# Patient Record
Sex: MALE | Race: WHITE | Employment: OTHER | URBAN - METROPOLITAN AREA
[De-identification: names, ages, dates, MRNs, and addresses within clinical notes are randomized per-mention and may not be internally consistent; named-entity substitution may affect disease eponyms.]

---

## 2019-11-04 ENCOUNTER — OFFICE VISIT (OUTPATIENT)
Dept: PHYSICAL MEDICINE AND REHAB | Age: 74
End: 2019-11-04
Payer: COMMERCIAL

## 2019-11-04 VITALS
SYSTOLIC BLOOD PRESSURE: 111 MMHG | BODY MASS INDEX: 30.1 KG/M2 | WEIGHT: 215 LBS | HEART RATE: 58 BPM | DIASTOLIC BLOOD PRESSURE: 65 MMHG | HEIGHT: 71 IN

## 2019-11-04 DIAGNOSIS — R25.2 SPASTICITY: ICD-10-CM

## 2019-11-04 DIAGNOSIS — I63.232 CEREBROVASCULAR ACCIDENT (CVA) DUE TO OCCLUSION OF LEFT CAROTID ARTERY (HCC): Primary | ICD-10-CM

## 2019-11-04 DIAGNOSIS — Z74.09 IMPAIRED MOBILITY AND ADLS: ICD-10-CM

## 2019-11-04 DIAGNOSIS — Z78.9 IMPAIRED MOBILITY AND ADLS: ICD-10-CM

## 2019-11-04 DIAGNOSIS — I69.30 CHRONIC ISCHEMIC LEFT MCA STROKE: ICD-10-CM

## 2019-11-04 PROCEDURE — 99204 OFFICE O/P NEW MOD 45 MIN: CPT | Performed by: PHYSICAL MEDICINE & REHABILITATION

## 2019-11-04 RX ORDER — LISINOPRIL 10 MG/1
10 TABLET ORAL DAILY
COMMUNITY
Start: 2019-11-04 | End: 2020-03-05 | Stop reason: ALTCHOICE

## 2019-11-04 RX ORDER — BICALUTAMIDE 50 MG/1
50 TABLET, FILM COATED ORAL DAILY
COMMUNITY
Start: 2018-08-27 | End: 2020-03-04 | Stop reason: ALTCHOICE

## 2019-11-04 RX ORDER — OXYCODONE HYDROCHLORIDE AND ACETAMINOPHEN 5; 325 MG/1; MG/1
1 TABLET ORAL EVERY 6 HOURS PRN
Qty: 2 TABLET | Refills: 0 | Status: SHIPPED | OUTPATIENT
Start: 2019-11-04 | End: 2019-11-05

## 2019-12-09 ENCOUNTER — OFFICE VISIT (OUTPATIENT)
Dept: PHYSICAL MEDICINE AND REHAB | Age: 74
End: 2019-12-09
Payer: COMMERCIAL

## 2019-12-09 VITALS — TEMPERATURE: 97.5 F

## 2019-12-09 DIAGNOSIS — R25.2 SPASTICITY: Primary | ICD-10-CM

## 2019-12-09 PROCEDURE — 64644 CHEMODENERV 1 EXTREM 5/> MUS: CPT | Performed by: PHYSICAL MEDICINE & REHABILITATION

## 2019-12-09 PROCEDURE — 64646 CHEMODENERV TRUNK MUSC 1-5: CPT | Performed by: PHYSICAL MEDICINE & REHABILITATION

## 2019-12-09 PROCEDURE — 64642 CHEMODENERV 1 EXTREMITY 1-4: CPT | Performed by: PHYSICAL MEDICINE & REHABILITATION

## 2020-01-20 ENCOUNTER — OFFICE VISIT (OUTPATIENT)
Dept: PHYSICAL MEDICINE AND REHAB | Age: 75
End: 2020-01-20
Payer: COMMERCIAL

## 2020-01-20 VITALS
WEIGHT: 210 LBS | SYSTOLIC BLOOD PRESSURE: 122 MMHG | HEIGHT: 71 IN | DIASTOLIC BLOOD PRESSURE: 72 MMHG | HEART RATE: 73 BPM | BODY MASS INDEX: 29.4 KG/M2

## 2020-01-20 PROCEDURE — 99213 OFFICE O/P EST LOW 20 MIN: CPT | Performed by: PHYSICAL MEDICINE & REHABILITATION

## 2020-01-20 NOTE — PROGRESS NOTES
Gabriel Frey D.O. [facility]   [address] [facility] [place]  Phone: [phone]  Fax: [phone]        20    Chief Complaint   Patient presents with    Arm Pain     6 week follow up after Botox    Leg Pain       HPI:  Kamar Valentin is a 69y.o. year old man seen today in follow up regarding Botox injection for spasticity. Interval history: Since the last visit the patient had Botox injection in the right upper and lower extremities on 19. There were no complications from the injection. Functional improvements have included ability to perform hand hygiene. His caregiver/wife notes that he has been able to walk with less extensor tone but now notes that the right foot is inverting on stairs. Today, the pain is rated Pain Score:   0 - No pain where 0 is no pain and 10 is pain as bad as it can be.     Past Medical History:   Diagnosis Date    A-fib [facility])     CVA (cerebral vascular accident) ([facility] [address] 11/15/2015    left MCA    Hyperlipemia     Prostate cancer ([facility] [address]     mets to spine, currently on chemo       Past Surgical History:   Procedure Laterality Date    COLONOSCOPY      GASTROSTOMY TUBE PLACEMENT  11/20/15    sonal       Social History     Tobacco Use    Smoking status: Former Smoker     Types: Cigarettes     Last attempt to quit: 2015     Years since quittin.4    Smokeless tobacco: Never Used   Substance Use Topics    Alcohol use: No     Alcohol/week: 0.0 standard drinks    Drug use: No       Family History   Problem Relation Age of Onset    Cancer Mother     Heart Disease Father        Current Outpatient Medications   Medication Sig Dispense Refill    lisinopril (PRINIVIL;ZESTRIL) 10 MG tablet Take 10 mg by mouth daily      rivaroxaban (XARELTO) 20 MG TABS tablet Take 20 mg by mouth daily      bicalutamide (CASODEX) 50 MG chemo tablet Take 50 mg by mouth daily      warfarin (COUMADIN) 3 MG tablet Take by mouth daily      baclofen (LIORESAL) 5 mg TABS Take 5 mg by mouth 3 times daily      tamsulosin (FLOMAX) 0.4 MG capsule Take 0.4 mg by mouth daily      metoprolol (TOPROL-XL) 25 MG XL tablet Take 25 mg by mouth daily      magnesium hydroxide (MILK OF MAGNESIA) 400 MG/5ML suspension Take 30 mLs by mouth daily as needed for Constipation      acetaminophen (TYLENOL) 325 MG tablet 650 mg by PEG Tube route every 6 hours as needed for Fever      sertraline (ZOLOFT) 50 MG tablet Take 50 mg by mouth daily       atorvastatin (LIPITOR) 40 MG tablet 1 tablet by Per G Tube route nightly 30 tablet 3    medicated lip balm (BLISTEX/CARMEX) 2-2.5-6.6 % STCK Apply topically as needed for Dry Lips 1 Stick 0    calcium carbonate 600 MG TABS tablet 1 tablet by Per G Tube route daily 30 tablet 3    Multiple Vitamins-Minerals (THERAPEUTIC MULTIVITAMIN-MINERALS) tablet Take 1 tablet by mouth daily      Cholecalciferol (VITAMIN D3) 2000 UNITS CAPS Take 1 capsule by mouth daily       No current facility-administered medications for this visit. No Known Allergies    Review of Systems:  No new weakness, paresthesia, incontinence of bowel or bladder, saddle anesthesia, falls or gait dysfunction. Otherwise, per HPI. Physical Exam:   Blood pressure 122/72, pulse 73, height 5' 11\" (1.803 m), weight 210 lb (95.3 kg). GENERAL: The patient is in no apparent distress. Body habitus is obese. MSK: PROM in shoulder abduction is 80*,MCP extension is -90* . There is no joint effusion, deformity, instability, swelling, erythema or warmth.   Spinal curvatures are normal.      NEURO:  Right hemiparesis     Upper Limb Resting Posture:   [  ]  Adducted/Internally rotated shoulder  Jazz.Crafts  ] Pronated Flexed Elbow  [  ]  Supinated Flexed Elbow  [  ]  Flexed wrist  [ X ]  Flexed Fingers  [  ]  Intrinsic Plus  [  ]  Thumb in Palm    Lower Limb Posture:   [  ]  Flexed hip   [  ]  Adducted thigh  [  ] Flexed knee  Jazz.Crafts  ]  Extended knee  [ X ]  Flexed

## 2020-02-20 ENCOUNTER — TELEPHONE (OUTPATIENT)
Dept: PHYSICAL MEDICINE AND REHAB | Age: 75
End: 2020-02-20

## 2020-02-20 NOTE — TELEPHONE ENCOUNTER
I called [employer] back and waited 1 hr 4 min prior to speaking with a representative. I spoke with Katherine PAYAN, who informed me that codes [address], [address], [address], [address], and [address] do not require a PA and are covered services as long as they are done in an outpatient setting and with an in-network provider. Ref# [id number]. Will call the patient's wife to schedule.

## 2020-02-20 NOTE — TELEPHONE ENCOUNTER
The patient's next Botox injections are due on 3/2/2020. Dr. Cara Reed is increasing the dose to 600 Units for the next round. I have attempted to call [facility] five time this morning, with no success (busy tone). I submitted a BVR request online via [email or web address]. I will try to contact insurance company later today.

## 2020-03-04 ENCOUNTER — OFFICE VISIT (OUTPATIENT)
Dept: PHYSICAL MEDICINE AND REHAB | Age: 75
End: 2020-03-04
Payer: COMMERCIAL

## 2020-03-04 VITALS — HEIGHT: 71 IN | BODY MASS INDEX: 29.29 KG/M2

## 2020-03-04 PROCEDURE — 96372 THER/PROPH/DIAG INJ SC/IM: CPT | Performed by: PHYSICAL MEDICINE & REHABILITATION

## 2020-03-04 PROCEDURE — 64644 CHEMODENERV 1 EXTREM 5/> MUS: CPT | Performed by: PHYSICAL MEDICINE & REHABILITATION

## 2020-03-04 PROCEDURE — 64645 CHEMODENERV 1 EXTREM 5/> EA: CPT | Performed by: PHYSICAL MEDICINE & REHABILITATION

## 2020-03-04 PROCEDURE — 95874 GUIDE NERV DESTR NEEDLE EMG: CPT | Performed by: PHYSICAL MEDICINE & REHABILITATION

## 2020-03-04 RX ORDER — PREDNISONE 1 MG/1
5 TABLET ORAL EVERY MORNING
COMMUNITY
Start: 2020-02-14

## 2020-03-04 RX ORDER — KETOROLAC TROMETHAMINE 15 MG/ML
30 INJECTION, SOLUTION INTRAMUSCULAR; INTRAVENOUS ONCE
Status: COMPLETED | OUTPATIENT
Start: 2020-03-04 | End: 2020-03-04

## 2020-03-04 RX ORDER — ABIRATERONE ACETATE 250 MG/1
1000 TABLET ORAL NIGHTLY
COMMUNITY
Start: 2020-02-15 | End: 2022-07-06

## 2020-03-04 RX ADMIN — KETOROLAC TROMETHAMINE 30 MG: 15 INJECTION, SOLUTION INTRAMUSCULAR; INTRAVENOUS at 16:23

## 2020-03-04 NOTE — PROGRESS NOTES
performed at each site prior to injection. Right Upper extremity  Pectoralis major 50 units  Pronator teres 30 units  FCR 80 units  FDS 70 uniys  FDP 60 units  FCU 30 units    Right lower extremity  RF 50 units  Vastus medialis 50 units  Total units 420. Total waste 80. · Adequate hemostasis was obtained and bandages were applied to the injection sites. The patient was monitored clinically in the office after the injection and left the office without incident. The patient was educated in post-procedure care including ice 20 minutes on/20 minutes off prn pain/bruising, call if any fevers chills, night sweats, drainage, increased pain, weakness, difficulty breathing or swallowing. The patient was advised to anticipate the Botox to start working in about 2 weeks.  follow up 6 weeks      Chipper Sicard, D.O., P.T.   Board Certified Physical Medicine and Rehabilitation  Board Certified Electrodiagnostic Medicine    Administrations This Visit     Onabotulinumtoxin A (BOTOX (COSMETIC)) injection 600 Units     Admin Date  03/04/2020  16:14 Action  Given Dose  600 Units Route  Intramuscular Site  Other Administered By  Monika Hoyt MA    Ordering Provider:  Lisa Roland DO    NDC:  1937-2498-18    Lot#:  [id number]    :  Nadira Culver    Patient Supplied?:  No    Comments:  EXP:  08/2022

## 2020-03-05 ENCOUNTER — APPOINTMENT (OUTPATIENT)
Dept: INTERVENTIONAL RADIOLOGY/VASCULAR | Age: 75
DRG: 208 | End: 2020-03-05
Payer: COMMERCIAL

## 2020-03-05 ENCOUNTER — APPOINTMENT (OUTPATIENT)
Dept: CT IMAGING | Age: 75
DRG: 208 | End: 2020-03-05
Payer: COMMERCIAL

## 2020-03-05 ENCOUNTER — HOSPITAL ENCOUNTER (INPATIENT)
Age: 75
LOS: 18 days | Discharge: NURSING, REHAB OR LONG-TERM CARE | DRG: 208 | End: 2020-03-23
Attending: EMERGENCY MEDICINE | Admitting: INTERNAL MEDICINE
Payer: COMMERCIAL

## 2020-03-05 ENCOUNTER — APPOINTMENT (OUTPATIENT)
Dept: GENERAL RADIOLOGY | Age: 75
DRG: 208 | End: 2020-03-05
Payer: COMMERCIAL

## 2020-03-05 PROBLEM — J18.9 HCAP (HEALTHCARE-ASSOCIATED PNEUMONIA): Status: ACTIVE | Noted: 2020-03-05

## 2020-03-05 LAB
ACETAMINOPHEN LEVEL: <5 MCG/ML (ref 10–30)
ALBUMIN SERPL-MCNC: 3.2 G/DL (ref 3.5–5.2)
ALP BLD-CCNC: 119 U/L (ref 40–129)
ALT SERPL-CCNC: 52 U/L (ref 0–40)
AMMONIA: 25 UMOL/L (ref 16–60)
ANION GAP SERPL CALCULATED.3IONS-SCNC: 17 MMOL/L (ref 7–16)
APTT: 37.6 SEC (ref 24.5–35.1)
AST SERPL-CCNC: 44 U/L (ref 0–39)
B.E.: -0.2 MMOL/L (ref -3–3)
BASOPHILS ABSOLUTE: 0.05 E9/L (ref 0–0.2)
BASOPHILS RELATIVE PERCENT: 0.4 % (ref 0–2)
BILIRUB SERPL-MCNC: 0.6 MG/DL (ref 0–1.2)
BUN BLDV-MCNC: 20 MG/DL (ref 8–23)
CALCIUM SERPL-MCNC: 8.6 MG/DL (ref 8.6–10.2)
CHLORIDE BLD-SCNC: 102 MMOL/L (ref 98–107)
CO2: 19 MMOL/L (ref 22–29)
CREAT SERPL-MCNC: 1 MG/DL (ref 0.7–1.2)
DELIVERY SYSTEMS: ABNORMAL
DEVICE: ABNORMAL
EOSINOPHILS ABSOLUTE: 0 E9/L (ref 0.05–0.5)
EOSINOPHILS RELATIVE PERCENT: 0 % (ref 0–6)
ETHANOL: <10 MG/DL (ref 0–0.08)
GFR AFRICAN AMERICAN: >60
GFR NON-AFRICAN AMERICAN: >60 ML/MIN/1.73
GLUCOSE BLD-MCNC: 112 MG/DL (ref 74–99)
HCO3 ARTERIAL: 23.5 MMOL/L (ref 22–26)
HCT VFR BLD CALC: 42 % (ref 37–54)
HEMOGLOBIN: 13.7 G/DL (ref 12.5–16.5)
IMMATURE GRANULOCYTES #: 0.14 E9/L
IMMATURE GRANULOCYTES %: 1 % (ref 0–5)
INFLUENZA A BY PCR: NOT DETECTED
INFLUENZA B BY PCR: NOT DETECTED
INR BLD: 3.4
LACTIC ACID: 1.3 MMOL/L (ref 0.5–2.2)
LIPASE: 8 U/L (ref 13–60)
LYMPHOCYTES ABSOLUTE: 1.12 E9/L (ref 1.5–4)
LYMPHOCYTES RELATIVE PERCENT: 7.9 % (ref 20–42)
MCH RBC QN AUTO: 30.2 PG (ref 26–35)
MCHC RBC AUTO-ENTMCNC: 32.6 % (ref 32–34.5)
MCV RBC AUTO: 92.7 FL (ref 80–99.9)
METER GLUCOSE: 102 MG/DL (ref 74–99)
MONOCYTES ABSOLUTE: 0.29 E9/L (ref 0.1–0.95)
MONOCYTES RELATIVE PERCENT: 2 % (ref 2–12)
NEUTROPHILS ABSOLUTE: 12.63 E9/L (ref 1.8–7.3)
NEUTROPHILS RELATIVE PERCENT: 88.7 % (ref 43–80)
O2 SATURATION: 84.9 % (ref 92–98.5)
OPERATOR ID: 40
PCO2 ARTERIAL: 34.6 MMHG (ref 35–45)
PDW BLD-RTO: 14 FL (ref 11.5–15)
PH BLOOD GAS: 7.44 (ref 7.35–7.45)
PLATELET # BLD: 179 E9/L (ref 130–450)
PMV BLD AUTO: 10.3 FL (ref 7–12)
PO2 ARTERIAL: 47.5 MMHG (ref 60–80)
POTASSIUM REFLEX MAGNESIUM: 3.7 MMOL/L (ref 3.5–5)
PROTHROMBIN TIME: 39.5 SEC (ref 9.3–12.4)
RBC # BLD: 4.53 E12/L (ref 3.8–5.8)
SALICYLATE, SERUM: <0.3 MG/DL (ref 0–30)
SODIUM BLD-SCNC: 138 MMOL/L (ref 132–146)
SOURCE, BLOOD GAS: ABNORMAL
TOTAL PROTEIN: 6.8 G/DL (ref 6.4–8.3)
TRICYCLIC ANTIDEPRESSANTS SCREEN SERUM: NEGATIVE NG/ML
TROPONIN: 0.26 NG/ML (ref 0–0.03)
WBC # BLD: 14.2 E9/L (ref 4.5–11.5)

## 2020-03-05 PROCEDURE — 82803 BLOOD GASES ANY COMBINATION: CPT

## 2020-03-05 PROCEDURE — 6360000002 HC RX W HCPCS: Performed by: INTERNAL MEDICINE

## 2020-03-05 PROCEDURE — 85730 THROMBOPLASTIN TIME PARTIAL: CPT

## 2020-03-05 PROCEDURE — 85025 COMPLETE CBC W/AUTO DIFF WBC: CPT

## 2020-03-05 PROCEDURE — 2580000003 HC RX 258: Performed by: EMERGENCY MEDICINE

## 2020-03-05 PROCEDURE — 36600 WITHDRAWAL OF ARTERIAL BLOOD: CPT

## 2020-03-05 PROCEDURE — 94640 AIRWAY INHALATION TREATMENT: CPT

## 2020-03-05 PROCEDURE — 71045 X-RAY EXAM CHEST 1 VIEW: CPT

## 2020-03-05 PROCEDURE — 70450 CT HEAD/BRAIN W/O DYE: CPT

## 2020-03-05 PROCEDURE — 93005 ELECTROCARDIOGRAM TRACING: CPT | Performed by: EMERGENCY MEDICINE

## 2020-03-05 PROCEDURE — 96365 THER/PROPH/DIAG IV INF INIT: CPT

## 2020-03-05 PROCEDURE — 87502 INFLUENZA DNA AMP PROBE: CPT

## 2020-03-05 PROCEDURE — 85610 PROTHROMBIN TIME: CPT

## 2020-03-05 PROCEDURE — 36556 INSERT NON-TUNNEL CV CATH: CPT

## 2020-03-05 PROCEDURE — 82962 GLUCOSE BLOOD TEST: CPT

## 2020-03-05 PROCEDURE — 71275 CT ANGIOGRAPHY CHEST: CPT

## 2020-03-05 PROCEDURE — G0480 DRUG TEST DEF 1-7 CLASSES: HCPCS

## 2020-03-05 PROCEDURE — 80307 DRUG TEST PRSMV CHEM ANLYZR: CPT

## 2020-03-05 PROCEDURE — 6370000000 HC RX 637 (ALT 250 FOR IP): Performed by: INTERNAL MEDICINE

## 2020-03-05 PROCEDURE — 87040 BLOOD CULTURE FOR BACTERIA: CPT

## 2020-03-05 PROCEDURE — 99285 EMERGENCY DEPT VISIT HI MDM: CPT

## 2020-03-05 PROCEDURE — 2580000003 HC RX 258: Performed by: INTERNAL MEDICINE

## 2020-03-05 PROCEDURE — 83690 ASSAY OF LIPASE: CPT

## 2020-03-05 PROCEDURE — 6360000002 HC RX W HCPCS: Performed by: EMERGENCY MEDICINE

## 2020-03-05 PROCEDURE — 6370000000 HC RX 637 (ALT 250 FOR IP): Performed by: EMERGENCY MEDICINE

## 2020-03-05 PROCEDURE — 2060000000 HC ICU INTERMEDIATE R&B

## 2020-03-05 PROCEDURE — 96367 TX/PROPH/DG ADDL SEQ IV INF: CPT

## 2020-03-05 PROCEDURE — 6360000004 HC RX CONTRAST MEDICATION: Performed by: RADIOLOGY

## 2020-03-05 PROCEDURE — 2500000003 HC RX 250 WO HCPCS: Performed by: INTERNAL MEDICINE

## 2020-03-05 PROCEDURE — 80053 COMPREHEN METABOLIC PANEL: CPT

## 2020-03-05 PROCEDURE — 83605 ASSAY OF LACTIC ACID: CPT

## 2020-03-05 PROCEDURE — 36415 COLL VENOUS BLD VENIPUNCTURE: CPT

## 2020-03-05 PROCEDURE — 82140 ASSAY OF AMMONIA: CPT

## 2020-03-05 PROCEDURE — 93971 EXTREMITY STUDY: CPT

## 2020-03-05 PROCEDURE — 84484 ASSAY OF TROPONIN QUANT: CPT

## 2020-03-05 RX ORDER — PROMETHAZINE HYDROCHLORIDE 25 MG/1
12.5 TABLET ORAL EVERY 6 HOURS PRN
Status: DISCONTINUED | OUTPATIENT
Start: 2020-03-05 | End: 2020-03-23 | Stop reason: HOSPADM

## 2020-03-05 RX ORDER — ABIRATERONE ACETATE 250 MG/1
1000 TABLET ORAL NIGHTLY
Status: DISCONTINUED | OUTPATIENT
Start: 2020-03-05 | End: 2020-03-23 | Stop reason: HOSPADM

## 2020-03-05 RX ORDER — BACLOFEN 10 MG/1
5 TABLET ORAL
Status: DISCONTINUED | OUTPATIENT
Start: 2020-03-05 | End: 2020-03-23 | Stop reason: HOSPADM

## 2020-03-05 RX ORDER — METOPROLOL SUCCINATE 100 MG/1
100 TABLET, EXTENDED RELEASE ORAL
COMMUNITY
End: 2022-11-01 | Stop reason: SDUPTHER

## 2020-03-05 RX ORDER — 0.9 % SODIUM CHLORIDE 0.9 %
1000 INTRAVENOUS SOLUTION INTRAVENOUS ONCE
Status: COMPLETED | OUTPATIENT
Start: 2020-03-05 | End: 2020-03-05

## 2020-03-05 RX ORDER — ACETAMINOPHEN 325 MG/1
325 TABLET ORAL EVERY 6 HOURS PRN
Status: DISCONTINUED | OUTPATIENT
Start: 2020-03-05 | End: 2020-03-23 | Stop reason: HOSPADM

## 2020-03-05 RX ORDER — ALBUTEROL SULFATE 2.5 MG/3ML
2.5 SOLUTION RESPIRATORY (INHALATION)
Status: DISCONTINUED | OUTPATIENT
Start: 2020-03-05 | End: 2020-03-23 | Stop reason: HOSPADM

## 2020-03-05 RX ORDER — LISINOPRIL 5 MG/1
5 TABLET ORAL EVERY MORNING
Status: DISCONTINUED | OUTPATIENT
Start: 2020-03-06 | End: 2020-03-09

## 2020-03-05 RX ORDER — ASPIRIN 300 MG/1
300 SUPPOSITORY RECTAL ONCE
Status: DISCONTINUED | OUTPATIENT
Start: 2020-03-05 | End: 2020-03-05

## 2020-03-05 RX ORDER — ONDANSETRON 2 MG/ML
4 INJECTION INTRAMUSCULAR; INTRAVENOUS EVERY 6 HOURS PRN
Status: DISCONTINUED | OUTPATIENT
Start: 2020-03-05 | End: 2020-03-23 | Stop reason: HOSPADM

## 2020-03-05 RX ORDER — METOPROLOL SUCCINATE 100 MG/1
100 TABLET, EXTENDED RELEASE ORAL
Status: DISCONTINUED | OUTPATIENT
Start: 2020-03-05 | End: 2020-03-23 | Stop reason: HOSPADM

## 2020-03-05 RX ORDER — SODIUM CHLORIDE 0.9 % (FLUSH) 0.9 %
10 SYRINGE (ML) INJECTION PRN
Status: DISCONTINUED | OUTPATIENT
Start: 2020-03-05 | End: 2020-03-23 | Stop reason: HOSPADM

## 2020-03-05 RX ORDER — ATORVASTATIN CALCIUM 40 MG/1
40 TABLET, FILM COATED ORAL
Status: ON HOLD | COMMUNITY
End: 2020-03-23 | Stop reason: HOSPADM

## 2020-03-05 RX ORDER — ATORVASTATIN CALCIUM 40 MG/1
40 TABLET, FILM COATED ORAL
Status: DISCONTINUED | OUTPATIENT
Start: 2020-03-05 | End: 2020-03-06

## 2020-03-05 RX ORDER — TAMSULOSIN HYDROCHLORIDE 0.4 MG/1
0.4 CAPSULE ORAL
Status: DISCONTINUED | OUTPATIENT
Start: 2020-03-05 | End: 2020-03-23 | Stop reason: HOSPADM

## 2020-03-05 RX ORDER — LEVOFLOXACIN 5 MG/ML
750 INJECTION, SOLUTION INTRAVENOUS EVERY 24 HOURS
Status: DISCONTINUED | OUTPATIENT
Start: 2020-03-05 | End: 2020-03-09

## 2020-03-05 RX ORDER — POLYETHYLENE GLYCOL 3350 17 G/17G
17 POWDER, FOR SOLUTION ORAL DAILY PRN
Status: DISCONTINUED | OUTPATIENT
Start: 2020-03-05 | End: 2020-03-23 | Stop reason: HOSPADM

## 2020-03-05 RX ORDER — SODIUM CHLORIDE 0.9 % (FLUSH) 0.9 %
10 SYRINGE (ML) INJECTION EVERY 12 HOURS SCHEDULED
Status: DISCONTINUED | OUTPATIENT
Start: 2020-03-05 | End: 2020-03-23 | Stop reason: HOSPADM

## 2020-03-05 RX ORDER — ACETAMINOPHEN 325 MG/1
650 TABLET ORAL EVERY 6 HOURS PRN
Status: DISCONTINUED | OUTPATIENT
Start: 2020-03-05 | End: 2020-03-23 | Stop reason: HOSPADM

## 2020-03-05 RX ORDER — VITAMIN B COMPLEX
1000 TABLET ORAL
Status: DISCONTINUED | OUTPATIENT
Start: 2020-03-05 | End: 2020-03-23 | Stop reason: HOSPADM

## 2020-03-05 RX ORDER — LISINOPRIL 5 MG/1
5 TABLET ORAL EVERY MORNING
Status: ON HOLD | COMMUNITY
End: 2020-03-23 | Stop reason: HOSPADM

## 2020-03-05 RX ORDER — PREDNISONE 1 MG/1
5 TABLET ORAL DAILY
Status: DISCONTINUED | OUTPATIENT
Start: 2020-03-06 | End: 2020-03-09

## 2020-03-05 RX ORDER — M-VIT,TX,IRON,MINS/CALC/FOLIC 27MG-0.4MG
1 TABLET ORAL EVERY MORNING
Status: DISCONTINUED | OUTPATIENT
Start: 2020-03-06 | End: 2020-03-18 | Stop reason: CLARIF

## 2020-03-05 RX ORDER — ACETAMINOPHEN 650 MG/1
650 SUPPOSITORY RECTAL EVERY 6 HOURS PRN
Status: DISCONTINUED | OUTPATIENT
Start: 2020-03-05 | End: 2020-03-18

## 2020-03-05 RX ORDER — LEVOFLOXACIN 5 MG/ML
750 INJECTION, SOLUTION INTRAVENOUS EVERY 24 HOURS
Status: DISCONTINUED | OUTPATIENT
Start: 2020-03-05 | End: 2020-03-05

## 2020-03-05 RX ORDER — SODIUM CHLORIDE 9 MG/ML
INJECTION, SOLUTION INTRAVENOUS EVERY 8 HOURS
Status: DISCONTINUED | OUTPATIENT
Start: 2020-03-06 | End: 2020-03-09

## 2020-03-05 RX ORDER — ASPIRIN 81 MG/1
324 TABLET, CHEWABLE ORAL ONCE
Status: DISCONTINUED | OUTPATIENT
Start: 2020-03-05 | End: 2020-03-06

## 2020-03-05 RX ADMIN — CHOLECALCIFEROL TAB 25 MCG (1000 UNIT) 1000 UNITS: 25 TAB at 23:02

## 2020-03-05 RX ADMIN — SODIUM CHLORIDE 1000 ML: 9 INJECTION, SOLUTION INTRAVENOUS at 09:51

## 2020-03-05 RX ADMIN — ATORVASTATIN CALCIUM 40 MG: 40 TABLET, FILM COATED ORAL at 23:02

## 2020-03-05 RX ADMIN — PIPERACILLIN AND TAZOBACTAM 3.38 G: 3; .375 INJECTION, POWDER, FOR SOLUTION INTRAVENOUS at 23:04

## 2020-03-05 RX ADMIN — PIPERACILLIN AND TAZOBACTAM 3.38 G: 3; .375 INJECTION, POWDER, FOR SOLUTION INTRAVENOUS at 12:22

## 2020-03-05 RX ADMIN — ALBUTEROL SULFATE 2.5 MG: 2.5 SOLUTION RESPIRATORY (INHALATION) at 19:12

## 2020-03-05 RX ADMIN — TAMSULOSIN HYDROCHLORIDE 0.4 MG: 0.4 CAPSULE ORAL at 23:02

## 2020-03-05 RX ADMIN — IOPAMIDOL 80 ML: 755 INJECTION, SOLUTION INTRAVENOUS at 14:09

## 2020-03-05 RX ADMIN — SODIUM CHLORIDE 1000 ML: 9 INJECTION, SOLUTION INTRAVENOUS at 13:20

## 2020-03-05 RX ADMIN — LEVOFLOXACIN 750 MG: 5 INJECTION, SOLUTION INTRAVENOUS at 18:16

## 2020-03-05 RX ADMIN — OYSTER SHELL CALCIUM WITH VITAMIN D 1 TABLET: 500; 200 TABLET, FILM COATED ORAL at 23:04

## 2020-03-05 RX ADMIN — VANCOMYCIN HYDROCHLORIDE 1500 MG: 10 INJECTION, POWDER, LYOPHILIZED, FOR SOLUTION INTRAVENOUS at 13:11

## 2020-03-05 RX ADMIN — METOPROLOL SUCCINATE 100 MG: 100 TABLET, EXTENDED RELEASE ORAL at 23:02

## 2020-03-05 RX ADMIN — Medication 10 ML: at 23:05

## 2020-03-05 RX ADMIN — FAMOTIDINE 20 MG: 10 INJECTION INTRAVENOUS at 23:04

## 2020-03-05 RX ADMIN — RIVAROXABAN 20 MG: 20 TABLET, FILM COATED ORAL at 23:04

## 2020-03-05 RX ADMIN — ACETAMINOPHEN 650 MG: 325 TABLET, FILM COATED ORAL at 23:03

## 2020-03-05 RX ADMIN — BACLOFEN 5 MG: 10 TABLET ORAL at 23:03

## 2020-03-05 ASSESSMENT — PAIN SCALES - GENERAL: PAINLEVEL_OUTOF10: 3

## 2020-03-05 NOTE — ED NOTES
EKG completed. Pt placed on telemetry monitor and pulse oximetry. Physician notified.       Saul [place]  03/05/20 5160

## 2020-03-05 NOTE — ED NOTES
To vascular lab for right leg evaluation     Bg Pillai RN  03/05/20 0934 <<----- Click to add NO significant Past Surgical History

## 2020-03-05 NOTE — ED NOTES
Report called to [facility]. They are asking for 20 min before going to the floor.      Erika Lux RN  03/05/20 5847

## 2020-03-05 NOTE — H&P
History and Physical      CHIEF COMPLAINT:  Altered mental status    History of Present Illness: This is a 70year old male patient with a past medical history of atrial fibrillation, hyperlipidemia, CVA, and prostate cancer with mets to the spine and brain. He is currently following with [facility] for chemotherapy. He was brought in with reports of altered mental status. According to the family he is usually very coherent but today he is somnolent. The laboratory studies today show an elevated white blood cell count, alt, ast, and troponin level. He was tested for influenza which was negative. There has been no change in his mental status since arrival.  The cat scan of the head shows a large remote left MCA territory infarct. The chest xray is suggestive of pneumonia. At this time he is being admitted for evaluation and treatment. REVIEW OF SYSTEMS:  Review of systems not obtained due to patient factors. PMH:  Past Medical History:   Diagnosis Date    A-fib [facility])     CVA (cerebral vascular accident) ([facility] [address] 11/15/2015    left MCA    Hyperlipemia     Prostate cancer ([facility] [address]     mets to spine, currently on chemo       Surgical History:  Past Surgical History:   Procedure Laterality Date    COLONOSCOPY      GASTROSTOMY TUBE PLACEMENT  11/20/15    sonal       Medications Prior to Admission:    Prior to Admission medications    Medication Sig Start Date End Date Taking?  Authorizing Provider   atorvastatin (LIPITOR) 40 MG tablet Take 40 mg by mouth Daily with supper   Yes Historical Provider, MD   Calcium Carb-Cholecalciferol ( CALCIUM/VITAMIN D) 600-800 MG-UNIT TABS Take 1 tablet by mouth daily   Yes Historical Provider, MD   vitamin D (CHOLECALCIFEROL) 25 MCG (1000 UT) TABS tablet Take 1,000 Units by mouth Daily with lunch   Yes Historical Provider, MD   lisinopril (PRINIVIL;ZESTRIL) 5 MG tablet Take 5 mg by mouth every morning   Yes Historical Provider, MD   metoprolol succinate (TOPROL XL) 100 MG extended release tablet Take 100 mg by mouth Daily with supper   Yes Historical Provider, MD   Ascorbic Acid (VITAMIN C PLUS WILD CAL HIPS PO) Take 1,000 mg by mouth Daily with lunch   Yes Historical Provider, MD   influenza virus trivalent vaccine (FLUZONE) injection Inject 0.5 mLs into the muscle once Given 10/2019   Yes Historical Provider, MD   predniSONE (DELTASONE) 5 MG tablet Take 5 mg by mouth every morning  2/14/20  Yes Historical Provider, MD   abiraterone acetate (ZYTIGA) 250 MG tablet Take 1,000 mg by mouth nightly  2/15/20  Yes Historical Provider, MD   rivaroxaban (XARELTO) 20 MG TABS tablet Take 20 mg by mouth Daily with supper  11/22/16  Yes Historical Provider, MD   baclofen (LIORESAL) 5 mg TABS Take 5 mg by mouth 3 times daily (with meals)    Yes Historical Provider, MD   tamsulosin (FLOMAX) 0.4 MG capsule Take 0.4 mg by mouth Daily with supper    Yes Historical Provider, MD   acetaminophen (TYLENOL) 325 MG tablet Take 325 mg by mouth every 6 hours as needed for Pain or Fever    Yes Historical Provider, MD   Multiple Vitamins-Minerals (THERAPEUTIC MULTIVITAMIN-MINERALS) tablet Take 1 tablet by mouth every morning    Yes Historical Provider, MD       Allergies:    Patient has no known allergies. Social History:    reports that he quit smoking about 4 years ago. His smoking use included cigarettes. He has never used smokeless tobacco. He reports that he does not drink alcohol or use drugs. Family History:   family history includes Cancer in his mother; Heart Disease in his father.       PHYSICAL EXAM:  Vitals:  BP 99/61   Pulse 85   Temp 97.7 °F (36.5 °C) (Oral)   Resp (!) 32   Ht 5' 11\" (1.803 m)   Wt 236 lb 7 oz (107.2 kg)   SpO2 94%   BMI 32.98 kg/m²     Constitutional/General: Somnolent, arousable to verbal stimuli, chronically ill-appearing individual  Head: Normocephalic and atraumatic  Mouth: Oropharynx clear, handling secretions, no trismus  Neck: Supple, full ROM, no extra-axial fluid is seen. The paranasal sinuses are clear. The globes and orbits are normal.  No abnormalities of the calvarium are identified. Large remote left MCA territory infarct. No acute hemorrhage or mass. Xr Chest Portable    Result Date: 3/5/2020  LOCATION: 200 EXAM: XR CHEST PORTABLE COMPARISON: None HISTORY: Shortness of breath TECHNIQUE: Single frontal view of the chest was obtained. FINDINGS:  SUPPORT DEVICES: None. LUNGS: Patchy airspace disease at the left lung base noted. PLEURA: No pneumothorax visualized. LUNG VOLUMES: Satisfactory inspirator effort. MEDIASTINAL STRUCTURES: No lymphadenopathy. Normal aortic contour. HEART SIZE: Enlarged. BONES AND SOFT TISSUES: No fracture or soft tissue abnormality. 1. Left lower lobe airspace disease is likely infectious. Us Dup Lower Extremity Right Dali    Result Date: 3/5/2020  Location:200 Exam: US DUP LOWER EXTREMITY RIGHT DALI Comparison: None Indications: Discomfort Technique: Using real-time, color, and spectral Doppler waveform analysis, ultrasound imaging of the right lower extremity deep venous system was performed, from the common femoral vein to the proximal calf. Findings: The visualized veins of the deep venous system are grossly patent without an echogenic focus within them. These veins compress fully and demonstrate flow bilaterally. No significant reflux is seen within the greater saphenous veins. No evidence of right lower extremity deep venous thrombus from common femoral vein to proximal calf. ASSESSMENT:      Altered mental status  Cellulitis  History of CVA  Metastatic prostate cancer  Hyperlipidemia  Elevated white blood cell count  Pnuemonia  Transamanitis  Elevated troponin          PLAN:    1. Admit to the hospital   2. Start the patient on IV antibiotics  3. Continue home medications  4. Discussed with family at length  5.  We will consult infectious disease    Code Status: full code  DVT prophylaxis: patient already on Xarelto      Electronically signed by Diana Castrejon MD on 3/5/2020 at 2:09 PM

## 2020-03-05 NOTE — ED PROVIDER NOTES
Chief complaint: Altered mental status      HPI:  3/5/20, Time: 8:02 AM         Rahul Hoang is a 70 y.o. male presenting to the ED for altered mental status. History is obtained from the patient's daughter as well as the patient's wife as the patient is acutely altered. Patient does have a history of CVA. The patient did have loss of communication and communicates only through fragments. The patient does have paresis on the right upper and lower extremity secondary to his CVA. The patient is able to ambulate with a walker. The patient does receive Botox for spasticity in his right upper and lower extremity. The patient did receive Botox yesterday. The patient was complaining of increasing pain in his right upper and lower extremity yesterday which was relieved with the Botox. The patient became very fatigued last night and today was not able to walk and was less alert than his normal self. The patient is normally \"very coherent \"per the wife. The patient is very somnolent today. This somnolence has been constant since onset. Nothing seems to make it better or worse. The patient has not had any treatments prior to arrival.  The patient is currently on chemotherapy at [facility] for prostate cancer. The patient does have known mets to the bones as well as the brain. The history is otherwise limited secondary to the acuity of the patient's condition. ROS:   Review of Systems   Unable to perform ROS: Acuity of condition       --------------------------------------------- PAST HISTORY ---------------------------------------------  Past Medical History:  has a past medical history of A-fib ([facility] [address] CVA (cerebral vascular accident) ([facility] [address] Hyperlipemia, and Prostate cancer ([facility] [address] Past Surgical History:  has a past surgical history that includes Gastrostomy tube placement (11/20/15) and Colonoscopy. Social History:  reports that he quit smoking about 4 years ago.  His smoking use included influenza A/B antigens   Result Value Ref Range    Influenza A by PCR Not Detected Not Detected    Influenza B by PCR Not Detected Not Detected   CBC Auto Differential   Result Value Ref Range    WBC 14.2 (H) 4.5 - 11.5 E9/L    RBC 4.53 3.80 - 5.80 E12/L    Hemoglobin 13.7 12.5 - 16.5 g/dL    Hematocrit 42.0 37.0 - 54.0 %    MCV 92.7 80.0 - 99.9 fL    MCH 30.2 26.0 - 35.0 pg    MCHC 32.6 32.0 - 34.5 %    RDW 14.0 11.5 - 15.0 fL    Platelets 088 350 - 548 E9/L    MPV 10.3 7.0 - 12.0 fL    Neutrophils % 88.7 (H) 43.0 - 80.0 %    Immature Granulocytes % 1.0 0.0 - 5.0 %    Lymphocytes % 7.9 (L) 20.0 - 42.0 %    Monocytes % 2.0 2.0 - 12.0 %    Eosinophils % 0.0 0.0 - 6.0 %    Basophils % 0.4 0.0 - 2.0 %    Neutrophils Absolute 12.63 (H) 1.80 - 7.30 E9/L    Immature Granulocytes # 0.14 E9/L    Lymphocytes Absolute 1.12 (L) 1.50 - 4.00 E9/L    Monocytes Absolute 0.29 0.10 - 0.95 E9/L    Eosinophils Absolute 0.00 (L) 0.05 - 0.50 E9/L    Basophils Absolute 0.05 0.00 - 0.20 E9/L   Comprehensive Metabolic Panel w/ Reflex to MG   Result Value Ref Range    Sodium 138 132 - 146 mmol/L    Potassium reflex Magnesium 3.7 3.5 - 5.0 mmol/L    Chloride 102 98 - 107 mmol/L    CO2 19 (L) 22 - 29 mmol/L    Anion Gap 17 (H) 7 - 16 mmol/L    Glucose 112 (H) 74 - 99 mg/dL    BUN 20 8 - 23 mg/dL    CREATININE 1.0 0.7 - 1.2 mg/dL    GFR Non-African American >60 >=60 mL/min/1.73    GFR African American >60     Calcium 8.6 8.6 - 10.2 mg/dL    Total Protein 6.8 6.4 - 8.3 g/dL    Alb 3.2 (L) 3.5 - 5.2 g/dL    Total Bilirubin 0.6 0.0 - 1.2 mg/dL    Alkaline Phosphatase 119 40 - 129 U/L    ALT 52 (H) 0 - 40 U/L    AST 44 (H) 0 - 39 U/L   Troponin   Result Value Ref Range    Troponin 0.26 (H) 0.00 - 0.03 ng/mL   Lipase   Result Value Ref Range    Lipase 8 (L) 13 - 60 U/L   Protime-INR   Result Value Ref Range    Protime 39.5 (H) 9.3 - 12.4 sec    INR 3.4    APTT   Result Value Ref Range    aPTT 37.6 (H) 24.5 - 35.1 sec   Lactic Acid, Plasma   Result mild depressions in the anterior leads, this is stable compared to patient's prior EKG. No ST segment elevations. Comparison: stable as compared to patient's most recent EKG  Interpreted by me      ------------------------- NURSING NOTES AND VITALS REVIEWED ---------------------------   The nursing notes within the ED encounter and vital signs as below have been reviewed by myself. BP (!) 105/58   Pulse 86   Temp 98.8 °F (37.1 °C) (Oral)   Resp 28   Ht 5' 11\" (1.803 m)   Wt 236 lb 7 oz (107.2 kg)   SpO2 99%   BMI 32.98 kg/m²   Oxygen Saturation Interpretation: Abnormal    The patients available past medical records and past encounters were reviewed.         ------------------------------ ED COURSE/MEDICAL DECISION MAKING----------------------  Medications   aspirin chewable tablet 324 mg (has no administration in time range)   sodium chloride flush 0.9 % injection 10 mL (has no administration in time range)   sodium chloride flush 0.9 % injection 10 mL (has no administration in time range)   acetaminophen (TYLENOL) tablet 650 mg (has no administration in time range)     Or   acetaminophen (TYLENOL) suppository 650 mg (has no administration in time range)   polyethylene glycol (GLYCOLAX) packet 17 g (has no administration in time range)   promethazine (PHENERGAN) tablet 12.5 mg (has no administration in time range)     Or   ondansetron (ZOFRAN) injection 4 mg (has no administration in time range)   vancomycin (VANCOCIN) 1,500 mg in dextrose 5 % 300 mL IVPB (has no administration in time range)   famotidine (PEPCID) injection 20 mg (has no administration in time range)   albuterol (PROVENTIL) nebulizer solution 2.5 mg (has no administration in time range)   piperacillin-tazobactam (ZOSYN) 3.375 g in dextrose 5 % 50 mL IVPB extended infusion (mini-bag) (has no administration in time range)     And   0.9 % sodium chloride infusion (has no administration in time range)   levofloxacin (LEVAQUIN) 750 MG/150ML infusion 750 mg (has no administration in time range)   0.9 % sodium chloride bolus (0 mLs Intravenous Stopped 3/5/20 1119)   vancomycin (VANCOCIN) 1,500 mg in dextrose 5 % 300 mL IVPB (0 mg Intravenous Stopped 3/5/20 1536)   piperacillin-tazobactam (ZOSYN) 3.375 g in dextrose 5 % 50 mL IVPB extended infusion (mini-bag) (0 g Intravenous Stopped 3/5/20 1306)   0.9 % sodium chloride bolus (0 mLs Intravenous Stopped 3/5/20 1510)   iopamidol (ISOVUE-370) 76 % injection 80 mL (80 mLs Intravenous Given 3/5/20 1409)             Medical Decision Making:   I, Dr. Charley Gordon am the primary physician of record. Jonathon Harley is a 70 y.o. male who presents to the ED for altered mental status. The patient did have labs and imaging which were reviewed. Patient was found hypoxic respiratory failure secondary to pneumonia. Patient also found to be encephalopathic. The patient did have elevated troponin at 1.26. CTA negative for any pulmonary emboli, patient did have triple-lumen placed in the emergency department. Patient was ordered antibiotics. Patient will be admitted for further treatment and evaluation. Consultation with internal medicine. The patient will be admitted for further treatment and evaluation for   1. Encephalopathy acute    2. HCAP (healthcare-associated pneumonia)    3. NSTEMI (non-ST elevated myocardial infarction) ([facility] [address]    4. Acute respiratory failure with hypoxia [facility])              Re-Evaluations/Consultations:             ED Course as of Mar 05 1625   Thu Mar 05, 2020   0903 Spoke with the patient's wife at bedside. [MT]   1150 Patient is in the bed in no acute distress. The results of today this point were discussed with the patient's wife and daughter. Risks and benefits of central line were discussed. They are agreeable. [MT]   [place] [address] established at this time. [MT]   [phone] Spoke with Dr. Tamanna Jain  Updated on the patient's case. Will call with CTA results.     [MT]   [address] todays results, in addition to providing specific details for the plan of care and counseling regarding the diagnosis and prognosis. Questions are answered at this time and they are agreeable with the plan.       --------------------------------- IMPRESSION AND DISPOSITION ---------------------------------    IMPRESSION  1. Encephalopathy acute    2. HCAP (healthcare-associated pneumonia)    3. NSTEMI (non-ST elevated myocardial infarction) ([facility] [address]    4. Acute respiratory failure with hypoxia (HCC)        DISPOSITION  Disposition: Admit to [address]  Patient condition is stable        NOTE: This report was transcribed using voice recognition software.  Every effort was made to ensure accuracy; however, inadvertent computerized transcription errors may be present         Dania Silva DO  03/05/20 4350

## 2020-03-06 ENCOUNTER — APPOINTMENT (OUTPATIENT)
Dept: GENERAL RADIOLOGY | Age: 75
DRG: 208 | End: 2020-03-06
Payer: COMMERCIAL

## 2020-03-06 ENCOUNTER — APPOINTMENT (OUTPATIENT)
Dept: ULTRASOUND IMAGING | Age: 75
DRG: 208 | End: 2020-03-06
Payer: COMMERCIAL

## 2020-03-06 LAB
BASOPHILS ABSOLUTE: 0 E9/L (ref 0–0.2)
BASOPHILS RELATIVE PERCENT: 0.2 % (ref 0–2)
BURR CELLS: ABNORMAL
DOHLE BODIES: ABNORMAL
EKG ATRIAL RATE: 388 BPM
EKG Q-T INTERVAL: 404 MS
EKG QRS DURATION: 94 MS
EKG QTC CALCULATION (BAZETT): 486 MS
EKG R AXIS: -45 DEGREES
EKG T AXIS: 55 DEGREES
EKG VENTRICULAR RATE: 87 BPM
EOSINOPHILS ABSOLUTE: 0 E9/L (ref 0.05–0.5)
EOSINOPHILS RELATIVE PERCENT: 0.1 % (ref 0–6)
HCT VFR BLD CALC: 38.8 % (ref 37–54)
HEMOGLOBIN: 12.4 G/DL (ref 12.5–16.5)
LACTIC ACID, SEPSIS: 1.3 MMOL/L (ref 0.5–1.9)
LACTIC ACID, SEPSIS: 2.1 MMOL/L (ref 0.5–1.9)
LYMPHOCYTES ABSOLUTE: 0.72 E9/L (ref 1.5–4)
LYMPHOCYTES RELATIVE PERCENT: 5.2 % (ref 20–42)
MCH RBC QN AUTO: 30 PG (ref 26–35)
MCHC RBC AUTO-ENTMCNC: 32 % (ref 32–34.5)
MCV RBC AUTO: 93.9 FL (ref 80–99.9)
METAMYELOCYTES RELATIVE PERCENT: 0.9 % (ref 0–1)
MONOCYTES ABSOLUTE: 0.14 E9/L (ref 0.1–0.95)
MONOCYTES RELATIVE PERCENT: 0.9 % (ref 2–12)
NEUTROPHILS ABSOLUTE: 13.54 E9/L (ref 1.8–7.3)
NEUTROPHILS RELATIVE PERCENT: 93 % (ref 43–80)
OVALOCYTES: ABNORMAL
PDW BLD-RTO: 14.6 FL (ref 11.5–15)
PLATELET # BLD: 146 E9/L (ref 130–450)
PMV BLD AUTO: 10.3 FL (ref 7–12)
POIKILOCYTES: ABNORMAL
POLYCHROMASIA: ABNORMAL
RBC # BLD: 4.13 E12/L (ref 3.8–5.8)
TEAR DROP CELLS: ABNORMAL
TOXIC GRANULATION: ABNORMAL
VACUOLATED NEUTROPHILS: ABNORMAL
WBC # BLD: 14.4 E9/L (ref 4.5–11.5)

## 2020-03-06 PROCEDURE — 2500000003 HC RX 250 WO HCPCS: Performed by: INTERNAL MEDICINE

## 2020-03-06 PROCEDURE — 76705 ECHO EXAM OF ABDOMEN: CPT

## 2020-03-06 PROCEDURE — 85025 COMPLETE CBC W/AUTO DIFF WBC: CPT

## 2020-03-06 PROCEDURE — 92611 MOTION FLUOROSCOPY/SWALLOW: CPT

## 2020-03-06 PROCEDURE — 36591 DRAW BLOOD OFF VENOUS DEVICE: CPT

## 2020-03-06 PROCEDURE — 71046 X-RAY EXAM CHEST 2 VIEWS: CPT

## 2020-03-06 PROCEDURE — 87040 BLOOD CULTURE FOR BACTERIA: CPT

## 2020-03-06 PROCEDURE — 74230 X-RAY XM SWLNG FUNCJ C+: CPT

## 2020-03-06 PROCEDURE — 83605 ASSAY OF LACTIC ACID: CPT

## 2020-03-06 PROCEDURE — 6360000002 HC RX W HCPCS: Performed by: INTERNAL MEDICINE

## 2020-03-06 PROCEDURE — 2580000003 HC RX 258: Performed by: INTERNAL MEDICINE

## 2020-03-06 PROCEDURE — 36415 COLL VENOUS BLD VENIPUNCTURE: CPT

## 2020-03-06 PROCEDURE — 94640 AIRWAY INHALATION TREATMENT: CPT

## 2020-03-06 PROCEDURE — 92526 ORAL FUNCTION THERAPY: CPT

## 2020-03-06 PROCEDURE — 93010 ELECTROCARDIOGRAM REPORT: CPT | Performed by: INTERNAL MEDICINE

## 2020-03-06 PROCEDURE — 6370000000 HC RX 637 (ALT 250 FOR IP): Performed by: INTERNAL MEDICINE

## 2020-03-06 PROCEDURE — 2060000000 HC ICU INTERMEDIATE R&B

## 2020-03-06 PROCEDURE — 36592 COLLECT BLOOD FROM PICC: CPT

## 2020-03-06 RX ORDER — PREDNISONE 1 MG/1
5 TABLET ORAL EVERY MORNING
Status: DISCONTINUED | OUTPATIENT
Start: 2020-03-06 | End: 2020-03-06 | Stop reason: SDUPTHER

## 2020-03-06 RX ORDER — SODIUM CHLORIDE 9 MG/ML
INJECTION, SOLUTION INTRAVENOUS ONCE
Status: COMPLETED | OUTPATIENT
Start: 2020-03-06 | End: 2020-03-06

## 2020-03-06 RX ORDER — SODIUM CHLORIDE 0.9 % (FLUSH) 0.9 %
10 SYRINGE (ML) INJECTION PRN
Status: DISCONTINUED | OUTPATIENT
Start: 2020-03-06 | End: 2020-03-23 | Stop reason: HOSPADM

## 2020-03-06 RX ORDER — SODIUM CHLORIDE 0.9 % (FLUSH) 0.9 %
10 SYRINGE (ML) INJECTION EVERY 12 HOURS SCHEDULED
Status: DISCONTINUED | OUTPATIENT
Start: 2020-03-06 | End: 2020-03-23 | Stop reason: HOSPADM

## 2020-03-06 RX ADMIN — PIPERACILLIN AND TAZOBACTAM 3.38 G: 3; .375 INJECTION, POWDER, FOR SOLUTION INTRAVENOUS at 16:06

## 2020-03-06 RX ADMIN — BACLOFEN 5 MG: 10 TABLET ORAL at 16:05

## 2020-03-06 RX ADMIN — ALBUTEROL SULFATE 2.5 MG: 2.5 SOLUTION RESPIRATORY (INHALATION) at 10:11

## 2020-03-06 RX ADMIN — RIVAROXABAN 20 MG: 20 TABLET, FILM COATED ORAL at 16:06

## 2020-03-06 RX ADMIN — ALBUTEROL SULFATE 2.5 MG: 2.5 SOLUTION RESPIRATORY (INHALATION) at 18:15

## 2020-03-06 RX ADMIN — BARIUM SULFATE 45 G: 0.6 CREAM ORAL at 15:23

## 2020-03-06 RX ADMIN — VANCOMYCIN HYDROCHLORIDE 1500 MG: 10 INJECTION, POWDER, LYOPHILIZED, FOR SOLUTION INTRAVENOUS at 16:52

## 2020-03-06 RX ADMIN — PIPERACILLIN AND TAZOBACTAM 3.38 G: 3; .375 INJECTION, POWDER, FOR SOLUTION INTRAVENOUS at 06:21

## 2020-03-06 RX ADMIN — FAMOTIDINE 20 MG: 10 INJECTION INTRAVENOUS at 20:55

## 2020-03-06 RX ADMIN — BARIUM SULFATE 45 G: 0.81 POWDER, FOR SUSPENSION ORAL at 15:23

## 2020-03-06 RX ADMIN — PIPERACILLIN AND TAZOBACTAM 3.38 G: 3; .375 INJECTION, POWDER, FOR SOLUTION INTRAVENOUS at 22:41

## 2020-03-06 RX ADMIN — Medication 10 ML: at 20:57

## 2020-03-06 RX ADMIN — ACETAMINOPHEN 650 MG: 325 TABLET, FILM COATED ORAL at 16:05

## 2020-03-06 RX ADMIN — Medication 10 ML: at 16:37

## 2020-03-06 RX ADMIN — TAMSULOSIN HYDROCHLORIDE 0.4 MG: 0.4 CAPSULE ORAL at 16:04

## 2020-03-06 RX ADMIN — FAMOTIDINE 20 MG: 10 INJECTION INTRAVENOUS at 16:34

## 2020-03-06 RX ADMIN — BACLOFEN 5 MG: 10 TABLET ORAL at 08:08

## 2020-03-06 RX ADMIN — SODIUM CHLORIDE: 9 INJECTION, SOLUTION INTRAVENOUS at 20:56

## 2020-03-06 RX ADMIN — ALBUTEROL SULFATE 2.5 MG: 2.5 SOLUTION RESPIRATORY (INHALATION) at 07:31

## 2020-03-06 RX ADMIN — SODIUM CHLORIDE: 9 INJECTION, SOLUTION INTRAVENOUS at 00:05

## 2020-03-06 RX ADMIN — VANCOMYCIN HYDROCHLORIDE 1500 MG: 10 INJECTION, POWDER, LYOPHILIZED, FOR SOLUTION INTRAVENOUS at 02:19

## 2020-03-06 RX ADMIN — Medication 10 ML: at 20:56

## 2020-03-06 RX ADMIN — LEVOFLOXACIN 750 MG: 5 INJECTION, SOLUTION INTRAVENOUS at 16:51

## 2020-03-06 RX ADMIN — SODIUM CHLORIDE: 9 INJECTION, SOLUTION INTRAVENOUS at 06:21

## 2020-03-06 RX ADMIN — METOPROLOL SUCCINATE 100 MG: 100 TABLET, EXTENDED RELEASE ORAL at 16:04

## 2020-03-06 RX ADMIN — BARIUM SULFATE 45 ML: 400 SUSPENSION ORAL at 15:23

## 2020-03-06 ASSESSMENT — PAIN SCALES - GENERAL
PAINLEVEL_OUTOF10: 4
PAINLEVEL_OUTOF10: 0
PAINLEVEL_OUTOF10: 1

## 2020-03-06 NOTE — CONSULTS
([facility]     mets to spine, currently on chemo       Patient Active Problem List    Diagnosis Date Noted    HCAP (healthcare-associated pneumonia) 03/05/2020    Subarachnoid hemorrhage ([facility] 12/07/2015    Cerebral contusion (HCC)     Paroxysmal atrial fibrillation (HCC)     Prostate cancer ([facility]     Dysphagia due to recent stroke     Cerebrovascular accident (CVA) due to occlusion of left carotid artery ([facility] 11/15/2015        Past Surgical History:   Procedure Laterality Date    COLONOSCOPY      GASTROSTOMY TUBE PLACEMENT  11/20/15    sonal       Family History  Family History   Problem Relation Age of Onset    Cancer Mother     Heart Disease Father        Social History    TOBACCO:   reports that he quit smoking about 4 years ago. His smoking use included cigarettes. He has never used smokeless tobacco.  ETOH:   reports no history of alcohol use. Home Medications  Prior to Admission medications    Medication Sig Start Date End Date Taking?  Authorizing Provider   atorvastatin (LIPITOR) 40 MG tablet Take 40 mg by mouth Daily with supper   Yes Historical Provider, MD   Calcium Carb-Cholecalciferol (SM CALCIUM/VITAMIN D) 600-800 MG-UNIT TABS Take 1 tablet by mouth daily   Yes Historical Provider, MD   vitamin D (CHOLECALCIFEROL) 25 MCG (1000 UT) TABS tablet Take 1,000 Units by mouth Daily with lunch   Yes Historical Provider, MD   lisinopril (PRINIVIL;ZESTRIL) 5 MG tablet Take 5 mg by mouth every morning   Yes Historical Provider, MD   metoprolol succinate (TOPROL XL) 100 MG extended release tablet Take 100 mg by mouth Daily with supper   Yes Historical Provider, MD   Ascorbic Acid (VITAMIN C PLUS WILD CAL HIPS PO) Take 1,000 mg by mouth Daily with lunch   Yes Historical Provider, MD   influenza virus trivalent vaccine (FLUZONE) injection Inject 0.5 mLs into the muscle once Given 10/2019   Yes Historical Provider, MD   predniSONE (DELTASONE) 5 MG tablet Take 5 mg by mouth every morning  2/14/20  Yes Historical Provider, MD   abiraterone acetate (ZYTIGA) 250 MG tablet Take 1,000 mg by mouth nightly  2/15/20  Yes Historical Provider, MD   rivaroxaban (XARELTO) 20 MG TABS tablet Take 20 mg by mouth Daily with supper  11/22/16  Yes Historical Provider, MD   baclofen (LIORESAL) 5 mg TABS Take 5 mg by mouth 3 times daily (with meals)    Yes Historical Provider, MD   tamsulosin (FLOMAX) 0.4 MG capsule Take 0.4 mg by mouth Daily with supper    Yes Historical Provider, MD   acetaminophen (TYLENOL) 325 MG tablet Take 325 mg by mouth every 6 hours as needed for Pain or Fever    Yes Historical Provider, MD   Multiple Vitamins-Minerals (THERAPEUTIC MULTIVITAMIN-MINERALS) tablet Take 1 tablet by mouth every morning    Yes Historical Provider, MD       Allergies  No Known Allergies    Review of Systems:    Difficult to ascertain with aphasia, but does complain of RLE pain      Objective  BP (!) 89/50   Pulse 83   Temp 99.1 °F (37.3 °C) (Axillary)   Resp 27   Ht 5' 11\" (1.803 m)   Wt 237 lb 14.4 oz (107.9 kg)   SpO2 95%   BMI 33.18 kg/m²     Physical Exam:   Performance Status:  General: AAO to person, place, time, in no acute distress, pleasant   Head and neck : PERRLA, EOMI . Sclera non icteric.   Oropharynx : Clear  Neck: no JVD,  no adenopathy  LYMPHATICS : No LAD  Heart: Regular rate and regular rhythm, no murmur  Lungs: Clear to auscultation   Extremities: RLE cellulitis  Abdomen: Soft, non-tender;no masses, no organomegaly  Skin:  No rash  Neurologic:R hemiparesis    Recent Laboratory Data-   Lab Results   Component Value Date    WBC 14.4 (H) 03/06/2020    HGB 12.4 (L) 03/06/2020    HCT 38.8 03/06/2020    MCV 93.9 03/06/2020     03/06/2020    LYMPHOPCT 5.2 (L) 03/06/2020    RBC 4.13 03/06/2020    MCH 30.0 03/06/2020    MCHC 32.0 03/06/2020    RDW 14.6 03/06/2020    NEUTOPHILPCT 93.0 (H) 03/06/2020    MONOPCT 0.9 (L) 03/06/2020    BASOPCT 0.2 03/06/2020    NEUTROABS 13.54 (H) 03/06/2020    LYMPHSABS 0.72 (L) for his slight leukocytosis (which is 18% neutrophilic)      Electronically signed by Sean Garcia MD on 3/6/2020 at 12:17 PM

## 2020-03-06 NOTE — PROGRESS NOTES
Date:3/6/2020  Patient Name: Jonathon Harley  MRN: [id number]  : 1945  ROOM #: 2056/7952-83    Occupational Therapy order received, chart reviewed and evaluation attempted this date. Patient is unavailable for OT evaluation due to being out of the room for testing. Will attempt OT evaluation at a later time. Thank you.      Emigdio Ramon OTZEFERINO/MUSA [id number]

## 2020-03-06 NOTE — PROGRESS NOTES
Speech Language Pathology  SPEECH LANGUAGE PATHOLOGY  DAILY PROGRESS NOTE        PATIENT NAME:  Collette Paredes      :  1945          TODAY'S DATE:  3/6/2020 ROOM:  9293/5154-04    Speech Pathologist (SLP) completed education with the patient/family regarding type of swallowing impairment. Reviewed current solid/liquid consistency diet recommendations and discussed compensatory strategies to ensure safe PO intake. Reviewed aspiration precautions. Encouraged patient and/or family to engage SLP in unstructured Q&A session relative to identified deficit areas; indicated understanding of all information provided via satisfactory verbal response. SLP completed in-depth edu with spouse on current dysphagia diet recommendations, prognosis, goal planning, etc. She was able to verbalize back his safe swallowing compensatory strategies. She does state he was coughing with liquids at home and feels the aspiration is the reason for current PNA. SLP to follow at bedside for dysphagia management to monitor tolerance for prescribed diet, ongoing incorporation of compensatory strategies, and diet advancement as indicated. See eval for complete details. Lakeshia Baker M.Ed. [facility]-SLP  [id number]     CPT code(s) 63373  dysphagia tx  Total minutes :  15 minutes

## 2020-03-06 NOTE — PROGRESS NOTES
or rhonchi. Not in respiratory distress  Cardiovascular:  Regular rate.  Irregularly irregular rhythm. No murmurs  Chest: no chest wall tenderness  Abdomen: Soft.  Non tender. Non distended.  +BS.  No rebound, guarding, or rigidity. No pulsatile masses appreciated. Musculoskeletal: The patient does have paresis of the right upper and lower extremity, the bilateral upper and lower extremities are skeletally intact, all compartments are soft.  There is erythema and swelling present of the right lower extremity  Skin: warm and dry.  Erythema present of right lower extremity  Neurologic: The patient is somnolent, easily arousable to verbal stimuli, communicates with confused fragments, patient with paresis of the right upper and lower extremity, patient does follow commands with left upper and lower extremity. Psych: Unable to be evaluated secondary to the acuity of the patient's condition.       Recent Labs     03/05/20  0851      K 3.7      CO2 19*   BUN 20   CREATININE 1.0   GLUCOSE 112*   CALCIUM 8.6       Recent Labs     03/05/20  0851 03/06/20  0419   WBC 14.2* 14.4*   RBC 4.53 4.13   HGB 13.7 12.4*   HCT 42.0 38.8   MCV 92.7 93.9   MCH 30.2 30.0   MCHC 32.6 32.0   RDW 14.0 14.6    146   MPV 10.3 10.3           Radiology: Xr Chest Standard (2 Vw)    Result Date: 3/6/2020  LOCATION: 200 EXAM: XR CHEST (2 VW) COMPARISON: None HISTORY: Shortness of breath TECHNIQUE: PA and lateral  views of the chest were obtained. FINDINGS:  SUPPORT DEVICES: None. LUNGS: Patchy lower lobe opacities are seen. The lungs are otherwise clear. PLEURA: No pneumothorax visualized. LUNG VOLUMES: Satisfactory inspirator effort. MEDIASTINAL STRUCTURES: No lymphadenopathy. Normal aortic contour. HEART SIZE: Normal. UPPER ABDOMEN: Unremarkable. BONES AND SOFT TISSUES: No fracture or soft tissue abnormality. Lower lobe opacities are possibly infectious.     Ct Head Wo Contrast    Result Date: 3/5/2020  LOCATION: 200 EXAM: with subsegmental atelectasis. The lungs are markedly limited from motion artifact. PULMONARY ARTERIES: Evaluation is limited for pulmonary embolus. No filling defects to the proximal lobar levels. HEART/PERICARDIUM/GREAT VESSELS: Cardiac size is normal.  There is no pericardial effusion. The great vessels of the chest are normal in caliber. LYMPH NODES: No thoracic adenopathy by size criteria. NECK BASE/CHEST WALL/DIAPHRAGM: No soft tissue lesions or diaphragmatic abnormality. UPPER ABDOMEN: Multiple large cysts in the liver noted. . OSSEOUS STRUCTURES: Several blastic lesions are identified involving the vertebral bodies the most conspicuous involving the T8 vertebral body involving portions of the right rib and spinous process. 1. No evidence of pulmonary embolism. 2. Lower lobe linear subsegmental atelectasis. 3. Blastic lesions in the spine and involving several ribs suspicious for metastatic disease, possibly prostate. Recommend correlation with PSA. Us Abdomen Limited    Result Date: 3/6/2020  Reading location:  100 INDICATION: Liver liver lesions on chest CT FINDINGS: Sonographic evaluation right upper quadrant. Normal caliber gallbladder without intrinsic filling defect or mural thickening or pericholecystic fluid. Normal caliber bile ducts. Simple hepatic cysts measuring 54 mm and 67 mm corresponds to the CT findings. Visualized portions of pancreas unremarkable. Right renal collecting system normal caliber. No acute finding. Us Dup Lower Extremity Right Dali    Result Date: 3/5/2020  Location:200 Exam: US DUP LOWER EXTREMITY RIGHT DALI Comparison: None Indications: Discomfort Technique: Using real-time, color, and spectral Doppler waveform analysis, ultrasound imaging of the right lower extremity deep venous system was performed, from the common femoral vein to the proximal calf. Findings: The visualized veins of the deep venous system are grossly patent without an echogenic focus within them. These veins compress fully and demonstrate flow bilaterally. No significant reflux is seen within the greater saphenous veins. No evidence of right lower extremity deep venous thrombus from common femoral vein to proximal calf. Assessment:    Patient Active Problem List   Diagnosis Code    Cerebrovascular accident (CVA) due to occlusion of left carotid artery ([address] L76.849    Dysphagia due to recent stroke I69.391    Paroxysmal atrial fibrillation (HCC) I48.0    Prostate cancer ([address] C61    Cerebral contusion ([address] H57.952U    Subarachnoid hemorrhage ([address] I60.9    HCAP (healthcare-associated pneumonia) J18.9     Altered mental status  Cellulitis  History of CVA  Metastatic prostate cancer  Hyperlipidemia  Elevated white blood cell count  Pnuemonia  Transamanitis  Elevated troponin    Plan:  1. Admit to the hospital   2. Start the patient on IV antibiotics  3. Continue home medications  4. Discussed with family at length  5.  Appreciated oncology consult       Code Status: full code  DVT prophylaxis: patient already on [address]

## 2020-03-06 NOTE — PROGRESS NOTES
Room #:   2412/3664-88  Date: 3/6/2020       Patient Name: Dru Coronado  : 1945      MRN: [id number]   Referring Provider: Masood Garibay MD    Patient unavailable for physical therapy eval due to off floor at ultrasound Will attempt PT evaluation at a later time. Thank you.        Cain Ashley, PT

## 2020-03-06 NOTE — PROGRESS NOTES
swallow and Alternate solids and liquids      STRUCTURAL/FUNCTIONAL ANOMALIES       No structural/functional anomalies were noted      CERVICAL ESOPHAGEAL STAGE :        The cervical esophagus appeared adequate                            The Speech Language Pathologist (SLP) completed education with the patient regarding results of evaluation. Explained that Speech Pathology intervention is warranted  at this time   Prognosis for improvements is fair     This plan will be re-evaluated and revised in 1 week  if warranted. Patient stated goals: Agreed with above,   Treatment goals discussed with Patient and Family   The Family understand(s) the diagnosis, prognosis and plan of care       CPT code:  86155  dysphagia study        [x]The admitting diagnosis and active problem list, as listed below have been reviewed prior to initiation of this evaluation. ADMITTING DIAGNOSIS: HCAP (healthcare-associated pneumonia) [J18.9]     ACTIVE PROBLEM LIST:   Patient Active Problem List   Diagnosis    Cerebrovascular accident (CVA) due to occlusion of left carotid artery ([address]    Dysphagia due to recent stroke    Paroxysmal atrial fibrillation ([address]    Prostate cancer ([address]    Cerebral contusion ([address]    Subarachnoid hemorrhage ([address]    HCAP (healthcare-associated pneumonia)   Fanny LOMAXEd. [address] [id number]

## 2020-03-06 NOTE — PROGRESS NOTES
Speech Language Pathology  Order received, chart reviewed, Pt currently NPO for CT of liver. Will Complete clinical bedside swallow eval as indicated. SLP spoke with nursing about temporary nectar (midly thick) liquids until bedside swallow can be completed. Laya LOMAXEd. [address] [id number]

## 2020-03-07 LAB
ADENOVIRUS BY PCR: NOT DETECTED
AMORPHOUS: ABNORMAL
AMPHETAMINE SCREEN, URINE: NOT DETECTED
ANION GAP SERPL CALCULATED.3IONS-SCNC: 12 MMOL/L (ref 7–16)
BACTERIA: ABNORMAL /HPF
BARBITURATE SCREEN URINE: NOT DETECTED
BASOPHILS ABSOLUTE: 0.01 E9/L (ref 0–0.2)
BASOPHILS RELATIVE PERCENT: 0.1 % (ref 0–2)
BENZODIAZEPINE SCREEN, URINE: NOT DETECTED
BILIRUBIN URINE: NEGATIVE
BLOOD, URINE: ABNORMAL
BORDETELLA PARAPERTUSSIS BY PCR: NOT DETECTED
BORDETELLA PERTUSSIS BY PCR: NOT DETECTED
BUN BLDV-MCNC: 20 MG/DL (ref 8–23)
CALCIUM SERPL-MCNC: 7.5 MG/DL (ref 8.6–10.2)
CANNABINOID SCREEN URINE: NOT DETECTED
CHLAMYDOPHILIA PNEUMONIAE BY PCR: NOT DETECTED
CHLORIDE BLD-SCNC: 103 MMOL/L (ref 98–107)
CLARITY: ABNORMAL
CO2: 23 MMOL/L (ref 22–29)
COCAINE METABOLITE SCREEN URINE: NOT DETECTED
COLOR: YELLOW
CORONAVIRUS 229E BY PCR: NOT DETECTED
CORONAVIRUS HKU1 BY PCR: NOT DETECTED
CORONAVIRUS NL63 BY PCR: NOT DETECTED
CORONAVIRUS OC43 BY PCR: NOT DETECTED
CREAT SERPL-MCNC: 1.4 MG/DL (ref 0.7–1.2)
EOSINOPHILS ABSOLUTE: 0.05 E9/L (ref 0.05–0.5)
EOSINOPHILS RELATIVE PERCENT: 0.6 % (ref 0–6)
EPITHELIAL CELLS, UA: ABNORMAL /HPF
FENTANYL SCREEN, URINE: NOT DETECTED
GFR AFRICAN AMERICAN: >60
GFR NON-AFRICAN AMERICAN: 50 ML/MIN/1.73
GLUCOSE BLD-MCNC: 125 MG/DL (ref 74–99)
GLUCOSE URINE: NEGATIVE MG/DL
HCT VFR BLD CALC: 34.6 % (ref 37–54)
HEMOGLOBIN: 11.3 G/DL (ref 12.5–16.5)
HUMAN METAPNEUMOVIRUS BY PCR: NOT DETECTED
HUMAN RHINOVIRUS/ENTEROVIRUS BY PCR: NOT DETECTED
IMMATURE GRANULOCYTES #: 0.03 E9/L
IMMATURE GRANULOCYTES %: 0.4 % (ref 0–5)
INFLUENZA A BY PCR: NOT DETECTED
INFLUENZA B BY PCR: NOT DETECTED
KETONES, URINE: NEGATIVE MG/DL
LACTIC ACID: 1.4 MMOL/L (ref 0.5–2.2)
LEUKOCYTE ESTERASE, URINE: NEGATIVE
LYMPHOCYTES ABSOLUTE: 0.82 E9/L (ref 1.5–4)
LYMPHOCYTES RELATIVE PERCENT: 10.6 % (ref 20–42)
Lab: NORMAL
MCH RBC QN AUTO: 30.1 PG (ref 26–35)
MCHC RBC AUTO-ENTMCNC: 32.7 % (ref 32–34.5)
MCV RBC AUTO: 92.3 FL (ref 80–99.9)
METHADONE SCREEN, URINE: NOT DETECTED
MONOCYTES ABSOLUTE: 0.22 E9/L (ref 0.1–0.95)
MONOCYTES RELATIVE PERCENT: 2.8 % (ref 2–12)
MYCOPLASMA PNEUMONIAE BY PCR: NOT DETECTED
NEUTROPHILS ABSOLUTE: 6.64 E9/L (ref 1.8–7.3)
NEUTROPHILS RELATIVE PERCENT: 85.5 % (ref 43–80)
NITRITE, URINE: NEGATIVE
OPIATE SCREEN URINE: NOT DETECTED
OXYCODONE URINE: NOT DETECTED
PARAINFLUENZA VIRUS 1 BY PCR: NOT DETECTED
PARAINFLUENZA VIRUS 2 BY PCR: NOT DETECTED
PARAINFLUENZA VIRUS 3 BY PCR: NOT DETECTED
PARAINFLUENZA VIRUS 4 BY PCR: NOT DETECTED
PDW BLD-RTO: 14.6 FL (ref 11.5–15)
PH UA: 5.5 (ref 5–9)
PHENCYCLIDINE SCREEN URINE: NOT DETECTED
PLATELET # BLD: 114 E9/L (ref 130–450)
PMV BLD AUTO: 10.5 FL (ref 7–12)
POTASSIUM SERPL-SCNC: 3.1 MMOL/L (ref 3.5–5)
PROTEIN UA: ABNORMAL MG/DL
RBC # BLD: 3.75 E12/L (ref 3.8–5.8)
RBC UA: ABNORMAL /HPF (ref 0–2)
RESPIRATORY SYNCYTIAL VIRUS BY PCR: NOT DETECTED
SEDIMENTATION RATE, ERYTHROCYTE: 84 MM/HR (ref 0–15)
SODIUM BLD-SCNC: 138 MMOL/L (ref 132–146)
SPECIFIC GRAVITY UA: 1.02 (ref 1–1.03)
URIC ACID, SERUM: 3.4 MG/DL (ref 3.4–7)
UROBILINOGEN, URINE: 0.2 E.U./DL
WBC # BLD: 7.8 E9/L (ref 4.5–11.5)
WBC UA: ABNORMAL /HPF (ref 0–5)

## 2020-03-07 PROCEDURE — 80307 DRUG TEST PRSMV CHEM ANLYZR: CPT

## 2020-03-07 PROCEDURE — 36415 COLL VENOUS BLD VENIPUNCTURE: CPT

## 2020-03-07 PROCEDURE — 83605 ASSAY OF LACTIC ACID: CPT

## 2020-03-07 PROCEDURE — 6360000002 HC RX W HCPCS: Performed by: INTERNAL MEDICINE

## 2020-03-07 PROCEDURE — 36592 COLLECT BLOOD FROM PICC: CPT

## 2020-03-07 PROCEDURE — 85651 RBC SED RATE NONAUTOMATED: CPT

## 2020-03-07 PROCEDURE — 0100U HC RESPIRPTHGN MULT REV TRANS & AMP PRB TECH 21 TRGT: CPT

## 2020-03-07 PROCEDURE — 2580000003 HC RX 258: Performed by: INTERNAL MEDICINE

## 2020-03-07 PROCEDURE — 97162 PT EVAL MOD COMPLEX 30 MIN: CPT | Performed by: PHYSICAL THERAPIST

## 2020-03-07 PROCEDURE — 97530 THERAPEUTIC ACTIVITIES: CPT | Performed by: PHYSICAL THERAPIST

## 2020-03-07 PROCEDURE — 2500000003 HC RX 250 WO HCPCS: Performed by: INTERNAL MEDICINE

## 2020-03-07 PROCEDURE — 84550 ASSAY OF BLOOD/URIC ACID: CPT

## 2020-03-07 PROCEDURE — 81001 URINALYSIS AUTO W/SCOPE: CPT

## 2020-03-07 PROCEDURE — 80048 BASIC METABOLIC PNL TOTAL CA: CPT

## 2020-03-07 PROCEDURE — 6370000000 HC RX 637 (ALT 250 FOR IP): Performed by: INTERNAL MEDICINE

## 2020-03-07 PROCEDURE — 85025 COMPLETE CBC W/AUTO DIFF WBC: CPT

## 2020-03-07 PROCEDURE — 2060000000 HC ICU INTERMEDIATE R&B

## 2020-03-07 PROCEDURE — 94640 AIRWAY INHALATION TREATMENT: CPT

## 2020-03-07 PROCEDURE — 86140 C-REACTIVE PROTEIN: CPT

## 2020-03-07 PROCEDURE — 87088 URINE BACTERIA CULTURE: CPT

## 2020-03-07 RX ORDER — 0.9 % SODIUM CHLORIDE 0.9 %
1000 INTRAVENOUS SOLUTION INTRAVENOUS ONCE
Status: COMPLETED | OUTPATIENT
Start: 2020-03-08 | End: 2020-03-08

## 2020-03-07 RX ADMIN — SODIUM CHLORIDE: 9 INJECTION, SOLUTION INTRAVENOUS at 02:47

## 2020-03-07 RX ADMIN — ABIRATERONE ACETATE 1000 MG: 250 TABLET ORAL at 22:49

## 2020-03-07 RX ADMIN — PIPERACILLIN AND TAZOBACTAM 3.38 G: 3; .375 INJECTION, POWDER, FOR SOLUTION INTRAVENOUS at 15:00

## 2020-03-07 RX ADMIN — LISINOPRIL 5 MG: 5 TABLET ORAL at 09:39

## 2020-03-07 RX ADMIN — BACLOFEN 5 MG: 10 TABLET ORAL at 13:27

## 2020-03-07 RX ADMIN — METOPROLOL SUCCINATE 100 MG: 100 TABLET, EXTENDED RELEASE ORAL at 17:37

## 2020-03-07 RX ADMIN — BACLOFEN 5 MG: 10 TABLET ORAL at 09:39

## 2020-03-07 RX ADMIN — OYSTER SHELL CALCIUM WITH VITAMIN D 1 TABLET: 500; 200 TABLET, FILM COATED ORAL at 09:39

## 2020-03-07 RX ADMIN — ALBUTEROL SULFATE 2.5 MG: 2.5 SOLUTION RESPIRATORY (INHALATION) at 10:20

## 2020-03-07 RX ADMIN — Medication 10 ML: at 09:43

## 2020-03-07 RX ADMIN — ALBUTEROL SULFATE 2.5 MG: 2.5 SOLUTION RESPIRATORY (INHALATION) at 18:15

## 2020-03-07 RX ADMIN — RIVAROXABAN 20 MG: 20 TABLET, FILM COATED ORAL at 17:37

## 2020-03-07 RX ADMIN — ALBUTEROL SULFATE 2.5 MG: 2.5 SOLUTION RESPIRATORY (INHALATION) at 06:15

## 2020-03-07 RX ADMIN — TAMSULOSIN HYDROCHLORIDE 0.4 MG: 0.4 CAPSULE ORAL at 17:37

## 2020-03-07 RX ADMIN — Medication 10 ML: at 23:31

## 2020-03-07 RX ADMIN — VANCOMYCIN HYDROCHLORIDE 1500 MG: 10 INJECTION, POWDER, LYOPHILIZED, FOR SOLUTION INTRAVENOUS at 13:42

## 2020-03-07 RX ADMIN — FAMOTIDINE 20 MG: 10 INJECTION INTRAVENOUS at 22:50

## 2020-03-07 RX ADMIN — SODIUM CHLORIDE: 9 INJECTION, SOLUTION INTRAVENOUS at 12:00

## 2020-03-07 RX ADMIN — PREDNISONE 5 MG: 5 TABLET ORAL at 09:39

## 2020-03-07 RX ADMIN — PIPERACILLIN AND TAZOBACTAM 3.38 G: 3; .375 INJECTION, POWDER, FOR SOLUTION INTRAVENOUS at 06:21

## 2020-03-07 RX ADMIN — BACLOFEN 5 MG: 10 TABLET ORAL at 17:59

## 2020-03-07 RX ADMIN — HYDROCORTISONE SODIUM SUCCINATE 100 MG: 100 INJECTION, POWDER, FOR SOLUTION INTRAMUSCULAR; INTRAVENOUS at 09:39

## 2020-03-07 RX ADMIN — MULTIPLE VITAMINS W/ MINERALS TAB 1 TABLET: TAB at 09:39

## 2020-03-07 RX ADMIN — FAMOTIDINE 20 MG: 10 INJECTION INTRAVENOUS at 09:39

## 2020-03-07 RX ADMIN — VANCOMYCIN HYDROCHLORIDE 1500 MG: 10 INJECTION, POWDER, LYOPHILIZED, FOR SOLUTION INTRAVENOUS at 01:00

## 2020-03-07 RX ADMIN — ALBUTEROL SULFATE 2.5 MG: 2.5 SOLUTION RESPIRATORY (INHALATION) at 14:00

## 2020-03-07 RX ADMIN — Medication 10 ML: at 09:44

## 2020-03-07 RX ADMIN — CHOLECALCIFEROL TAB 25 MCG (1000 UNIT) 1000 UNITS: 25 TAB at 13:50

## 2020-03-07 RX ADMIN — LEVOFLOXACIN 750 MG: 5 INJECTION, SOLUTION INTRAVENOUS at 17:37

## 2020-03-07 ASSESSMENT — PAIN SCALES - GENERAL
PAINLEVEL_OUTOF10: 0

## 2020-03-07 NOTE — PROGRESS NOTES
Physical Therapy Initial Evaluation    Room #:  4465/4118-95  Patient Name: Nella Cannon  YOB: 1945  MRN: [id number]    Referring Provider: Tiana Cleaning MD    Date of Service: 3/7/2020    Evaluating Physical Therapist:  [place] [id number]     Diagnosis:   HCAP (healthcare-associated pneumonia) [J18.9]       Patient Active Problem List   Diagnosis    Cerebrovascular accident (CVA) due to occlusion of left carotid artery ([address]    Dysphagia due to recent stroke    Paroxysmal atrial fibrillation ([address]    Prostate cancer ([address]    Cerebral contusion ([address]    [facility]    HCAP (healthcare-associated pneumonia)       Tentative placement recommendation: Home Health Physical Therapy  or [facility] with Physical Therapy   Equipment recommendation: Patient has needed equipment       Prior Level of Function: Family reports patient ambulated independently with quad cane. Spouse reports patient had CVA 4 years ago that affected his R side. She reports he can only flex his R hip; which he uses to advance the R LE during gait. Rehab Potential: good  for baseline    Past medical history:   Past Medical History:   Diagnosis Date    A-fib ([address]     Arthritis     CVA (cerebral vascular accident) ([facility] 11/15/2015    left MCA    Hyperlipemia     Prostate cancer ([facility]     mets to spine, currently on chemo     Past Surgical History:   Procedure Laterality Date    COLONOSCOPY      GASTROSTOMY TUBE PLACEMENT  11/20/15    sonal       Precautions: falls risk, urinary incontinence, port in L inguinal area -- patient cannot wear Depends-type garment for this reason, unable to use R UE and LE due to CVA, aphasia -- primarily answers yes/no, some other limited speech. It is unclear how ambulatory the patient was PTA (which was 2 days ago). Spouse claims he ambulated independently with quad cane. Today he had significant difficulty sitting at bedside.    SUBJECTIVE:    Social Able to hold position in 30 sec bouts. Dynamic Standing:  not assessed   Sitting EOB:  fair   Dynamic Standing: fair      Patient is Alert & Oriented x person, place, time and situation and follows directions   Sensation:  Patient denies numbness and tingling to extremities   Edema:  yes right lower extremity  Endurance: poor    Patient education  Patient educated on role of Physical Therapy, risks of immobility and plan of care; bed mobility, sitting posture, UE support in sitting    Patient response to education:   Pt verbalized understanding Pt demonstrated skill Pt requires further education in this area   Yes No Yes      ASSESSMENT: Patient exhibits decreased strength, balance, coordination impairing functional mobility. Therapist educated and facilitated patient on techniques to increase safety and independence with bed mobility, balance, functional transfers, and functional mobility. Treatment:  Patient practiced and was instructed in the following treatment: Sat edge of bed 15 minutes with Moderate assist of  2 to increase dynamic sitting balance and activity tolerance. At end of session, patient in bed with alarm call light and phone within reach, all lines and tubes intact, nursing notified. Patient would benefit from continued skilled Physical Therapy to improve functional independence and quality of life. Patient's/ family goals   home      Patient and or family understand(s) diagnosis, prognosis, and plan of care. PLAN:    Physical Therapy care will be provided in accordance with the objectives noted above. Exercises and functional mobility practice will be used as well as appropriate assistive devices or modalities to obtain goals. Patient and family education will also be administered as needed. Frequency of treatments: Patient will be seen  daily  for therapeutic exercise, functional retraining, endurance activities, balance exercises, family and patient education.

## 2020-03-08 LAB
C-REACTIVE PROTEIN: 23.7 MG/DL (ref 0–0.4)
VANCOMYCIN TROUGH: 30.1 MCG/ML (ref 5–16)

## 2020-03-08 PROCEDURE — 2060000000 HC ICU INTERMEDIATE R&B

## 2020-03-08 PROCEDURE — 6360000002 HC RX W HCPCS: Performed by: INTERNAL MEDICINE

## 2020-03-08 PROCEDURE — 99223 1ST HOSP IP/OBS HIGH 75: CPT | Performed by: NURSE PRACTITIONER

## 2020-03-08 PROCEDURE — 2580000003 HC RX 258: Performed by: INTERNAL MEDICINE

## 2020-03-08 PROCEDURE — 6370000000 HC RX 637 (ALT 250 FOR IP): Performed by: INTERNAL MEDICINE

## 2020-03-08 PROCEDURE — 94640 AIRWAY INHALATION TREATMENT: CPT

## 2020-03-08 PROCEDURE — 2500000003 HC RX 250 WO HCPCS: Performed by: INTERNAL MEDICINE

## 2020-03-08 PROCEDURE — 51798 US URINE CAPACITY MEASURE: CPT

## 2020-03-08 PROCEDURE — 80202 ASSAY OF VANCOMYCIN: CPT

## 2020-03-08 PROCEDURE — 36415 COLL VENOUS BLD VENIPUNCTURE: CPT

## 2020-03-08 RX ORDER — SODIUM CHLORIDE 9 MG/ML
INJECTION, SOLUTION INTRAVENOUS CONTINUOUS
Status: DISCONTINUED | OUTPATIENT
Start: 2020-03-08 | End: 2020-03-08 | Stop reason: ALTCHOICE

## 2020-03-08 RX ADMIN — ALBUTEROL SULFATE 2.5 MG: 2.5 SOLUTION RESPIRATORY (INHALATION) at 10:09

## 2020-03-08 RX ADMIN — BACLOFEN 5 MG: 10 TABLET ORAL at 10:20

## 2020-03-08 RX ADMIN — ALBUTEROL SULFATE 2.5 MG: 2.5 SOLUTION RESPIRATORY (INHALATION) at 14:13

## 2020-03-08 RX ADMIN — ACETAMINOPHEN 650 MG: 325 TABLET, FILM COATED ORAL at 10:23

## 2020-03-08 RX ADMIN — Medication 10 ML: at 10:27

## 2020-03-08 RX ADMIN — PIPERACILLIN AND TAZOBACTAM 3.38 G: 3; .375 INJECTION, POWDER, FOR SOLUTION INTRAVENOUS at 10:22

## 2020-03-08 RX ADMIN — SODIUM CHLORIDE: 9 INJECTION, SOLUTION INTRAVENOUS at 04:56

## 2020-03-08 RX ADMIN — BACLOFEN 5 MG: 10 TABLET ORAL at 17:16

## 2020-03-08 RX ADMIN — CHOLECALCIFEROL TAB 25 MCG (1000 UNIT) 1000 UNITS: 25 TAB at 13:10

## 2020-03-08 RX ADMIN — SODIUM CHLORIDE: 9 INJECTION, SOLUTION INTRAVENOUS at 14:40

## 2020-03-08 RX ADMIN — SODIUM CHLORIDE: 9 INJECTION, SOLUTION INTRAVENOUS at 19:07

## 2020-03-08 RX ADMIN — FAMOTIDINE 20 MG: 10 INJECTION INTRAVENOUS at 10:21

## 2020-03-08 RX ADMIN — PIPERACILLIN AND TAZOBACTAM 3.38 G: 3; .375 INJECTION, POWDER, FOR SOLUTION INTRAVENOUS at 00:10

## 2020-03-08 RX ADMIN — Medication 10 ML: at 10:22

## 2020-03-08 RX ADMIN — RIVAROXABAN 20 MG: 20 TABLET, FILM COATED ORAL at 17:15

## 2020-03-08 RX ADMIN — Medication 10 ML: at 20:12

## 2020-03-08 RX ADMIN — METOPROLOL SUCCINATE 100 MG: 100 TABLET, EXTENDED RELEASE ORAL at 17:15

## 2020-03-08 RX ADMIN — SODIUM CHLORIDE: 9 INJECTION, SOLUTION INTRAVENOUS at 20:19

## 2020-03-08 RX ADMIN — PREDNISONE 5 MG: 5 TABLET ORAL at 10:20

## 2020-03-08 RX ADMIN — MULTIPLE VITAMINS W/ MINERALS TAB 1 TABLET: TAB at 10:20

## 2020-03-08 RX ADMIN — SODIUM CHLORIDE 1000 ML: 9 INJECTION, SOLUTION INTRAVENOUS at 00:01

## 2020-03-08 RX ADMIN — FAMOTIDINE 20 MG: 10 INJECTION INTRAVENOUS at 20:12

## 2020-03-08 RX ADMIN — ALBUTEROL SULFATE 2.5 MG: 2.5 SOLUTION RESPIRATORY (INHALATION) at 04:42

## 2020-03-08 RX ADMIN — ALBUTEROL SULFATE 2.5 MG: 2.5 SOLUTION RESPIRATORY (INHALATION) at 17:34

## 2020-03-08 RX ADMIN — Medication 10 ML: at 20:11

## 2020-03-08 RX ADMIN — BACLOFEN 5 MG: 10 TABLET ORAL at 13:08

## 2020-03-08 RX ADMIN — TAMSULOSIN HYDROCHLORIDE 0.4 MG: 0.4 CAPSULE ORAL at 17:16

## 2020-03-08 RX ADMIN — HYDROCORTISONE SODIUM SUCCINATE 100 MG: 100 INJECTION, POWDER, FOR SOLUTION INTRAMUSCULAR; INTRAVENOUS at 10:22

## 2020-03-08 RX ADMIN — OYSTER SHELL CALCIUM WITH VITAMIN D 1 TABLET: 500; 200 TABLET, FILM COATED ORAL at 10:20

## 2020-03-08 RX ADMIN — PIPERACILLIN AND TAZOBACTAM 3.38 G: 3; .375 INJECTION, POWDER, FOR SOLUTION INTRAVENOUS at 17:55

## 2020-03-08 RX ADMIN — LEVOFLOXACIN 750 MG: 5 INJECTION, SOLUTION INTRAVENOUS at 16:22

## 2020-03-08 ASSESSMENT — PAIN SCALES - GENERAL
PAINLEVEL_OUTOF10: 0

## 2020-03-08 NOTE — PROGRESS NOTES
Patient has had not urine output this shift. He has also had very little intake as he is very sleepy today. Dr Tamanna Jain notified. States to order normal saline at 150/hr and to drawl lab work in the morning. Will continue to monitor.

## 2020-03-08 NOTE — PLAN OF CARE
Problem: Falls - Risk of:  Goal: Will remain free from falls  Description: Will remain free from falls  Outcome: Met This Shift     Problem: Falls - Risk of:  Goal: Absence of physical injury  Description: Absence of physical injury  Outcome: Met This Shift     Problem: Risk for Impaired Skin Integrity  Goal: Tissue integrity - skin and mucous membranes  Description: Structural intactness and normal physiological function of skin and  mucous membranes.   Outcome: Met This Shift     Problem: Airway Clearance - Ineffective:  Goal: Ability to maintain a clear airway will improve  Description: Ability to maintain a clear airway will improve  Outcome: Met This Shift     Problem: Gas Exchange - Impaired:  Goal: Levels of oxygenation will improve  Description: Levels of oxygenation will improve  Outcome: Met This Shift

## 2020-03-08 NOTE — CONSULTS
lunch  influenza virus trivalent vaccine (FLUZONE) injection, Inject 0.5 mLs into the muscle once Given 10/2019  predniSONE (DELTASONE) 5 MG tablet, Take 5 mg by mouth every morning   abiraterone acetate (ZYTIGA) 250 MG tablet, Take 1,000 mg by mouth nightly   rivaroxaban (XARELTO) 20 MG TABS tablet, Take 20 mg by mouth Daily with supper   baclofen (LIORESAL) 5 mg TABS, Take 5 mg by mouth 3 times daily (with meals)   tamsulosin (FLOMAX) 0.4 MG capsule, Take 0.4 mg by mouth Daily with supper   acetaminophen (TYLENOL) 325 MG tablet, Take 325 mg by mouth every 6 hours as needed for Pain or Fever   Multiple Vitamins-Minerals (THERAPEUTIC MULTIVITAMIN-MINERALS) tablet, Take 1 tablet by mouth every morning     Allergies:    Patient has no known allergies. Social History:    reports that he quit smoking about 4 years ago. His smoking use included cigarettes. He has never used smokeless tobacco. He reports that he does not drink alcohol or use drugs. Family History:   Non-contributory to this Urological problem  family history includes Cancer in his mother; Heart Disease in his father. REVIEW OF SYSTEMS:  Unable to obtain as above    PHYSICAL EXAM:    Vitals:  BP (!) 99/56   Pulse 88   Temp 98.2 °F (36.8 °C) (Axillary)   Resp 22   Ht 5' 11\" (1.803 m)   Wt 241 lb (109.3 kg)   SpO2 96%   BMI 33.61 kg/m²     General:  Awake, alert, rarely opens eyes other than to painful stimuli and strong voice. Communicates in only one-word answers. Well developed, well nourished, poorly groomed. No apparent distress. HEENT:  Normocephalic, atraumatic. Pupils equal, round. No scleral icterus. No conjunctival injection. No nasal discharge. Neck:  Supple, no masses. Heart:  RRR  Lungs:  No audible wheezing. Respirations symmetric and non-labored.   Abdomen:  soft, nontender, no masses, no organomegaly, no peritoneal signs  Extremities: Right-sided immobility  Skin:  Warm and dry, no open lesions or rashes  Neuro:

## 2020-03-08 NOTE — PROGRESS NOTES
evaluation of the pulmonary arteries. CONTRAST: 80 mL Isovue-370 intravenous contrast. FINDINGS: SUPPORT DEVICES: None LUNGS/CENTRAL AIRWAYS/PLEURA: Linear parenchymal bands seen at the lung bases compatible with subsegmental atelectasis. The lungs are markedly limited from motion artifact. PULMONARY ARTERIES: Evaluation is limited for pulmonary embolus. No filling defects to the proximal lobar levels. HEART/PERICARDIUM/GREAT VESSELS: Cardiac size is normal.  There is no pericardial effusion. The great vessels of the chest are normal in caliber. LYMPH NODES: No thoracic adenopathy by size criteria. NECK BASE/CHEST WALL/DIAPHRAGM: No soft tissue lesions or diaphragmatic abnormality. UPPER ABDOMEN: Multiple large cysts in the liver noted. . OSSEOUS STRUCTURES: Several blastic lesions are identified involving the vertebral bodies the most conspicuous involving the T8 vertebral body involving portions of the right rib and spinous process. 1. No evidence of pulmonary embolism. 2. Lower lobe linear subsegmental atelectasis. 3. Blastic lesions in the spine and involving several ribs suspicious for metastatic disease, possibly prostate. Recommend correlation with PSA. Us Abdomen Limited    Result Date: 3/6/2020  Reading location:  100 INDICATION: Liver liver lesions on chest CT FINDINGS: Sonographic evaluation right upper quadrant. Normal caliber gallbladder without intrinsic filling defect or mural thickening or pericholecystic fluid. Normal caliber bile ducts. Simple hepatic cysts measuring 54 mm and 67 mm corresponds to the CT findings. Visualized portions of pancreas unremarkable. Right renal collecting system normal caliber. No acute finding.     Us Dup Lower Extremity Right Dali    Result Date: 3/5/2020  Location:200 Exam: US DUP LOWER EXTREMITY RIGHT DALI Comparison: None Indications: Discomfort Technique: Using real-time, color, and spectral Doppler waveform analysis, ultrasound imaging of the right lower extremity deep venous system was performed, from the common femoral vein to the proximal calf. Findings: The visualized veins of the deep venous system are grossly patent without an echogenic focus within them. These veins compress fully and demonstrate flow bilaterally. No significant reflux is seen within the greater saphenous veins. No evidence of right lower extremity deep venous thrombus from common femoral vein to proximal calf. Assessment:    Patient Active Problem List   Diagnosis Code    Cerebrovascular accident (CVA) due to occlusion of left carotid artery ([address] H55.568    Dysphagia due to recent stroke I69.391    Paroxysmal atrial fibrillation (HCC) I48.0    Prostate cancer ([address] C61    Cerebral contusion ([address] B86.136G    Subarachnoid hemorrhage ([address] I60.9    HCAP (healthcare-associated pneumonia) J18.9     Altered mental status  Cellulitis  History of CVA  Metastatic prostate cancer  Hyperlipidemia  Elevated white blood cell count  Pnuemonia  Transamanitis  Elevated troponin  A speech evaluation for dysphagia    Plan:  1. Admitted to the hospital   2. Continue the patient on IV antibiotics  3. Continue home medications  4. Discussed with family at length  5. Appreciated oncology consult  6.  Because of aspiration patient placed on thickened liquid diet       Code Status: full code  DVT prophylaxis: patient already on [address]

## 2020-03-08 NOTE — PROGRESS NOTES
Progress Note    Subject:Patient awake alert oriented as dysarthria secondary to CVA  Able to talk much better today wife at the bedside discussed the case in detail with the    ROS: denies fever, chills, cp, sob, n/v, HA unless stated above.  sodium chloride flush  10 mL Intravenous 2 times per day    hydrocortisone sodium succinate PF  100 mg Intravenous Daily    sodium chloride flush  10 mL Intravenous 2 times per day    vancomycin  15 mg/kg Intravenous Q12H    famotidine (PEPCID) injection  20 mg Intravenous BID    albuterol  2.5 mg Nebulization Q4H WA    abiraterone acetate  1,000 mg Oral Nightly    baclofen  5 mg Oral TID WC    calcium-vitamin D  1 tablet Oral Daily    influenza A&B vaccine  0.5 mL Intramuscular Prior to discharge    lisinopril  5 mg Oral QAM    metoprolol succinate  100 mg Oral Dinner    therapeutic multivitamin-minerals  1 tablet Oral QAM    rivaroxaban  20 mg Oral Dinner    tamsulosin  0.4 mg Oral Dinner    Vitamin D  1,000 Units Oral Lunch    piperacillin-tazobactam  3.375 g Intravenous Q8H    levofloxacin  750 mg Intravenous Q24H    predniSONE  5 mg Oral Daily     sodium chloride flush, 10 mL, PRN  sodium chloride flush, 10 mL, PRN  acetaminophen, 650 mg, Q6H PRN    Or  acetaminophen, 650 mg, Q6H PRN  polyethylene glycol, 17 g, Daily PRN  promethazine, 12.5 mg, Q6H PRN    Or  ondansetron, 4 mg, Q6H PRN  acetaminophen, 325 mg, Q6H PRN         Objective:         Intake/Output Summary (Last 24 hours) at 3/8/2020 0636  Last data filed at 3/8/2020 9675  Gross per 24 hour   Intake 1062 ml   Output --   Net 1062 ml       BP (!) 100/59   Pulse 93   Temp 98 °F (36.7 °C) (Axillary)   Resp 18   Ht 5' 11\" (1.803 m)   Wt 241 lb (109.3 kg)   SpO2 97%   BMI 33.61 kg/m²     Constitutional/General: awake alert oriented,chronically ill-appearing individual  Head: Normocephalic and atraumatic  Mouth: Oropharynx clear, handling secretions, no trismus  Neck: Supple, full ROM, no meningeal signs  Pulmonary: Lungs clear to auscultation bilaterally, no wheezes, rales, or rhonchi. Not in respiratory distress  Cardiovascular:  Regular rate.  Irregularly irregular rhythm. No murmurs  Chest: no chest wall tenderness  Abdomen: Soft.  Non tender. Non distended.  +BS.  No rebound, guarding, or rigidity. No pulsatile masses appreciated. Musculoskeletal: The patient does have paresis of the right upper and lower extremity, the bilateral upper and lower extremities are skeletally intact, all compartments are soft.  There is erythema and swelling present of the right lower extremity  Skin: warm and dry.  Erythema present of right lower extremity  Neurologic: The patient is somnolent, easily arousable to verbal stimuli, communicates with confused fragments, patient with paresis of the right upper and lower extremity, patient does follow commands with left upper and lower extremity. Psych: Unable to be evaluated secondary to the acuity of the patient's condition.       Recent Labs     03/05/20  0851 03/07/20  1045    138   K 3.7 3.1*    103   CO2 19* 23   BUN 20 20   CREATININE 1.0 1.4*   GLUCOSE 112* 125*   CALCIUM 8.6 7.5*       Recent Labs     03/05/20  0851 03/06/20  0419 03/07/20  1045   WBC 14.2* 14.4* 7.8   RBC 4.53 4.13 3.75*   HGB 13.7 12.4* 11.3*   HCT 42.0 38.8 34.6*   MCV 92.7 93.9 92.3   MCH 30.2 30.0 30.1   MCHC 32.6 32.0 32.7   RDW 14.0 14.6 14.6    146 114*   MPV 10.3 10.3 10.5           Radiology: Xr Chest Standard (2 Vw)    Result Date: 3/6/2020  LOCATION: 200 EXAM: XR CHEST (2 VW) COMPARISON: None HISTORY: Shortness of breath TECHNIQUE: PA and lateral  views of the chest were obtained. FINDINGS:  SUPPORT DEVICES: None. LUNGS: Patchy lower lobe opacities are seen. The lungs are otherwise clear. PLEURA: No pneumothorax visualized. LUNG VOLUMES: Satisfactory inspirator effort. MEDIASTINAL STRUCTURES: No lymphadenopathy. Normal aortic contour.  HEART SIZE: Normal. UPPER ABDOMEN: Unremarkable. BONES AND SOFT TISSUES: No fracture or soft tissue abnormality. Lower lobe opacities are possibly infectious. Ct Head Wo Contrast    Result Date: 3/5/2020  LOCATION: 200 EXAM: CT HEAD WO CONTRAST COMPARISON: None HISTORY: Confusion TECHNIQUE: Noncontrast helical CT images were performed of the head. Coronal and sagittal reconstructions also obtained. Automated dose control was used for this exam. CONTRAST: No intravenous contrast administered. FINDINGS: Large remote left MCA territory infarct is identified. A moderate amount of patchy periventricular and subcortical white matter hypodensity noted, likely chronic microvascular ischemic related. There is no evidence of intracranial hemorrhage or mass. No extra-axial fluid is seen. The paranasal sinuses are clear. The globes and orbits are normal.  No abnormalities of the calvarium are identified. Large remote left MCA territory infarct. No acute hemorrhage or mass. Xr Chest Portable    Result Date: 3/5/2020  LOCATION: 200 EXAM: XR CHEST PORTABLE COMPARISON: None HISTORY: Shortness of breath TECHNIQUE: Single frontal view of the chest was obtained. FINDINGS:  SUPPORT DEVICES: None. LUNGS: Patchy airspace disease at the left lung base noted. PLEURA: No pneumothorax visualized. LUNG VOLUMES: Satisfactory inspirator effort. MEDIASTINAL STRUCTURES: No lymphadenopathy. Normal aortic contour. HEART SIZE: Enlarged. BONES AND SOFT TISSUES: No fracture or soft tissue abnormality. 1. Left lower lobe airspace disease is likely infectious. Cta Chest W Contrast    Result Date: 3/5/2020  LOCATION:200 EXAM: CTA CHEST W CONTRAST COMPARISON: None HISTORY:  Shortness of breath TECHNIQUE: Axial CT images are obtained of the chest.  Coronal and sagittal reconstructions were obtained with 3-D maximum intensity projection (MIP) reconstructed images.   These were performed on a separate workstation with concurrent supervision for detailed extremity deep venous system was performed, from the common femoral vein to the proximal calf. Findings: The visualized veins of the deep venous system are grossly patent without an echogenic focus within them. These veins compress fully and demonstrate flow bilaterally. No significant reflux is seen within the greater saphenous veins. No evidence of right lower extremity deep venous thrombus from common femoral vein to proximal calf. Assessment:    Patient Active Problem List   Diagnosis Code    Cerebrovascular accident (CVA) due to occlusion of left carotid artery ([address] W14.104    Dysphagia due to recent stroke I69.391    Paroxysmal atrial fibrillation (HCC) I48.0    Prostate cancer ([address] C61    Cerebral contusion ([address] I99.840G    Subarachnoid hemorrhage ([address] I60.9    HCAP (healthcare-associated pneumonia) J18.9     Altered mental status  Cellulitis  History of CVA  Metastatic prostate cancer  Hyperlipidemia  Elevated white blood cell count  Pnuemonia  Transamanitis  Elevated troponin  A speech evaluation for dysphagia    Plan:  1. Admitted to the hospital   2. Continue the patient on IV antibiotics  3. Continue home medications  4. Discussed with family at length  5. Appreciated oncology consult  6.  Because of aspiration patient placed on thickened liquid diet       Code Status: full code  DVT prophylaxis: patient already on [address]

## 2020-03-08 NOTE — CONSULTS
from the new tests that they have performed. - Family Meeting:   Participants:Spouse- Sharonda    Family meeting was held to discuss:goals of care, prior expressed wishes and discharge plan    -Surrogate/Legal NOK: Spouse and Child    Contacts:  Harrison Stacy [phone] (wife)                                             Flakito Childs [phone]       Past Medical History:   Diagnosis Date    [facility]-fib [facility])     Arthritis     [facility]A [facility]cerebral vascular [facility]) [facility] 11/15/2015    left MCA    Hyperlipemia     Prostate cancer ([facility] [facility]     mets to spine, currently on chemo       Past Surgical History:   Procedure Laterality Date    COLONOSCOPY      GASTROSTOMY TUBE PLACEMENT  11/20/15    sonal       Family History   Problem Relation Age of Onset    Cancer Mother     Heart Disease Father        Social history:   status: yes  Marital status:   Living status: with family:  spouse   Work history: unknown     No Known Allergies    ROS: UNLESS STATED PATIENT DENIES: Unable to complete d/t patient's increased sleepiness  CONSTITUTIONAL:  fever, chill, rigors, nausea, vomiting, fatigue. HEENT: blurry vision, double vision, hearing problem, tinnitus, hoarseness, dysphagia, odynophagia  RESPIRATORY: cough, shortness of breath, sputum expectoration. CARDIOVASCULAR:  Chest pain/pressure, palpitation, syncope, irregular beats  GASTROINTESTINAL:  abdominal or rectal pain, diarrhea, constipation, . GENITOURINARY:  Burning, frequency, urgency, incontinence, discharge  INTEGUMENTARY: rash, wound, pruritis  HEMATOLOGIC/LYMPHATIC:  Swelling, sores, gum bleeding, easy bruising, pica.   MUSCULOSKELETAL:  pain, edema, joint swelling or redness  NEUROLOGICAL:  light headed, dizziness, loss of consciousness, weakness, change in memory, seizures, tremors    Objective:     Physical Exam  BP (!) 99/56   Pulse 88   Temp 98.2 °F (36.8 °C) (Axillary) procedure. Possibly change code status to a DNR-CCA. Full code for now. Goal- keep  around as long as she can. Windy Mejia APRN-CNP, AGACNP-BC  Palliative Medicine    Discussed patient and the plan of care with the other IDT members of Palliative Care, and with consultants, Primary Attending, patient, family and floor nurse. Thank you for allowing Palliative Medicine to participate in the care of Daniela Ceron. Note: This report was completed using computerRed LaGoon voiced recognition software. Every effort has been made to ensure accuracy; however, inadvertent computerized transcription errors may be present.

## 2020-03-08 NOTE — PROGRESS NOTES
Pt found with femoral tlc out. Sutures intact. Condom cath on floor. piv on floor. Pressure held to groin, until bleeding stopped then dry dressing applied. Wife Cuba Trevino notified.   Charge nurse aware

## 2020-03-09 ENCOUNTER — APPOINTMENT (OUTPATIENT)
Dept: GENERAL RADIOLOGY | Age: 75
DRG: 208 | End: 2020-03-09
Payer: COMMERCIAL

## 2020-03-09 ENCOUNTER — ANESTHESIA EVENT (OUTPATIENT)
Dept: OPERATING ROOM | Age: 75
DRG: 208 | End: 2020-03-09
Payer: COMMERCIAL

## 2020-03-09 ENCOUNTER — ANESTHESIA (OUTPATIENT)
Dept: OPERATING ROOM | Age: 75
DRG: 208 | End: 2020-03-09
Payer: COMMERCIAL

## 2020-03-09 VITALS
DIASTOLIC BLOOD PRESSURE: 72 MMHG | OXYGEN SATURATION: 99 % | SYSTOLIC BLOOD PRESSURE: 130 MMHG | RESPIRATION RATE: 16 BRPM

## 2020-03-09 LAB
ANION GAP SERPL CALCULATED.3IONS-SCNC: 14 MMOL/L (ref 7–16)
B.E.: -6.6 MMOL/L (ref -3–3)
BUN BLDV-MCNC: 45 MG/DL (ref 8–23)
CALCIUM SERPL-MCNC: 7.3 MG/DL (ref 8.6–10.2)
CHLORIDE BLD-SCNC: 103 MMOL/L (ref 98–107)
CO2: 20 MMOL/L (ref 22–29)
COHB: 0.3 % (ref 0–1.5)
CREAT SERPL-MCNC: 4.6 MG/DL (ref 0.7–1.2)
CRITICAL: ABNORMAL
DATE ANALYZED: ABNORMAL
DATE OF COLLECTION: ABNORMAL
GFR AFRICAN AMERICAN: 15
GFR NON-AFRICAN AMERICAN: 13 ML/MIN/1.73
GLUCOSE BLD-MCNC: 101 MG/DL (ref 74–99)
HCO3: 18.8 MMOL/L (ref 22–26)
HCT VFR BLD CALC: 35.7 % (ref 37–54)
HEMOGLOBIN: 11.4 G/DL (ref 12.5–16.5)
HHB: 6.6 % (ref 0–5)
LAB: ABNORMAL
Lab: ABNORMAL
MCH RBC QN AUTO: 29.8 PG (ref 26–35)
MCHC RBC AUTO-ENTMCNC: 31.9 % (ref 32–34.5)
MCV RBC AUTO: 93.5 FL (ref 80–99.9)
METHB: 0.3 % (ref 0–1.5)
MODE: ABNORMAL
O2 CONTENT: 16.2 ML/DL
O2 SATURATION: 93.4 % (ref 92–98.5)
O2HB: 92.8 % (ref 94–97)
OPERATOR ID: 3
PATIENT TEMP: 37 C
PCO2: 37.5 MMHG (ref 35–45)
PDW BLD-RTO: 15.1 FL (ref 11.5–15)
PH BLOOD GAS: 7.32 (ref 7.35–7.45)
PLATELET # BLD: 129 E9/L (ref 130–450)
PMV BLD AUTO: 10.4 FL (ref 7–12)
PO2: 67.4 MMHG (ref 75–100)
POTASSIUM SERPL-SCNC: 3.1 MMOL/L (ref 3.5–5)
RBC # BLD: 3.82 E12/L (ref 3.8–5.8)
SODIUM BLD-SCNC: 137 MMOL/L (ref 132–146)
SOURCE, BLOOD GAS: ABNORMAL
THB: 12.4 G/DL (ref 11.5–16.5)
TIME ANALYZED: 1234
URINE CULTURE, ROUTINE: NORMAL
VANCOMYCIN RANDOM: 30.6 MCG/ML (ref 5–40)
WBC # BLD: 9.1 E9/L (ref 4.5–11.5)

## 2020-03-09 PROCEDURE — 6360000002 HC RX W HCPCS: Performed by: INTERNAL MEDICINE

## 2020-03-09 PROCEDURE — 6360000004 HC RX CONTRAST MEDICATION: Performed by: UROLOGY

## 2020-03-09 PROCEDURE — 80048 BASIC METABOLIC PNL TOTAL CA: CPT

## 2020-03-09 PROCEDURE — BT14YZZ FLUOROSCOPY OF KIDNEYS, URETERS AND BLADDER USING OTHER CONTRAST: ICD-10-PCS | Performed by: UROLOGY

## 2020-03-09 PROCEDURE — 87088 URINE BACTERIA CULTURE: CPT

## 2020-03-09 PROCEDURE — 6360000002 HC RX W HCPCS: Performed by: NURSE ANESTHETIST, CERTIFIED REGISTERED

## 2020-03-09 PROCEDURE — 3700000001 HC ADD 15 MINUTES (ANESTHESIA): Performed by: UROLOGY

## 2020-03-09 PROCEDURE — 94660 CPAP INITIATION&MGMT: CPT

## 2020-03-09 PROCEDURE — C1769 GUIDE WIRE: HCPCS | Performed by: UROLOGY

## 2020-03-09 PROCEDURE — 80202 ASSAY OF VANCOMYCIN: CPT

## 2020-03-09 PROCEDURE — 3600000002 HC SURGERY LEVEL 2 BASE: Performed by: UROLOGY

## 2020-03-09 PROCEDURE — 36415 COLL VENOUS BLD VENIPUNCTURE: CPT

## 2020-03-09 PROCEDURE — 2060000000 HC ICU INTERMEDIATE R&B

## 2020-03-09 PROCEDURE — 0T7D8ZZ DILATION OF URETHRA, VIA NATURAL OR ARTIFICIAL OPENING ENDOSCOPIC: ICD-10-PCS | Performed by: UROLOGY

## 2020-03-09 PROCEDURE — 94640 AIRWAY INHALATION TREATMENT: CPT

## 2020-03-09 PROCEDURE — C1758 CATHETER, URETERAL: HCPCS | Performed by: UROLOGY

## 2020-03-09 PROCEDURE — 6370000000 HC RX 637 (ALT 250 FOR IP): Performed by: INTERNAL MEDICINE

## 2020-03-09 PROCEDURE — 2709999900 HC NON-CHARGEABLE SUPPLY: Performed by: UROLOGY

## 2020-03-09 PROCEDURE — 7100000001 HC PACU RECOVERY - ADDTL 15 MIN: Performed by: UROLOGY

## 2020-03-09 PROCEDURE — 2580000003 HC RX 258: Performed by: UROLOGY

## 2020-03-09 PROCEDURE — 2500000003 HC RX 250 WO HCPCS: Performed by: INTERNAL MEDICINE

## 2020-03-09 PROCEDURE — 3600000012 HC SURGERY LEVEL 2 ADDTL 15MIN: Performed by: UROLOGY

## 2020-03-09 PROCEDURE — 2580000003 HC RX 258: Performed by: NURSE ANESTHETIST, CERTIFIED REGISTERED

## 2020-03-09 PROCEDURE — 82805 BLOOD GASES W/O2 SATURATION: CPT

## 2020-03-09 PROCEDURE — 85027 COMPLETE CBC AUTOMATED: CPT

## 2020-03-09 PROCEDURE — 0T9B80Z DRAINAGE OF BLADDER WITH DRAINAGE DEVICE, VIA NATURAL OR ARTIFICIAL OPENING ENDOSCOPIC: ICD-10-PCS | Performed by: UROLOGY

## 2020-03-09 PROCEDURE — 3700000000 HC ANESTHESIA ATTENDED CARE: Performed by: UROLOGY

## 2020-03-09 PROCEDURE — 74400 UROGRAPHY IV +-KUB TOMOG: CPT

## 2020-03-09 PROCEDURE — 2580000003 HC RX 258: Performed by: INTERNAL MEDICINE

## 2020-03-09 PROCEDURE — 7100000000 HC PACU RECOVERY - FIRST 15 MIN: Performed by: UROLOGY

## 2020-03-09 RX ORDER — PROPOFOL 10 MG/ML
INJECTION, EMULSION INTRAVENOUS PRN
Status: DISCONTINUED | OUTPATIENT
Start: 2020-03-09 | End: 2020-03-09 | Stop reason: SDUPTHER

## 2020-03-09 RX ORDER — LEVOFLOXACIN 5 MG/ML
500 INJECTION, SOLUTION INTRAVENOUS
Status: DISCONTINUED | OUTPATIENT
Start: 2020-03-10 | End: 2020-03-11

## 2020-03-09 RX ORDER — FENTANYL CITRATE 50 UG/ML
INJECTION, SOLUTION INTRAMUSCULAR; INTRAVENOUS PRN
Status: DISCONTINUED | OUTPATIENT
Start: 2020-03-09 | End: 2020-03-09 | Stop reason: SDUPTHER

## 2020-03-09 RX ORDER — NALOXONE HYDROCHLORIDE 0.4 MG/ML
INJECTION, SOLUTION INTRAMUSCULAR; INTRAVENOUS; SUBCUTANEOUS
Status: DISCONTINUED
Start: 2020-03-09 | End: 2020-03-09 | Stop reason: WASHOUT

## 2020-03-09 RX ORDER — SODIUM CHLORIDE, SODIUM LACTATE, POTASSIUM CHLORIDE, CALCIUM CHLORIDE 600; 310; 30; 20 MG/100ML; MG/100ML; MG/100ML; MG/100ML
INJECTION, SOLUTION INTRAVENOUS CONTINUOUS PRN
Status: DISCONTINUED | OUTPATIENT
Start: 2020-03-09 | End: 2020-03-09 | Stop reason: SDUPTHER

## 2020-03-09 RX ORDER — SODIUM CHLORIDE, SODIUM LACTATE, POTASSIUM CHLORIDE, CALCIUM CHLORIDE 600; 310; 30; 20 MG/100ML; MG/100ML; MG/100ML; MG/100ML
INJECTION, SOLUTION INTRAVENOUS CONTINUOUS
Status: DISCONTINUED | OUTPATIENT
Start: 2020-03-09 | End: 2020-03-10

## 2020-03-09 RX ORDER — SODIUM CHLORIDE 9 MG/ML
INJECTION, SOLUTION INTRAVENOUS EVERY 12 HOURS
Status: DISCONTINUED | OUTPATIENT
Start: 2020-03-10 | End: 2020-03-23 | Stop reason: HOSPADM

## 2020-03-09 RX ADMIN — ACETAMINOPHEN 650 MG: 650 SUPPOSITORY RECTAL at 00:10

## 2020-03-09 RX ADMIN — Medication 10 ML: at 09:20

## 2020-03-09 RX ADMIN — ALBUTEROL SULFATE 2.5 MG: 2.5 SOLUTION RESPIRATORY (INHALATION) at 06:41

## 2020-03-09 RX ADMIN — ALBUTEROL SULFATE 2.5 MG: 2.5 SOLUTION RESPIRATORY (INHALATION) at 12:49

## 2020-03-09 RX ADMIN — SODIUM CHLORIDE, POTASSIUM CHLORIDE, SODIUM LACTATE AND CALCIUM CHLORIDE: 600; 310; 30; 20 INJECTION, SOLUTION INTRAVENOUS at 15:18

## 2020-03-09 RX ADMIN — ALBUTEROL SULFATE 2.5 MG: 2.5 SOLUTION RESPIRATORY (INHALATION) at 09:44

## 2020-03-09 RX ADMIN — PIPERACILLIN AND TAZOBACTAM 3.38 G: 3; .375 INJECTION, POWDER, FOR SOLUTION INTRAVENOUS at 00:29

## 2020-03-09 RX ADMIN — SODIUM CHLORIDE, POTASSIUM CHLORIDE, SODIUM LACTATE AND CALCIUM CHLORIDE: 600; 310; 30; 20 INJECTION, SOLUTION INTRAVENOUS at 13:41

## 2020-03-09 RX ADMIN — FENTANYL CITRATE 25 MCG: 50 INJECTION, SOLUTION INTRAMUSCULAR; INTRAVENOUS at 13:46

## 2020-03-09 RX ADMIN — ALBUTEROL SULFATE 2.5 MG: 2.5 SOLUTION RESPIRATORY (INHALATION) at 16:12

## 2020-03-09 RX ADMIN — SODIUM CHLORIDE: 9 INJECTION, SOLUTION INTRAVENOUS at 06:44

## 2020-03-09 RX ADMIN — FENTANYL CITRATE 25 MCG: 50 INJECTION, SOLUTION INTRAMUSCULAR; INTRAVENOUS at 13:49

## 2020-03-09 RX ADMIN — FAMOTIDINE 20 MG: 10 INJECTION INTRAVENOUS at 10:49

## 2020-03-09 RX ADMIN — Medication 10 ML: at 20:43

## 2020-03-09 RX ADMIN — PROPOFOL 140 MG: 10 INJECTION, EMULSION INTRAVENOUS at 13:46

## 2020-03-09 RX ADMIN — HYDROCORTISONE SODIUM SUCCINATE 100 MG: 100 INJECTION, POWDER, FOR SOLUTION INTRAMUSCULAR; INTRAVENOUS at 09:20

## 2020-03-09 RX ADMIN — PIPERACILLIN AND TAZOBACTAM 3.38 G: 3; .375 INJECTION, POWDER, FOR SOLUTION INTRAVENOUS at 20:44

## 2020-03-09 RX ADMIN — PIPERACILLIN AND TAZOBACTAM 3.38 G: 3; .375 INJECTION, POWDER, FOR SOLUTION INTRAVENOUS at 09:20

## 2020-03-09 ASSESSMENT — PAIN SCALES - GENERAL
PAINLEVEL_OUTOF10: 0
PAINLEVEL_OUTOF10: 3

## 2020-03-09 ASSESSMENT — PULMONARY FUNCTION TESTS
PIF_VALUE: 0
PIF_VALUE: 1
PIF_VALUE: 2
PIF_VALUE: 1

## 2020-03-09 ASSESSMENT — ENCOUNTER SYMPTOMS: SHORTNESS OF BREATH: 0

## 2020-03-09 NOTE — PROGRESS NOTES
Date:3/9/2020  Patient Name: Vj Saeed  MRN: [id number]  : 1945  ROOM #: 8112/2912-02    Occupational Therapy order received, chart reviewed and evaluation attempted this date. Patient is unavailable for OT evaluation due to being with doctor and nursing at this time. Will attempt OT evaluation at a later time. Thank you.      Jackie Bermudez OTR/L [id number]

## 2020-03-09 NOTE — PROGRESS NOTES
Date:3/9/2020  Patient Name: Dru Coronado  MRN: [id number]  : 1945  ROOM #: OR POOL/NONE    Occupational Therapy order received, chart reviewed and evaluation attempted this date. Patient is unavailable for OT evaluation due to being out of the room in OR. Will attempt OT evaluation at a later time. Thank you.      Alpa Landrum OTR/L [id number]

## 2020-03-09 NOTE — PROGRESS NOTES
Pharmacy Consultation Note  (Antibiotic Dosing and Monitoring)    Initial consult date: 3/8/20  Consulting physician: Dr. Jovana Nguyễn   Drug(s): Vancomycin   Indication: Pneumonia     Vancomycin discontinued, pharmacy will sign off. Please re-consult if needed.     Thank you,    Hector Beasley, PharmD, BCPS 3/9/2020 12:34 PM   [phone]

## 2020-03-09 NOTE — BRIEF OP NOTE
Brief Postoperative Note    Justine Lobo  YOB: 1945  [id number]    Pre-operative Diagnosis: inability to insert lopes     Post-operative Diagnosis: Same membranous strictur    Procedure: cysto retro urethral dilatation bilat retrogrades lopes placement    Anesthesia: MAC    Surgeons/Assistants: Rachell Epps      Estimated Blood Loss: less than 50     Complications: None    Specimens: Was Not Obtained    Findings:     Electronically signed by Mona Corona MD on 3/9/2020 at 2:25 PM

## 2020-03-09 NOTE — PLAN OF CARE
Problem: Falls - Risk of:  Goal: Will remain free from falls  Description: Will remain free from falls  3/9/2020 1207 by Janice Clifford RN  Outcome: Met This Shift     Problem: Falls - Risk of:  Goal: Absence of physical injury  Description: Absence of physical injury  3/9/2020 1207 by Janice Clifford RN  Outcome: Met This Shift     Problem: Airway Clearance - Ineffective:  Goal: Ability to maintain a clear airway will improve  Description: Ability to maintain a clear airway will improve  3/9/2020 1207 by Janice Clifford RN  Outcome: Met This Shift     Problem: Gas Exchange - Impaired:  Goal: Levels of oxygenation will improve  Description: Levels of oxygenation will improve  3/9/2020 1207 by Janice Clifford RN  Outcome: Met This Shift     Problem: Risk for Impaired Skin Integrity  Goal: Tissue integrity - skin and mucous membranes  Description: Structural intactness and normal physiological function of skin and  mucous membranes. 3/9/2020 1207 by Janice Clifford RN  Outcome: Not Met This Shift  Note: Skin tear to coccyx.  Continue mepilex

## 2020-03-09 NOTE — PROGRESS NOTES
non-distended  Genitourinary: [place] condom catheter with no urine output   Extremities: no cyanosis, clubbing or edema         Labs:     Recent Labs     03/09/20  0557      K 3.1*      CO2 20*   BUN 45*   CREATININE 4.6*   GLUCOSE 101*   CALCIUM 7.3*       Lab Results   Component Value Date    HGB 11.4 03/09/2020    HCT 35.7 03/09/2020         Assessment/Plan:  Metastatic Prostate Cancer   Difficult lopes insertion   Bladder outlet obstruction   Azotemia     Palliative care consulted and appreciate their input   Creatinine has increased from 1.4 to 4.6 today, no urine output  I spoke with [facility], Mark's wife, on the phone. Made her aware of current situation and need for lopes catheter. We feel that it is best he have lopes placed under anesthesia due to the failed attempts at bedside yesterday. She consented to proceed with the procedure today.    He was made NPO  He will proceed to OR today for cystopanendoscopy and lopes catheter insertion with Dr. Esa Berumen  Risks/Benefits of procedure discussed  We will cont to follow     OLIVA Juarez - CNP   MELISA  Urology

## 2020-03-09 NOTE — PROGRESS NOTES
.3/9/2020  [id number]  OR POOL/NONE      Patient unavailable for physical therapy treatment due to out of room in OR. Will attempt treatment at a later time/date.         Tania Lambert, \[facility]\""  LIC# [id number]

## 2020-03-09 NOTE — ANESTHESIA PRE PROCEDURE
tablet 5 mg  5 mg Oral TID WC Dennise Jung MD   5 mg at 03/08/20 1716    calcium-vitamin D 500-200 MG-UNIT per tablet 1 tablet  1 tablet Oral Daily Dennise Jung MD   1 tablet at 03/08/20 1020    influenza A&B vaccine (FLUAD) injection 0.5 mL  0.5 mL Intramuscular Prior to discharge Dennise Jung MD        lisinopril (PRINIVIL;ZESTRIL) tablet 5 mg  5 mg Oral DANIEL Jung MD   5 mg at 03/07/20 0992    metoprolol succinate (TOPROL XL) extended release tablet 100 mg  100 mg Oral Dinner Dennise Jung MD   100 mg at 03/08/20 1715    therapeutic multivitamin-minerals 1 tablet  1 tablet Oral DANIEL Jung MD   1 tablet at 03/08/20 1020    rivaroxaban (XARELTO) tablet 20 mg  20 mg Oral Dinner Dennise Jung MD   20 mg at 03/08/20 1715    tamsulosin (FLOMAX) capsule 0.4 mg  0.4 mg Oral Dinner Dennise Jung MD   0.4 mg at 03/08/20 1716    vitamin D (CHOLECALCIFEROL) tablet 1,000 Units  1,000 Units Oral Lunch Dennise Jung MD   1,000 Units at 03/08/20 1310    piperacillin-tazobactam (ZOSYN) 3.375 g in dextrose 5 % 50 mL IVPB extended infusion (mini-bag)  3.375 g Intravenous Q8H Dennise Jung MD 12.5 mL/hr at 03/09/20 0920 3.375 g at 03/09/20 0920    And    0.9 % sodium chloride infusion   Intravenous Q8H Dennise Jung MD 12.5 mL/hr at 03/09/20 0644      levofloxacin (LEVAQUIN) 750 MG/150ML infusion 750 mg  750 mg Intravenous Q24H Dennise Jung MD   Stopped at 03/08/20 1805    predniSONE (DELTASONE) tablet 5 mg  5 mg Oral Daily Dennise Jung MD   5 mg at 03/08/20 1020       Allergies:  No Known Allergies    Problem List:    Patient Active Problem List   Diagnosis Code    Cerebrovascular accident (CVA) due to occlusion of left carotid artery ([facility] X45.219    Dysphagia due to recent stroke I69.391    Paroxysmal atrial fibrillation (HCC) I48.0    Prostate cancer ([facility] C61    Cerebral contusion ([facility] N23.816F    Subarachnoid hemorrhage ([facility] I60.9    HCAP (healthcare-associated pneumonia) J18.9 CALCIUM 7.3 03/09/2020    BILITOT 0.6 03/05/2020    ALKPHOS 119 03/05/2020    AST 44 03/05/2020    ALT 52 03/05/2020       POC Tests: No results for input(s): POCGLU, POCNA, POCK, POCCL, POCBUN, POCHEMO, POCHCT in the last 72 hours. Coags:   Lab Results   Component Value Date    PROTIME 39.5 03/05/2020    INR 3.4 03/05/2020    APTT 37.6 03/05/2020       HCG (If Applicable): No results found for: PREGTESTUR, PREGSERUM, HCG, HCGQUANT     ABGs:   Lab Results   Component Value Date    PO2ART 47.5 03/05/2020    AJT8UOY 34.6 03/05/2020    TUH3DTH 23.5 03/05/2020        Type & Screen (If Applicable):  No results found for: [facility]    Anesthesia Evaluation  Patient summary reviewed  Airway: Mallampati: III  TM distance: >3 FB   Neck ROM: full  Mouth opening: > = 3 FB Dental:          Pulmonary: breath sounds clear to auscultation  (+) pneumonia: unresolved,      (-) shortness of breath                           Cardiovascular:  Exercise tolerance: no interval change,   (+) dysrhythmias: atrial fibrillation,       ECG reviewed  Rhythm: irregular  Rate: normal  Echocardiogram reviewed                  Neuro/Psych:   (+) CVA: residual symptoms,              ROS comment: Altered mental status GI/Hepatic/Renal:   (+) morbid obesity     (-) liver disease and no renal disease       Endo/Other:    (+) malignancy/cancer (Prostate cancer with metas to brain and spine). Abdominal:   (+) obese,         Vascular:   + PVD, aortic or cerebral, . Anesthesia Plan      MAC     ASA 4       Induction: intravenous. MIPS: Prophylactic antiemetics administered. Anesthetic plan and risks discussed with patient and healthcare power of . Plan discussed with CRNA.                   Pascual Rosenthal MD   3/9/2020

## 2020-03-09 NOTE — PROGRESS NOTES
Joan Avendano,    Your patient is on a medication that requires a renal dose adjustment. Renal Function Assessment:    Date Body Weight IBW Adj. Body Weight SCr CrCl Dialysis status   3/9/2020 109.3 kg 75.3 kg 88.9 kg 4.6 18 ml/min N/A       Pharmacy has renally dose-adjusted the following medication(s):    Date Medication Original Dosing Regimen New Dosing Regimen   3/9/2020 Levofloxacin 750 mg daily 500 mg q48h   3/9/2020 Pip/tazo 3.375 g q8h 3.375 g q12h   3/9/2020 Famotidine 20 mg 2 times daily 20 mg daily     These changes were made per protocol according to the [facility] Renal Function-Based Dose Adjustments Policy    *Please note this dose may need readjusted if your patient's renal function significantly improves. Please contact pharmacy with any questions regarding these changes.     Darwin Catherine PharmD, BCPS 3/9/2020 12:07 PM   [phone]

## 2020-03-09 NOTE — PROGRESS NOTES
Blood and [address] [place] [address]  Hematology/Oncology      Patient Name: Sukhi Balderas  YOB: 1945  PCP: Olga Wyatt MD   Referring Provider:      Reason for Consultation:   Chief Complaint   Patient presents with    Altered Mental Status     to er via ems from home for evaluation of complaint of dyspnea and generalized weakness per ems from \"daughter\". the pt is oriented x 0 here for me. he does follow requests with left arm and leg. he is moaning constantly but does not communicate where he is having pain, if any. Subjective:    Somnolent postprocedure  Status post cystoscopy, urethral dilatation retrograde pyelogram and placement of Tobin catheter. History of Present Illness: This pt is a 69 yo male with a past medical history of atrial fibrillation on Xarelto, hyperlipidemia, CVA, and prostate cancer with mets to the spine and a CVA with resultant expressive aphasia and R hemiparesis and brain. He is currently following with [facility] for therapy. He was admitted with AMS and CXR was concerning for PNA. CT head showed old infarct but no new process. CTA showed no PE, and no definitve PNA but was limited due to motion artifact. Subsegmental atelectasis was noted at the bases. He endorse pain in the RLE which is read and swollen, likely cellulitis    Oncologic history:  69year old male with prostate cancer originally diagnosed in 2013. S/P I -125 seed implantation January 2014. Rising PSA in 2015 led to restaging with discovery of diffuse bone mets on bone scan. Started ADT with bicalutamide. S/P 5 cycles of docetaxel (8/2015 - 11/2015). Treatment interrupted with massive CVA with residual ttwzdob80/2015. Restarted Lupron/biclautamide July 2016.   Started denosumab 120 mg subcutaneously 9/26/2016.    8/2017 - Patient declined genetic testing  PSA increasing  Now on Abiraterone Derrel Marcelino)    Diagnostic Data:     Past Medical History:   Diagnosis Date    [facility]     Arthritis     CVA (cerebral vascular accident) ([facility] [address] 11/15/2015    left MCA    Hyperlipemia     Prostate cancer ([facility] [address]     mets to spine, currently on chemo       Patient Active Problem List    Diagnosis Date Noted    HCAP (healthcare-associated pneumonia) 03/05/2020    Subarachnoid hemorrhage ([facility] [address] [facility] 12/07/2015    Cerebral contusion (HCC)     Paroxysmal atrial fibrillation (HCC)     Prostate cancer ([facility] [address] [facility]     Dysphagia due to recent stroke     Cerebrovascular accident (CVA) due to occlusion of left carotid artery ([facility] [address] 11/15/2015        Past Surgical History:   Procedure Laterality Date    COLONOSCOPY      GASTROSTOMY TUBE PLACEMENT  11/20/15    sonal       Family History  Family History   Problem Relation Age of Onset    Cancer Mother     Heart Disease Father        Social History    TOBACCO:   reports that he quit smoking about 4 years ago. His smoking use included cigarettes. He has never used smokeless tobacco.  ETOH:   reports no history of alcohol use. Home Medications  Prior to Admission medications    Medication Sig Start Date End Date Taking?  Authorizing Provider   atorvastatin (LIPITOR) 40 MG tablet Take 40 mg by mouth Daily with supper   Yes Historical Provider, MD   Calcium Carb-Cholecalciferol (SM CALCIUM/VITAMIN D) 600-800 MG-UNIT TABS Take 1 tablet by mouth daily   Yes Historical Provider, MD   vitamin D (CHOLECALCIFEROL) 25 MCG (1000 UT) TABS tablet Take 1,000 Units by mouth Daily with lunch   Yes Historical Provider, MD   lisinopril (PRINIVIL;ZESTRIL) 5 MG tablet Take 5 mg by mouth every morning   Yes Historical Provider, MD   metoprolol succinate (TOPROL XL) 100 MG extended release tablet Take 100 mg by mouth Daily with supper   Yes Historical Provider, MD   Ascorbic Acid (VITAMIN C PLUS WILD CAL HIPS PO) Take 1,000 mg by mouth Daily with lunch   Yes Historical Provider, MD   influenza virus trivalent vaccine (FLUZONE) injection Inject 0.5 mLs into the muscle once Given 10/2019 Yes Historical Provider, MD   predniSONE (DELTASONE) 5 MG tablet Take 5 mg by mouth every morning  2/14/20  Yes Historical Provider, MD   abiraterone acetate (ZYTIGA) 250 MG tablet Take 1,000 mg by mouth nightly  2/15/20  Yes Historical Provider, MD   rivaroxaban (XARELTO) 20 MG TABS tablet Take 20 mg by mouth Daily with supper  11/22/16  Yes Historical Provider, MD   baclofen (LIORESAL) 5 mg TABS Take 5 mg by mouth 3 times daily (with meals)    Yes Historical Provider, MD   tamsulosin (FLOMAX) 0.4 MG capsule Take 0.4 mg by mouth Daily with supper    Yes Historical Provider, MD   acetaminophen (TYLENOL) 325 MG tablet Take 325 mg by mouth every 6 hours as needed for Pain or Fever    Yes Historical Provider, MD   Multiple Vitamins-Minerals (THERAPEUTIC MULTIVITAMIN-MINERALS) tablet Take 1 tablet by mouth every morning    Yes Historical Provider, MD       Allergies  No Known Allergies    Review of Systems:    Difficult to ascertain with aphasia, but does complain of RLE pain      Objective  BP (!) 156/80   Pulse 94   Temp 97.4 °F (36.3 °C) (Axillary)   Resp 12   Ht 5' 11\" (1.803 m)   Wt 241 lb (109.3 kg)   SpO2 95%   BMI 33.61 kg/m²     Physical Exam:   Performance Status:  General: AAO to person, place, time, in no acute distress, pleasant   Head and neck : PERRLA, EOMI . Sclera non icteric.   Oropharynx : Clear  Neck: no JVD,  no adenopathy  LYMPHATICS : No LAD  Heart: Regular rate and regular rhythm, no murmur  Lungs: Clear to auscultation   Extremities: RLE cellulitis  Abdomen: Soft, non-tender;no masses, no organomegaly  Skin:  No rash  Neurologic:R hemiparesis    Recent Laboratory Data-   Lab Results   Component Value Date    WBC 9.1 03/09/2020    HGB 11.4 (L) 03/09/2020    HCT 35.7 (L) 03/09/2020    MCV 93.5 03/09/2020     (L) 03/09/2020    LYMPHOPCT 10.6 (L) 03/07/2020    RBC 3.82 03/09/2020    MCH 29.8 03/09/2020    MCHC 31.9 (L) 03/09/2020    RDW 15.1 (H) 03/09/2020    NEUTOPHILPCT 85.5 (H) 03/07/2020    MONOPCT 2.8 03/07/2020    BASOPCT 0.1 03/07/2020    NEUTROABS 6.64 03/07/2020    LYMPHSABS 0.82 (L) 03/07/2020    MONOSABS 0.22 03/07/2020    EOSABS 0.05 03/07/2020    BASOSABS 0.01 03/07/2020       Lab Results   Component Value Date     03/09/2020    K 3.1 (L) 03/09/2020     03/09/2020    CO2 20 (L) 03/09/2020    BUN 45 (H) 03/09/2020    CREATININE 4.6 (H) 03/09/2020    GLUCOSE 101 (H) 03/09/2020    CALCIUM 7.3 (L) 03/09/2020    PROT 6.8 03/05/2020    LABALBU 3.2 (L) 03/05/2020    BILITOT 0.6 03/05/2020    ALKPHOS 119 03/05/2020    AST 44 (H) 03/05/2020    ALT 52 (H) 03/05/2020    LABGLOM 13 03/09/2020    GFRAA 15 03/09/2020       No results found for: IRON, TIBC, FERRITIN        Radiology-    XR Urogram W WO Kub W WO Tomogram   Final Result   Intraoperative images as above. FL MODIFIED BARIUM SWALLOW W VIDEO   Final Result   1. Aspiration with thin liquids. 2. Please see speech therapist's report for a detailed description of   findings. XR CHEST STANDARD (2 VW)   Final Result   Lower lobe opacities are possibly infectious. US ABDOMEN LIMITED   Final Result   No acute finding. CTA CHEST W CONTRAST   Final Result   1. No evidence of pulmonary embolism. 2. Lower lobe linear subsegmental atelectasis. 3. Blastic lesions in the spine and involving several ribs suspicious   for metastatic disease, possibly prostate. Recommend correlation with   PSA. US DUP LOWER EXTREMITY RIGHT DALI   Final Result   No evidence of right lower extremity deep venous thrombus   from common femoral vein to proximal calf. CT Head WO Contrast   Final Result      Large remote left MCA territory infarct. No acute hemorrhage or mass. XR CHEST PORTABLE   Final Result   1. Left lower lobe airspace disease is likely infectious.        IR FLUORO GUIDED CVA DEVICE PLMT/REPLACE/REMOVAL    (Results Pending)         ASSESSMENT/PLAN :  69 yo male  Prostate cancer, mCRPC, following at [facility], on lupron, Xgeva and Zytiga  A fib on Xarelto  History of L CVA  Possible PNA  RLE cellulitis  Acute kidney failure    - Imaging personally reviewed  - Xarelto is known to cause elevation of INR/PT/aPTT. This is a normal finding while on this medication. Can use lovenox during admission, prophylactic dose, if procedures anticipated  - Continue treatment for possible PNA  - Flu negative  - RLE cellulitis, continue abx therapy    He is presently on Zosyn along with Levaquin  He is presently on Solu-Cortef daily but to go back to prednisone when Solu-Cortef discontinued  His acute renal failure is likely related to obstructive uropathy and anticipate improvement in his renal function with his Tobin catheter insertion  - OK to continue Zytiga.  Hold it today because of lethargy and risk of aspiration  Hold Xarelto for GFR of  13ml/min and resume for GFR > 30ml/min    Discussed with wife at bedside      Electronically signed by Angella Bentley MD on 3/9/2020 at 4:52 PM

## 2020-03-10 ENCOUNTER — APPOINTMENT (OUTPATIENT)
Dept: GENERAL RADIOLOGY | Age: 75
DRG: 208 | End: 2020-03-10
Payer: COMMERCIAL

## 2020-03-10 ENCOUNTER — APPOINTMENT (OUTPATIENT)
Dept: INTERVENTIONAL RADIOLOGY/VASCULAR | Age: 75
DRG: 208 | End: 2020-03-10
Payer: COMMERCIAL

## 2020-03-10 LAB
ANION GAP SERPL CALCULATED.3IONS-SCNC: 19 MMOL/L (ref 7–16)
APTT: 30.5 SEC (ref 24.5–35.1)
B.E.: -11.3 MMOL/L (ref -3–3)
B.E.: -5.2 MMOL/L (ref -3–3)
BLOOD CULTURE, ROUTINE: NORMAL
BUN BLDV-MCNC: 54 MG/DL (ref 8–23)
CALCIUM SERPL-MCNC: 7.2 MG/DL (ref 8.6–10.2)
CHLORIDE BLD-SCNC: 103 MMOL/L (ref 98–107)
CO2: 18 MMOL/L (ref 22–29)
COHB: 0 % (ref 0–1.5)
COHB: 0.3 % (ref 0–1.5)
COMMENT: ABNORMAL
CREAT SERPL-MCNC: 6.3 MG/DL (ref 0.7–1.2)
CRITICAL: ABNORMAL
CRITICAL: ABNORMAL
CULTURE, BLOOD 2: NORMAL
DATE ANALYZED: ABNORMAL
DATE ANALYZED: ABNORMAL
DATE OF COLLECTION: ABNORMAL
DATE OF COLLECTION: ABNORMAL
FIO2: 100 %
FIO2: 40 %
GFR AFRICAN AMERICAN: 11
GFR NON-AFRICAN AMERICAN: 9 ML/MIN/1.73
GLUCOSE BLD-MCNC: 102 MG/DL (ref 74–99)
HCO3: 17.9 MMOL/L (ref 22–26)
HCO3: 19.7 MMOL/L (ref 22–26)
HCT VFR BLD CALC: 35.2 % (ref 37–54)
HEMOGLOBIN: 11 G/DL (ref 12.5–16.5)
HHB: 1 % (ref 0–5)
HHB: 1.7 % (ref 0–5)
INR BLD: 1.3
LAB: ABNORMAL
MAGNESIUM: 2.5 MG/DL (ref 1.6–2.6)
MCH RBC QN AUTO: 28.9 PG (ref 26–35)
MCHC RBC AUTO-ENTMCNC: 31.3 % (ref 32–34.5)
MCV RBC AUTO: 92.6 FL (ref 80–99.9)
METER GLUCOSE: 144 MG/DL (ref 74–99)
METHB: 0.4 % (ref 0–1.5)
METHB: 0.4 % (ref 0–1.5)
MODE: ABNORMAL
MODE: ABNORMAL
O2 CONTENT: 16.7 ML/DL
O2 CONTENT: 18.1 ML/DL
O2 SATURATION: 98.3 % (ref 92–98.5)
O2 SATURATION: 99 % (ref 92–98.5)
O2HB: 97.6 % (ref 94–97)
O2HB: 98.6 % (ref 94–97)
OPERATOR ID: 3
OPERATOR ID: ABNORMAL
PATIENT TEMP: 37 C
PATIENT TEMP: 37 C
PCO2: 36.3 MMHG (ref 35–45)
PCO2: 54.7 MMHG (ref 35–45)
PDW BLD-RTO: 15.3 FL (ref 11.5–15)
PEEP/CPAP: 6 CMH2O
PFO2: 2.28 MMHG/%
PFO2: 3.4 MMHG/%
PH BLOOD GAS: 7.13 (ref 7.35–7.45)
PH BLOOD GAS: 7.35 (ref 7.35–7.45)
PHOSPHORUS: 3.9 MG/DL (ref 2.5–4.5)
PLATELET # BLD: 163 E9/L (ref 130–450)
PMV BLD AUTO: 9.7 FL (ref 7–12)
PO2: 135.9 MMHG (ref 75–100)
PO2: 227.7 MMHG (ref 75–100)
POTASSIUM SERPL-SCNC: 3.3 MMOL/L (ref 3.5–5)
PROTHROMBIN TIME: 14.4 SEC (ref 9.3–12.4)
PS: 12 CMH20
RBC # BLD: 3.8 E12/L (ref 3.8–5.8)
RI(T): 0.72
RI(T): 1.78
SODIUM BLD-SCNC: 140 MMOL/L (ref 132–146)
SOURCE, BLOOD GAS: ABNORMAL
SOURCE, BLOOD GAS: ABNORMAL
THB: 12 G/DL (ref 11.5–16.5)
THB: 12.7 G/DL (ref 11.5–16.5)
TIME ANALYZED: 1132
TIME ANALYZED: 1912
TROPONIN: 0.06 NG/ML (ref 0–0.03)
WBC # BLD: 8.5 E9/L (ref 4.5–11.5)

## 2020-03-10 PROCEDURE — 90935 HEMODIALYSIS ONE EVALUATION: CPT

## 2020-03-10 PROCEDURE — 87070 CULTURE OTHR SPECIMN AEROBIC: CPT

## 2020-03-10 PROCEDURE — 94660 CPAP INITIATION&MGMT: CPT

## 2020-03-10 PROCEDURE — 6360000002 HC RX W HCPCS: Performed by: INTERNAL MEDICINE

## 2020-03-10 PROCEDURE — 5A1945Z RESPIRATORY VENTILATION, 24-96 CONSECUTIVE HOURS: ICD-10-PCS | Performed by: INTERNAL MEDICINE

## 2020-03-10 PROCEDURE — 80048 BASIC METABOLIC PNL TOTAL CA: CPT

## 2020-03-10 PROCEDURE — 99291 CRITICAL CARE FIRST HOUR: CPT | Performed by: INTERNAL MEDICINE

## 2020-03-10 PROCEDURE — 02H633Z INSERTION OF INFUSION DEVICE INTO RIGHT ATRIUM, PERCUTANEOUS APPROACH: ICD-10-PCS | Performed by: RADIOLOGY

## 2020-03-10 PROCEDURE — 84100 ASSAY OF PHOSPHORUS: CPT

## 2020-03-10 PROCEDURE — 82962 GLUCOSE BLOOD TEST: CPT

## 2020-03-10 PROCEDURE — 94640 AIRWAY INHALATION TREATMENT: CPT

## 2020-03-10 PROCEDURE — 82805 BLOOD GASES W/O2 SATURATION: CPT

## 2020-03-10 PROCEDURE — 84484 ASSAY OF TROPONIN QUANT: CPT

## 2020-03-10 PROCEDURE — 2000000000 HC ICU R&B

## 2020-03-10 PROCEDURE — 5A1D70Z PERFORMANCE OF URINARY FILTRATION, INTERMITTENT, LESS THAN 6 HOURS PER DAY: ICD-10-PCS | Performed by: INTERNAL MEDICINE

## 2020-03-10 PROCEDURE — C1752 CATH,HEMODIALYSIS,SHORT-TERM: HCPCS

## 2020-03-10 PROCEDURE — 85027 COMPLETE CBC AUTOMATED: CPT

## 2020-03-10 PROCEDURE — 2500000003 HC RX 250 WO HCPCS: Performed by: INTERNAL MEDICINE

## 2020-03-10 PROCEDURE — 80074 ACUTE HEPATITIS PANEL: CPT

## 2020-03-10 PROCEDURE — 36415 COLL VENOUS BLD VENIPUNCTURE: CPT

## 2020-03-10 PROCEDURE — 85730 THROMBOPLASTIN TIME PARTIAL: CPT

## 2020-03-10 PROCEDURE — 51798 US URINE CAPACITY MEASURE: CPT

## 2020-03-10 PROCEDURE — 77001 FLUOROGUIDE FOR VEIN DEVICE: CPT | Performed by: RADIOLOGY

## 2020-03-10 PROCEDURE — 36556 INSERT NON-TUNNEL CV CATH: CPT | Performed by: RADIOLOGY

## 2020-03-10 PROCEDURE — 87206 SMEAR FLUORESCENT/ACID STAI: CPT

## 2020-03-10 PROCEDURE — 87081 CULTURE SCREEN ONLY: CPT

## 2020-03-10 PROCEDURE — 2580000003 HC RX 258: Performed by: INTERNAL MEDICINE

## 2020-03-10 PROCEDURE — 36558 INSERT TUNNELED CV CATH: CPT | Performed by: RADIOLOGY

## 2020-03-10 PROCEDURE — 74018 RADEX ABDOMEN 1 VIEW: CPT

## 2020-03-10 PROCEDURE — 83735 ASSAY OF MAGNESIUM: CPT

## 2020-03-10 PROCEDURE — 94002 VENT MGMT INPAT INIT DAY: CPT

## 2020-03-10 PROCEDURE — 86706 HEP B SURFACE ANTIBODY: CPT

## 2020-03-10 PROCEDURE — 0BH17EZ INSERTION OF ENDOTRACHEAL AIRWAY INTO TRACHEA, VIA NATURAL OR ARTIFICIAL OPENING: ICD-10-PCS | Performed by: INTERNAL MEDICINE

## 2020-03-10 PROCEDURE — 85610 PROTHROMBIN TIME: CPT

## 2020-03-10 PROCEDURE — 71045 X-RAY EXAM CHEST 1 VIEW: CPT

## 2020-03-10 PROCEDURE — 76937 US GUIDE VASCULAR ACCESS: CPT | Performed by: RADIOLOGY

## 2020-03-10 RX ORDER — MIDAZOLAM HYDROCHLORIDE 1 MG/ML
INJECTION INTRAMUSCULAR; INTRAVENOUS
Status: DISCONTINUED
Start: 2020-03-10 | End: 2020-03-11

## 2020-03-10 RX ORDER — PROPOFOL 10 MG/ML
10 INJECTION, EMULSION INTRAVENOUS
Status: DISCONTINUED | OUTPATIENT
Start: 2020-03-10 | End: 2020-03-13

## 2020-03-10 RX ADMIN — SODIUM CHLORIDE, POTASSIUM CHLORIDE, SODIUM LACTATE AND CALCIUM CHLORIDE: 600; 310; 30; 20 INJECTION, SOLUTION INTRAVENOUS at 01:26

## 2020-03-10 RX ADMIN — FAMOTIDINE 20 MG: 10 INJECTION, SOLUTION INTRAVENOUS at 10:48

## 2020-03-10 RX ADMIN — Medication 10 ML: at 21:16

## 2020-03-10 RX ADMIN — ALBUTEROL SULFATE 2.5 MG: 2.5 SOLUTION RESPIRATORY (INHALATION) at 12:40

## 2020-03-10 RX ADMIN — ALBUTEROL SULFATE 2.5 MG: 2.5 SOLUTION RESPIRATORY (INHALATION) at 09:19

## 2020-03-10 RX ADMIN — ALBUTEROL SULFATE 2.5 MG: 2.5 SOLUTION RESPIRATORY (INHALATION) at 06:16

## 2020-03-10 RX ADMIN — Medication 10 ML: at 21:07

## 2020-03-10 RX ADMIN — PROPOFOL INJECTABLE EMULSION 10 MCG/KG/MIN: 10 INJECTION, EMULSION INTRAVENOUS at 21:00

## 2020-03-10 RX ADMIN — SODIUM CHLORIDE: 9 INJECTION, SOLUTION INTRAVENOUS at 13:12

## 2020-03-10 RX ADMIN — ALBUTEROL SULFATE 2.5 MG: 2.5 SOLUTION RESPIRATORY (INHALATION) at 16:27

## 2020-03-10 RX ADMIN — PIPERACILLIN AND TAZOBACTAM 3.38 G: 3; .375 INJECTION, POWDER, FOR SOLUTION INTRAVENOUS at 10:48

## 2020-03-10 RX ADMIN — Medication 10 ML: at 10:48

## 2020-03-10 RX ADMIN — SODIUM CHLORIDE: 9 INJECTION, SOLUTION INTRAVENOUS at 01:12

## 2020-03-10 RX ADMIN — HYDROCORTISONE SODIUM SUCCINATE 100 MG: 100 INJECTION, POWDER, FOR SOLUTION INTRAMUSCULAR; INTRAVENOUS at 10:47

## 2020-03-10 ASSESSMENT — PAIN SCALES - GENERAL
PAINLEVEL_OUTOF10: 0
PAINLEVEL_OUTOF10: 0

## 2020-03-10 ASSESSMENT — PULMONARY FUNCTION TESTS
PIF_VALUE: 27
PIF_VALUE: 22

## 2020-03-10 NOTE — PROGRESS NOTES
Responded to RRT. Assisted fellow therapists. Ran ABGs, obtained Glidescope and Akth ventilator. Went to ED to do tx. Total time one hour.

## 2020-03-10 NOTE — PROGRESS NOTES
Palliative Medicine   Progression of Care     Patient's code status changed to a DNR-CCA at this time. Staff RN stated that the wife called in to change the code status. Had a long conversation yesterday with the patient's wife yesterday concerning code status levels.        Palliative Care following closely for support of patient and family   Angel BAPTISTE-AFSHIN, AGACNP-BC

## 2020-03-10 NOTE — PROGRESS NOTES
Blood and [address] [place] [address]  Hematology/Oncology      Patient Name: Claude Pennant  YOB: 1945  PCP: Jennifer Mclean MD   Referring Provider:      Reason for Consultation:   Chief Complaint   Patient presents with    Altered Mental Status     to er via ems from home for evaluation of complaint of dyspnea and generalized weakness per ems from \"daughter\". the pt is oriented x 0 here for me. he does follow requests with left arm and leg. he is moaning constantly but does not communicate where he is having pain, if any. Subjective:  Status post cystoscopy, urethral dilatation retrograde pyelogram and placement of Tobin catheter. Remains nonverbal      History of Present Illness: This pt is a 69 yo male with a past medical history of atrial fibrillation on Xarelto, hyperlipidemia, CVA, and prostate cancer with mets to the spine and a CVA with resultant expressive aphasia and R hemiparesis and brain. He is currently following with [facility] for therapy. He was admitted with AMS and CXR was concerning for PNA. CT head showed old infarct but no new process. CTA showed no PE, and no definitve PNA but was limited due to motion artifact. Subsegmental atelectasis was noted at the bases. He endorse pain in the RLE which is read and swollen, likely cellulitis    Oncologic history:  69year old male with prostate cancer originally diagnosed in 2013. S/P I -125 seed implantation January 2014. Rising PSA in 2015 led to restaging with discovery of diffuse bone mets on bone scan. Started ADT with bicalutamide. S/P 5 cycles of docetaxel (8/2015 - 11/2015). Treatment interrupted with massive CVA with residual lotqtqk08/2015. Restarted Lupron/biclautamide July 2016.   Started denosumab 120 mg subcutaneously 9/26/2016.    8/2017 - Patient declined genetic testing  PSA increasing  Now on Abiraterone Otulices Yip)    Diagnostic Data:     Past Medical History:   Diagnosis Date    [facility] ([facility] [address]     Arthritis  CVA [facility]cerebral vascular [facility]) ([facility] [address] 11/15/2015    left MCA    Hyperlipemia     Prostate cancer ([facility] [address]     mets to spine, currently on chemo       Patient Active Problem List    Diagnosis Date Noted    HCAP (healthcare-associated pneumonia) 03/05/2020    Subarachnoid hemorrhage ([facility] [address] 12/07/2015    Cerebral contusion (HCC)     Paroxysmal atrial fibrillation (HCC)     Prostate cancer ([facility] [address]     Dysphagia due to recent stroke     Cerebrovascular accident (CVA) due to occlusion of left carotid artery ([facility] [address] 11/15/2015        Past Surgical History:   Procedure Laterality Date    COLONOSCOPY      CYSTOSCOPY N/A 3/9/2020    CYSTOSCOPY, BILATERAL RETROGRADE PYELOGRAMS, URETHRAL DILATATION, REYEZ CATHETER INSERTION performed by Ino Polanco MD at [facility]  11/20/15    sonal       Family History  Family History   Problem Relation Age of Onset    Cancer Mother     Heart Disease Father        Social History    TOBACCO:   reports that he quit smoking about 4 years ago. His smoking use included cigarettes. He has never used smokeless tobacco.  ETOH:   reports no history of alcohol use. Home Medications  Prior to Admission medications    Medication Sig Start Date End Date Taking?  Authorizing Provider   atorvastatin (LIPITOR) 40 MG tablet Take 40 mg by mouth Daily with supper   Yes Historical Provider, MD   Calcium Carb-Cholecalciferol (SM CALCIUM/VITAMIN D) 600-800 MG-UNIT TABS Take 1 tablet by mouth daily   Yes Historical Provider, MD   vitamin D (CHOLECALCIFEROL) 25 MCG (1000 UT) TABS tablet Take 1,000 Units by mouth Daily with lunch   Yes Historical Provider, MD   lisinopril (PRINIVIL;ZESTRIL) 5 MG tablet Take 5 mg by mouth every morning   Yes Historical Provider, MD   metoprolol succinate (TOPROL XL) 100 MG extended release tablet Take 100 mg by mouth Daily with supper   Yes Historical Provider, MD   Ascorbic Acid (VITAMIN C PLUS WILD CAL HIPS PO) Take 1,000 mg by mouth IR FLUORO GUIDED CVA DEVICE PLMT/REPLACE/REMOVAL    (Results Pending)   IR INTERVENTIONAL RADIOLOGY PROCEDURE REQUEST    (Results Pending)         ASSESSMENT/PLAN :  69 yo male  Prostate cancer, mCRPC, following at [facility], on lupron, Xgeva and Zytiga  A fib on Xarelto  History of L CVA  Possible PNA  RLE cellulitis  Acute kidney failure    - Imaging personally reviewed  - Xarelto is known to cause elevation of INR/PT/aPTT. This is a normal finding while on this medication.  Can use lovenox during admission, prophylactic dose, if procedures anticipated  - Continue treatment for possible PNA  - Flu negative  - RLE cellulitis, continue abx therapy    He is presently on Zosyn along with Levaquin  Cellulitis is improving  He is presently on Solu-Cortef daily but to go back to prednisone when Solu-Cortef discontinued  His acute renal failure is likely related to obstructive uropathy and anticipate improvement in his renal function with his Tobin catheter insertion  OK to hold Zytiga  Hold Xarelto for current GFR and resume for GFR > 30ml/min  Discussed with wife at bedside      Electronically signed by Sean Garcia MD on 3/10/2020 at 12:52 PM

## 2020-03-10 NOTE — SIGNIFICANT EVENT
[address] [place], [place] [address]ist RRT/Code Blue Note    Subjective:     Called to bedside RRT for acute respiratory distress. Patient with sudden desaturation from 95% on  5L down down to mid 60's. Per nursing, he rapidly went from pink to gray. When I arrived, respiratory placed oral airway and were bagging patient. Patient was listed as DNR CCA so I had my NP check with wife if intubation was okay. She was agreeable but did not want CPR. RT attempt intubation but patient with a lot of dry mucus secretions which were blocking airway. Vocal cord were visualized with handheld glidescope but unable to advance tube. RT x 2 and myself attempted without success. During this time, we were able to clear a lot of inspissated secretions. O2 saturation improved to mid 90's. Patient was due to undergo 1st HD session and I felt need to have stable airway to ensure safe treatment. Therefore, with the help of glidescope on stand, RT was able to intubate with 7.0 tube. I obtained ABG left wrist without difficulty on 1st attempt.  Initial vent setting AC 14  FIO2 50% and PEEP of 5.       midazolam        famotidine (PEPCID) injection  20 mg Intravenous Daily    levofloxacin  500 mg Intravenous Q48H    piperacillin-tazobactam  3.375 g Intravenous Q12H    [Held by provider] rivaroxaban  15 mg Oral Dinner    sodium chloride flush  10 mL Intravenous 2 times per day    hydrocortisone sodium succinate PF  100 mg Intravenous Daily    sodium chloride flush  10 mL Intravenous 2 times per day    albuterol  2.5 mg Nebulization Q4H WA    abiraterone acetate  1,000 mg Oral Nightly    baclofen  5 mg Oral TID WC    calcium-vitamin D  1 tablet Oral Daily    influenza A&B vaccine  0.5 mL Intramuscular Prior to discharge    metoprolol succinate  100 mg Oral Dinner    therapeutic multivitamin-minerals  1 tablet Oral QAM    tamsulosin  0.4 mg Oral Dinner    Vitamin D  1,000 Units Oral Lunch     sodium chloride flush, 10 mL, PRN  sodium chloride flush, 10 mL, PRN  acetaminophen, 650 mg, Q6H PRN    Or  acetaminophen, 650 mg, Q6H PRN  polyethylene glycol, 17 g, Daily PRN  promethazine, 12.5 mg, Q6H PRN    Or  ondansetron, 4 mg, Q6H PRN  acetaminophen, 325 mg, Q6H PRN         Objective:    BP (!) 157/81   Pulse 101   Temp 97.4 °F (36.3 °C) (Axillary)   Resp 25   Ht 5' 11\" (1.803 m)   Wt 246 lb (111.6 kg)   SpO2 96%   BMI 34.31 kg/m²   General Appearance: unresponsive and gray but improve to spontaneous movement and pinkish coloration. Skin: as above. Head: normocephalic and atraumatic  Eyes: pupils equal, round, and reactive to light, extraocular eye movements intact, conjunctivae normal  ENT: redundant soft tissue upper airway with some bleeding from intubation trauma. Thickened inspissated secretion dark brown/black as notes above. Neck: supple and non-tender without mass, no cervical lymphadenopathy  Pulmonary/Chest:diminished breath sounds   Cardiovascular: normal rate, regular rhythm, normal S1 and S2, no murmurs, rubs, clicks, or gallops  Abdomen: soft, non-tender, non-distended, normal bowel sounds, no masses or organomegaly  Extremities:  edema        Recent Labs     03/09/20  0557 03/10/20  0616    140   K 3.1* 3.3*    103   CO2 20* 18*   BUN 45* 54*   CREATININE 4.6* 6.3*   GLUCOSE 101* 102*   CALCIUM 7.3* 7.2*       Recent Labs     03/09/20  0557 03/10/20  0616   WBC 9.1 8.5   RBC 3.82 3.80   HGB 11.4* 11.0*   HCT 35.7* 35.2*   MCV 93.5 92.6   MCH 29.8 28.9   MCHC 31.9* 31.3*   RDW 15.1* 15.3*   * 163   MPV 10.4 9.7         Blood Gas, Arterial [[id number]] (Abnormal) Resulted: 03/10/20 1912     Specimen: Blood Updated: 03/10/20 1914      Date Analyzed [id number]      Time Analyzed 1912      Source: Blood Arterial      pH, Blood Gas 7. 133      PCO2 54.7 mmHg       PO2 227.7 mmHg       HCO3 17.9 mmol/L       B.E. -11.3 mmol/L       O2 Sat 99.0 %       PO2/FIO2 2.28 mmHg/%

## 2020-03-10 NOTE — PROGRESS NOTES
Pharmacy Consultation Note      Discussed anticoagulation options with Dr. Josefina Crisostomo. Xarelto not recommended in hemodialysis, renal failure. Per his order, hold all anticoagulants for now.      Jeremie Larios, PharmD, BCPS 3/10/2020 2:55 PM   Pager: [phone]  Ext: [phone]

## 2020-03-10 NOTE — PROGRESS NOTES
Occupational Therapy  OT SESSION ATTEMPT     Date:3/10/2020  Patient Name: Bhargav Daniel  MRN: [id number]  : 1945  Room: [address] [place] [address]     Attempted OT session this date:    [] unavailable due to other medical staff currently with pt   [x] on hold per nursing staff, \"He's not doing well. \"   [] on hold per nursing staff secondary to lab / radiology results    [] declined treatment  this date due to ____. Benefits of participation in therapy reviewed with pt.    [] off unit   [] Other:     Will reattempt OT evaluation and/or treatment at a later time.     Denis Dietrich, OTR/L #[id number]

## 2020-03-10 NOTE — PROGRESS NOTES
Not in respiratory distress  Cardiovascular:  Regular rate.  Irregularly irregular rhythm. No murmurs  Chest: no chest wall tenderness  Abdomen: Soft.  Non tender. Non distended.  +BS.  No rebound, guarding, or rigidity. No pulsatile masses appreciated. Musculoskeletal: The patient does have paresis of the right upper and lower extremity, the bilateral upper and lower extremities are skeletally intact, all compartments are soft.  There is erythema and swelling present of the right lower extremity  Skin: warm and dry.  Erythema present of right lower extremity  Neurologic: The patient is somnolent, easily arousable to verbal stimuli, communicates with confused fragments, patient with paresis of the right upper and lower extremity, patient does follow commands with left upper and lower extremity. Psych: Unable to be evaluated secondary to the acuity of the patient's condition.       Recent Labs     03/07/20  1045 03/09/20  0557    137   K 3.1* 3.1*    103   CO2 23 20*   BUN 20 45*   CREATININE 1.4* 4.6*   GLUCOSE 125* 101*   CALCIUM 7.5* 7.3*       Recent Labs     03/07/20  1045 03/09/20  0557   WBC 7.8 9.1   RBC 3.75* 3.82   HGB 11.3* 11.4*   HCT 34.6* 35.7*   MCV 92.3 93.5   MCH 30.1 29.8   MCHC 32.7 31.9*   RDW 14.6 15.1*   * 129*   MPV 10.5 10.4           Radiology: Xr Chest Standard (2 Vw)    Result Date: 3/6/2020  LOCATION: [facility] EXAM: XR CHEST (2 VW) COMPARISON: None HISTORY: Shortness of breath TECHNIQUE: PA and lateral  views of the chest were obtained. FINDINGS:  SUPPORT DEVICES: None. LUNGS: Patchy lower lobe opacities are seen. The lungs are otherwise clear. PLEURA: No pneumothorax visualized. LUNG VOLUMES: Satisfactory inspirator effort. MEDIASTINAL STRUCTURES: No lymphadenopathy. Normal aortic contour. HEART SIZE: Normal. UPPER ABDOMEN: Unremarkable. BONES AND SOFT TISSUES: No fracture or soft tissue abnormality. Lower lobe opacities are possibly infectious.     Ct Head Wo Contrast    Result Date: 3/5/2020  LOCATION: 200 EXAM: CT HEAD WO CONTRAST COMPARISON: None HISTORY: Confusion TECHNIQUE: Noncontrast helical CT images were performed of the head. Coronal and sagittal reconstructions also obtained. Automated dose control was used for this exam. CONTRAST: No intravenous contrast administered. FINDINGS: Large remote left MCA territory infarct is identified. A moderate amount of patchy periventricular and subcortical white matter hypodensity noted, likely chronic microvascular ischemic related. There is no evidence of intracranial hemorrhage or mass. No extra-axial fluid is seen. The paranasal sinuses are clear. The globes and orbits are normal.  No abnormalities of the calvarium are identified. Large remote left MCA territory infarct. No acute hemorrhage or mass. Xr Chest Portable    Result Date: 3/5/2020  LOCATION: 200 EXAM: XR CHEST PORTABLE COMPARISON: None HISTORY: Shortness of breath TECHNIQUE: Single frontal view of the chest was obtained. FINDINGS:  SUPPORT DEVICES: None. LUNGS: Patchy airspace disease at the left lung base noted. PLEURA: No pneumothorax visualized. LUNG VOLUMES: Satisfactory inspirator effort. MEDIASTINAL STRUCTURES: No lymphadenopathy. Normal aortic contour. HEART SIZE: Enlarged. BONES AND SOFT TISSUES: No fracture or soft tissue abnormality. 1. Left lower lobe airspace disease is likely infectious. Cta Chest W Contrast    Result Date: 3/5/2020  LOCATION:200 EXAM: CTA CHEST W CONTRAST COMPARISON: None HISTORY:  Shortness of breath TECHNIQUE: Axial CT images are obtained of the chest.  Coronal and sagittal reconstructions were obtained with 3-D maximum intensity projection (MIP) reconstructed images. These were performed on a separate workstation with concurrent supervision for detailed evaluation of the pulmonary arteries.  CONTRAST: 80 mL Isovue-370 intravenous contrast. FINDINGS: SUPPORT DEVICES: None LUNGS/CENTRAL AIRWAYS/PLEURA: Linear grossly patent without an echogenic focus within them. These veins compress fully and demonstrate flow bilaterally. No significant reflux is seen within the greater saphenous veins. No evidence of right lower extremity deep venous thrombus from common femoral vein to proximal calf. Assessment:    Patient Active Problem List   Diagnosis Code    Cerebrovascular accident (CVA) due to occlusion of left carotid artery ([address] B14.164    Dysphagia due to recent stroke I69.391    Paroxysmal atrial fibrillation (HCC) I48.0    Prostate cancer ([address] C61    Cerebral contusion ([address] T39.301P    Subarachnoid hemorrhage ([address] I60.9    HCAP (healthcare-associated pneumonia) J18.9     Altered mental status  Acute renal failure  Cellulitis  History of CVA  Metastatic prostate cancer  Hyperlipidemia  Elevated white blood cell count  Pnuemonia  Transamanitis  Elevated troponin  A speech evaluation for dysphagia    Plan:  1. Admitted to the hospital   2. Continue the patient on IV antibiotics  3. Continue home medications  4. Discussed with family at length  5. Appreciated oncology consult  6. Because of aspiration patient placed on thickened liquid diet  7. We will consult nephrology and oncology  8. Discussed the medication with the pharmacist and the doses were changed according to the renal dysfunction. I stopped the vancomycin  9. We will monitor labs closely  10.  Family aware       Code Status: full code  DVT prophylaxis: patient already on [address]

## 2020-03-10 NOTE — PROGRESS NOTES
3/10/2020 8:21 AM  Service: Urology  Group: [facility] ([facility])    Sean Yu  [id number]    Subjective:    He is sleeping, responds to painful stimuli  He is on BiPAP  His daughter is present  Tobin catheter with minimal output bloody urine noted      Review of Systems  Unable to obtain due to confusion      Scheduled Meds:   famotidine (PEPCID) injection  20 mg Intravenous Daily    levofloxacin  500 mg Intravenous Q48H    piperacillin-tazobactam  3.375 g Intravenous Q12H    rivaroxaban  15 mg Oral Dinner    sodium chloride flush  10 mL Intravenous 2 times per day    hydrocortisone sodium succinate PF  100 mg Intravenous Daily    sodium chloride flush  10 mL Intravenous 2 times per day    albuterol  2.5 mg Nebulization Q4H WA    abiraterone acetate  1,000 mg Oral Nightly    baclofen  5 mg Oral TID WC    calcium-vitamin D  1 tablet Oral Daily    influenza A&B vaccine  0.5 mL Intramuscular Prior to discharge    metoprolol succinate  100 mg Oral Dinner    therapeutic multivitamin-minerals  1 tablet Oral QAM    tamsulosin  0.4 mg Oral Dinner    Vitamin D  1,000 Units Oral Lunch       Objective:  Vitals:    03/10/20 0415   BP: 130/84   Pulse: 98   Resp: 22   Temp: 97.5 °F (36.4 °C)   SpO2: 100%         Allergies: Patient has no known allergies.     General Appearance: Lethargic, confused, responds to painful stimuli skin: no rash or erythema  Head: normocephalic and atraumatic  Pulmonary/Chest: normal air movement, BiPAP  Abdomen: soft, non-tender, non-distended  Genitourinary: Blood at meatus, Tobin catheter intact, little to no urine output, bloody drainage in Tobin bag  Extremities: no cyanosis, clubbing or edema         Labs:     Recent Labs     03/10/20  0616      K 3.3*      CO2 18*   BUN 54*   CREATININE 6.3*   GLUCOSE 102*   CALCIUM 7.2*       Lab Results   Component Value Date    HGB 11.0 03/10/2020    HCT 35.2 03/10/2020         Assessment/Plan:  POD #1 Cystopanendoscopy,

## 2020-03-10 NOTE — CONSULTS
Assessment and Plan  Patient is a 70 y.o. male with the following medical Problems:   #1 encephalopathy  2. Suspected aspiration pneumonia  3. Acute hypoxic respiratory failure requiring supplemental oxygen and BiPAP    Plan of care:  1 patient's encephalopathy is unlikely related to CO2 retention. His CO2 has been normal.  His acidosis is mostly metabolic and likely related to his renal failure. 2.  Patient would require further work-up for his encephalopathy which may include ammonia level  3. I will hold Xarelto since the patient's creatinine is very elevated. I spoke to the clinical pharmacist and she will discuss an alternative anticoagulant with his primary physician. #4 we will repeat chest x-ray to further evaluate lung parenchyma and to rule out aspiration pneumonia. Patient is too encephalopathy to swallow and is not a candidate for evaluation for swallowing. 5.  Patient's right lower extremity is swollen. He is on anticoagulation. I will leave the decision to proceed with an ultrasound for right lower extremity to the discretion of the primary care physician. 6.  Patient is appropriately covered with antibiotics for aspiration pneumonia. 7.  Based on the blood gases patient has no CO2 retention and there is no indication for positive pressure ventilation. History of Present Illness:   History is limited and was mostly obtained from the chart. Patient is a 72-year-old man with large left MCA stroke and dense right-sided hemiplegia who presented with altered mental status. Patient underwent urological intervention procedure yesterday and was placed on BiPAP. I was consulted to evaluate the patient for aspiration pneumonia and the need for BiPAP. Based on the patient blood gases he did not demonstrate a need for noninvasive positive pressure ventilation.     Past Medical History:  Past Medical History:   Diagnosis Date    A-fib ([facility]     Arthritis     CVA (cerebral vascular accident) 1048    levofloxacin (LEVAQUIN) 500 MG/100ML infusion 500 mg, 500 mg, Intravenous, Q48H, Joe Raymond MD    piperacillin-tazobactam (ZOSYN) 3.375 g in dextrose 5 % 50 mL IVPB extended infusion (mini-bag), 3.375 g, Intravenous, Q12H, Last Rate: 12.5 mL/hr at 03/10/20 1048, 3.375 g at 03/10/20 1048 **AND** 0.9 % sodium chloride infusion, , Intravenous, Q12H, Joe Raymond MD, Last Rate: 12.5 mL/hr at 03/10/20 1312    rivaroxaban (XARELTO) tablet 15 mg, 15 mg, Oral, Dinner, Joe Raymond MD    lactated ringers infusion, , Intravenous, Continuous, Santiago Skyler Blandon MD, Last Rate: 60 mL/hr at 03/10/20 0126    sodium chloride flush 0.9 % injection 10 mL, 10 mL, Intravenous, 2 times per day, Joe Raymond MD, 10 mL at 03/10/20 1048    sodium chloride flush 0.9 % injection 10 mL, 10 mL, Intravenous, PRN, Joe Raymond MD    hydrocortisone sodium succinate PF (SOLU-CORTEF) injection 100 mg, 100 mg, Intravenous, Daily, Joe Raymond MD, 100 mg at 03/10/20 1047    sodium chloride flush 0.9 % injection 10 mL, 10 mL, Intravenous, 2 times per day, Joe Raymond MD, 10 mL at 03/09/20 2043    sodium chloride flush 0.9 % injection 10 mL, 10 mL, Intravenous, PRN, Joe Raymond MD    acetaminophen (TYLENOL) tablet 650 mg, 650 mg, Oral, Q6H PRN, 650 mg at 03/08/20 1023 **OR** acetaminophen (TYLENOL) suppository 650 mg, 650 mg, Rectal, Q6H PRN, Joe Raymond MD, 650 mg at 03/09/20 0010    polyethylene glycol (GLYCOLAX) packet 17 g, 17 g, Oral, Daily PRN, Joe Raymond MD    promethazine (PHENERGAN) tablet 12.5 mg, 12.5 mg, Oral, Q6H PRN **OR** ondansetron (ZOFRAN) injection 4 mg, 4 mg, Intravenous, Q6H PRN, Joe Raymond MD    albuterol (PROVENTIL) nebulizer solution 2.5 mg, 2.5 mg, Nebulization, Q4H WA, Joe Raymond MD, 2.5 mg at 03/10/20 1240    abiraterone acetate (ZYTIGA) 250 MG tablet TABS 1,000 mg, 1,000 mg, Oral, Nightly, Joe Raymond MD, Stopped at 03/09/20 2100    acetaminophen (TYLENOL) tablet 325 mg, 325 mg, radiological imaging and cardiac work up were reviewed        STAFF PHYSICIAN NOTE OF PERSONAL INVOLVEMENT IN CARE  As the attending physician, I certify that I personally reviewed the patient's history and personally examined the patient to confirm the physical findings described above, and that I reviewed the relevant imaging studies and available reports. I also discussed the differential diagnosis and all of the proposed management plans with the patient and individuals accompanying the patient to this visit. They had the opportunity to ask questions about the proposed management plans and to have those questions answered.       Electronically signed by Tang Khan MD on 3/10/2020 at 2:23 PM

## 2020-03-11 ENCOUNTER — APPOINTMENT (OUTPATIENT)
Dept: ULTRASOUND IMAGING | Age: 75
DRG: 208 | End: 2020-03-11
Payer: COMMERCIAL

## 2020-03-11 LAB
ALBUMIN SERPL-MCNC: 2.1 G/DL (ref 3.5–5.2)
ALP BLD-CCNC: 102 U/L (ref 40–129)
ALT SERPL-CCNC: 42 U/L (ref 0–40)
ANION GAP SERPL CALCULATED.3IONS-SCNC: 14 MMOL/L (ref 7–16)
AST SERPL-CCNC: 48 U/L (ref 0–39)
BILIRUB SERPL-MCNC: 0.3 MG/DL (ref 0–1.2)
BLOOD CULTURE, ROUTINE: NORMAL
BUN BLDV-MCNC: 44 MG/DL (ref 8–23)
C3 COMPLEMENT: 139 MG/DL (ref 90–180)
C4 COMPLEMENT: 26 MG/DL (ref 10–40)
CALCIUM SERPL-MCNC: 7.2 MG/DL (ref 8.6–10.2)
CHLORIDE BLD-SCNC: 102 MMOL/L (ref 98–107)
CO2: 23 MMOL/L (ref 22–29)
CREAT SERPL-MCNC: 5.6 MG/DL (ref 0.7–1.2)
CULTURE, BLOOD 2: NORMAL
GFR AFRICAN AMERICAN: 12
GFR NON-AFRICAN AMERICAN: 10 ML/MIN/1.73
GLUCOSE BLD-MCNC: 86 MG/DL (ref 74–99)
HAV IGM SER IA-ACNC: NORMAL
HBV SURFACE AB TITR SER: NORMAL {TITER}
HCT VFR BLD CALC: 30.5 % (ref 37–54)
HEMOGLOBIN: 9.6 G/DL (ref 12.5–16.5)
HEPATITIS B CORE IGM ANTIBODY: NORMAL
HEPATITIS B SURFACE ANTIGEN INTERPRETATION: NORMAL
HEPATITIS C ANTIBODY INTERPRETATION: NORMAL
MAGNESIUM: 2.1 MG/DL (ref 1.6–2.6)
MCH RBC QN AUTO: 29.1 PG (ref 26–35)
MCHC RBC AUTO-ENTMCNC: 31.5 % (ref 32–34.5)
MCV RBC AUTO: 92.4 FL (ref 80–99.9)
PDW BLD-RTO: 15.3 FL (ref 11.5–15)
PHOSPHORUS: 3.1 MG/DL (ref 2.5–4.5)
PLATELET # BLD: 146 E9/L (ref 130–450)
PMV BLD AUTO: 9.6 FL (ref 7–12)
POTASSIUM SERPL-SCNC: 3 MMOL/L (ref 3.5–5)
RBC # BLD: 3.3 E12/L (ref 3.8–5.8)
SODIUM BLD-SCNC: 139 MMOL/L (ref 132–146)
TOTAL PROTEIN: 5.2 G/DL (ref 6.4–8.3)
TROPONIN: 0.05 NG/ML (ref 0–0.03)
TROPONIN: 0.05 NG/ML (ref 0–0.03)
URINE CULTURE, ROUTINE: NORMAL
WBC # BLD: 6.5 E9/L (ref 4.5–11.5)

## 2020-03-11 PROCEDURE — 2500000003 HC RX 250 WO HCPCS: Performed by: INTERNAL MEDICINE

## 2020-03-11 PROCEDURE — 36592 COLLECT BLOOD FROM PICC: CPT

## 2020-03-11 PROCEDURE — 86704 HEP B CORE ANTIBODY TOTAL: CPT

## 2020-03-11 PROCEDURE — 94003 VENT MGMT INPAT SUBQ DAY: CPT

## 2020-03-11 PROCEDURE — 83516 IMMUNOASSAY NONANTIBODY: CPT

## 2020-03-11 PROCEDURE — 86255 FLUORESCENT ANTIBODY SCREEN: CPT

## 2020-03-11 PROCEDURE — 90935 HEMODIALYSIS ONE EVALUATION: CPT

## 2020-03-11 PROCEDURE — 86706 HEP B SURFACE ANTIBODY: CPT

## 2020-03-11 PROCEDURE — 80053 COMPREHEN METABOLIC PANEL: CPT

## 2020-03-11 PROCEDURE — 2580000003 HC RX 258: Performed by: INTERNAL MEDICINE

## 2020-03-11 PROCEDURE — 99231 SBSQ HOSP IP/OBS SF/LOW 25: CPT | Performed by: NURSE PRACTITIONER

## 2020-03-11 PROCEDURE — 86162 COMPLEMENT TOTAL (CH50): CPT

## 2020-03-11 PROCEDURE — 86705 HEP B CORE ANTIBODY IGM: CPT

## 2020-03-11 PROCEDURE — 84484 ASSAY OF TROPONIN QUANT: CPT

## 2020-03-11 PROCEDURE — 2000000000 HC ICU R&B

## 2020-03-11 PROCEDURE — 94640 AIRWAY INHALATION TREATMENT: CPT

## 2020-03-11 PROCEDURE — 86160 COMPLEMENT ANTIGEN: CPT

## 2020-03-11 PROCEDURE — 86225 DNA ANTIBODY NATIVE: CPT

## 2020-03-11 PROCEDURE — 86038 ANTINUCLEAR ANTIBODIES: CPT

## 2020-03-11 PROCEDURE — 83735 ASSAY OF MAGNESIUM: CPT

## 2020-03-11 PROCEDURE — 86803 HEPATITIS C AB TEST: CPT

## 2020-03-11 PROCEDURE — 87340 HEPATITIS B SURFACE AG IA: CPT

## 2020-03-11 PROCEDURE — 6360000002 HC RX W HCPCS: Performed by: INTERNAL MEDICINE

## 2020-03-11 PROCEDURE — 93970 EXTREMITY STUDY: CPT

## 2020-03-11 PROCEDURE — 84100 ASSAY OF PHOSPHORUS: CPT

## 2020-03-11 PROCEDURE — 85027 COMPLETE CBC AUTOMATED: CPT

## 2020-03-11 RX ORDER — ANTICOAGULANT SODIUM CITRATE SOLUTION 4 G/100ML
4 SOLUTION INTRAVENOUS PRN
Status: DISCONTINUED | OUTPATIENT
Start: 2020-03-11 | End: 2020-03-23 | Stop reason: HOSPADM

## 2020-03-11 RX ADMIN — Medication 10 ML: at 21:37

## 2020-03-11 RX ADMIN — Medication 10 ML: at 08:42

## 2020-03-11 RX ADMIN — PIPERACILLIN AND TAZOBACTAM 3.38 G: 3; .375 INJECTION, POWDER, FOR SOLUTION INTRAVENOUS at 14:53

## 2020-03-11 RX ADMIN — ALBUTEROL SULFATE 2.5 MG: 2.5 SOLUTION RESPIRATORY (INHALATION) at 09:12

## 2020-03-11 RX ADMIN — CALCIUM GLUCONATE 1 G: 98 INJECTION, SOLUTION INTRAVENOUS at 11:36

## 2020-03-11 RX ADMIN — HYDROCORTISONE SODIUM SUCCINATE 100 MG: 100 INJECTION, POWDER, FOR SOLUTION INTRAMUSCULAR; INTRAVENOUS at 08:42

## 2020-03-11 RX ADMIN — FAMOTIDINE 20 MG: 10 INJECTION, SOLUTION INTRAVENOUS at 08:42

## 2020-03-11 RX ADMIN — ALBUTEROL SULFATE 2.5 MG: 2.5 SOLUTION RESPIRATORY (INHALATION) at 05:03

## 2020-03-11 RX ADMIN — PROPOFOL INJECTABLE EMULSION 20 MCG/KG/MIN: 10 INJECTION, EMULSION INTRAVENOUS at 04:06

## 2020-03-11 RX ADMIN — PIPERACILLIN AND TAZOBACTAM 3.38 G: 3; .375 INJECTION, POWDER, FOR SOLUTION INTRAVENOUS at 00:15

## 2020-03-11 RX ADMIN — ALBUTEROL SULFATE 2.5 MG: 2.5 SOLUTION RESPIRATORY (INHALATION) at 19:05

## 2020-03-11 RX ADMIN — HYDROCORTISONE SODIUM SUCCINATE 20 MG: 100 INJECTION, POWDER, FOR SOLUTION INTRAMUSCULAR; INTRAVENOUS at 19:04

## 2020-03-11 RX ADMIN — SODIUM CHLORIDE: 9 INJECTION, SOLUTION INTRAVENOUS at 19:00

## 2020-03-11 RX ADMIN — LEVOFLOXACIN 500 MG: 5 INJECTION, SOLUTION INTRAVENOUS at 01:06

## 2020-03-11 ASSESSMENT — PULMONARY FUNCTION TESTS
PIF_VALUE: 23
PIF_VALUE: 20
PIF_VALUE: 20
PIF_VALUE: 22
PIF_VALUE: 24

## 2020-03-11 ASSESSMENT — PAIN SCALES - GENERAL: PAINLEVEL_OUTOF10: 0

## 2020-03-11 NOTE — PROGRESS NOTES
Assessment and Plan  Patient is a 70 y.o. male with the following medical Problems:   #1 encephalopathy  2. Suspected aspiration pneumonia  3. Acute hypoxic respiratory failure requiring supplemental oxygen and BiPAP    Plan of care:  1 patient's encephalopathy is unlikely related to CO2 retention. His CO2 has been normal.  His acidosis is mostly metabolic and likely related to his renal failure. 2.  Patient would require further work-up for his encephalopathy which includes ammonia level and possible MRI and if no improvement  3. Hold AC for now  #4  Patient received a session of dialysis yesterday and is scheduled for another session today. 5.  Patient's right lower extremity is swollen. He is on anticoagulation. US LE ordered. 6.  Patient is appropriately covered with antibiotics for aspiration pneumonia. Levaquin is discontinued and Zosyn is continued. 7.  Hydrocortisone 20 mg twice daily. 8.  Propofol for sedation with daily spontaneous awakening trial.    History of Present Illness:   History is limited and was mostly obtained from the chart. Patient is a 62-year-old man with large left MCA stroke and dense right-sided hemiplegia who presented with altered mental status. Patient underwent urological intervention procedure yesterday and was placed on BiPAP. I was consulted to evaluate the patient for aspiration pneumonia and the need for BiPAP. Based on the patient blood gases he did not demonstrate a need for noninvasive positive pressure ventilation. 03/11/2020  Patient had excessive secretions yesterday and developed mucous plugging. He required intubation. He was transferred to the ICU. He underwent 1 session of hemodialysis and is receiving another session of dialysis today. Is currently on propofol infusion for sedation.   His sedation will be discontinued today for sedation vacation and awakening trial.    Past Medical History:  Past Medical History:   Diagnosis Date    A-fib ([facility] [address]  Arthritis     CVA (cerebral vascular accident) ([facility] [address] 11/15/2015    left MCA    Hyperlipemia     [facility] ([facility]     mets to spine, currently on chemo        Family History:   Family History   Problem Relation Age of Onset    Cancer Mother     Heart Disease Father        Allergies:         Patient has no known allergies.     Social history:  Social History     Socioeconomic History    Marital status:      Spouse name: Not on file    Number of children: Not on file    Years of education: Not on file    Highest education level: Not on file   Occupational History    Not on file   Social Needs    Financial resource strain: Not on file    Food insecurity     Worry: Not on file     Inability: Not on file    Transportation needs     Medical: Not on file     Non-medical: Not on file   Tobacco Use    Smoking status: Former Smoker     Types: Cigarettes     Last attempt to quit: 2015     Years since quittin.5    Smokeless tobacco: Never Used   Substance and Sexual Activity    Alcohol use: No     Alcohol/week: 0.0 standard drinks    Drug use: No    Sexual activity: Not Currently   Lifestyle    Physical activity     Days per week: Not on file     Minutes per session: Not on file    Stress: Not on file   Relationships    Social connections     Talks on phone: Not on file     Gets together: Not on file     Attends [employer] service: Not on file     Active member of club or organization: Not on file     Attends meetings of clubs or organizations: Not on file     Relationship status: Not on file    Intimate partner violence     Fear of current or ex partner: Not on file     Emotionally abused: Not on file     Physically abused: Not on file     Forced sexual activity: Not on file   Other Topics Concern    Not on file   Social History Narrative    Not on file       Current Medications:     Current Facility-Administered Medications:     hydrocortisone sodium succinate PF (SOLU-CORTEF) injection 20 mg, 20 mg, Intravenous, BID, Yobany Lima MD    calcium gluconate 1 g in sodium chloride 0.9 % 100 mL IVPB, 1 g, Intravenous, Once, Pauline Lozano MD    propofol injection, 10 mcg/kg/min, Intravenous, Titrated, Yobany Lima MD, Stopped at 03/11/20 0928    famotidine (PEPCID) injection 20 mg, 20 mg, Intravenous, Daily, Ryan Gallagher MD, 20 mg at 03/11/20 0842    piperacillin-tazobactam (ZOSYN) 3.375 g in dextrose 5 % 50 mL IVPB extended infusion (mini-bag), 3.375 g, Intravenous, Q12H, Stopped at 03/11/20 0405 **AND** 0.9 % sodium chloride infusion, , Intravenous, Q12H, Ryan Gallagher MD, Stopped at 03/10/20 1512    [Held by provider] rivaroxaban (XARELTO) tablet 15 mg, 15 mg, Oral, Dinner, Ryan Gallagher MD    sodium chloride flush 0.9 % injection 10 mL, 10 mL, Intravenous, 2 times per day, Ryan Gallagher MD, 10 mL at 03/11/20 0842    sodium chloride flush 0.9 % injection 10 mL, 10 mL, Intravenous, PRN, Ryan Gallagher MD    sodium chloride flush 0.9 % injection 10 mL, 10 mL, Intravenous, 2 times per day, Ryan Gallagher MD, 10 mL at 03/11/20 0842    sodium chloride flush 0.9 % injection 10 mL, 10 mL, Intravenous, PRN, Ryan Gallagher MD    acetaminophen (TYLENOL) tablet 650 mg, 650 mg, Oral, Q6H PRN, 650 mg at 03/08/20 1023 **OR** acetaminophen (TYLENOL) suppository 650 mg, 650 mg, Rectal, Q6H PRN, Ryan Gallagher MD, 650 mg at 03/09/20 0010    polyethylene glycol (GLYCOLAX) packet 17 g, 17 g, Oral, Daily PRN, Ryan Gallagher MD    promethazine (PHENERGAN) tablet 12.5 mg, 12.5 mg, Oral, Q6H PRN **OR** ondansetron (ZOFRAN) injection 4 mg, 4 mg, Intravenous, Q6H PRN, Ryan Gallagher MD    albuterol (PROVENTIL) nebulizer solution 2.5 mg, 2.5 mg, Nebulization, Q4H WA, Ryan Gallagher MD, 2.5 mg at 03/11/20 0912    abiraterone acetate (ZYTIGA) 250 MG tablet TABS 1,000 mg, 1,000 mg, Oral, Nightly, Ryan Gallagher MD, Stopped at 03/09/20 2100    acetaminophen (TYLENOL) tablet 325 mg, 325 mg, Oral, Q6H PRN, Leighann Montana MD    [Held by provider] baclofen (LIORESAL) tablet 5 mg, 5 mg, Oral, TID WC, Leighann Montana MD, 5 mg at 03/08/20 1716    calcium-vitamin D 500-200 MG-UNIT per tablet 1 tablet, 1 tablet, Oral, Daily, Leighann Montana MD, 1 tablet at 03/08/20 1020    influenza A&B vaccine (FLUAD) injection 0.5 mL, 0.5 mL, Intramuscular, Prior to discharge, Leighann Montana MD    metoprolol succinate (TOPROL XL) extended release tablet 100 mg, 100 mg, Oral, Dinner, Leighann Montana MD, 100 mg at 03/08/20 1715    therapeutic multivitamin-minerals 1 tablet, 1 tablet, Oral, QAM, Leighann Montana MD, 1 tablet at 03/08/20 1020    tamsulosin (FLOMAX) capsule 0.4 mg, 0.4 mg, Oral, Dinner, Leighann Montana MD, 0.4 mg at 03/08/20 1716    vitamin D (CHOLECALCIFEROL) tablet 1,000 Units, 1,000 Units, Oral, Lunch, Leighann Montana MD, 1,000 Units at 03/08/20 1310      Review of Systems:   Unable to obtain due to medical condition    Physical Exam:   Vital Signs:  /70   Pulse 82   Temp 97.8 °F (36.6 °C)   Resp 17   Ht 5' 11\" (1.803 m)   Wt 236 lb 1.6 oz (107.1 kg)   SpO2 98%   BMI 32.93 kg/m²     Input/Output:  In: 289 [I.V.:139]  Out: 280     Oxygen requirements: 2 L NC/ 40% PAP      General appearance:  not in pain or distress, in no respiratory distress    HEENT: intubated  Neck: Supple, no jugular venous distension, lymphadenopathy, thyromegaly or carotid bruits  Chest: Decraesed breath sounds, no wheezing, no crackles and no tenderness over ribs   Cardiovascular: Normal S1, S2, regular rate and rhythm, no murmur, rub or gallop  Abdomen: Normal sounds present, soft, lax with no tenderness, no hepatosplenomegaly, and no masses  Extremities: +ve edema. Pulses are equally present.  RLE> LLE  Skin: intact, no rashes   Neurologic: Lethargic, unresponsive, does not move extremities, has dense right-sided hemiplegia,       Investigations:  Labs, radiological imaging and cardiac work up were reviewed        STAFF PHYSICIAN NOTE OF PERSONAL INVOLVEMENT IN CARE  As the attending physician, I certify that I personally reviewed the patient's history and personally examined the patient to confirm the physical findings described above, and that I reviewed the relevant imaging studies and available reports. I also discussed the differential diagnosis and all of the proposed management plans with the patient and individuals accompanying the patient to this visit. They had the opportunity to ask questions about the proposed management plans and to have those questions answered.       Electronically signed by Elan Rosa MD on 3/11/2020 at 11:27 AM

## 2020-03-11 NOTE — PLAN OF CARE
Problem: Falls - Risk of:  Goal: Will remain free from falls  Description: Will remain free from falls  Outcome: Met This Shift  Goal: Absence of physical injury  Description: Absence of physical injury  Outcome: Met This Shift     Problem: Risk for Impaired Skin Integrity  Goal: Tissue integrity - skin and mucous membranes  Description: Structural intactness and normal physiological function of skin and  mucous membranes.   Outcome: Met This Shift     Problem: Airway Clearance - Ineffective:  Goal: Ability to maintain a clear airway will improve  Description: Ability to maintain a clear airway will improve  Outcome: Met This Shift     Problem: Gas Exchange - Impaired:  Goal: Levels of oxygenation will improve  Description: Levels of oxygenation will improve  Outcome: Met This Shift     Problem: Fluid Volume - Imbalance:  Goal: Absence of imbalanced fluid volume signs and symptoms  Description: Absence of imbalanced fluid volume signs and symptoms  Outcome: Met This Shift     Problem: Restraint Use - Nonviolent/Non-Self-Destructive Behavior:  Goal: Absence of restraint indications  Description: Absence of restraint indications  Outcome: Not Met This Shift  Patient still tries to pull at NG and ETT  Goal: Absence of restraint-related injury  Description: Absence of restraint-related injury  Outcome: Met This Shift

## 2020-03-11 NOTE — PROGRESS NOTES
Progress Note    Subject:Patient this morning was confused and lethargic and not answering questions was not arousable    ROS: could not be obtained because of change in mental status     midazolam        famotidine (PEPCID) injection  20 mg Intravenous Daily    levofloxacin  500 mg Intravenous Q48H    piperacillin-tazobactam  3.375 g Intravenous Q12H    [Held by provider] rivaroxaban  15 mg Oral Dinner    sodium chloride flush  10 mL Intravenous 2 times per day    hydrocortisone sodium succinate PF  100 mg Intravenous Daily    sodium chloride flush  10 mL Intravenous 2 times per day    albuterol  2.5 mg Nebulization Q4H WA    abiraterone acetate  1,000 mg Oral Nightly    baclofen  5 mg Oral TID WC    calcium-vitamin D  1 tablet Oral Daily    influenza A&B vaccine  0.5 mL Intramuscular Prior to discharge    metoprolol succinate  100 mg Oral Dinner    therapeutic multivitamin-minerals  1 tablet Oral QAM    tamsulosin  0.4 mg Oral Dinner    Vitamin D  1,000 Units Oral Lunch     sodium chloride flush, 10 mL, PRN  sodium chloride flush, 10 mL, PRN  acetaminophen, 650 mg, Q6H PRN    Or  acetaminophen, 650 mg, Q6H PRN  polyethylene glycol, 17 g, Daily PRN  promethazine, 12.5 mg, Q6H PRN    Or  ondansetron, 4 mg, Q6H PRN  acetaminophen, 325 mg, Q6H PRN         Objective: Intake/Output Summary (Last 24 hours) at 3/10/2020 2349  Last data filed at 3/10/2020 2300  Gross per 24 hour   Intake 527 ml   Output 205 ml   Net 322 ml       /76   Pulse 83   Temp 98.2 °F (36.8 °C)   Resp 15   Ht 5' 11\" (1.803 m)   Wt 236 lb 5.3 oz (107.2 kg)   SpO2 100%   BMI 32.96 kg/m²     Constitutional/General: Lethargic this morning,chronically ill-appearing individual  Head: Normocephalic and atraumatic  Mouth: Oropharynx clear, handling secretions, no trismus  Neck: Supple, full ROM, no meningeal signs  Pulmonary: Lungs clear to auscultation bilaterally, no wheezes, rales, or rhonchi.  Not in respiratory distress  Cardiovascular:  Regular rate.  Irregularly irregular rhythm. No murmurs  Chest: no chest wall tenderness  Abdomen: Soft.  Non tender. Non distended.  +BS.  No rebound, guarding, or rigidity. No pulsatile masses appreciated. Musculoskeletal: The patient does have paresis of the right upper and lower extremity, the bilateral upper and lower extremities are skeletally intact, all compartments are soft.  There is erythema and swelling present of the right lower extremity  Skin: warm and dry.  Erythema present of right lower extremity  Neurologic: The patient is somnolent, and lethargic patient with paresis of the right upper and lower extremity, patient does follow commands with left upper and lower extremity. Psych: Unable to be evaluated secondary to the acuity of the patient's condition.       Recent Labs     03/09/20  0557 03/10/20  0616    140   K 3.1* 3.3*    103   CO2 20* 18*   BUN 45* 54*   CREATININE 4.6* 6.3*   GLUCOSE 101* 102*   CALCIUM 7.3* 7.2*       Recent Labs     03/09/20  0557 03/10/20  0616   WBC 9.1 8.5   RBC 3.82 3.80   HGB 11.4* 11.0*   HCT 35.7* 35.2*   MCV 93.5 92.6   MCH 29.8 28.9   MCHC 31.9* 31.3*   RDW 15.1* 15.3*   * 163   MPV 10.4 9.7           Radiology: Xr Chest Standard (2 Vw)    Result Date: 3/6/2020  LOCATION: 200 EXAM: XR CHEST (2 VW) COMPARISON: None HISTORY: Shortness of breath TECHNIQUE: PA and lateral  views of the chest were obtained. FINDINGS:  SUPPORT DEVICES: None. LUNGS: Patchy lower lobe opacities are seen. The lungs are otherwise clear. PLEURA: No pneumothorax visualized. LUNG VOLUMES: Satisfactory inspirator effort. MEDIASTINAL STRUCTURES: No lymphadenopathy. Normal aortic contour. HEART SIZE: Normal. UPPER ABDOMEN: Unremarkable. BONES AND SOFT TISSUES: No fracture or soft tissue abnormality. Lower lobe opacities are possibly infectious.     Ct Head Wo Contrast    Result Date: 3/5/2020  LOCATION: 200 EXAM: CT HEAD WO CONTRAST COMPARISON: None HISTORY: Confusion TECHNIQUE: Noncontrast helical CT images were performed of the head. Coronal and sagittal reconstructions also obtained. Automated dose control was used for this exam. CONTRAST: No intravenous contrast administered. FINDINGS: Large remote left MCA territory infarct is identified. A moderate amount of patchy periventricular and subcortical white matter hypodensity noted, likely chronic microvascular ischemic related. There is no evidence of intracranial hemorrhage or mass. No extra-axial fluid is seen. The paranasal sinuses are clear. The globes and orbits are normal.  No abnormalities of the calvarium are identified. Large remote left MCA territory infarct. No acute hemorrhage or mass. Xr Chest Portable    Result Date: 3/5/2020  LOCATION: 200 EXAM: XR CHEST PORTABLE COMPARISON: None HISTORY: Shortness of breath TECHNIQUE: Single frontal view of the chest was obtained. FINDINGS:  SUPPORT DEVICES: None. LUNGS: Patchy airspace disease at the left lung base noted. PLEURA: No pneumothorax visualized. LUNG VOLUMES: Satisfactory inspirator effort. MEDIASTINAL STRUCTURES: No lymphadenopathy. Normal aortic contour. HEART SIZE: Enlarged. BONES AND SOFT TISSUES: No fracture or soft tissue abnormality. 1. Left lower lobe airspace disease is likely infectious. Cta Chest W Contrast    Result Date: 3/5/2020  LOCATION:200 EXAM: CTA CHEST W CONTRAST COMPARISON: None HISTORY:  Shortness of breath TECHNIQUE: Axial CT images are obtained of the chest.  Coronal and sagittal reconstructions were obtained with 3-D maximum intensity projection (MIP) reconstructed images. These were performed on a separate workstation with concurrent supervision for detailed evaluation of the pulmonary arteries. CONTRAST: 80 mL Isovue-370 intravenous contrast. FINDINGS: SUPPORT DEVICES: None LUNGS/CENTRAL AIRWAYS/PLEURA: Linear parenchymal bands seen at the lung bases compatible with subsegmental atelectasis.  The lungs are markedly limited from motion artifact. PULMONARY ARTERIES: Evaluation is limited for pulmonary embolus. No filling defects to the proximal lobar levels. HEART/PERICARDIUM/GREAT VESSELS: Cardiac size is normal.  There is no pericardial effusion. The great vessels of the chest are normal in caliber. LYMPH NODES: No thoracic adenopathy by size criteria. NECK BASE/CHEST WALL/DIAPHRAGM: No soft tissue lesions or diaphragmatic abnormality. UPPER ABDOMEN: Multiple large cysts in the liver noted. . OSSEOUS STRUCTURES: Several blastic lesions are identified involving the vertebral bodies the most conspicuous involving the T8 vertebral body involving portions of the right rib and spinous process. 1. No evidence of pulmonary embolism. 2. Lower lobe linear subsegmental atelectasis. 3. Blastic lesions in the spine and involving several ribs suspicious for metastatic disease, possibly prostate. Recommend correlation with PSA. Us Abdomen Limited    Result Date: 3/6/2020  Reading location:  100 INDICATION: Liver liver lesions on chest CT FINDINGS: Sonographic evaluation right upper quadrant. Normal caliber gallbladder without intrinsic filling defect or mural thickening or pericholecystic fluid. Normal caliber bile ducts. Simple hepatic cysts measuring 54 mm and 67 mm corresponds to the CT findings. Visualized portions of pancreas unremarkable. Right renal collecting system normal caliber. No acute finding. Us Dup Lower Extremity Right Dali    Result Date: 3/5/2020  Location:200 Exam: US DUP LOWER EXTREMITY RIGHT DALI Comparison: None Indications: Discomfort Technique: Using real-time, color, and spectral Doppler waveform analysis, ultrasound imaging of the right lower extremity deep venous system was performed, from the common femoral vein to the proximal calf. Findings: The visualized veins of the deep venous system are grossly patent without an echogenic focus within them.  These veins compress fully and

## 2020-03-11 NOTE — PROGRESS NOTES
3/11/2020 9:04 AM  Service: Urology  Group: [facility] ([facility])    Collette Paredes  [id number]    Subjective:    He is now intubated and admitted in the ICU   He has been taken off of his sedation currently   He opens his eyes to verbal stimuli   No family is present  Lopes intact, minimal output   Patient is now a [facility]    Review of Systems  Unable to obtain due to intubation and not following commands      Scheduled Meds:   famotidine (PEPCID) injection  20 mg Intravenous Daily    levofloxacin  500 mg Intravenous Q48H    piperacillin-tazobactam  3.375 g Intravenous Q12H    [Held by provider] rivaroxaban  15 mg Oral Dinner    sodium chloride flush  10 mL Intravenous 2 times per day    hydrocortisone sodium succinate PF  100 mg Intravenous Daily    sodium chloride flush  10 mL Intravenous 2 times per day    albuterol  2.5 mg Nebulization Q4H WA    abiraterone acetate  1,000 mg Oral Nightly    baclofen  5 mg Oral TID WC    calcium-vitamin D  1 tablet Oral Daily    influenza A&B vaccine  0.5 mL Intramuscular Prior to discharge    metoprolol succinate  100 mg Oral Dinner    therapeutic multivitamin-minerals  1 tablet Oral QAM    tamsulosin  0.4 mg Oral Dinner    Vitamin D  1,000 Units Oral Lunch       Objective:  Vitals:    03/11/20 0800   BP: 109/61   Pulse: 80   Resp: 20   Temp: 98.4 °F (36.9 °C)   SpO2: 99%         Allergies: Patient has no known allergies.     General Appearance: Lethargic, intubated, admitted in the ICU, ill appearing  skin: no rash or erythema  Head: normocephalic and atraumatic  Pulmonary/Chest: Intubated, on ventilator, respirations symmetric   Abdomen: soft, non-tender, non-distended  Genitourinary: lopes intact, cherry colored urine   Extremities: no cyanosis, clubbing or edema         Labs:     Recent Labs     03/11/20  0508      K 3.0*      CO2 23   BUN 44*   CREATININE 5.6*   GLUCOSE 86   CALCIUM 7.2*       Lab Results   Component Value Date    HGB 9.6 03/11/2020    HCT 30.5 03/11/2020         Assessment/Plan:  POD #2 Cystopanendoscopy, urethral dilatation,retrograde pyelogram, placement of Lopes catheter    Continue lopes catheter   Continue manual irrigations every four hours and as needed   Oncology following  Nephrology following, cont to watch the creatinine   We will cont to follow

## 2020-03-11 NOTE — PROGRESS NOTES
Chanda Loja MD  Nephrology    Hemodialysis/Progress note. Patient seen and examined on hemodialyisis. Chart reviewed. Events of last 24 hours noted. Patient had significant respiratory distress at had to be intubated and put on mechanical ventilation and transferred to intensive care unit. Patient did have transient intradialytic hypotension during his first hemodialysis treatment yesterday. When seen in the intensive care unit the patient is intubated and on mechanical ventilation. Sedation has been scaled back and he is off sedation. He is much more responsive. He is presently in no acute distress. Continues to have gross hematuria with limited urinary output. Vital SignsBP 137/70   Pulse 82   Temp 97.8 °F (36.6 °C)   Resp 17   Ht 5' 11\" (1.803 m)   Wt 236 lb 1.6 oz (107.1 kg)   SpO2 98%   BMI 32.93 kg/m²   24HR INTAKE/OUTPUT:    Intake/Output Summary (Last 24 hours) at 3/11/2020 1109  Last data filed at 3/11/2020 0600  Gross per 24 hour   Intake 773 ml   Output 355 ml   Net 418 ml           Physical Exam     Neck: No JVD  Lungs: Bilateral coarse breath sounds with few scattered end expiratory rhonchi  Heart: Regular rate and rhythm. No S3 gallop. No  murmrur. Abdomen: Soft non distended, non tender and normal bowel sounds. Extremeties: +1 edema of the right lower extremity with erythema below the knee.   Trace edema on the left        ROS:  RESPIRATORY:   positive for shortness of breath  CARDIOVASCULAR:  negative  GASTROINTESTINAL:  negative  GENITOURINARY: Positive for gross hematuria    Medications:  Current Facility-Administered Medications   Medication Dose Route Frequency Provider Last Rate Last Dose    hydrocortisone sodium succinate PF (SOLU-CORTEF) injection 20 mg  20 mg Intravenous BID Aden Delaney MD        propofol injection  10 mcg/kg/min Intravenous Titrated Aden Delaney MD   Stopped at 03/11/20 0928    famotidine (PEPCID) injection 20 mg  20 mg Intravenous Daily Harry Closs, MD   20 mg at 03/11/20 0842    piperacillin-tazobactam (ZOSYN) 3.375 g in dextrose 5 % 50 mL IVPB extended infusion (mini-bag)  3.375 g Intravenous Q12H Harry Closs, MD   Stopped at 03/11/20 0405    And    0.9 % sodium chloride infusion   Intravenous Q12H Harry Closs, MD   Stopped at 03/10/20 1512    [Held by provider] rivaroxaban (XARELTO) tablet 15 mg  15 mg Oral Dinner Harry Closs, MD        sodium chloride flush 0.9 % injection 10 mL  10 mL Intravenous 2 times per day Harry Closs, MD   10 mL at 03/11/20 0842    sodium chloride flush 0.9 % injection 10 mL  10 mL Intravenous PRN Harry Closs, MD        sodium chloride flush 0.9 % injection 10 mL  10 mL Intravenous 2 times per day Harry Closs, MD   10 mL at 03/11/20 0842    sodium chloride flush 0.9 % injection 10 mL  10 mL Intravenous PRN Harry Closs, MD        acetaminophen (TYLENOL) tablet 650 mg  650 mg Oral Q6H PRN Harry Closs, MD   650 mg at 03/08/20 1023    Or    acetaminophen (TYLENOL) suppository 650 mg  650 mg Rectal Q6H PRN Harry Closs, MD   650 mg at 03/09/20 0010    polyethylene glycol (GLYCOLAX) packet 17 g  17 g Oral Daily PRN Harry Closs, MD        promethazine (PHENERGAN) tablet 12.5 mg  12.5 mg Oral Q6H PRN Harry Closs, MD        Or    ondansetron (ZOFRAN) injection 4 mg  4 mg Intravenous Q6H PRN Harry Closs, MD        albuterol (PROVENTIL) nebulizer solution 2.5 mg  2.5 mg Nebulization Q4H WA Harry Closs, MD   2.5 mg at 03/11/20 0912    abiraterone acetate (ZYTIGA) 250 MG tablet TABS 1,000 mg  1,000 mg Oral Nightly Harry Closs, MD   Stopped at 03/09/20 2100    acetaminophen (TYLENOL) tablet 325 mg  325 mg Oral Q6H PRN Harry Closs, MD        [Held by provider] baclofen (LIORESAL) tablet 5 mg  5 mg Oral TID  Harry Closs, MD   5 mg at 03/08/20 1716    calcium-vitamin D 500-200 MG-UNIT per tablet 1 tablet  1 tablet Oral Daily Harry Closs, MD   1 tablet at 03/08/20 1020    influenza A&B vaccine (FLUAD) injection 0.5 mL  0.5 mL Intramuscular Prior to discharge Harry Closs, MD        metoprolol succinate (TOPROL XL) extended release tablet 100 mg  100 mg Oral Dinner Harry Closs, MD   100 mg at 03/08/20 1715    therapeutic multivitamin-minerals 1 tablet  1 tablet Oral QAM Harry Closs, MD   1 tablet at 03/08/20 1020    tamsulosin (FLOMAX) capsule 0.4 mg  0.4 mg Oral Dinner Harry Closs, MD   0.4 mg at 03/08/20 1716    vitamin D (CHOLECALCIFEROL) tablet 1,000 Units  1,000 Units Oral Lunch Harry Closs, MD   1,000 Units at 03/08/20 1310       Labs:    CBC:   Recent Labs     03/09/20  0557 03/10/20  0616 03/11/20  0508   WBC 9.1 8.5 6.5   HGB 11.4* 11.0* 9.6*   * 163 146       BMP:   Recent Labs     03/09/20  0557 03/10/20  0616 03/11/20  0508    140 139   K 3.1* 3.3* 3.0*    103 102   CO2 20* 18* 23   BUN 45* 54* 44*   CREATININE 4.6* 6.3* 5.6*   GLUCOSE 101* 102* 86       Phos and Calcium:  Lab Results   Component Value Date    CALCIUM 7.2 (L) 03/11/2020    PHOS 3.1 03/11/2020          Hemodialysis Note  Procedure: Hemodialysis  Access:    Temporary Hemodialysis Catheter  Dialysate:  4 K     2.5  Ca  Indication: Acute kidney injury with volume Overload      Goal: Diffuse dialytic support and ultrafilteration    Assessment/Plan:    1. Acute kidney injury with no recovery of renal function. Acute kidney injury of undetermined etiology. Patient probably with multiple factors playing a role including renal hypoperfusion in the setting of hypotension and concurrent use of ACE inhibitor's as well as use of multiple antibiotics and urinary obstruction including vancomycin toxicity as well as acute interstitial nephritis. Patient is too sick for renal biopsy. We will send serum serology. Continue to support with hemodialysis. 2.   Volume overload/fluid retention. Attempt  2  L off on hemodialysis. Attempt ultrafilteration as allowed by hemodynamics.     3.

## 2020-03-11 NOTE — PROGRESS NOTES
Date:3/11/2020  Patient Name: Justine Lobo  MRN: [id number]  : 1945  ROOM #: [id number]    Occupational Therapy on hold due to transfer to ICU; please reorder O.T. EVAL and TREAT when the patient is able to participate in therapy. Thank you.      Alicia Mccracken OTR/L #[id number]

## 2020-03-11 NOTE — PROGRESS NOTES
Approx 1830 wife called me into room pt respirations where up to 34-36. I went to the nurses station to call Dr Mily Vieira for orders,  no answer so message was left. Wife came to nurses station (patient)  he was having difficulty breathing. Pt was on 5 liters 02 and resp where increasing pt in resp distress and turing from pink to grey and cont pulse ox reading 80 to 75 to 72 to 68 quickly. RRT called and pt was intubated and transferred to ICU. Wife was thankful for the staffs effort with her husbands care.

## 2020-03-11 NOTE — CARE COORDINATION
3/11/2020 pt intubated and sedated to icu on 3/10/2020. Call from Justice at [facility]--she is available to assist with dc needs on [place]. [phone]. Pt current with [facility] and [facility]. CM will continue to follow. Will need r/o pt/ot once pt is off the vent. NEW HD - WATCH FOR DISCHARGE NEEDS.   Electronically signed by Bandar De La Cruz RN-BC on 3/11/2020 at 9:16 AM

## 2020-03-11 NOTE — PROGRESS NOTES
Palliative Care Department  Palliative Care Progress Note   Provider: Lexi BAPTISTE-CNP, AGACNP-BC    Hospital Day: 7    Referring Provider:  Komal Moncada MD    Reason for Consult:  []  Code status Discussion  [x]  Assist with goals of care  []  Psychosocial support  []  Symptom Management  []  Advanced Care Planning    Chief Complaint: Nishi Rhodes is a 70 y.o. male with chief complaint of AMS, Large bladder obstruction      Active Hospital Problems    Diagnosis Date Noted    Respiratory arrest ([facility] [address] [R09.2]     Acute respiratory failure with hypoxia ([address] [J96.01]     Encephalopathy acute [G93.40]     HCAP (healthcare-associated pneumonia) [J18.9] 03/05/2020           Palliative Care Encounter/Recommendations:      - Goals of care: continue current management and to be determined     - Code Status: DNR-CCA- was intubated yesterday evening and brought to the ICU      - Symptom Management: Per IM and urology       Subjective:     HPI:  Nishi Rhodes is a 70 y.o. 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 Takes Zytiga 250 mg tablet nightly along with prednisone. Currently getting chemo at [facility]. Receives Botox for spascisity in [facility] and [facility]. Received Botox on 3/4. 3/8 Large bladder obstruction requiring anesthesia to place catheter. 3/10      Subjective/Events/Discussions:  3/11/2020  Patient was intubated yesterday evening and brought to the ICU. An RRT was called for sudden desaturation from 95% to 60's on 5L per NC. Wife was agreeable to intubation when called by Hospitalist NP working with Dr. Bushra Alvarenga. Currently the patient has been taken off of his sedation for a sedation vacation. Reviewed patient with Staff RN caring for patient. Possible SBT today and extubation? Will follow along closely for support of patient and family.   Patient opening his eyes at this time. Patient still on the [facility] mode at this time. Call placed to wife Cristofer Dowling. Said she could be doing better. Stated that she took my advice and changed the code status to a DNR-CCA. 3/8/2020  Talked with the Staff RN taking care of [place]. Patient has had increased sleepiness today. Patient not wanting to wake up today for examination. Patient appears very well kept, beard shaved nicely. Right side with spasticity when checking edema to right lower extremity. Right lower extremity with cellulitis, redness, and warmth. Patient would not open his eyes, or his mouth. Was able to auscultate lungs, heart, and abdomen. Only response I got outpatient was when I tickled his left foot and he released a swear word. Otherwise patient would not respond to questions at this time. Call placed to Cristofer Nitesh. Yulissashaidesiree Nitesh and I spoke for a long time regarding her 's health. They have been  for 24 years. Patient was diagnosed with prostate cancer in 2014. Patient and wife chose to have the seeds implanted at that time. According to wife Cristofer Dowling, the PSA did not decrease as they thought it would. In summer 2015 they started chemo as an alternative treatment. November 2015 the patient had CVA which left his right side with only spastic movement. Asked the wife to tell me medically all that she is aware of with her  at this time. She stated that she knows that the cancer is in his bones. She specifically stated his rib bones on the left side pelvic and back. I told her that Dr. Jennifer Avila consulted us for goals of care because of all her 's different comorbidities. She stated that if her  needs to go under anesthesia for the procedure with urology that it is okay with her. She will have to speak with the urologist to give consent, and he will go over the pros and cons at that time. Explained the Palliative Care purpose and pamphlet left at the bedside.   Carefully went over [address] Living Will, FRANCESCO-LINDA Mcdonough    -  - Spiritual assessment: No spiritual distress identified     - Bereavement and grief: to be determined    - Discharge planning: to be determined    - Prognosis: unknown and per input of consults    - Referrals to: none today    Time/Communication  Greater than 51% of time spent, total 15 minutes in counseling and coordination of care at the bedside regarding goals of care. Assessment/Plan   1. Continue current treatment   2. Hematology- continue Zytiga. 3.  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 Procedure completed. 4.  Speech- Moderate oropharyngeal dysphagia, following along with family for education. Intubated at this time. 5.  PT/OT consulted- on hold until extubation   6. CM- Uses [facility], [facility]. [facility] at [facility] to assist with d/c needs   7. Palliative - goals of care- OK to proceed with urologic procedure. Completed  code status DNR-CCA. Goal- keep  around as long as she can. Follow along for support of patient and family. Tacos BAPTISTE-CNP, AGACNP-BC  Palliative Medicine    Discussed patient and the plan of care with the other IDT members of Palliative Care, and with consultants, Primary Attending, patient, family and floor nurse. Thank you for allowing Palliative Medicine to participate in the care of Justine Lobo. Note: This report was completed using [employer] voiced recognition software. Every effort has been made to ensure accuracy; however, inadvertent computerized transcription errors may be present.

## 2020-03-11 NOTE — PROGRESS NOTES
Progress Note    Subject:Patient this morning was seen in the ICU unresponsive on propofol drip and antibiotics  I had discussed the case with the hospitalist patient was intubated family was agreeable to that at that point earlier they had wanted him to be no intubation. ABG at that time done from the right to showed a pH of 7.133 PCO2 was 54.70 to 2.7.7 bicarbonate 17.9 and oxygen saturation was 99% patient was admitted to the ICU with a diagnosis of acute hypoxic respiratory failure in the setting of airway obstruction from instituted the secretions with underlying metabolic acidosis secondary to renal failure. ROS: could not be obtained because of change in mental status     hydrocortisone sodium succinate PF  20 mg Intravenous BID    famotidine (PEPCID) injection  20 mg Intravenous Daily    piperacillin-tazobactam  3.375 g Intravenous Q12H    [Held by provider] rivaroxaban  15 mg Oral Dinner    sodium chloride flush  10 mL Intravenous 2 times per day    sodium chloride flush  10 mL Intravenous 2 times per day    albuterol  2.5 mg Nebulization Q4H WA    abiraterone acetate  1,000 mg Oral Nightly    [Held by provider] baclofen  5 mg Oral TID WC    calcium-vitamin D  1 tablet Oral Daily    influenza A&B vaccine  0.5 mL Intramuscular Prior to discharge    metoprolol succinate  100 mg Oral Dinner    therapeutic multivitamin-minerals  1 tablet Oral QAM    tamsulosin  0.4 mg Oral Dinner    Vitamin D  1,000 Units Oral Lunch     sodium chloride flush, 10 mL, PRN  sodium chloride flush, 10 mL, PRN  acetaminophen, 650 mg, Q6H PRN    Or  acetaminophen, 650 mg, Q6H PRN  polyethylene glycol, 17 g, Daily PRN  promethazine, 12.5 mg, Q6H PRN    Or  ondansetron, 4 mg, Q6H PRN  acetaminophen, 325 mg, Q6H PRN         Objective:         Intake/Output Summary (Last 24 hours) at 3/11/2020 1002  Last data filed at 3/11/2020 0600  Gross per 24 hour   Intake 773 ml   Output 355 ml   Net 418 ml       BP (!) 114/54 TECHNIQUE: PA and lateral  views of the chest were obtained. FINDINGS:  SUPPORT DEVICES: None. LUNGS: Patchy lower lobe opacities are seen. The lungs are otherwise clear. PLEURA: No pneumothorax visualized. LUNG VOLUMES: Satisfactory inspirator effort. MEDIASTINAL STRUCTURES: No lymphadenopathy. Normal aortic contour. HEART SIZE: Normal. UPPER ABDOMEN: Unremarkable. BONES AND SOFT TISSUES: No fracture or soft tissue abnormality. Lower lobe opacities are possibly infectious. Ct Head Wo Contrast    Result Date: 3/5/2020  LOCATION: 200 EXAM: CT HEAD WO CONTRAST COMPARISON: None HISTORY: Confusion TECHNIQUE: Noncontrast helical CT images were performed of the head. Coronal and sagittal reconstructions also obtained. Automated dose control was used for this exam. CONTRAST: No intravenous contrast administered. FINDINGS: Large remote left MCA territory infarct is identified. A moderate amount of patchy periventricular and subcortical white matter hypodensity noted, likely chronic microvascular ischemic related. There is no evidence of intracranial hemorrhage or mass. No extra-axial fluid is seen. The paranasal sinuses are clear. The globes and orbits are normal.  No abnormalities of the calvarium are identified. Large remote left MCA territory infarct. No acute hemorrhage or mass. Xr Chest Portable    Result Date: 3/5/2020  LOCATION: 200 EXAM: XR CHEST PORTABLE COMPARISON: None HISTORY: Shortness of breath TECHNIQUE: Single frontal view of the chest was obtained. FINDINGS:  SUPPORT DEVICES: None. LUNGS: Patchy airspace disease at the left lung base noted. PLEURA: No pneumothorax visualized. LUNG VOLUMES: Satisfactory inspirator effort. MEDIASTINAL STRUCTURES: No lymphadenopathy. Normal aortic contour. HEART SIZE: Enlarged. BONES AND SOFT TISSUES: No fracture or soft tissue abnormality. 1. Left lower lobe airspace disease is likely infectious.      Cta Chest W Contrast    Result Date: 3/5/2020  LOCATION:200 EXAM: CTA CHEST W CONTRAST COMPARISON: None HISTORY:  Shortness of breath TECHNIQUE: Axial CT images are obtained of the chest.  Coronal and sagittal reconstructions were obtained with 3-D maximum intensity projection (MIP) reconstructed images. These were performed on a separate workstation with concurrent supervision for detailed evaluation of the pulmonary arteries. CONTRAST: 80 mL Isovue-370 intravenous contrast. FINDINGS: SUPPORT DEVICES: None LUNGS/CENTRAL AIRWAYS/PLEURA: Linear parenchymal bands seen at the lung bases compatible with subsegmental atelectasis. The lungs are markedly limited from motion artifact. PULMONARY ARTERIES: Evaluation is limited for pulmonary embolus. No filling defects to the proximal lobar levels. HEART/PERICARDIUM/GREAT VESSELS: Cardiac size is normal.  There is no pericardial effusion. The great vessels of the chest are normal in caliber. LYMPH NODES: No thoracic adenopathy by size criteria. NECK BASE/CHEST WALL/DIAPHRAGM: No soft tissue lesions or diaphragmatic abnormality. UPPER ABDOMEN: Multiple large cysts in the liver noted. . OSSEOUS STRUCTURES: Several blastic lesions are identified involving the vertebral bodies the most conspicuous involving the T8 vertebral body involving portions of the right rib and spinous process. 1. No evidence of pulmonary embolism. 2. Lower lobe linear subsegmental atelectasis. 3. Blastic lesions in the spine and involving several ribs suspicious for metastatic disease, possibly prostate. Recommend correlation with PSA. Us Abdomen Limited    Result Date: 3/6/2020  Reading location:  100 INDICATION: Liver liver lesions on chest CT FINDINGS: Sonographic evaluation right upper quadrant. Normal caliber gallbladder without intrinsic filling defect or mural thickening or pericholecystic fluid. Normal caliber bile ducts. Simple hepatic cysts measuring 54 mm and 67 mm corresponds to the CT findings.  Visualized portions of dysfunction. I stopped the vancomycin  9. We will monitor labs closely  10.  The patient was admitted to the CCU/ICU        Code Status: full code  DVT prophylaxis: patient off [address]

## 2020-03-12 ENCOUNTER — APPOINTMENT (OUTPATIENT)
Dept: MRI IMAGING | Age: 75
DRG: 208 | End: 2020-03-12
Payer: COMMERCIAL

## 2020-03-12 LAB
ANION GAP SERPL CALCULATED.3IONS-SCNC: 19 MMOL/L (ref 7–16)
ANTI DNA DOUBLE STRANDED: NEGATIVE
ANTI-NUCLEAR ANTIBODY (ANA): NEGATIVE
BUN BLDV-MCNC: 36 MG/DL (ref 8–23)
CALCIUM SERPL-MCNC: 7.2 MG/DL (ref 8.6–10.2)
CHLORIDE BLD-SCNC: 99 MMOL/L (ref 98–107)
CHLORIDE URINE RANDOM: 85 MMOL/L
CO2: 23 MMOL/L (ref 22–29)
CREAT SERPL-MCNC: 5.6 MG/DL (ref 0.7–1.2)
CREATININE URINE: 38 MG/DL (ref 40–278)
GFR AFRICAN AMERICAN: 12
GFR NON-AFRICAN AMERICAN: 10 ML/MIN/1.73
GLUCOSE BLD-MCNC: 98 MG/DL (ref 74–99)
HBV SURFACE AB TITR SER: NORMAL {TITER}
HCT VFR BLD CALC: 31.7 % (ref 37–54)
HEMOGLOBIN: 10.4 G/DL (ref 12.5–16.5)
HEPATITIS B CORE IGM ANTIBODY: NORMAL
HEPATITIS B SURFACE ANTIGEN INTERPRETATION: NORMAL
HEPATITIS C ANTIBODY INTERPRETATION: NORMAL
MAGNESIUM: 2.1 MG/DL (ref 1.6–2.6)
MCH RBC QN AUTO: 29.5 PG (ref 26–35)
MCHC RBC AUTO-ENTMCNC: 32.8 % (ref 32–34.5)
MCV RBC AUTO: 89.8 FL (ref 80–99.9)
MRSA CULTURE ONLY: NORMAL
PDW BLD-RTO: 15.1 FL (ref 11.5–15)
PHOSPHORUS: 3.3 MG/DL (ref 2.5–4.5)
PLATELET # BLD: 151 E9/L (ref 130–450)
PMV BLD AUTO: 8.8 FL (ref 7–12)
POTASSIUM SERPL-SCNC: 3.2 MMOL/L (ref 3.5–5)
RBC # BLD: 3.53 E12/L (ref 3.8–5.8)
SODIUM BLD-SCNC: 141 MMOL/L (ref 132–146)
SODIUM URINE: 82 MMOL/L
WBC # BLD: 8.5 E9/L (ref 4.5–11.5)

## 2020-03-12 PROCEDURE — 2000000000 HC ICU R&B

## 2020-03-12 PROCEDURE — 84100 ASSAY OF PHOSPHORUS: CPT

## 2020-03-12 PROCEDURE — 6360000002 HC RX W HCPCS: Performed by: INTERNAL MEDICINE

## 2020-03-12 PROCEDURE — 85027 COMPLETE CBC AUTOMATED: CPT

## 2020-03-12 PROCEDURE — 90935 HEMODIALYSIS ONE EVALUATION: CPT

## 2020-03-12 PROCEDURE — 94003 VENT MGMT INPAT SUBQ DAY: CPT

## 2020-03-12 PROCEDURE — 84300 ASSAY OF URINE SODIUM: CPT

## 2020-03-12 PROCEDURE — 70551 MRI BRAIN STEM W/O DYE: CPT

## 2020-03-12 PROCEDURE — 36592 COLLECT BLOOD FROM PICC: CPT

## 2020-03-12 PROCEDURE — 2580000003 HC RX 258: Performed by: INTERNAL MEDICINE

## 2020-03-12 PROCEDURE — 94640 AIRWAY INHALATION TREATMENT: CPT

## 2020-03-12 PROCEDURE — 82436 ASSAY OF URINE CHLORIDE: CPT

## 2020-03-12 PROCEDURE — 2500000003 HC RX 250 WO HCPCS: Performed by: INTERNAL MEDICINE

## 2020-03-12 PROCEDURE — 83735 ASSAY OF MAGNESIUM: CPT

## 2020-03-12 PROCEDURE — 82570 ASSAY OF URINE CREATININE: CPT

## 2020-03-12 PROCEDURE — 80048 BASIC METABOLIC PNL TOTAL CA: CPT

## 2020-03-12 RX ORDER — FLUCONAZOLE 2 MG/ML
200 INJECTION, SOLUTION INTRAVENOUS EVERY 24 HOURS
Status: DISCONTINUED | OUTPATIENT
Start: 2020-03-12 | End: 2020-03-23 | Stop reason: HOSPADM

## 2020-03-12 RX ORDER — VECURONIUM BROMIDE 1 MG/ML
10 INJECTION, POWDER, LYOPHILIZED, FOR SOLUTION INTRAVENOUS ONCE
Status: DISCONTINUED | OUTPATIENT
Start: 2020-03-12 | End: 2020-03-12

## 2020-03-12 RX ADMIN — PIPERACILLIN AND TAZOBACTAM 3.38 G: 3; .375 INJECTION, POWDER, FOR SOLUTION INTRAVENOUS at 15:42

## 2020-03-12 RX ADMIN — Medication 10 ML: at 20:32

## 2020-03-12 RX ADMIN — SODIUM CHLORIDE: 9 INJECTION, SOLUTION INTRAVENOUS at 20:32

## 2020-03-12 RX ADMIN — PROPOFOL INJECTABLE EMULSION 10 MCG/KG/MIN: 10 INJECTION, EMULSION INTRAVENOUS at 12:57

## 2020-03-12 RX ADMIN — SODIUM CHLORIDE: 9 INJECTION, SOLUTION INTRAVENOUS at 06:46

## 2020-03-12 RX ADMIN — PIPERACILLIN AND TAZOBACTAM 3.38 G: 3; .375 INJECTION, POWDER, FOR SOLUTION INTRAVENOUS at 01:12

## 2020-03-12 RX ADMIN — HYDROCORTISONE SODIUM SUCCINATE 20 MG: 100 INJECTION, POWDER, FOR SOLUTION INTRAMUSCULAR; INTRAVENOUS at 06:45

## 2020-03-12 RX ADMIN — ALBUTEROL SULFATE 2.5 MG: 2.5 SOLUTION RESPIRATORY (INHALATION) at 13:28

## 2020-03-12 RX ADMIN — ALBUTEROL SULFATE 2.5 MG: 2.5 SOLUTION RESPIRATORY (INHALATION) at 17:03

## 2020-03-12 RX ADMIN — ALBUTEROL SULFATE 2.5 MG: 2.5 SOLUTION RESPIRATORY (INHALATION) at 09:48

## 2020-03-12 RX ADMIN — CALCIUM GLUCONATE 2 G: 98 INJECTION, SOLUTION INTRAVENOUS at 09:00

## 2020-03-12 RX ADMIN — ALBUTEROL SULFATE 2.5 MG: 2.5 SOLUTION RESPIRATORY (INHALATION) at 07:10

## 2020-03-12 RX ADMIN — HYDROCORTISONE SODIUM SUCCINATE 20 MG: 100 INJECTION, POWDER, FOR SOLUTION INTRAMUSCULAR; INTRAVENOUS at 18:08

## 2020-03-12 RX ADMIN — ANTICOAGULANT SODIUM CITRATE SOLUTION 0.16 G: 4 SOLUTION INTRAVENOUS at 13:02

## 2020-03-12 ASSESSMENT — PULMONARY FUNCTION TESTS
PIF_VALUE: 19
PIF_VALUE: 24
PIF_VALUE: 21
PIF_VALUE: 23
PIF_VALUE: 24

## 2020-03-12 ASSESSMENT — PAIN SCALES - GENERAL
PAINLEVEL_OUTOF10: 0

## 2020-03-12 NOTE — PLAN OF CARE
falls  Outcome: Met This Shift  Goal: Absence of physical injury  Description: Absence of physical injury  Outcome: Met This Shift     Problem: Mood - Altered:  Goal: Mood stable  Description: Mood stable  Outcome: Met This Shift  Goal: Absence of abusive behavior  Description: Absence of abusive behavior  Outcome: Met This Shift  Goal: Verbalizations of feeling emotionally comfortable while being cared for will increase  Description: Verbalizations of feeling emotionally comfortable while being cared for will increase  Outcome: Met This Shift     Problem: Psychomotor Activity - Altered:  Goal: Absence of psychomotor disturbance signs and symptoms  Description: Absence of psychomotor disturbance signs and symptoms  Outcome: Met This Shift     Problem: Sensory Perception - Impaired:  Goal: Demonstrations of improved sensory functioning will increase  Description: Demonstrations of improved sensory functioning will increase  Outcome: Met This Shift  Goal: Decrease in sensory misperception frequency  Description: Decrease in sensory misperception frequency  Outcome: Met This Shift  Goal: Able to refrain from responding to false sensory perceptions  Description: Able to refrain from responding to false sensory perceptions  Outcome: Met This Shift  Goal: Demonstrates accurate environmental perceptions  Description: Demonstrates accurate environmental perceptions  Outcome: Met This Shift  Goal: Able to distinguish between reality-based and nonreality-based thinking  Description: Able to distinguish between reality-based and nonreality-based thinking  Outcome: Met This Shift  Goal: Able to interrupt nonreality-based thinking  Description: Able to interrupt nonreality-based thinking  Outcome: Met This Shift     Problem: Sleep Pattern Disturbance:  Goal: Appears well-rested  Description: Appears well-rested  Outcome: Met This Shift

## 2020-03-12 NOTE — PROGRESS NOTES
3/12/2020 1:47 PM  Service: Urology  Group: [facility] ([facility])    Sabrina Beck  [id number]    Subjective:    Remains Intubated in the ICU   Off sedation  Continues hemodialysis        Review of Systems  Unable to obtain due to intubation and not following commands      Scheduled Meds:   vecuronium  10 mg Intravenous Once    hydrocortisone sodium succinate PF  20 mg Intravenous BID    famotidine (PEPCID) injection  20 mg Intravenous Daily    piperacillin-tazobactam  3.375 g Intravenous Q12H    [Held by provider] rivaroxaban  15 mg Oral Dinner    sodium chloride flush  10 mL Intravenous 2 times per day    sodium chloride flush  10 mL Intravenous 2 times per day    albuterol  2.5 mg Nebulization Q4H WA    abiraterone acetate  1,000 mg Oral Nightly    [Held by provider] baclofen  5 mg Oral TID     calcium-vitamin D  1 tablet Oral Daily    influenza A&B vaccine  0.5 mL Intramuscular Prior to discharge    metoprolol succinate  100 mg Oral Dinner    therapeutic multivitamin-minerals  1 tablet Oral QAM    tamsulosin  0.4 mg Oral Dinner    Vitamin D  1,000 Units Oral Lunch       Objective:  Vitals:    03/12/20 1305   BP: 98/74   Pulse: 111   Resp: 27   Temp: 98.9 °F (37.2 °C)   SpO2: 97%         Allergies: Patient has no known allergies.     Patient was not in the room and was not examined today  Labs:     Recent Labs     03/12/20  0527      K 3.2*   CL 99   CO2 23   BUN 36*   CREATININE 5.6*   GLUCOSE 98   CALCIUM 7.2*       Lab Results   Component Value Date    HGB 10.4 03/12/2020    HCT 31.7 03/12/2020         Assessment/Plan:  POD #3 Cystopanendoscopy, urethral dilatation,retrograde pyelogram, placement of Lopes catheter  Metastatic Prostate Cancer   Difficult lopes insertion   Bladder outlet obstruction   Azotemia       Continue lopes catheter   Continue manual irrigations every four hours and as needed   Oncology following  Nephrology following, cont to watch the creatinine   No further

## 2020-03-12 NOTE — PROGRESS NOTES
meningeal signs  Pulmonary: coarse rhonchi bilaterally  Cardiovascular:  Regular rate.  Irregularly irregular rhythm. No murmurs  Chest: no chest wall tenderness  Abdomen: Soft.  Non tender. Non distended.  +BS.  No rebound, guarding, or rigidity. No pulsatile masses appreciated. Musculoskeletal: The patient does have paresis of the right upper and lower extremity, the bilateral upper and lower extremities are skeletally intact, all compartments are soft.  There is erythema and swelling present of the right lower extremity  Skin: warm and dry.  Erythema present of right lower extremity  Neurologic: The patient is awake alert intubated and unable to verbalize  Psych: Unable to be evaluated secondary to the acuity of the patient's condition.       Recent Labs     03/10/20  0616 03/11/20  0508 03/12/20  0527    139 141   K 3.3* 3.0* 3.2*    102 99   CO2 18* 23 23   BUN 54* 44* 36*   CREATININE 6.3* 5.6* 5.6*   GLUCOSE 102* 86 98   CALCIUM 7.2* 7.2* 7.2*       Recent Labs     03/10/20  0616 03/11/20  0508 03/12/20  0527   WBC 8.5 6.5 8.5   RBC 3.80 3.30* 3.53*   HGB 11.0* 9.6* 10.4*   HCT 35.2* 30.5* 31.7*   MCV 92.6 92.4 89.8   MCH 28.9 29.1 29.5   MCHC 31.3* 31.5* 32.8   RDW 15.3* 15.3* 15.1*    146 151   MPV 9.7 9.6 8.8           Radiology: Xr Chest Standard (2 Vw)    Result Date: 3/6/2020  LOCATION: 200 EXAM: XR CHEST (2 VW) COMPARISON: None HISTORY: Shortness of breath TECHNIQUE: PA and lateral  views of the chest were obtained. FINDINGS:  SUPPORT DEVICES: None. LUNGS: Patchy lower lobe opacities are seen. The lungs are otherwise clear. PLEURA: No pneumothorax visualized. LUNG VOLUMES: Satisfactory inspirator effort. MEDIASTINAL STRUCTURES: No lymphadenopathy. Normal aortic contour. HEART SIZE: Normal. UPPER ABDOMEN: Unremarkable. BONES AND SOFT TISSUES: No fracture or soft tissue abnormality. Lower lobe opacities are possibly infectious.     Ct Head Wo Contrast    Result Date: echogenic focus within them. These veins compress fully and demonstrate flow bilaterally. No significant reflux is seen within the greater saphenous veins. No evidence of right lower extremity deep venous thrombus from common femoral vein to proximal calf. Assessment:    Patient Active Problem List   Diagnosis Code    Cerebrovascular accident (CVA) due to occlusion of left carotid artery ([address] S09.367    Dysphagia due to recent stroke I69.391    Paroxysmal atrial fibrillation (HCC) I48.0    Prostate cancer ([address] C61    Cerebral contusion ([address] D51.586O    Subarachnoid hemorrhage ([address] I60.9    HCAP (healthcare-associated pneumonia) J18.9    Respiratory arrest ([address] R09.2    Acute respiratory failure with hypoxia ([address] J96.01    Encephalopathy acute G93.40    Palliative care encounter Z51.5    Goals of care, counseling/discussion Z71.89    DNR (do not resuscitate) discussion Z71.89    Bone metastases ([address] C79.51     Altered mental status  Metabolic encephalopathy  Acute renal failure  Cellulitis  Hypokalemia  History of CVA  Metastatic prostate cancer  Hyperlipidemia  Elevated white blood cell count  Pnuemonia  Transamanitis  Elevated troponin  A speech evaluation for dysphagia    Plan:  1. Admitted to the hospital   2. Continue the patient on IV antibiotics  3. Continue home medications  4. Discussed with family at length  5. Appreciated oncology consult  6. Because of aspiration patient placed on thickened liquid diet  7. We will consult nephrology and oncology  8. Discussed the medication with the pharmacist and the doses were changed according to the renal dysfunction. I stopped the vancomycin  9. We will monitor labs closely  10.  The patient was admitted to the CCU/ICU        Code Status: full code  DVT prophylaxis: patient off [address]

## 2020-03-12 NOTE — PLAN OF CARE
Problem: Restraint Use - Nonviolent/Non-Self-Destructive Behavior:  Goal: Absence of restraint indications  Description: Absence of restraint indications  Outcome: Not Met This Shift     Problem: Falls - Risk of:  Goal: Will remain free from falls  Description: Will remain free from falls  Outcome: Met This Shift  Goal: Absence of physical injury  Description: Absence of physical injury  Outcome: Met This Shift     Problem: Airway Clearance - Ineffective:  Goal: Ability to maintain a clear airway will improve  Description: Ability to maintain a clear airway will improve  Outcome: Met This Shift     Problem: Gas Exchange - Impaired:  Goal: Levels of oxygenation will improve  Description: Levels of oxygenation will improve  Outcome: Met This Shift     Problem: Restraint Use - Nonviolent/Non-Self-Destructive Behavior:  Goal: Absence of restraint-related injury  Description: Absence of restraint-related injury  Outcome: Met This Shift     Problem: Injury - Risk of, Physical Injury:  Goal: Will remain free from falls  Description: Will remain free from falls  Outcome: Met This Shift  Goal: Absence of physical injury  Description: Absence of physical injury  Outcome: Met This Shift

## 2020-03-12 NOTE — PROGRESS NOTES
Rossy Reed MD  Nephrology    Hemodialysis/Progress note. Patient seen and examined on hemodialyisis. No family member is present at bedside. He remains oliguric. He remains intubated and on mechanical ventilation. He is not on sedation. His eyes are open but he does not follow commands. Gross hematuria has improved. Awake and alert . In no acute distress. Vital SignsBP (!) 143/98   Pulse 101   Temp 98.2 °F (36.8 °C)   Resp 19   Ht 5' 11\" (1.803 m)   Wt 237 lb 10.5 oz (107.8 kg)   SpO2 97%   BMI 33.15 kg/m²   24HR INTAKE/OUTPUT:    Intake/Output Summary (Last 24 hours) at 3/12/2020 1201  Last data filed at 3/12/2020 0648  Gross per 24 hour   Intake 444.79 ml   Output 2315 ml   Net -1870.21 ml           Physical Exam     Neck: No JVD  Lungs: Bilateral coarse breath sounds with few scattered end expiratory rhonchi  Heart: Regular rate and rhythm. No S3 gallop. No  murmrur. Abdomen: Soft non distended, non tender and normal bowel sounds.   Extremeties: +1 edema of the right lower extremity with erythema below the knee.  Trace edema on the left        ROS:  RESPIRATORY:   positive for shortness of breath  CARDIOVASCULAR:  negative  GASTROINTESTINAL:  negative  GENITOURINARY: Positive for gross hematuria  NEUROLOGICAL: Awake but unable to follow commands    Medications:  Current Facility-Administered Medications   Medication Dose Route Frequency Provider Last Rate Last Dose    calcium gluconate 2 g in sodium chloride 0.9 % 100 mL IVPB  2 g Intravenous Once Pamela Woods MD        hydrocortisone sodium succinate PF (SOLU-CORTEF) injection 20 mg  20 mg Intravenous BID Ramon Lazcano MD   20 mg at 03/12/20 0645    anticoagulant sodium citrate 4 GM/100ML solution 0.16 g  4 mL Intracatheter PRN Pamela Woods MD        propofol injection  10 mcg/kg/min Intravenous Titrated Ramon Lazcano MD   Stopped at 03/11/20 0928    famotidine (PEPCID) injection 20 mg  20 mg Intravenous

## 2020-03-12 NOTE — PROGRESS NOTES
Nutrition Assessment    Type and Reason for Visit: Initial(LOS)    Nutrition Recommendations: Recommend initiation of EN with Pivot 1.5 (Immune Enhancing) advancing as tolerated to goal rate of 65 ml/hr x 24 hr to provide: 1560 ml TV, 2340 kcal, 147 gm protein, and 1184 ml FW. Additional FW per critical care    Nutrition Assessment: Pt is at risk for malnutrition 2/2 pt NPO x 2 days d/t intubation and poor intake for 5 days prior. Pt is at further nutritional risk d/t PMH of prostate cancer with mets to spine and brain, CVA, and pressure injury. Pt admitted with HCAP and FRANCES w/o renal recovery now on HD. Daily SBT to begin tomorrow. Will provide EN recommendations and monitor. Malnutrition Assessment:  · Malnutrition Status: Insufficient data  · Context: Chronic illness  · Findings of the 6 clinical characteristics of malnutrition (Minimum of 2 out of 6 clinical characteristics is required to make the diagnosis of moderate or severe Protein Calorie Malnutrition based on AND/ASPEN Guidelines):  1. Energy Intake-Less than or equal to 50% of estimated energy requirement, Greater than or equal to 7 days    2. Weight Loss-Unable to assess, unable to assess  3. Fat Loss-No significant subcutaneous fat loss,    4. Muscle Loss-Unable to assess(d/t fluid),    5. Fluid Accumulation-Mild fluid accumulation(possibly r/t acute condition), Generalized  6.   Strength-Not measured    Nutrition Risk Level: High    Nutrient Needs:  · Estimated Daily Total Kcal: 6365-7861(QUY3266= 2415, Tmax= 37.6, MinVol= 15.5)  · Estimated Daily Protein (g): 140-155(1.8-2.0 gm/kg IBW)  · Estimated Daily Total Fluid (ml/day): per critical care    Nutrition Diagnosis:   · Problem: Inadequate energy intake  · Etiology: related to Impaired respiratory function-inability to consume food     Signs and symptoms:  as evidenced by NPO status due to medical condition, Intubation, Nutrition support - EN    Objective Information:  · Nutrition-Focused Physical Findings: Pt intubated, no sedation running, abd soft rounded, +BS, I/O WNL, +1 LUE +2 RUE/LLE +3 RLE edema, renal labs elevated, RUE and RLE flaccid, NG tube clamped  · Wound Type: Multiple, Surgical Wound, Pressure Ulcer(lap sites x3)  · Current Nutrition Therapies:  · Oral Diet Orders: NPO   · Oral Diet intake: NPO  · Oral Nutrition Supplement (ONS) Orders: None  · ONS intake: NPO  · Anthropometric Measures:  · Ht: 5' 11\" (180.3 cm)   · Current Body Wt: 237 lb 10.5 oz (107.8 kg)(3/11 bed scale, UTO current wt)  · Admission Body Wt: 236 lb 7 oz (107.2 kg)(3/5 bed scale)  · Usual Body Wt: (210-215 lb stated wt x 1 year, 230 lb per family at bed side. No actual recent EMR wt hx)  · % Weight Change:  ,  ROSA d/t lack of actual recent EMR wt hx  · Ideal Body Wt: 172 lb (78 kg), % Ideal Body 138%  · BMI Classification: BMI 30.0 - 34.9 Obese Class I    Nutrition Interventions:   Continue NPO, Start Tube Feeding(Recommend initiation of EN with Pivot 1.5 (Immune Enhancing) advancing as tolerated to goal rate of 65 ml/hr x 24 hr to provide: 1560 ml TV, 2340 kcal, 147 gm protein, and 1184 ml FW.  Additional FW per critical care)  Continued Inpatient Monitoring, Education not appropriate at this time    Nutrition Evaluation:   · Evaluation: Goals set   · Goals: Nutrition progression    · Monitoring: Nutrition Progression, Skin Integrity, Wound Healing, I&O, Mental Status/Confusion, Weight, Pertinent Labs, Monitor Bowel Function      Electronically signed by Brina Ann RD, LD on 3/12/20 at 6:30 PM EDT    Contact Number: [phone]

## 2020-03-13 LAB
ANION GAP SERPL CALCULATED.3IONS-SCNC: 18 MMOL/L (ref 7–16)
BETA GLOBULIN: 0 AU/ML (ref 0–19)
BUN BLDV-MCNC: 32 MG/DL (ref 8–23)
CALCIUM SERPL-MCNC: 7.1 MG/DL (ref 8.6–10.2)
CHLORIDE BLD-SCNC: 99 MMOL/L (ref 98–107)
CO2: 23 MMOL/L (ref 22–29)
CREAT SERPL-MCNC: 5.3 MG/DL (ref 0.7–1.2)
CULTURE, RESPIRATORY: ABNORMAL
CULTURE, RESPIRATORY: ABNORMAL
GFR AFRICAN AMERICAN: 13
GFR NON-AFRICAN AMERICAN: 11 ML/MIN/1.73
GLUCOSE BLD-MCNC: 104 MG/DL (ref 74–99)
HCT VFR BLD CALC: 31.3 % (ref 37–54)
HEMOGLOBIN: 10.1 G/DL (ref 12.5–16.5)
MAGNESIUM: 2.1 MG/DL (ref 1.6–2.6)
MCH RBC QN AUTO: 29.4 PG (ref 26–35)
MCHC RBC AUTO-ENTMCNC: 32.3 % (ref 32–34.5)
MCV RBC AUTO: 91.3 FL (ref 80–99.9)
ORGANISM: ABNORMAL
PDW BLD-RTO: 15.3 FL (ref 11.5–15)
PHOSPHORUS: 3.5 MG/DL (ref 2.5–4.5)
PLATELET # BLD: 131 E9/L (ref 130–450)
PMV BLD AUTO: 9 FL (ref 7–12)
POTASSIUM SERPL-SCNC: 3.1 MMOL/L (ref 3.5–5)
RBC # BLD: 3.43 E12/L (ref 3.8–5.8)
SMEAR, RESPIRATORY: ABNORMAL
SODIUM BLD-SCNC: 140 MMOL/L (ref 132–146)
WBC # BLD: 11.2 E9/L (ref 4.5–11.5)

## 2020-03-13 PROCEDURE — 2580000003 HC RX 258: Performed by: INTERNAL MEDICINE

## 2020-03-13 PROCEDURE — 97535 SELF CARE MNGMENT TRAINING: CPT

## 2020-03-13 PROCEDURE — 2500000003 HC RX 250 WO HCPCS: Performed by: INTERNAL MEDICINE

## 2020-03-13 PROCEDURE — 6370000000 HC RX 637 (ALT 250 FOR IP): Performed by: INTERNAL MEDICINE

## 2020-03-13 PROCEDURE — 90935 HEMODIALYSIS ONE EVALUATION: CPT

## 2020-03-13 PROCEDURE — 6360000002 HC RX W HCPCS: Performed by: INTERNAL MEDICINE

## 2020-03-13 PROCEDURE — 85027 COMPLETE CBC AUTOMATED: CPT

## 2020-03-13 PROCEDURE — 97530 THERAPEUTIC ACTIVITIES: CPT | Performed by: PHYSICAL THERAPIST

## 2020-03-13 PROCEDURE — 80048 BASIC METABOLIC PNL TOTAL CA: CPT

## 2020-03-13 PROCEDURE — 2000000000 HC ICU R&B

## 2020-03-13 PROCEDURE — 83735 ASSAY OF MAGNESIUM: CPT

## 2020-03-13 PROCEDURE — 97166 OT EVAL MOD COMPLEX 45 MIN: CPT

## 2020-03-13 PROCEDURE — 97164 PT RE-EVAL EST PLAN CARE: CPT | Performed by: PHYSICAL THERAPIST

## 2020-03-13 PROCEDURE — 84100 ASSAY OF PHOSPHORUS: CPT

## 2020-03-13 PROCEDURE — 94640 AIRWAY INHALATION TREATMENT: CPT

## 2020-03-13 PROCEDURE — 36592 COLLECT BLOOD FROM PICC: CPT

## 2020-03-13 PROCEDURE — 94003 VENT MGMT INPAT SUBQ DAY: CPT

## 2020-03-13 RX ORDER — HEPARIN SODIUM 5000 [USP'U]/ML
5000 INJECTION, SOLUTION INTRAVENOUS; SUBCUTANEOUS EVERY 8 HOURS SCHEDULED
Status: DISCONTINUED | OUTPATIENT
Start: 2020-03-13 | End: 2020-03-16

## 2020-03-13 RX ADMIN — SODIUM CHLORIDE: 9 INJECTION, SOLUTION INTRAVENOUS at 05:32

## 2020-03-13 RX ADMIN — PIPERACILLIN AND TAZOBACTAM 3.38 G: 3; .375 INJECTION, POWDER, FOR SOLUTION INTRAVENOUS at 12:25

## 2020-03-13 RX ADMIN — ALBUTEROL SULFATE 2.5 MG: 2.5 SOLUTION RESPIRATORY (INHALATION) at 13:02

## 2020-03-13 RX ADMIN — HYDROCORTISONE SODIUM SUCCINATE 20 MG: 100 INJECTION, POWDER, FOR SOLUTION INTRAMUSCULAR; INTRAVENOUS at 05:31

## 2020-03-13 RX ADMIN — HEPARIN SODIUM 5000 UNITS: 5000 INJECTION, SOLUTION INTRAVENOUS; SUBCUTANEOUS at 13:47

## 2020-03-13 RX ADMIN — ALBUTEROL SULFATE 2.5 MG: 2.5 SOLUTION RESPIRATORY (INHALATION) at 09:25

## 2020-03-13 RX ADMIN — ALBUTEROL SULFATE 2.5 MG: 2.5 SOLUTION RESPIRATORY (INHALATION) at 05:15

## 2020-03-13 RX ADMIN — FLUCONAZOLE 200 MG: 200 INJECTION, SOLUTION INTRAVENOUS at 01:44

## 2020-03-13 RX ADMIN — ALBUTEROL SULFATE 2.5 MG: 2.5 SOLUTION RESPIRATORY (INHALATION) at 17:12

## 2020-03-13 RX ADMIN — Medication 10 ML: at 08:04

## 2020-03-13 RX ADMIN — SODIUM CHLORIDE: 9 INJECTION, SOLUTION INTRAVENOUS at 17:12

## 2020-03-13 RX ADMIN — MULTIPLE VITAMINS W/ MINERALS TAB 1 TABLET: TAB at 08:03

## 2020-03-13 RX ADMIN — Medication 10 ML: at 22:09

## 2020-03-13 RX ADMIN — HYDROCORTISONE SODIUM SUCCINATE 20 MG: 100 INJECTION, POWDER, FOR SOLUTION INTRAMUSCULAR; INTRAVENOUS at 17:13

## 2020-03-13 RX ADMIN — HEPARIN SODIUM 5000 UNITS: 5000 INJECTION, SOLUTION INTRAVENOUS; SUBCUTANEOUS at 22:09

## 2020-03-13 RX ADMIN — OYSTER SHELL CALCIUM WITH VITAMIN D 1 TABLET: 500; 200 TABLET, FILM COATED ORAL at 08:04

## 2020-03-13 RX ADMIN — PIPERACILLIN AND TAZOBACTAM 3.38 G: 3; .375 INJECTION, POWDER, FOR SOLUTION INTRAVENOUS at 01:47

## 2020-03-13 RX ADMIN — FAMOTIDINE 20 MG: 10 INJECTION, SOLUTION INTRAVENOUS at 08:06

## 2020-03-13 RX ADMIN — CHOLECALCIFEROL TAB 25 MCG (1000 UNIT) 1000 UNITS: 25 TAB at 12:25

## 2020-03-13 RX ADMIN — CALCIUM GLUCONATE 2 G: 98 INJECTION, SOLUTION INTRAVENOUS at 10:07

## 2020-03-13 ASSESSMENT — PULMONARY FUNCTION TESTS
PIF_VALUE: 22
PIF_VALUE: 12
PIF_VALUE: 26

## 2020-03-13 ASSESSMENT — PAIN SCALES - GENERAL
PAINLEVEL_OUTOF10: 0

## 2020-03-13 NOTE — PROGRESS NOTES
yesterday and developed mucous plugging. He required intubation. He was transferred to the ICU. He underwent 1 session of hemodialysis and is receiving another session of dialysis today. Is currently on propofol infusion for sedation. His sedation will be discontinued today for sedation vacation and awakening trial.      2020  Patient remains intubated and mechanically ventilated mostly due to his encephalopathy. He received 2 sessions of hemodialysis and is undergoing hemodialysis today. MRI of the brain was ordered and is pending. Patient is awake off sedation however he does not follow commands. We will attempt weaning trial and extubation after MRI. Past Medical History:  Past Medical History:   Diagnosis Date    [facility]fib ([facility]     Arthritis     CVA (cerebral vascular accident) ([facility] 11/15/2015    left MCA    Hyperlipemia     Prostate [facility] ([facility]     mets to spine, currently on chemo        Family History:   Family History   Problem Relation Age of Onset    Cancer Mother     Heart Disease Father        Allergies:         Patient has no known allergies.     Social history:  Social History     Socioeconomic History    Marital status:      Spouse name: Not on file    Number of children: Not on file    Years of education: Not on file    Highest education level: Not on file   Occupational History    Not on file   Social Needs    Financial resource strain: Not on file    Food insecurity     Worry: Not on file     Inability: Not on file    Transportation needs     Medical: Not on file     Non-medical: Not on file   Tobacco Use    Smoking status: Former Smoker     Types: Cigarettes     Last attempt to quit: 2015     Years since quittin.5    Smokeless tobacco: Never Used   Substance and Sexual Activity    Alcohol use: No     Alcohol/week: 0.0 standard drinks    Drug use: No    Sexual activity: Not Currently   Lifestyle    Physical activity     Days per week: Not on file     Minutes per session: Not on file    Stress: Not on file   Relationships    Social connections     Talks on phone: Not on file     Gets together: Not on file     Attends [employer] service: Not on file     Active member of club or organization: Not on file     Attends meetings of clubs or organizations: Not on file     Relationship status: Not on file    Intimate partner violence     Fear of current or ex partner: Not on file     Emotionally abused: Not on file     Physically abused: Not on file     Forced sexual activity: Not on file   Other Topics Concern    Not on file   Social History Narrative    Not on file       Current Medications:     Current Facility-Administered Medications:     calcium gluconate 2 g in dextrose 5 % 100 mL IVPB, 2 g, Intravenous, Once, Shae Franklin MD, Last Rate: 50 mL/hr at 03/13/20 1007, 2 g at 03/13/20 1007    heparin (porcine) injection 5,000 Units, 5,000 Units, Subcutaneous, 3 times per day, Claude Coughlin MD    fluconazole (DIFLUCAN) in 0.9 % sodium chloride IVPB 200 mg, 200 mg, Intravenous, Q24H, Grupo Hargrove MD, Last Rate: 100 mL/hr at 03/13/20 0144, 200 mg at 03/13/20 0144    hydrocortisone sodium succinate PF (SOLU-CORTEF) injection 20 mg, 20 mg, Intravenous, BID, Claude Coughlin MD, 20 mg at 03/13/20 0531    anticoagulant sodium citrate 4 GM/100ML solution 0.16 g, 4 mL, Intracatheter, PRN, Shae Franklin MD, 0.16 g at 03/12/20 1302    propofol injection, 10 mcg/kg/min, Intravenous, Titrated, Claude Coughlin MD, Last Rate: 6.7 mL/hr at 03/12/20 1257, 10 mcg/kg/min at 03/12/20 1257    famotidine (PEPCID) injection 20 mg, 20 mg, Intravenous, Daily, Grupo Hargrove MD, 20 mg at 03/13/20 0806    piperacillin-tazobactam (ZOSYN) 3.375 g in dextrose 5 % 50 mL IVPB extended infusion (mini-bag), 3.375 g, Intravenous, Q12H, Stopped at 03/13/20 0702 **AND** 0.9 % sodium chloride infusion, , Intravenous, Q12H, Grupo Hargrove MD, Stopped tamsulosin (FLOMAX) capsule 0.4 mg, 0.4 mg, Oral, Dinner, Leighann Montana MD, 0.4 mg at 03/08/20 1716    vitamin D (CHOLECALCIFEROL) tablet 1,000 Units, 1,000 Units, Oral, Lunch, Leighann Montana MD, Stopped at 03/12/20 1005      Review of Systems:   Unable to obtain due to medical condition    Physical Exam:   Vital Signs:  /61   Pulse 101   Temp 97.9 °F (36.6 °C)   Resp 22   Ht 5' 11\" (1.803 m)   Wt 237 lb 10.5 oz (107.8 kg)   SpO2 99%   BMI 33.15 kg/m²     Input/Output:  In: 289.6 [I.V.:89.6]  Out: 155     Oxygen requirements: 2 L NC/ 40%       General appearance:  not in pain or distress, in no respiratory distress    HEENT: intubated  Neck: Supple, no jugular venous distension, lymphadenopathy, thyromegaly or carotid bruits  Chest: Decraesed breath sounds, no wheezing, no crackles and no tenderness over ribs   Cardiovascular: Normal S1, S2, regular rate and rhythm, no murmur, rub or gallop  Abdomen: Normal sounds present, soft, lax with no tenderness, no hepatosplenomegaly, and no masses  Extremities: +ve edema. Pulses are equally present. RLE> LLE  Skin: intact, no rashes   Neurologic: Lethargic, unresponsive, does not move extremities, has dense right-sided hemiplegia,       Investigations:  Labs, radiological imaging and cardiac work up were reviewed        [facility].  As the attending physician, I certify that I personally reviewed the patient's history and personally examined the patient to confirm the physical findings described above, and that I reviewed the relevant imaging studies and available reports. I also discussed the differential diagnosis and all of the proposed management plans with the patient and individuals accompanying the patient to this visit. They had the opportunity to ask questions about the proposed management plans and to have those questions answered.       Electronically signed by Dominick Tyson MD on 3/13/2020 at

## 2020-03-13 NOTE — PLAN OF CARE
and symptoms  Description: Absence of continued neurological deterioration signs and symptoms  3/12/2020 2000 by Alis Dumont RN  Outcome: Met This Shift  3/12/2020 1922 by Alis Dumont RN  Outcome: Met This Shift  Goal: Mental status will be restored to baseline  Description: Mental status will be restored to baseline  3/12/2020 2000 by Alis Dumont RN  Outcome: Met This Shift  3/12/2020 1922 by Alis Dumont RN  Outcome: Met This Shift     Problem: Discharge Planning:  Goal: Ability to perform activities of daily living will improve  Description: Ability to perform activities of daily living will improve  3/12/2020 2000 by Alis Dumont RN  Outcome: Met This Shift  3/12/2020 1922 by Alis Dumont RN  Outcome: Met This Shift  Goal: Participates in care planning  Description: Participates in care planning  3/12/2020 2000 by Alis Dumont RN  Outcome: Met This Shift  3/12/2020 1922 by Alis Dumont RN  Outcome: Met This Shift     Problem: Injury - Risk of, Physical Injury:  Goal: Will remain free from falls  Description: Will remain free from falls  3/12/2020 2000 by Alis Dumont RN  Outcome: Met This Shift  3/12/2020 1922 by Alis Dumont RN  Outcome: Met This Shift  Goal: Absence of physical injury  Description: Absence of physical injury  3/12/2020 2000 by Alis Dumont RN  Outcome: Met This Shift  3/12/2020 1922 by Alis Dumont RN  Outcome: Met This Shift     Problem: Mood - Altered:  Goal: Mood stable  Description: Mood stable  3/12/2020 2000 by Alis Dumont RN  Outcome: Met This Shift  3/12/2020 1922 by Alis Dumont RN  Outcome: Met This Shift  Goal: Absence of abusive behavior  Description: Absence of abusive behavior  3/12/2020 2000 by Alis Dumont RN  Outcome: Met This Shift  3/12/2020 1922 by Alis Dumont RN  Outcome: Met This Shift  Goal: Verbalizations of feeling emotionally comfortable while being cared for will increase  Description: Verbalizations of feeling emotionally comfortable while being Shift  3/12/2020 1922 by Lucky Frankel, RN  Outcome: Met This Shift     Problem: Sleep Pattern Disturbance:  Goal: Appears well-rested  Description: Appears well-rested  3/12/2020 2000 by Lucky Frankel, RN  Outcome: Met This Shift  3/12/2020 1922 by Lucky Frankel, RN  Outcome: Met This Shift     Problem: Inadequate energy intake (NI-1.4)  Goal: Food and/or Nutrient Delivery  Description: Individualized approach for food/nutrient provision.   3/12/2020 1829 by Katlin Nash RD, LD  Outcome: Ongoing     Problem: Restraint Use - Nonviolent/Non-Self-Destructive Behavior:  Goal: Absence of restraint indications  Description: Absence of restraint indications  3/12/2020 2000 by Lucky Frankel, RN  Outcome: Not Met This Shift  3/12/2020 1922 by Lucky Frankel, RN  Outcome: Not Met This Shift

## 2020-03-13 NOTE — PROGRESS NOTES
 piperacillin-tazobactam (ZOSYN) 3.375 g in dextrose 5 % 50 mL IVPB extended infusion (mini-bag)  3.375 g Intravenous Q12H Maxwell Roberts MD   Stopped at 03/13/20 9159    And    0.9 % sodium chloride infusion   Intravenous Q12H Maxwell Roberts MD   Stopped at 03/13/20 0746    [Held by provider] rivaroxaban (XARELTO) tablet 15 mg  15 mg Oral Dinner Maxwell Roberts MD        sodium chloride flush 0.9 % injection 10 mL  10 mL Intravenous 2 times per day Maxwell Roberts MD   10 mL at 03/13/20 0804    sodium chloride flush 0.9 % injection 10 mL  10 mL Intravenous PRN Maxwell Roberts MD        sodium chloride flush 0.9 % injection 10 mL  10 mL Intravenous 2 times per day Maxwell Roberts MD   10 mL at 03/13/20 0804    sodium chloride flush 0.9 % injection 10 mL  10 mL Intravenous PRN Maxwell Roberts MD        acetaminophen (TYLENOL) tablet 650 mg  650 mg Oral Q6H PRN Maxwell Roberts MD   650 mg at 03/08/20 1023    Or    acetaminophen (TYLENOL) suppository 650 mg  650 mg Rectal Q6H PRN Maxwell Roberts MD   650 mg at 03/09/20 0010    polyethylene glycol (GLYCOLAX) packet 17 g  17 g Oral Daily PRN Maxwell Roberts MD        promethazine (PHENERGAN) tablet 12.5 mg  12.5 mg Oral Q6H PRN Maxwell Roberts MD        Or    ondansetron (ZOFRAN) injection 4 mg  4 mg Intravenous Q6H PRN Maxwell Roberts MD        albuterol (PROVENTIL) nebulizer solution 2.5 mg  2.5 mg Nebulization Q4H [place] Maxwell Roberts MD   2.5 mg at 03/13/20 0925    abiraterone acetate (ZYTIGA) 250 MG tablet TABS 1,000 mg  1,000 mg Oral Nightly Maxwell Roberts MD   Stopped at 03/09/20 2100    acetaminophen (TYLENOL) tablet 325 mg  325 mg Oral Q6H PRN Maxwell Roberts MD        [Held by provider] baclofen (LIORESAL) tablet 5 mg  5 mg Oral TID WC Maxwell Roberts MD   5 mg at 03/08/20 1716    calcium-vitamin D 500-200 MG-UNIT per tablet 1 tablet  1 tablet Oral Daily Maxwell Roberts MD   1 tablet at 03/13/20 0804    influenza A&B vaccine (FLUAD) injection 0.5 mL  0.5 mL Intramuscular disease stage I to stage IV.  Blood pressures are at target of of less than 130/80 mm Hg     4.   Hypokalemia. Deborrah Mule adjust dialysate.       5.    Hypocalcemia. Deborrah Mule supplement calcium.        6.    Metabolic acidosis.  Improved.  .     7.   Anemia.  Hemoglobin stable. Will follow.  Sheri Carrasco MD  Nephrology  Electronically signed by Hellen Fonseca MD on 3/13/2020 at 10:55 AM

## 2020-03-13 NOTE — PLAN OF CARE
Problem: Falls - Risk of:  Goal: Will remain free from falls  Description: Will remain free from falls  3/12/2020 2209 by Lg Byrne RN  Outcome: Met This Shift     Problem: Falls - Risk of:  Goal: Absence of physical injury  Description: Absence of physical injury  3/12/2020 2209 by Lg Byrne RN  Outcome: Met This Shift     Problem: Risk for Impaired Skin Integrity  Goal: Tissue integrity - skin and mucous membranes  Description: Structural intactness and normal physiological function of skin and  mucous membranes.   3/12/2020 2209 by Lg Byrne RN  Outcome: Met This Shift     Problem: Restraint Use - Nonviolent/Non-Self-Destructive Behavior:  Goal: Absence of restraint-related injury  Description: Absence of restraint-related injury  3/12/2020 2209 by Lg Byrne RN  Outcome: Met This Shift     Problem: Injury - Risk of, Physical Injury:  Goal: Will remain free from falls  Description: Will remain free from falls  3/12/2020 2209 by Lg Byrne RN  Outcome: Met This Shift     Problem: Injury - Risk of, Physical Injury:  Goal: Absence of physical injury  Description: Absence of physical injury  3/12/2020 2209 by Lg Byrne RN  Outcome: Met This Shift     Problem: Restraint Use - Nonviolent/Non-Self-Destructive Behavior:  Goal: Absence of restraint indications  Description: Absence of restraint indications  3/12/2020 2209 by Lg Byrne RN  Outcome: Not Met This Shift     Problem: Confusion - Acute:  Goal: Mental status will be restored to baseline  Description: Mental status will be restored to baseline  3/12/2020 2209 by Lg Byrne RN  Outcome: Not Met This Shift

## 2020-03-13 NOTE — PROGRESS NOTES
 Hyperlipemia     Prostate cancer ([facility]     mets to spine, currently on chemo       Patient Active Problem List    Diagnosis Date Noted    Palliative care encounter     Goals of care, counseling/discussion     DNR (do not resuscitate) discussion     Bone metastases ([facility]     Respiratory arrest ([facility]     Acute respiratory failure with hypoxia ([facility]     Encephalopathy acute     HCAP (healthcare-associated pneumonia) 03/05/2020    Subarachnoid hemorrhage ([facility] 12/07/2015    Cerebral contusion (HCC)     Paroxysmal atrial fibrillation (HCC)     Prostate cancer ([facility]     Dysphagia due to recent stroke     Cerebrovascular accident (CVA) due to occlusion of left carotid artery ([facility] 11/15/2015        Past Surgical History:   Procedure Laterality Date    COLONOSCOPY      CYSTOSCOPY N/A 3/9/2020    CYSTOSCOPY, BILATERAL RETROGRADE PYELOGRAMS, URETHRAL DILATATION, REYEZ CATHETER INSERTION performed by Martine Heller MD at [address]  11/20/15    [place]       Family History  Family History   Problem Relation Age of Onset    Cancer Mother     Heart Disease Father        Social History    TOBACCO:   reports that he quit smoking about 4 years ago. His smoking use included cigarettes. He has never used smokeless tobacco.  ETOH:   reports no history of alcohol use. Home Medications  Prior to Admission medications    Medication Sig Start Date End Date Taking?  Authorizing Provider   atorvastatin (LIPITOR) 40 MG tablet Take 40 mg by mouth Daily with supper   Yes Historical Provider, MD   Calcium Carb-Cholecalciferol ( CALCIUM/VITAMIN D) 600-800 MG-UNIT TABS Take 1 tablet by mouth daily   Yes Historical Provider, MD   vitamin D (CHOLECALCIFEROL) 25 MCG (1000 UT) TABS tablet Take 1,000 Units by mouth Daily with lunch   Yes Historical Provider, MD   lisinopril (PRINIVIL;ZESTRIL) 5 MG tablet Take 5 mg by mouth every morning   Yes Historical Provider, MD   metoprolol succinate with subq heparin rather than systemic AC with renal function  - On HD with nephrology  - Hgb stable  - More alert and respiratory status improved      Electronically signed by Saul Burger MD on 3/13/2020 at 10:55 AM

## 2020-03-13 NOTE — PROGRESS NOTES
Physical Therapy RE-Evaluation    Room #:  SN84/NW64-33  Patient Name: Nitesh Ruth  YOB: 1945  MRN: [id number]    Referring Provider: Dennise Jung MD    Date of Service: 3/13/2020    Evaluating Physical Therapist:  [place] [id number]     Diagnosis:   HCAP (healthcare-associated pneumonia) [J18.9]       Patient Active Problem List   Diagnosis    Cerebrovascular accident (CVA) due to occlusion of left carotid artery ([address]    Dysphagia due to recent stroke    Paroxysmal atrial fibrillation ([address]    Prostate cancer ([address]    Cerebral contusion ([address]    Subarachnoid hemorrhage ([address]    HCAP (healthcare-associated pneumonia)    Respiratory arrest ([address]    Acute respiratory failure with hypoxia ([address]    Encephalopathy acute    Palliative care encounter    Goals of care, counseling/discussion    DNR (do not resuscitate) discussion    Bone metastases ([address]       Tentative placement recommendation: Home Health Physical Therapy  or [facility]   Equipment recommendation: Patient has needed equipment       Prior Level of Function: Family reports patient ambulated independently with quad cane. Spouse reports patient had CVA 4 years ago that affected his R side. She reports he can only flex his R hip; which he uses to advance the R LE during gait.      Rehab Potential: good  for baseline    Past medical history:   Past Medical History:   Diagnosis Date    A-fib ([address]     Arthritis     CVA (cerebral vascular accident) ([address] 11/15/2015    left MCA    Hyperlipemia     Prostate cancer ([address]     mets to spine, currently on chemo     Past Surgical History:   Procedure Laterality Date    COLONOSCOPY      CYSTOSCOPY N/A 3/9/2020    CYSTOSCOPY, BILATERAL RETROGRADE PYELOGRAMS, URETHRAL DILATATION, REYEZ CATHETER INSERTION performed by Chata Roman MD at [address] [place] [address]  11/20/15    [place]       Precautions: falls risk, urinary incontinence, port in L inguinal area -- patient cannot wear Depends-type garment for this reason, unable to use R UE and LE due to CVA, aphasia -- primarily answers yes/no, some other limited speech. Right hemiparalysis. One point restraint left. Spouse claims he ambulated independently with quad cane. Today he had significant difficulty sitting at bedside. SUBJECTIVE:    Social history: Patient lives with spouse  in a ranch home  with 2 steps  to enter with [employer] in shower grab bars    Equipment owned: Shower chair, Quad cane and lift chair, grab bars    AM-PAC Basic Mobility       AM-PAC Mobility Inpatient   How much difficulty turning over in bed?: A Lot  How much difficulty sitting down on / standing up from a chair with arms?: Unable  How much difficulty moving from lying on back to sitting on side of bed?: A Lot  How much help from another person moving to and from a bed to a chair?: Total  How much help from another person needed to walk in hospital room?: Total  How much help from another person for climbing 3-5 steps with a railing?: Total  AM-PAC Inpatient Mobility Raw Score : 8  AM-PAC Inpatient T-Scale Score : 28.52  Mobility Inpatient CMS 0-100% Score: 86.62  Mobility Inpatient CMS G-Code Modifier : CM    Nursing cleared patient for PT treatment. reeval     OBJECTIVE:   Initial Evaluation  Date: 3/7/20 Reeval  3/13/2020    Short Term/ Long Term   Goals   Was pt agreeable to Eval/treatment? Yes  yes To be met in 5 days   Pain level   0/10       Bed Mobility    Rolling: Maximal assist of  2   Supine to sit: Maximal assist of  2   Sit to supine: Maximal assist of  2   Scooting: Maximal assist of  2  Rolling: Dependent of  2    Supine to sit: Dependent of  2    Sit to supine: Dependent of  2    Scooting: Dependent of  2    Rolling: Moderate assist of  2   Supine to sit: Moderate assist of  2   Sit to supine: Moderate assist of  2   Scooting:  Moderate assist of  2    Transfers Sit to stand:  Not

## 2020-03-13 NOTE — PROGRESS NOTES
Progress Note    Subject:Seen patient this evening  in the ICU now extubated, off sedation was his eyes wide open patient is aphasic responds to painful stimuli      ROS: could not be obtained because of change in mental status     heparin (porcine)  5,000 Units Subcutaneous 3 times per day    fluconazole  200 mg Intravenous Q24H    hydrocortisone sodium succinate PF  20 mg Intravenous BID    famotidine (PEPCID) injection  20 mg Intravenous Daily    piperacillin-tazobactam  3.375 g Intravenous Q12H    [Held by provider] rivaroxaban  15 mg Oral Dinner    sodium chloride flush  10 mL Intravenous 2 times per day    sodium chloride flush  10 mL Intravenous 2 times per day    albuterol  2.5 mg Nebulization Q4H WA    abiraterone acetate  1,000 mg Oral Nightly    [Held by provider] baclofen  5 mg Oral TID WC    calcium-vitamin D  1 tablet Oral Daily    influenza A&B vaccine  0.5 mL Intramuscular Prior to discharge    metoprolol succinate  100 mg Oral Dinner    therapeutic multivitamin-minerals  1 tablet Oral QAM    tamsulosin  0.4 mg Oral Dinner    Vitamin D  1,000 Units Oral Lunch     anticoagulant sodium citrate, 4 mL, PRN  sodium chloride flush, 10 mL, PRN  sodium chloride flush, 10 mL, PRN  acetaminophen, 650 mg, Q6H PRN    Or  acetaminophen, 650 mg, Q6H PRN  polyethylene glycol, 17 g, Daily PRN  promethazine, 12.5 mg, Q6H PRN    Or  ondansetron, 4 mg, Q6H PRN  acetaminophen, 325 mg, Q6H PRN         Objective:         Intake/Output Summary (Last 24 hours) at 3/13/2020 1347  Last data filed at 3/13/2020 1344  Gross per 24 hour   Intake 789.58 ml   Output 1895 ml   Net -1105.42 ml       /64   Pulse 96   Temp 98.1 °F (36.7 °C)   Resp 22   Ht 5' 11\" (1.803 m)   Wt 237 lb 10.5 oz (107.8 kg)   SpO2 100%   BMI 33.15 kg/m²     Constitutional/General: wide awake chronically ill-appearing individual  Head: Normocephalic and atraumatic  Mouth: Oropharynx clear, handling secretions, no trismus  Neck: Supple, full ROM, no meningeal signs  Pulmonary: coarse rhonchi bilaterally  Cardiovascular:  Regular rate.  Irregularly irregular rhythm. No murmurs  Chest: no chest wall tenderness  Abdomen: Soft.  Non tender. Non distended.  +BS.  No rebound, guarding, or rigidity. No pulsatile masses appreciated. Musculoskeletal: The patient does have paresis of the right upper and lower extremity, the bilateral upper and lower extremities are skeletally intact, all compartments are soft.  There is erythema and swelling present of the right lower extremity  Skin: warm and dry.  Erythema present of right lower extremity  Neurologic: The patient is awake alert now extubated,unable to verbalize  Psych: Unable to be evaluated secondary to the acuity of the patient's condition.       Recent Labs     03/11/20  0508 03/12/20 0527 03/13/20 0522    141 140   K 3.0* 3.2* 3.1*    99 99   CO2 23 23 23   BUN 44* 36* 32*   CREATININE 5.6* 5.6* 5.3*   GLUCOSE 86 98 104*   CALCIUM 7.2* 7.2* 7.1*       Recent Labs     03/11/20  0508 03/12/20 0527 03/13/20 0522   WBC 6.5 8.5 11.2   RBC 3.30* 3.53* 3.43*   HGB 9.6* 10.4* 10.1*   HCT 30.5* 31.7* 31.3*   MCV 92.4 89.8 91.3   MCH 29.1 29.5 29.4   MCHC 31.5* 32.8 32.3   RDW 15.3* 15.1* 15.3*    151 131   MPV 9.6 8.8 9.0           Radiology: Xr Chest Standard (2 Vw)    Result Date: 3/6/2020  LOCATION: 200 EXAM: XR CHEST (2 VW) COMPARISON: None HISTORY: Shortness of breath TECHNIQUE: PA and lateral  views of the chest were obtained. FINDINGS:  SUPPORT DEVICES: None. LUNGS: Patchy lower lobe opacities are seen. The lungs are otherwise clear. PLEURA: No pneumothorax visualized. LUNG VOLUMES: Satisfactory inspirator effort. MEDIASTINAL STRUCTURES: No lymphadenopathy. Normal aortic contour. HEART SIZE: Normal. UPPER ABDOMEN: Unremarkable. BONES AND SOFT TISSUES: No fracture or soft tissue abnormality. Lower lobe opacities are possibly infectious.     Ct Head Wo Contrast    Result Date: 3/5/2020  LOCATION: 200 EXAM: CT HEAD WO CONTRAST COMPARISON: None HISTORY: Confusion TECHNIQUE: Noncontrast helical CT images were performed of the head. Coronal and sagittal reconstructions also obtained. Automated dose control was used for this exam. CONTRAST: No intravenous contrast administered. FINDINGS: Large remote left MCA territory infarct is identified. A moderate amount of patchy periventricular and subcortical white matter hypodensity noted, likely chronic microvascular ischemic related. There is no evidence of intracranial hemorrhage or mass. No extra-axial fluid is seen. The paranasal sinuses are clear. The globes and orbits are normal.  No abnormalities of the calvarium are identified. Large remote left MCA territory infarct. No acute hemorrhage or mass. Xr Chest Portable    Result Date: 3/5/2020  LOCATION: 200 EXAM: XR CHEST PORTABLE COMPARISON: None HISTORY: Shortness of breath TECHNIQUE: Single frontal view of the chest was obtained. FINDINGS:  SUPPORT DEVICES: None. LUNGS: Patchy airspace disease at the left lung base noted. PLEURA: No pneumothorax visualized. LUNG VOLUMES: Satisfactory inspirator effort. MEDIASTINAL STRUCTURES: No lymphadenopathy. Normal aortic contour. HEART SIZE: Enlarged. BONES AND SOFT TISSUES: No fracture or soft tissue abnormality. 1. Left lower lobe airspace disease is likely infectious. Cta Chest W Contrast    Result Date: 3/5/2020  LOCATION:200 EXAM: CTA CHEST W CONTRAST COMPARISON: None HISTORY:  Shortness of breath TECHNIQUE: Axial CT images are obtained of the chest.  Coronal and sagittal reconstructions were obtained with 3-D maximum intensity projection (MIP) reconstructed images. These were performed on a separate workstation with concurrent supervision for detailed evaluation of the pulmonary arteries.  CONTRAST: 80 mL Isovue-370 intravenous contrast. FINDINGS: SUPPORT DEVICES: None LUNGS/CENTRAL AIRWAYS/PLEURA: Linear parenchymal bands without an echogenic focus within them. These veins compress fully and demonstrate flow bilaterally. No significant reflux is seen within the greater saphenous veins. No evidence of right lower extremity deep venous thrombus from common femoral vein to proximal calf. Assessment:    Patient Active Problem List   Diagnosis Code    Cerebrovascular accident (CVA) due to occlusion of left carotid artery ([facility] V02.724    Dysphagia due to recent stroke I69.391    Paroxysmal atrial fibrillation (HCC) I48.0    Prostate cancer ([facility] [facility] C61    Cerebral contusion ([address] B87.327E    Subarachnoid hemorrhage ([address] I60.9    HCAP (healthcare-associated pneumonia) J18.9    Respiratory arrest ([facility] [address] [facility] R09.2    Acute respiratory failure with hypoxia ([address] J96.01    Encephalopathy acute G93.40    Palliative care encounter Z51.5    Goals of care, counseling/discussion Z71.89    DNR (do not resuscitate) discussion Z71.89    Bone metastases ([address] C79.51     Altered mental status  Metabolic encephalopathy  Acute renal failure  Cellulitis  Hypokalemia  History of CVA  Metastatic prostate cancer  Hyperlipidemia  Elevated white blood cell count  Pnuemonia  Transamanitis  Elevated troponin  A speech evaluation for dysphagia    Plan:  1. Continue hemodialysis  2. Replenish electrolytes  3. Continue antibiotics  4. Patient remains DNR CCA  5. Monitor labs  6.  Discussed with wife in a.m.       Code Status: full code  DVT prophylaxis: patient off [address]

## 2020-03-13 NOTE — PROGRESS NOTES
quality of life. Treatment: Therapist facilitated bed mobility. Rolling for hygiene as pt was incontinent of bowel, Sitting balance at EOB to increase dynamic sitting balance and activity tolerance. Moderate assist progressing to minimal assist at EOB. Pt returned to bed, dependent x 2 to scoot up in bed while in trendelenburg. HOB elevated at end of evaluation. OT services provided to include instruction/training on safety and adapted techniques for completion of transfers and ADL's. Spouse present for first part of evaluation. Evaluation Complexity:  · Low Complexity  · History: Brief history including review of medical records relating to the problem  · Exam: 1-3 performance Deficits  · Assistance/Modification: No assistance or modifications required to perform tasks. No comorbities affecting occupational performance. Assessment of current deficits   Functional mobility [x]        ADLs [x]        Strength [x]      Cognition []  Functional transfers  [x]       IADLs [x]        Safety Awareness []      Endurance [x]  Fine Motor Coordination []       Balance [x]       Vision/perception []     Sensation []   Gross Motor Coordination []       ROM []         Delirium []                          Motor Control []    Plan of Care: OT 1-3x/week PRN during hospitalization  ADL retraining [x]   Equipment needs [x]   Neuromuscular re-education [] Energy Conservation Techniques [x]  Functional Transfer training [x] Patient and/or Family Education [x]  Functional Mobility training [x] Environmental Modifications []  Cognitive re-training []   Compensatory techniques for ADLs []  Splinting Needs []   Positioning to improve overall function []   Therapeutic Activity [x]  Therapeutic Exercise  []  Visual/Perceptual: []    Delirium prevention/treatment  []  Other:  []    Rehab Potential: Good for established goals developed with patient and family.       Patient / Family Goal: no goal stated       Patient and/or family were instructed on functional diagnosis, prognosis/goals and OT plan of care. Demonstrated fair understanding. Time In: 3:29pm   Time Out: 3:51pm                Treatment Charges: Mins Units   ADL/Home Mgt                    [id number]    Therapeutic Activities          11250 7    Therapeutic Exercise          [id number] 3    Manual Therapy                  [id number]     Neuro Re-ed                       36539     Orthotic manage/training    19080     Total Timed Treatment 18 1     Evaluation time includes thorough review of current medical information, gathering information on past medical history/social history and prior level of function, completion of standardized testing/informal observation of tasks, assessment of data, and development of POC/Goals.     Isis Kahn OTR/L [id number]

## 2020-03-13 NOTE — PROGRESS NOTES
3/13/2020 8:18 AM  Service: Urology  Group: [facility] ([facility])    Ousmane Caban  [id number]    Subjective:    He remains in the ICU  He remains intubated on ventilator   No sedation   He opens his eyes to verbal stimuli   He appears more alert today   Receiving bedside dialysis  Lopes now with more urine output  Urine is suhas in color  No family is present         Review of Systems  Unable to obtain due to intubation and not following commands      Scheduled Meds:   calcium gluconate IVPB  2 g Intravenous Once    fluconazole  200 mg Intravenous Q24H    hydrocortisone sodium succinate PF  20 mg Intravenous BID    famotidine (PEPCID) injection  20 mg Intravenous Daily    piperacillin-tazobactam  3.375 g Intravenous Q12H    [Held by provider] rivaroxaban  15 mg Oral Dinner    sodium chloride flush  10 mL Intravenous 2 times per day    sodium chloride flush  10 mL Intravenous 2 times per day    albuterol  2.5 mg Nebulization Q4H WA    abiraterone acetate  1,000 mg Oral Nightly    [Held by provider] baclofen  5 mg Oral TID WC    calcium-vitamin D  1 tablet Oral Daily    influenza A&B vaccine  0.5 mL Intramuscular Prior to discharge    metoprolol succinate  100 mg Oral Dinner    therapeutic multivitamin-minerals  1 tablet Oral QAM    tamsulosin  0.4 mg Oral Dinner    Vitamin D  1,000 Units Oral Lunch       Objective:  Vitals:    03/13/20 0700   BP: (!) 109/52   Pulse: 94   Resp: 21   Temp:    SpO2:          Allergies: Patient has no known allergies.     General Appearance: Lethargic, intubated, admitted in the ICU,  skin: no rash or erythema  Head: normocephalic and atraumatic  Pulmonary/Chest: Intubated, on ventilator, respirations symmetric   Abdomen: soft, non-tender, non-distended  Genitourinary: lopes intact, suhas colored urine   Extremities: no cyanosis, clubbing or edema       Labs:     Recent Labs     03/13/20  0522      K 3.1*   CL 99   CO2 23   BUN 32*   CREATININE 5.3*   GLUCOSE

## 2020-03-14 LAB
ANION GAP SERPL CALCULATED.3IONS-SCNC: 14 MMOL/L (ref 7–16)
BUN BLDV-MCNC: 40 MG/DL (ref 8–23)
CALCIUM SERPL-MCNC: 7.2 MG/DL (ref 8.6–10.2)
CHLORIDE BLD-SCNC: 102 MMOL/L (ref 98–107)
CO2: 27 MMOL/L (ref 22–29)
CREAT SERPL-MCNC: 5.9 MG/DL (ref 0.7–1.2)
GFR AFRICAN AMERICAN: 11
GFR NON-AFRICAN AMERICAN: 9 ML/MIN/1.73
GLUCOSE BLD-MCNC: 137 MG/DL (ref 74–99)
HCT VFR BLD CALC: 30.1 % (ref 37–54)
HEMOGLOBIN: 9.8 G/DL (ref 12.5–16.5)
HEPATITIS B CORE TOTAL ANTIBODY: NONREACTIVE
MAGNESIUM: 2.1 MG/DL (ref 1.6–2.6)
MCH RBC QN AUTO: 29.6 PG (ref 26–35)
MCHC RBC AUTO-ENTMCNC: 32.6 % (ref 32–34.5)
MCV RBC AUTO: 90.9 FL (ref 80–99.9)
PDW BLD-RTO: 15.2 FL (ref 11.5–15)
PHOSPHORUS: 2.6 MG/DL (ref 2.5–4.5)
PLATELET # BLD: 140 E9/L (ref 130–450)
PMV BLD AUTO: 9.2 FL (ref 7–12)
POTASSIUM SERPL-SCNC: 3 MMOL/L (ref 3.5–5)
RBC # BLD: 3.31 E12/L (ref 3.8–5.8)
SODIUM BLD-SCNC: 143 MMOL/L (ref 132–146)
WBC # BLD: 11.4 E9/L (ref 4.5–11.5)

## 2020-03-14 PROCEDURE — 2580000003 HC RX 258: Performed by: INTERNAL MEDICINE

## 2020-03-14 PROCEDURE — 6360000002 HC RX W HCPCS: Performed by: INTERNAL MEDICINE

## 2020-03-14 PROCEDURE — 85027 COMPLETE CBC AUTOMATED: CPT

## 2020-03-14 PROCEDURE — 80048 BASIC METABOLIC PNL TOTAL CA: CPT

## 2020-03-14 PROCEDURE — 94640 AIRWAY INHALATION TREATMENT: CPT

## 2020-03-14 PROCEDURE — 83735 ASSAY OF MAGNESIUM: CPT

## 2020-03-14 PROCEDURE — 90935 HEMODIALYSIS ONE EVALUATION: CPT

## 2020-03-14 PROCEDURE — 84100 ASSAY OF PHOSPHORUS: CPT

## 2020-03-14 PROCEDURE — 2500000003 HC RX 250 WO HCPCS: Performed by: INTERNAL MEDICINE

## 2020-03-14 PROCEDURE — 6370000000 HC RX 637 (ALT 250 FOR IP): Performed by: INTERNAL MEDICINE

## 2020-03-14 PROCEDURE — 2700000000 HC OXYGEN THERAPY PER DAY

## 2020-03-14 PROCEDURE — 1200000000 HC SEMI PRIVATE

## 2020-03-14 RX ORDER — POTASSIUM CHLORIDE 7.45 MG/ML
10 INJECTION INTRAVENOUS
Status: COMPLETED | OUTPATIENT
Start: 2020-03-14 | End: 2020-03-14

## 2020-03-14 RX ADMIN — ALBUTEROL SULFATE 2.5 MG: 2.5 SOLUTION RESPIRATORY (INHALATION) at 09:03

## 2020-03-14 RX ADMIN — HEPARIN SODIUM 5000 UNITS: 5000 INJECTION, SOLUTION INTRAVENOUS; SUBCUTANEOUS at 13:57

## 2020-03-14 RX ADMIN — PIPERACILLIN AND TAZOBACTAM 3.38 G: 3; .375 INJECTION, POWDER, FOR SOLUTION INTRAVENOUS at 01:10

## 2020-03-14 RX ADMIN — ALBUTEROL SULFATE 2.5 MG: 2.5 SOLUTION RESPIRATORY (INHALATION) at 16:31

## 2020-03-14 RX ADMIN — SODIUM CHLORIDE, PRESERVATIVE FREE 10 ML: 5 INJECTION INTRAVENOUS at 00:28

## 2020-03-14 RX ADMIN — Medication 10 ML: at 07:50

## 2020-03-14 RX ADMIN — HYDROCORTISONE SODIUM SUCCINATE 20 MG: 100 INJECTION, POWDER, FOR SOLUTION INTRAMUSCULAR; INTRAVENOUS at 06:11

## 2020-03-14 RX ADMIN — HEPARIN SODIUM 5000 UNITS: 5000 INJECTION, SOLUTION INTRAVENOUS; SUBCUTANEOUS at 06:11

## 2020-03-14 RX ADMIN — CHOLECALCIFEROL TAB 25 MCG (1000 UNIT) 1000 UNITS: 25 TAB at 13:56

## 2020-03-14 RX ADMIN — MULTIPLE VITAMINS W/ MINERALS TAB 1 TABLET: TAB at 07:48

## 2020-03-14 RX ADMIN — POTASSIUM CHLORIDE 10 MEQ: 7.46 INJECTION, SOLUTION INTRAVENOUS at 10:43

## 2020-03-14 RX ADMIN — FLUCONAZOLE 200 MG: 200 INJECTION, SOLUTION INTRAVENOUS at 00:28

## 2020-03-14 RX ADMIN — HYDROCORTISONE SODIUM SUCCINATE 20 MG: 100 INJECTION, POWDER, FOR SOLUTION INTRAMUSCULAR; INTRAVENOUS at 19:34

## 2020-03-14 RX ADMIN — OYSTER SHELL CALCIUM WITH VITAMIN D 1 TABLET: 500; 200 TABLET, FILM COATED ORAL at 07:48

## 2020-03-14 RX ADMIN — ANTICOAGULANT SODIUM CITRATE SOLUTION 0.16 G: 4 SOLUTION INTRAVENOUS at 13:54

## 2020-03-14 RX ADMIN — PIPERACILLIN AND TAZOBACTAM 3.38 G: 3; .375 INJECTION, POWDER, FOR SOLUTION INTRAVENOUS at 13:57

## 2020-03-14 RX ADMIN — Medication 10 ML: at 22:08

## 2020-03-14 RX ADMIN — SODIUM CHLORIDE: 9 INJECTION, SOLUTION INTRAVENOUS at 18:00

## 2020-03-14 RX ADMIN — Medication 10 ML: at 22:07

## 2020-03-14 RX ADMIN — HEPARIN SODIUM 5000 UNITS: 5000 INJECTION, SOLUTION INTRAVENOUS; SUBCUTANEOUS at 22:07

## 2020-03-14 RX ADMIN — ALBUTEROL SULFATE 2.5 MG: 2.5 SOLUTION RESPIRATORY (INHALATION) at 04:40

## 2020-03-14 RX ADMIN — POTASSIUM CHLORIDE 10 MEQ: 7.46 INJECTION, SOLUTION INTRAVENOUS at 09:42

## 2020-03-14 RX ADMIN — FAMOTIDINE 20 MG: 10 INJECTION, SOLUTION INTRAVENOUS at 07:49

## 2020-03-14 RX ADMIN — SODIUM CHLORIDE: 9 INJECTION, SOLUTION INTRAVENOUS at 04:44

## 2020-03-14 RX ADMIN — CALCIUM GLUCONATE 1 G: 98 INJECTION, SOLUTION INTRAVENOUS at 10:17

## 2020-03-14 RX ADMIN — ALBUTEROL SULFATE 2.5 MG: 2.5 SOLUTION RESPIRATORY (INHALATION) at 13:03

## 2020-03-14 ASSESSMENT — PAIN SCALES - GENERAL
PAINLEVEL_OUTOF10: 0

## 2020-03-14 NOTE — PLAN OF CARE
and normal physiological function of skin and  mucous membranes.   Outcome: Not Met This Shift  Note: Redness buttocks: Q2h turning, preventative mepilex     Problem: Fluid Volume - Imbalance:  Goal: Absence of imbalanced fluid volume signs and symptoms  Description: Absence of imbalanced fluid volume signs and symptoms  Outcome: Not Met This Shift  Note: HD running     Problem: Restraint Use - Nonviolent/Non-Self-Destructive Behavior:  Goal: Absence of restraint indications  Description: Absence of restraint indications  3/14/2020 0954 by Maude Seip, RN  Outcome: Not Met This Shift  Note: Pulling at lines and tubes    3/14/2020 0503 by Charity Mcleod RN  Outcome: Not Met This Shift  Note: Ana Dond and attempts to pull at NG when unrestrained     Problem: Sensory Perception - Impaired:  Goal: Able to interrupt nonreality-based thinking  Description: Able to interrupt nonreality-based thinking  Outcome: Not Met This Shift

## 2020-03-14 NOTE — PROGRESS NOTES
Nephrology Note  Alison Delgado MD          Patient seen and examined. Chart reviewed. Patient has been extubated. He is more awake and alert. He follows commands rather intermittently. Urine output remains poor although hematuria has cleared up. He is tolerating tube feedings. No family member is present at bedside. In no acute distress. Vital SignsBP 106/60   Pulse 98   Temp 98.8 °F (37.1 °C) (Oral)   Resp 27   Ht 5' 11\" (1.803 m)   Wt 239 lb 9.6 oz (108.7 kg)   SpO2 91%   BMI 33.42 kg/m²   24HR INTAKE/OUTPUT:    Intake/Output Summary (Last 24 hours) at 3/14/2020 0856  Last data filed at 3/14/2020 0644  Gross per 24 hour   Intake 1100 ml   Output 2015 ml   Net -915 ml         Physical Exam    Neck: No JVD  Lungs: Breath sounds are decreased at the bases with few scattered end expiratory rhonchi  Heart: Regular rate and rhythm. No S3 gallop. No  murmrur. Abdomen: Soft non distended, non tender and normal bowel sounds. Extremeties: +1 edema of the right lower extremity with erythema below the knee.  Trace edema on the left        ROS:  RESPIRATORY:   positive for shortness of breath  CARDIOVASCULAR:  negative  Luz Terell has cleared up.   NEUROLOGICAL: Awake but unable to follow commands    Current Facility-Administered Medications   Medication Dose Route Frequency Provider Last Rate Last Dose    calcium gluconate 1 g in dextrose 5 % 100 mL IVPB  1 g Intravenous Once Jose Ritter MD        potassium chloride 10 mEq/100 mL IVPB (Peripheral Line)  10 mEq Intravenous Q1H Santiago Neal Hodges MD        heparin (porcine) injection 5,000 Units  5,000 Units Subcutaneous 3 times per day Bogdan Sorenson MD   5,000 Units at 03/14/20 0611    fluconazole (DIFLUCAN) in 0.9 % sodium chloride IVPB 200 mg  200 mg Intravenous Q24H Dennise Jung  mL/hr at 03/14/20 0028 200 mg at 03/14/20 0028    hydrocortisone sodium succinate PF (SOLU-CORTEF) injection 20 mg  20 mg Intravenous BID Isacc Mccoy MD   20 mg at 03/14/20 3668    anticoagulant sodium citrate 4 GM/100ML solution 0.16 g  4 mL Intracatheter PRN Santiago Sunita Wooten MD   0.16 g at 03/12/20 1302    famotidine (PEPCID) injection 20 mg  20 mg Intravenous Daily Maxwell Roberts MD   20 mg at 03/14/20 0749    piperacillin-tazobactam (ZOSYN) 3.375 g in dextrose 5 % 50 mL IVPB extended infusion (mini-bag)  3.375 g Intravenous Q12H Maxwell Roberts MD   Stopped at 03/14/20 0440    And    0.9 % sodium chloride infusion   Intravenous Q12H Maxwell Roberts MD   Stopped at 03/14/20 0644    [Held by provider] rivaroxaban (XARELTO) tablet 15 mg  15 mg Oral Dinner Maxwell Roberts MD        sodium chloride flush 0.9 % injection 10 mL  10 mL Intravenous 2 times per day Maxwell Roberts MD   10 mL at 03/14/20 0750    sodium chloride flush 0.9 % injection 10 mL  10 mL Intravenous PRN Maxwell Roberts MD   10 mL at 03/14/20 0028    sodium chloride flush 0.9 % injection 10 mL  10 mL Intravenous 2 times per day Maxwell Roberts MD   10 mL at 03/14/20 0750    sodium chloride flush 0.9 % injection 10 mL  10 mL Intravenous PRN Maxwell Roberts MD        acetaminophen (TYLENOL) tablet 650 mg  650 mg Oral Q6H PRN Maxwell Roberts MD   650 mg at 03/08/20 1023    Or    acetaminophen (TYLENOL) suppository 650 mg  650 mg Rectal Q6H PRN Maxwell Roberts MD   650 mg at 03/09/20 0010    polyethylene glycol (GLYCOLAX) packet 17 g  17 g Oral Daily PRN Maxwell Roberts MD        promethazine (PHENERGAN) tablet 12.5 mg  12.5 mg Oral Q6H PRN Maxwell Roberts MD        Or    ondansetron (ZOFRAN) injection 4 mg  4 mg Intravenous Q6H PRN Maxwell Roberts MD        albuterol (PROVENTIL) nebulizer solution 2.5 mg  2.5 mg Nebulization Q4H WA Maxwell Roberts MD   2.5 mg at 03/14/20 0440    abiraterone acetate (ZYTIGA) 250 MG tablet TABS 1,000 mg  1,000 mg Oral Nightly Maxwell Roberts MD   Stopped at 03/09/20 2100    acetaminophen (TYLENOL) tablet 325 mg  325 mg Oral Q6H

## 2020-03-14 NOTE — PROGRESS NOTES
Progress Note    Subject:Seen patient this evening  in the ICU now extubated undergoing dialysis, off sedation was his eyes wide open patient is mumbling continuously sometimes repetitiously      ROS: could not be obtained because of aphasia and prior CVA     heparin (porcine)  5,000 Units Subcutaneous 3 times per day    fluconazole  200 mg Intravenous Q24H    hydrocortisone sodium succinate PF  20 mg Intravenous BID    famotidine (PEPCID) injection  20 mg Intravenous Daily    piperacillin-tazobactam  3.375 g Intravenous Q12H    [Held by provider] rivaroxaban  15 mg Oral Dinner    sodium chloride flush  10 mL Intravenous 2 times per day    sodium chloride flush  10 mL Intravenous 2 times per day    albuterol  2.5 mg Nebulization Q4H WA    abiraterone acetate  1,000 mg Oral Nightly    [Held by provider] baclofen  5 mg Oral TID WC    calcium-vitamin D  1 tablet Oral Daily    influenza A&B vaccine  0.5 mL Intramuscular Prior to discharge    metoprolol succinate  100 mg Oral Dinner    therapeutic multivitamin-minerals  1 tablet Oral QAM    tamsulosin  0.4 mg Oral Dinner    Vitamin D  1,000 Units Oral Lunch     anticoagulant sodium citrate, 4 mL, PRN  sodium chloride flush, 10 mL, PRN  sodium chloride flush, 10 mL, PRN  acetaminophen, 650 mg, Q6H PRN    Or  acetaminophen, 650 mg, Q6H PRN  polyethylene glycol, 17 g, Daily PRN  promethazine, 12.5 mg, Q6H PRN    Or  ondansetron, 4 mg, Q6H PRN  acetaminophen, 325 mg, Q6H PRN         Objective:         Intake/Output Summary (Last 24 hours) at 3/14/2020 1815  Last data filed at 3/14/2020 1400  Gross per 24 hour   Intake 1308 ml   Output 2120 ml   Net -812 ml       /64   Pulse 105   Temp 98.4 °F (36.9 °C) (Oral)   Resp 21   Ht 5' 11\" (1.803 m)   Wt 239 lb 9.6 oz (108.7 kg)   SpO2 95%   BMI 33.42 kg/m²     Constitutional/General: wide awake chronically ill-appearing individual  Head: Normocephalic and atraumatic  Mouth: Oropharynx clear, handling secretions, no trismus  Neck: Supple, full ROM, no meningeal signs  Pulmonary: coarse rhonchi bilaterally  Cardiovascular:  Regular rate.  Irregularly irregular rhythm. No murmurs  Chest: no chest wall tenderness  Abdomen: Soft.  Non tender. Non distended.  +BS.  No rebound, guarding, or rigidity. No pulsatile masses appreciated. Musculoskeletal: The patient does have paresis of the right upper and lower extremity, the bilateral upper and lower extremities are skeletally intact, all compartments are soft.  There is erythema and swelling present of the right lower extremity  Skin: warm and dry.  Erythema present of right lower extremity  Neurologic: The patient is awake alert now extubated,unable to verbalize  Psych: Unable to be evaluated secondary to the acuity of the patient's condition.       Recent Labs     03/12/20 0527 03/13/20 0522 03/14/20  0602    140 143   K 3.2* 3.1* 3.0*   CL 99 99 102   CO2 23 23 27   BUN 36* 32* 40*   CREATININE 5.6* 5.3* 5.9*   GLUCOSE 98 104* 137*   CALCIUM 7.2* 7.1* 7.2*       Recent Labs     03/12/20 0527 03/13/20 0522 03/14/20  0602   WBC 8.5 11.2 11.4   RBC 3.53* 3.43* 3.31*   HGB 10.4* 10.1* 9.8*   HCT 31.7* 31.3* 30.1*   MCV 89.8 91.3 90.9   MCH 29.5 29.4 29.6   MCHC 32.8 32.3 32.6   RDW 15.1* 15.3* 15.2*    131 140   MPV 8.8 9.0 9.2           Radiology: Xr Chest Standard (2 Vw)    Result Date: 3/6/2020  LOCATION: 200 EXAM: XR CHEST (2 VW) COMPARISON: None HISTORY: Shortness of breath TECHNIQUE: PA and lateral  views of the chest were obtained. FINDINGS:  SUPPORT DEVICES: None. LUNGS: Patchy lower lobe opacities are seen. The lungs are otherwise clear. PLEURA: No pneumothorax visualized. LUNG VOLUMES: Satisfactory inspirator effort. MEDIASTINAL STRUCTURES: No lymphadenopathy. Normal aortic contour. HEART SIZE: Normal. UPPER ABDOMEN: Unremarkable. BONES AND SOFT TISSUES: No fracture or soft tissue abnormality.      Lower lobe opacities are possibly infectious. Ct Head Wo Contrast    Result Date: 3/5/2020  LOCATION: 200 EXAM: CT HEAD WO CONTRAST COMPARISON: None HISTORY: Confusion TECHNIQUE: Noncontrast helical CT images were performed of the head. Coronal and sagittal reconstructions also obtained. Automated dose control was used for this exam. CONTRAST: No intravenous contrast administered. FINDINGS: Large remote left MCA territory infarct is identified. A moderate amount of patchy periventricular and subcortical white matter hypodensity noted, likely chronic microvascular ischemic related. There is no evidence of intracranial hemorrhage or mass. No extra-axial fluid is seen. The paranasal sinuses are clear. The globes and orbits are normal.  No abnormalities of the calvarium are identified. Large remote left MCA territory infarct. No acute hemorrhage or mass. Xr Chest Portable    Result Date: 3/5/2020  LOCATION: 200 EXAM: XR CHEST PORTABLE COMPARISON: None HISTORY: Shortness of breath TECHNIQUE: Single frontal view of the chest was obtained. FINDINGS:  SUPPORT DEVICES: None. LUNGS: Patchy airspace disease at the left lung base noted. PLEURA: No pneumothorax visualized. LUNG VOLUMES: Satisfactory inspirator effort. MEDIASTINAL STRUCTURES: No lymphadenopathy. Normal aortic contour. HEART SIZE: Enlarged. BONES AND SOFT TISSUES: No fracture or soft tissue abnormality. 1. Left lower lobe airspace disease is likely infectious. Cta Chest W Contrast    Result Date: 3/5/2020  LOCATION:200 EXAM: CTA CHEST W CONTRAST COMPARISON: None HISTORY:  Shortness of breath TECHNIQUE: Axial CT images are obtained of the chest.  Coronal and sagittal reconstructions were obtained with 3-D maximum intensity projection (MIP) reconstructed images. These were performed on a separate workstation with concurrent supervision for detailed evaluation of the pulmonary arteries.  CONTRAST: 80 mL Isovue-370 intravenous contrast. FINDINGS: SUPPORT DEVICES: None

## 2020-03-14 NOTE — PROGRESS NOTES
CRITICAL CARE PROGRESS NOTE    The patient's case was discussed in multidisciplinary rounds including critical care specialist, nursing, RT and pharmacy. His evaluation is as follows:     70year old man with PMH as described below, admitted to ICU for management of encephalopathy, acute hypoxemic respiratory failure due to pneumonia, FRANCES requiring HD. Extubated yesterday, today on 2L NC  Received HD today     Past Medical History:   Diagnosis Date    A-fib ([facility]     Arthritis     CVA (cerebral vascular accident) ([facility] 11/15/2015    left MCA    Hyperlipemia     Prostate cancer (HCC)     mets to spine, currently on chemo     Recent Labs     03/14/20  0602      K 3.0*      CO2 27   BUN 40*   CREATININE 5.9*   GLUCOSE 137*   CALCIUM 7.2*     Recent Labs     03/14/20  0602   WBC 11.4   RBC 3.31*   HGB 9.8*   HCT 30.1*   MCV 90.9   MCH 29.6   MCHC 32.6   RDW 15.2*      MPV 9.2       No results for input(s): BC in the last 72 hours. No results for input(s): Prosper Em in the last 72 hours.     24 HR INTAKE/OUTPUT:      Intake/Output Summary (Last 24 hours) at 3/14/2020 1825  Last data filed at 3/14/2020 1400  Gross per 24 hour   Intake 1308 ml   Output 2120 ml   Net -812 ml     MEDICATIONS:   heparin (porcine)  5,000 Units Subcutaneous 3 times per day    fluconazole  200 mg Intravenous Q24H    hydrocortisone sodium succinate PF  20 mg Intravenous BID    famotidine (PEPCID) injection  20 mg Intravenous Daily    piperacillin-tazobactam  3.375 g Intravenous Q12H    [Held by provider] rivaroxaban  15 mg Oral Dinner    sodium chloride flush  10 mL Intravenous 2 times per day    sodium chloride flush  10 mL Intravenous 2 times per day    albuterol  2.5 mg Nebulization Q4H WA    abiraterone acetate  1,000 mg Oral Nightly    [Held by provider] baclofen  5 mg Oral TID WC    calcium-vitamin D  1 tablet Oral Daily    influenza A&B vaccine  0.5 mL Intramuscular Prior to discharge    metoprolol succinate  100 mg Oral Dinner    therapeutic multivitamin-minerals  1 tablet Oral QAM    tamsulosin  0.4 mg Oral Dinner    Vitamin D  1,000 Units Oral Lunch      sodium chloride Stopped (03/14/20 0644)     anticoagulant sodium citrate, sodium chloride flush, sodium chloride flush, acetaminophen **OR** acetaminophen, polyethylene glycol, promethazine **OR** ondansetron, acetaminophen    OBJECTIVE:  Vitals:    03/14/20 1700   BP: 123/64   Pulse: 105   Resp: 21   Temp:    SpO2: 95%     FiO2 : 40 %  O2 Flow Rate (L/min): 2 L/min  O2 Device: None (Room air)        LABS:  WBC   Date Value Ref Range Status   03/14/2020 11.4 4.5 - 11.5 E9/L Final   03/13/2020 11.2 4.5 - 11.5 E9/L Final   03/12/2020 8.5 4.5 - 11.5 E9/L Final     Hemoglobin   Date Value Ref Range Status   03/14/2020 9.8 (L) 12.5 - 16.5 g/dL Final   03/13/2020 10.1 (L) 12.5 - 16.5 g/dL Final   03/12/2020 10.4 (L) 12.5 - 16.5 g/dL Final     Hematocrit   Date Value Ref Range Status   03/14/2020 30.1 (L) 37.0 - 54.0 % Final   03/13/2020 31.3 (L) 37.0 - 54.0 % Final   03/12/2020 31.7 (L) 37.0 - 54.0 % Final     MCV   Date Value Ref Range Status   03/14/2020 90.9 80.0 - 99.9 fL Final   03/13/2020 91.3 80.0 - 99.9 fL Final   03/12/2020 89.8 80.0 - 99.9 fL Final     Platelets   Date Value Ref Range Status   03/14/2020 140 130 - 450 E9/L Final   03/13/2020 131 130 - 450 E9/L Final   03/12/2020 151 130 - 450 E9/L Final     Sodium   Date Value Ref Range Status   03/14/2020 143 132 - 146 mmol/L Final   03/13/2020 140 132 - 146 mmol/L Final   03/12/2020 141 132 - 146 mmol/L Final     Potassium   Date Value Ref Range Status   03/14/2020 3.0 (L) 3.5 - 5.0 mmol/L Final   03/13/2020 3.1 (L) 3.5 - 5.0 mmol/L Final   03/12/2020 3.2 (L) 3.5 - 5.0 mmol/L Final     Potassium reflex Magnesium   Date Value Ref Range Status   03/05/2020 3.7 3.5 - 5.0 mmol/L Final     Chloride   Date Value Ref Range Status   03/14/2020 102 98 - 107 mmol/L Final   03/13/2020 99 98 - 107 Ref Range Status   03/14/2020 9 >=60 mL/min/1.73 Final     Comment:     Chronic Kidney Disease: less than 60 ml/min/1.73 sq.m. Kidney Failure: less than 15 ml/min/1.73 sq.m. Results valid for patients 18 years and older. 03/13/2020 11 >=60 mL/min/1.73 Final     Comment:     Chronic Kidney Disease: less than 60 ml/min/1.73 sq.m. Kidney Failure: less than 15 ml/min/1.73 sq.m. Results valid for patients 18 years and older. 03/12/2020 10 >=60 mL/min/1.73 Final     Comment:     Chronic Kidney Disease: less than 60 ml/min/1.73 sq.m. Kidney Failure: less than 15 ml/min/1.73 sq.m. Results valid for patients 18 years and older. GFR    Date Value Ref Range Status   03/14/2020 11  Final   03/13/2020 13  Final   03/12/2020 12  Final     Magnesium   Date Value Ref Range Status   03/14/2020 2.1 1.6 - 2.6 mg/dL Final   03/13/2020 2.1 1.6 - 2.6 mg/dL Final   03/12/2020 2.1 1.6 - 2.6 mg/dL Final     Phosphorus   Date Value Ref Range Status   03/14/2020 2.6 2.5 - 4.5 mg/dL Final   03/13/2020 3.5 2.5 - 4.5 mg/dL Final   03/12/2020 3.3 2.5 - 4.5 mg/dL Final     No results for input(s): PH, PO2, PCO2, HCO3, BE, O2SAT in the last 72 hours. RADIOLOGY:  MRI BRAIN WO CONTRAST   Final Result   1. No evidence of acute on chronic ischemia. 2. Large remote left MCA territory infarct. US DUP LOWER EXTREMITIES BILATERAL VENOUS   Final Result   No evidence of bilateral lower extremity deep venous   thrombus from common femoral vein to proximal calf. XR ABDOMEN FOR NG/OG/NE TUBE PLACEMENT   Final Result   NG tube tip within the stomach      XR CHEST PORTABLE   Final Result   Lines and tubes as described. IR FLUORO GUIDED CVA DEVICE PLMT/REPLACE/REMOVAL   Final Result   1. Successful placement of a temporary dialysis catheter via the right   internal jugular vein.        IR ULTRASOUND GUIDANCE VASCULAR ACCESS   Final Result   Ultrasound guidance was provided during placement of a   PICC line. XR Urogram W WO Kub W WO Tomogram   Final Result   Intraoperative images as above. FL MODIFIED BARIUM SWALLOW W VIDEO   Final Result   1. Aspiration with thin liquids. 2. Please see speech therapist's report for a detailed description of   findings. XR CHEST STANDARD (2 VW)   Final Result   Lower lobe opacities are possibly infectious. US ABDOMEN LIMITED   Final Result   No acute finding. CTA CHEST W CONTRAST   Final Result   1. No evidence of pulmonary embolism. 2. Lower lobe linear subsegmental atelectasis. 3. Blastic lesions in the spine and involving several ribs suspicious   for metastatic disease, possibly prostate. Recommend correlation with   PSA. US DUP LOWER EXTREMITY RIGHT DALI   Final Result   No evidence of right lower extremity deep venous thrombus   from common femoral vein to proximal calf. CT Head WO Contrast   Final Result      Large remote left MCA territory infarct. No acute hemorrhage or mass. XR CHEST PORTABLE   Final Result   1. Left lower lobe airspace disease is likely infectious. PROBLEM LIST:  Active Problems:    HCAP (healthcare-associated pneumonia)    Respiratory arrest ([address]    Acute respiratory failure with hypoxia ([address]    Encephalopathy acute    Palliative care encounter    Goals of care, counseling/discussion    DNR (do not resuscitate) discussion    Bone metastases ([address]  Resolved Problems:    * No resolved hospital problems.  *  /64   Pulse 105   Temp 98.4 °F (36.9 °C) (Oral)   Resp 21   Ht 5' 11\" (1.803 m)   Wt 239 lb 9.6 oz (108.7 kg)   SpO2 95%   BMI 33.42 kg/m²   General: Awake Lethargic  Somnolent  Comatose  Oriented to place, time and person  HEENT: No head lesions, PERRL, EOMI, mouth without lesions, no nasal lesions, no cervical adenopathy palpated  Respiratory: Lungs with equal breath sounds bilaterally, no adventitious sounds auscultated, no accessory muscle use  CV: Regular rate, no murmurs, JVD, no leg edema  Abdomen: Soft, non tender, + bowel sounds, no lesions  Skin: Hydrated, adequate turgor, no rash, capillary refill <2 seconds  Extremities: Muscular strength 4/4 in 4 limbs, moves 4 limbs spontaneously, distal pulses present  Neurology: Awake and alert, follows commands, moves 4 limbs on command and spontaneously, equal sensation, no dysmetria, neck is supple, no meningitic signs present.       A/P:  1) Acute hypoxemic respiratory failure due to aspiration pneumonia  --Continue zosyn    2) Acute kidney injury secondary to Sepsis induced   --On HD  --Avoid nephrotoxins and dose medications according GFR    3) Encephalopathy  --Reorientation     4) Metastatic prostate cancer  --Continue Zytiga    DVT prophylaxis -> On rivaroxaban    Transfer to Medicine floor    Ansley Carter MD  Pulmonary and Critical Care Medicine

## 2020-03-15 LAB
ANCA IFA: ABNORMAL
ANION GAP SERPL CALCULATED.3IONS-SCNC: 14 MMOL/L (ref 7–16)
BUN BLDV-MCNC: 44 MG/DL (ref 8–23)
CALCIUM IONIZED: 1.11 MMOL/L (ref 1.15–1.33)
CALCIUM SERPL-MCNC: 7.6 MG/DL (ref 8.6–10.2)
CHLORIDE BLD-SCNC: 102 MMOL/L (ref 98–107)
CO2: 27 MMOL/L (ref 22–29)
COMPLEMENT TOTAL (CH50): 183 CAE UNITS (ref 60–144)
CREAT SERPL-MCNC: 5.3 MG/DL (ref 0.7–1.2)
GFR AFRICAN AMERICAN: 13
GFR NON-AFRICAN AMERICAN: 11 ML/MIN/1.73
GLUCOSE BLD-MCNC: 123 MG/DL (ref 74–99)
HCT VFR BLD CALC: 29 % (ref 37–54)
HEMOGLOBIN: 9.2 G/DL (ref 12.5–16.5)
MAGNESIUM: 2.1 MG/DL (ref 1.6–2.6)
MCH RBC QN AUTO: 29 PG (ref 26–35)
MCHC RBC AUTO-ENTMCNC: 31.7 % (ref 32–34.5)
MCV RBC AUTO: 91.5 FL (ref 80–99.9)
PDW BLD-RTO: 15.4 FL (ref 11.5–15)
PHOSPHORUS: 2.7 MG/DL (ref 2.5–4.5)
PLATELET # BLD: 138 E9/L (ref 130–450)
PMV BLD AUTO: 9.4 FL (ref 7–12)
POTASSIUM SERPL-SCNC: 3.5 MMOL/L (ref 3.5–5)
RBC # BLD: 3.17 E12/L (ref 3.8–5.8)
SODIUM BLD-SCNC: 143 MMOL/L (ref 132–146)
WBC # BLD: 8.8 E9/L (ref 4.5–11.5)

## 2020-03-15 PROCEDURE — 6360000002 HC RX W HCPCS: Performed by: INTERNAL MEDICINE

## 2020-03-15 PROCEDURE — 82330 ASSAY OF CALCIUM: CPT

## 2020-03-15 PROCEDURE — 85027 COMPLETE CBC AUTOMATED: CPT

## 2020-03-15 PROCEDURE — 6370000000 HC RX 637 (ALT 250 FOR IP): Performed by: INTERNAL MEDICINE

## 2020-03-15 PROCEDURE — 94640 AIRWAY INHALATION TREATMENT: CPT

## 2020-03-15 PROCEDURE — 2500000003 HC RX 250 WO HCPCS: Performed by: INTERNAL MEDICINE

## 2020-03-15 PROCEDURE — 84100 ASSAY OF PHOSPHORUS: CPT

## 2020-03-15 PROCEDURE — 1200000000 HC SEMI PRIVATE

## 2020-03-15 PROCEDURE — 83735 ASSAY OF MAGNESIUM: CPT

## 2020-03-15 PROCEDURE — 2580000003 HC RX 258: Performed by: INTERNAL MEDICINE

## 2020-03-15 PROCEDURE — 80048 BASIC METABOLIC PNL TOTAL CA: CPT

## 2020-03-15 RX ADMIN — METOPROLOL SUCCINATE 100 MG: 100 TABLET, EXTENDED RELEASE ORAL at 16:49

## 2020-03-15 RX ADMIN — SODIUM CHLORIDE: 9 INJECTION, SOLUTION INTRAVENOUS at 16:51

## 2020-03-15 RX ADMIN — SODIUM CHLORIDE: 9 INJECTION, SOLUTION INTRAVENOUS at 04:42

## 2020-03-15 RX ADMIN — OYSTER SHELL CALCIUM WITH VITAMIN D 1 TABLET: 500; 200 TABLET, FILM COATED ORAL at 08:56

## 2020-03-15 RX ADMIN — Medication 10 ML: at 21:30

## 2020-03-15 RX ADMIN — Medication 10 ML: at 08:56

## 2020-03-15 RX ADMIN — FLUCONAZOLE 200 MG: 200 INJECTION, SOLUTION INTRAVENOUS at 00:44

## 2020-03-15 RX ADMIN — HYDROCORTISONE SODIUM SUCCINATE 20 MG: 100 INJECTION, POWDER, FOR SOLUTION INTRAMUSCULAR; INTRAVENOUS at 05:31

## 2020-03-15 RX ADMIN — HEPARIN SODIUM 5000 UNITS: 5000 INJECTION, SOLUTION INTRAVENOUS; SUBCUTANEOUS at 21:30

## 2020-03-15 RX ADMIN — HYDROCORTISONE SODIUM SUCCINATE 20 MG: 100 INJECTION, POWDER, FOR SOLUTION INTRAMUSCULAR; INTRAVENOUS at 16:49

## 2020-03-15 RX ADMIN — ALBUTEROL SULFATE 2.5 MG: 2.5 SOLUTION RESPIRATORY (INHALATION) at 08:44

## 2020-03-15 RX ADMIN — PIPERACILLIN AND TAZOBACTAM 3.38 G: 3; .375 INJECTION, POWDER, FOR SOLUTION INTRAVENOUS at 12:58

## 2020-03-15 RX ADMIN — FAMOTIDINE 20 MG: 10 INJECTION, SOLUTION INTRAVENOUS at 08:56

## 2020-03-15 RX ADMIN — HEPARIN SODIUM 5000 UNITS: 5000 INJECTION, SOLUTION INTRAVENOUS; SUBCUTANEOUS at 16:49

## 2020-03-15 RX ADMIN — TAMSULOSIN HYDROCHLORIDE 0.4 MG: 0.4 CAPSULE ORAL at 16:49

## 2020-03-15 RX ADMIN — PIPERACILLIN AND TAZOBACTAM 3.38 G: 3; .375 INJECTION, POWDER, FOR SOLUTION INTRAVENOUS at 00:45

## 2020-03-15 RX ADMIN — ALBUTEROL SULFATE 2.5 MG: 2.5 SOLUTION RESPIRATORY (INHALATION) at 14:56

## 2020-03-15 RX ADMIN — ALBUTEROL SULFATE 2.5 MG: 2.5 SOLUTION RESPIRATORY (INHALATION) at 05:29

## 2020-03-15 RX ADMIN — HEPARIN SODIUM 5000 UNITS: 5000 INJECTION, SOLUTION INTRAVENOUS; SUBCUTANEOUS at 05:31

## 2020-03-15 RX ADMIN — ALBUTEROL SULFATE 2.5 MG: 2.5 SOLUTION RESPIRATORY (INHALATION) at 19:24

## 2020-03-15 RX ADMIN — CHOLECALCIFEROL TAB 25 MCG (1000 UNIT) 1000 UNITS: 25 TAB at 12:58

## 2020-03-15 RX ADMIN — Medication 10 ML: at 08:57

## 2020-03-15 RX ADMIN — MULTIPLE VITAMINS W/ MINERALS TAB 1 TABLET: TAB at 08:57

## 2020-03-15 ASSESSMENT — PAIN SCALES - GENERAL
PAINLEVEL_OUTOF10: 0

## 2020-03-15 NOTE — PLAN OF CARE
Problem: Restraint Use - Nonviolent/Non-Self-Destructive Behavior:  Goal: Absence of restraint-related injury  Description: Absence of restraint-related injury  Outcome: Met This Shift     Problem: Restraint Use - Nonviolent/Non-Self-Destructive Behavior:  Goal: Absence of restraint indications  Description: Absence of restraint indications  Outcome: Not Met This Shift

## 2020-03-15 NOTE — PROGRESS NOTES
Nephrology Note  Cyndee Burkett MD          Patient seen and examined. Chart reviewed. No family member is present at bedside. Patient is more awake and alert. He is still unable to follow commands. Tolerating tube feedings. In no acute distress.     Vital SignsBP (!) 109/54   Pulse 101   Temp 98.3 °F (36.8 °C) (Oral)   Resp 22   Ht 5' 11\" (1.803 m)   Wt 239 lb 9.6 oz (108.7 kg)   SpO2 95%   BMI 33.42 kg/m²   24HR INTAKE/OUTPUT:    Intake/Output Summary (Last 24 hours) at 3/15/2020 1250  Last data filed at 3/15/2020 0533  Gross per 24 hour   Intake 1536 ml   Output 1800 ml   Net -264 ml         Physical Exam         Current Facility-Administered Medications   Medication Dose Route Frequency Provider Last Rate Last Dose    heparin (porcine) injection 5,000 Units  5,000 Units Subcutaneous 3 times per day Micki Benton MD   5,000 Units at 03/15/20 0531    fluconazole (DIFLUCAN) in 0.9 % sodium chloride IVPB 200 mg  200 mg Intravenous Q24H Joe Raymond  mL/hr at 03/15/20 0044 200 mg at 03/15/20 0044    hydrocortisone sodium succinate PF (SOLU-CORTEF) injection 20 mg  20 mg Intravenous BID Micki Benton MD   20 mg at 03/15/20 0531    anticoagulant sodium citrate 4 GM/100ML solution 0.16 g  4 mL Intracatheter PRN Santiago Skyler Blandon MD   0.16 g at 03/14/20 1354    famotidine (PEPCID) injection 20 mg  20 mg Intravenous Daily Joe Raymond MD   20 mg at 03/15/20 0856    piperacillin-tazobactam (ZOSYN) 3.375 g in dextrose 5 % 50 mL IVPB extended infusion (mini-bag)  3.375 g Intravenous Q12H Joe Raymond MD   Stopped at 03/15/20 0442    And    0.9 % sodium chloride infusion   Intravenous Q12H Joe Raymond MD   Stopped at 03/15/20 4898    [Held by provider] rivaroxaban (XARELTO) tablet 15 mg  15 mg Oral Dinner Joe Raymond MD        sodium chloride flush 0.9 % injection 10 mL  10 mL Intravenous 2 times per day Joe Raymond MD   10 mL at 03/15/20 0856    sodium chloride flush 0.9 % injection 10 mL  10 mL Intravenous PRN Tiana Cleaning MD   10 mL at 03/14/20 0028    sodium chloride flush 0.9 % injection 10 mL  10 mL Intravenous 2 times per day Tiana Cleaning MD   10 mL at 03/15/20 0857    sodium chloride flush 0.9 % injection 10 mL  10 mL Intravenous PRN Tiana Cleaning MD        acetaminophen (TYLENOL) tablet 650 mg  650 mg Oral Q6H PRN Tiana Cleaning MD   650 mg at 03/08/20 1023    Or    acetaminophen (TYLENOL) suppository 650 mg  650 mg Rectal Q6H PRN Tiana Cleaning MD   650 mg at 03/09/20 0010    polyethylene glycol (GLYCOLAX) packet 17 g  17 g Oral Daily PRN Tiana Cleaning MD        promethazine (PHENERGAN) tablet 12.5 mg  12.5 mg Oral Q6H PRN Tiana Cleaning MD        Or    ondansetron (ZOFRAN) injection 4 mg  4 mg Intravenous Q6H PRN Tiana Cleaning MD        albuterol (PROVENTIL) nebulizer solution 2.5 mg  2.5 mg Nebulization Q4H WA Tiana Cleaning MD   2.5 mg at 03/15/20 0844    abiraterone acetate (ZYTIGA) 250 MG tablet TABS 1,000 mg  1,000 mg Oral Nightly Tiana Cleaning MD   Stopped at 03/09/20 2100    acetaminophen (TYLENOL) tablet 325 mg  325 mg Oral Q6H PRN Tiana Cleaning MD        [Held by provider] baclofen (LIORESAL) tablet 5 mg  5 mg Oral TID  Tiana Cleaning MD   5 mg at 03/08/20 1716    calcium-vitamin D 500-200 MG-UNIT per tablet 1 tablet  1 tablet Oral Daily Tiana Cleaning MD   1 tablet at 03/15/20 0856    influenza A&B vaccine (FLUAD) injection 0.5 mL  0.5 mL Intramuscular Prior to discharge Tiana Cleaning MD        metoprolol succinate (TOPROL XL) extended release tablet 100 mg  100 mg Oral Dinner Tiana Cleaning MD   100 mg at 03/08/20 1715    therapeutic multivitamin-minerals 1 tablet  1 tablet Oral QAM Tiana Cleaning MD   1 tablet at 03/15/20 0857    tamsulosin (FLOMAX) capsule 0.4 mg  0.4 mg Oral Dinner Tiana Cleaning MD   0.4 mg at 03/08/20 1716    vitamin D (CHOLECALCIFEROL) tablet 1,000 Units  1,000 Units Oral Lunch Tiana Cleaning MD   1,000 Units at 03/15/20  0536   WBC 11.2 11.4 8.8   HGB 10.1* 9.8* 9.2*    140 138        No results found for: IRON, TIBC, FERRITIN    Lab Results   Component Value Date    CALCIUM 7.6 (L) 03/15/2020    CALCIUM 7.2 (L) 03/14/2020    CALCIUM 7.1 (L) 03/13/2020    CAION 1.11 (L) 03/15/2020    CAION 1.22 11/22/2015    CAION 1.26 11/18/2015    PHOS 2.7 03/15/2020    PHOS 2.6 03/14/2020    PHOS 3.5 03/13/2020    MG 2.1 03/15/2020    MG 2.1 03/14/2020    MG 2.1 03/13/2020       BMP:   Recent Labs     03/13/20  0522 03/14/20  0602 03/15/20  0536    143 143   K 3.1* 3.0* 3.5   CL 99 102 102   CO2 23 27 27   BUN 32* 40* 44*   CREATININE 5.3* 5.9* 5.3*   GLUCOSE 104* 137* 123*             Assessment: / Plan:    1.   Acute kidney injury with no recovery of renal function.  Acute kidney injury of undetermined etiology.  Patient probably with multiple factors playing a role including renal hypoperfusion in the setting of hypotension and concurrent use of ACE inhibitor's as well as use of multiple antibiotics and urinary obstruction including vancomycin toxicity as well as acute interstitial nephritis. Serum serology is negative.  Urine output is marginally improved. We will hold dialysis treatment today. Sarthak Dove will assess in the morning regarding ongoing dialytic support. Most likely he will need at least a few more hemodialysis treatments.           2.   Volume overload/fluid retention. .  Improved. Attempt ultrafilteration as allowed by hemodynamics.     3.   Hypertension with chronic kidney disease stage I to stage IV.  Blood pressures are at target of of less than 130/80 mm Hg     4.   Hypokalemia.  Improved. Dialysate adjusted.     5.    Hypocalcemia.  Improved. 6.    Metabolic acidosis.  Improved. .     7.   Anemia.  Hemoglobin stable. Will follow.        Josephine Rodriguez MD  Nephrology  Electronically signed by Josephine Rodriguez MD on 3/15/2020 at 12:49 PM

## 2020-03-15 NOTE — PROGRESS NOTES
full ROM, no meningeal signs  Pulmonary: coarse rhonchi bilaterally  Cardiovascular:  Regular rate.  Irregularly irregular rhythm. No murmurs  Chest: no chest wall tenderness  Abdomen: Soft.  Non tender. Non distended.  +BS.  No rebound, guarding, or rigidity. No pulsatile masses appreciated. Musculoskeletal: The patient does have paresis of the right upper and lower extremity, the bilateral upper and lower extremities are skeletally intact, all compartments are soft.  There is erythema and swelling present of the right lower extremity  Skin: warm and dry.  Erythema present of right lower extremity  Neurologic: The patient is awake alert now extubated,unable to verbalize  Psych: Unable to be evaluated secondary to the acuity of the patient's condition.       Recent Labs     03/13/20  0522 03/14/20  0602 03/15/20  0536    143 143   K 3.1* 3.0* 3.5   CL 99 102 102   CO2 23 27 27   BUN 32* 40* 44*   CREATININE 5.3* 5.9* 5.3*   GLUCOSE 104* 137* 123*   CALCIUM 7.1* 7.2* 7.6*       Recent Labs     03/13/20  0522 03/14/20  0602 03/15/20  0536   WBC 11.2 11.4 8.8   RBC 3.43* 3.31* 3.17*   HGB 10.1* 9.8* 9.2*   HCT 31.3* 30.1* 29.0*   MCV 91.3 90.9 91.5   MCH 29.4 29.6 29.0   MCHC 32.3 32.6 31.7*   RDW 15.3* 15.2* 15.4*    140 138   MPV 9.0 9.2 9.4           Radiology: Xr Chest Standard (2 Vw)    Result Date: 3/6/2020  LOCATION: 200 EXAM: XR CHEST (2 VW) COMPARISON: None HISTORY: Shortness of breath TECHNIQUE: PA and lateral  views of the chest were obtained. FINDINGS:  SUPPORT DEVICES: None. LUNGS: Patchy lower lobe opacities are seen. The lungs are otherwise clear. PLEURA: No pneumothorax visualized. LUNG VOLUMES: Satisfactory inspirator effort. MEDIASTINAL STRUCTURES: No lymphadenopathy. Normal aortic contour. HEART SIZE: Normal. UPPER ABDOMEN: Unremarkable. BONES AND SOFT TISSUES: No fracture or soft tissue abnormality. Lower lobe opacities are possibly infectious.     Ct Head Wo Contrast    Result Date: 3/5/2020  LOCATION: 200 EXAM: CT HEAD WO CONTRAST COMPARISON: None HISTORY: Confusion TECHNIQUE: Noncontrast helical CT images were performed of the head. Coronal and sagittal reconstructions also obtained. Automated dose control was used for this exam. CONTRAST: No intravenous contrast administered. FINDINGS: Large remote left MCA territory infarct is identified. A moderate amount of patchy periventricular and subcortical white matter hypodensity noted, likely chronic microvascular ischemic related. There is no evidence of intracranial hemorrhage or mass. No extra-axial fluid is seen. The paranasal sinuses are clear. The globes and orbits are normal.  No abnormalities of the calvarium are identified. Large remote left MCA territory infarct. No acute hemorrhage or mass. Xr Chest Portable    Result Date: 3/5/2020  LOCATION: 200 EXAM: XR CHEST PORTABLE COMPARISON: None HISTORY: Shortness of breath TECHNIQUE: Single frontal view of the chest was obtained. FINDINGS:  SUPPORT DEVICES: None. LUNGS: Patchy airspace disease at the left lung base noted. PLEURA: No pneumothorax visualized. LUNG VOLUMES: Satisfactory inspirator effort. MEDIASTINAL STRUCTURES: No lymphadenopathy. Normal aortic contour. HEART SIZE: Enlarged. BONES AND SOFT TISSUES: No fracture or soft tissue abnormality. 1. Left lower lobe airspace disease is likely infectious. Cta Chest W Contrast    Result Date: 3/5/2020  LOCATION:200 EXAM: CTA CHEST W CONTRAST COMPARISON: None HISTORY:  Shortness of breath TECHNIQUE: Axial CT images are obtained of the chest.  Coronal and sagittal reconstructions were obtained with 3-D maximum intensity projection (MIP) reconstructed images. These were performed on a separate workstation with concurrent supervision for detailed evaluation of the pulmonary arteries.  CONTRAST: 80 mL Isovue-370 intravenous contrast. FINDINGS: SUPPORT DEVICES: None LUNGS/CENTRAL AIRWAYS/PLEURA: Linear parenchymal bands seen at the lung bases compatible with subsegmental atelectasis. The lungs are markedly limited from motion artifact. PULMONARY ARTERIES: Evaluation is limited for pulmonary embolus. No filling defects to the proximal lobar levels. HEART/PERICARDIUM/GREAT VESSELS: Cardiac size is normal.  There is no pericardial effusion. The great vessels of the chest are normal in caliber. LYMPH NODES: No thoracic adenopathy by size criteria. NECK BASE/CHEST WALL/DIAPHRAGM: No soft tissue lesions or diaphragmatic abnormality. UPPER ABDOMEN: Multiple large cysts in the liver noted. . OSSEOUS STRUCTURES: Several blastic lesions are identified involving the vertebral bodies the most conspicuous involving the T8 vertebral body involving portions of the right rib and spinous process. 1. No evidence of pulmonary embolism. 2. Lower lobe linear subsegmental atelectasis. 3. Blastic lesions in the spine and involving several ribs suspicious for metastatic disease, possibly prostate. Recommend correlation with PSA. Us Abdomen Limited    Result Date: 3/6/2020  Reading location:  100 INDICATION: Liver liver lesions on chest CT FINDINGS: Sonographic evaluation right upper quadrant. Normal caliber gallbladder without intrinsic filling defect or mural thickening or pericholecystic fluid. Normal caliber bile ducts. Simple hepatic cysts measuring 54 mm and 67 mm corresponds to the CT findings. Visualized portions of pancreas unremarkable. Right renal collecting system normal caliber. No acute finding. Us Dup Lower Extremity Right Dali    Result Date: 3/5/2020  Location:200 Exam: US DUP LOWER EXTREMITY RIGHT DALI Comparison: None Indications: Discomfort Technique: Using real-time, color, and spectral Doppler waveform analysis, ultrasound imaging of the right lower extremity deep venous system was performed, from the common femoral vein to the proximal calf.  Findings: The visualized veins of the deep venous system are grossly patent

## 2020-03-16 LAB
ANION GAP SERPL CALCULATED.3IONS-SCNC: 15 MMOL/L (ref 7–16)
BUN BLDV-MCNC: 71 MG/DL (ref 8–23)
CALCIUM IONIZED: 1.07 MMOL/L (ref 1.15–1.33)
CALCIUM SERPL-MCNC: 7.7 MG/DL (ref 8.6–10.2)
CHLORIDE BLD-SCNC: 103 MMOL/L (ref 98–107)
CO2: 27 MMOL/L (ref 22–29)
CREAT SERPL-MCNC: 6.8 MG/DL (ref 0.7–1.2)
GFR AFRICAN AMERICAN: 10
GFR NON-AFRICAN AMERICAN: 8 ML/MIN/1.73
GLUCOSE BLD-MCNC: 107 MG/DL (ref 74–99)
HCT VFR BLD CALC: 29.8 % (ref 37–54)
HEMOGLOBIN: 9.7 G/DL (ref 12.5–16.5)
MAGNESIUM: 2.2 MG/DL (ref 1.6–2.6)
MCH RBC QN AUTO: 29.8 PG (ref 26–35)
MCHC RBC AUTO-ENTMCNC: 32.6 % (ref 32–34.5)
MCV RBC AUTO: 91.4 FL (ref 80–99.9)
MYELOPEROXIDASE AB: 4 AU/ML (ref 0–19)
PDW BLD-RTO: 15.4 FL (ref 11.5–15)
PHOSPHORUS: 3.4 MG/DL (ref 2.5–4.5)
PLATELET # BLD: 162 E9/L (ref 130–450)
PMV BLD AUTO: 8.5 FL (ref 7–12)
POTASSIUM SERPL-SCNC: 3.7 MMOL/L (ref 3.5–5)
RBC # BLD: 3.26 E12/L (ref 3.8–5.8)
SERINE PROTEASE 3 AB: 1 AU/ML (ref 0–19)
SODIUM BLD-SCNC: 145 MMOL/L (ref 132–146)
WBC # BLD: 8.3 E9/L (ref 4.5–11.5)

## 2020-03-16 PROCEDURE — 6360000002 HC RX W HCPCS: Performed by: INTERNAL MEDICINE

## 2020-03-16 PROCEDURE — 84100 ASSAY OF PHOSPHORUS: CPT

## 2020-03-16 PROCEDURE — 36415 COLL VENOUS BLD VENIPUNCTURE: CPT

## 2020-03-16 PROCEDURE — 1200000000 HC SEMI PRIVATE

## 2020-03-16 PROCEDURE — 6370000000 HC RX 637 (ALT 250 FOR IP): Performed by: INTERNAL MEDICINE

## 2020-03-16 PROCEDURE — 80048 BASIC METABOLIC PNL TOTAL CA: CPT

## 2020-03-16 PROCEDURE — 83735 ASSAY OF MAGNESIUM: CPT

## 2020-03-16 PROCEDURE — 2580000003 HC RX 258: Performed by: INTERNAL MEDICINE

## 2020-03-16 PROCEDURE — 90935 HEMODIALYSIS ONE EVALUATION: CPT

## 2020-03-16 PROCEDURE — 82330 ASSAY OF CALCIUM: CPT

## 2020-03-16 PROCEDURE — 94640 AIRWAY INHALATION TREATMENT: CPT

## 2020-03-16 PROCEDURE — 92610 EVALUATE SWALLOWING FUNCTION: CPT | Performed by: SPEECH-LANGUAGE PATHOLOGIST

## 2020-03-16 PROCEDURE — 85027 COMPLETE CBC AUTOMATED: CPT

## 2020-03-16 PROCEDURE — 2500000003 HC RX 250 WO HCPCS: Performed by: INTERNAL MEDICINE

## 2020-03-16 RX ADMIN — ALBUTEROL SULFATE 2.5 MG: 2.5 SOLUTION RESPIRATORY (INHALATION) at 06:59

## 2020-03-16 RX ADMIN — Medication 10 ML: at 09:00

## 2020-03-16 RX ADMIN — SODIUM CHLORIDE: 9 INJECTION, SOLUTION INTRAVENOUS at 16:30

## 2020-03-16 RX ADMIN — Medication 10 ML: at 21:12

## 2020-03-16 RX ADMIN — MULTIPLE VITAMINS W/ MINERALS TAB 1 TABLET: TAB at 08:26

## 2020-03-16 RX ADMIN — PIPERACILLIN AND TAZOBACTAM 3.38 G: 3; .375 INJECTION, POWDER, FOR SOLUTION INTRAVENOUS at 00:06

## 2020-03-16 RX ADMIN — APIXABAN 2.5 MG: 5 TABLET, FILM COATED ORAL at 21:10

## 2020-03-16 RX ADMIN — ALBUTEROL SULFATE 2.5 MG: 2.5 SOLUTION RESPIRATORY (INHALATION) at 13:26

## 2020-03-16 RX ADMIN — TAMSULOSIN HYDROCHLORIDE 0.4 MG: 0.4 CAPSULE ORAL at 18:01

## 2020-03-16 RX ADMIN — OYSTER SHELL CALCIUM WITH VITAMIN D 1 TABLET: 500; 200 TABLET, FILM COATED ORAL at 08:26

## 2020-03-16 RX ADMIN — FLUCONAZOLE 200 MG: 200 INJECTION, SOLUTION INTRAVENOUS at 00:06

## 2020-03-16 RX ADMIN — Medication 10 ML: at 21:10

## 2020-03-16 RX ADMIN — CHOLECALCIFEROL TAB 25 MCG (1000 UNIT) 1000 UNITS: 25 TAB at 12:00

## 2020-03-16 RX ADMIN — ALBUTEROL SULFATE 2.5 MG: 2.5 SOLUTION RESPIRATORY (INHALATION) at 19:17

## 2020-03-16 RX ADMIN — Medication 10 ML: at 08:26

## 2020-03-16 RX ADMIN — FAMOTIDINE 20 MG: 10 INJECTION, SOLUTION INTRAVENOUS at 08:26

## 2020-03-16 RX ADMIN — FLUCONAZOLE 200 MG: 200 INJECTION, SOLUTION INTRAVENOUS at 23:34

## 2020-03-16 RX ADMIN — PIPERACILLIN AND TAZOBACTAM 3.38 G: 3; .375 INJECTION, POWDER, FOR SOLUTION INTRAVENOUS at 11:30

## 2020-03-16 RX ADMIN — SODIUM CHLORIDE: 9 INJECTION, SOLUTION INTRAVENOUS at 04:36

## 2020-03-16 RX ADMIN — ALBUTEROL SULFATE 2.5 MG: 2.5 SOLUTION RESPIRATORY (INHALATION) at 09:47

## 2020-03-16 RX ADMIN — HEPARIN SODIUM 5000 UNITS: 5000 INJECTION, SOLUTION INTRAVENOUS; SUBCUTANEOUS at 06:22

## 2020-03-16 RX ADMIN — HYDROCORTISONE SODIUM SUCCINATE 20 MG: 100 INJECTION, POWDER, FOR SOLUTION INTRAMUSCULAR; INTRAVENOUS at 18:00

## 2020-03-16 RX ADMIN — HYDROCORTISONE SODIUM SUCCINATE 20 MG: 100 INJECTION, POWDER, FOR SOLUTION INTRAMUSCULAR; INTRAVENOUS at 06:22

## 2020-03-16 ASSESSMENT — PAIN SCALES - GENERAL
PAINLEVEL_OUTOF10: 0
PAINLEVEL_OUTOF10: 0

## 2020-03-16 NOTE — PROGRESS NOTES
Nephrology Note    Patient seen and examined. Chart reviewed. No family member is present at bedside. Pt is lethargic arouses and mumbles, follows basic directions  Seen on IHD this AM    Vital SignsBP 129/84   Pulse 101   Temp 99.8 °F (37.7 °C) (Axillary)   Resp 18   Ht 5' 11\" (1.803 m)   Wt 239 lb 6.7 oz (108.6 kg)   SpO2 96%   BMI 33.39 kg/m²   24HR INTAKE/OUTPUT:      Intake/Output Summary (Last 24 hours) at 3/16/2020 1552  Last data filed at 3/16/2020 1400  Gross per 24 hour   Intake 1426 ml   Output 600 ml   Net 826 ml         Physical Exam     Neck: No JVD  Lungs: Breath sounds are decreased at the bases with few scattered end expiratory rhonchi  Heart: Irreg Irreg tachy No S3 gallop. No  murmrur. Abdomen: Soft non distended, non tender and normal bowel sounds.   Extremeties: +1 edema of the right lower extremity with erythema below the knee.  Trace edema on the left    Current Facility-Administered Medications   Medication Dose Route Frequency Provider Last Rate Last Dose    heparin (porcine) injection 5,000 Units  5,000 Units Subcutaneous 3 times per day Early Kocher, MD   5,000 Units at 03/16/20 0622    fluconazole (DIFLUCAN) in 0.9 % sodium chloride IVPB 200 mg  200 mg Intravenous Q24H Kevin Estes  mL/hr at 03/16/20 0006 200 mg at 03/16/20 0006    hydrocortisone sodium succinate PF (SOLU-CORTEF) injection 20 mg  20 mg Intravenous BID Early Kocher, MD   20 mg at 03/16/20 2974    anticoagulant sodium citrate 4 GM/100ML solution 0.16 g  4 mL Intracatheter PRN Teresa Bañuelos MD   0.16 g at 03/14/20 1354    famotidine (PEPCID) injection 20 mg  20 mg Intravenous Daily Kevin Estes MD   20 mg at 03/16/20 0826    piperacillin-tazobactam (ZOSYN) 3.375 g in dextrose 5 % 50 mL IVPB extended infusion (mini-bag)  3.375 g Intravenous Q12H Kevin Estes MD 12.5 mL/hr at 03/16/20 1130 3.375 g at 03/16/20 1130    And    0.9 % sodium chloride infusion   Intravenous Q12H with chronic kidney disease stage I to stage IV.  Blood pressures are at target of of less than 130/80 mm Hg     4.   Hypokalemia.  Improved. Dialysate adjusted.     5.    Hypocalcemia. Ionized Ca++ low-correct with IHD    6.    Metabolic acidosis.  Improved. .     7.   Anemia.  Hemoglobin stable. Will follow.      Electronically signed by Robb Blanco MD on 3/16/2020 at 3:52 PM

## 2020-03-16 NOTE — PROGRESS NOTES
Progress Note    Subject:Seen patient this am  in the ICU now extubated undergoing dialysis, off sedation has his eyes wide open able to say short sentences      ROS: could not be obtained because of aphasia and prior CVA     heparin (porcine)  5,000 Units Subcutaneous 3 times per day    fluconazole  200 mg Intravenous Q24H    hydrocortisone sodium succinate PF  20 mg Intravenous BID    famotidine (PEPCID) injection  20 mg Intravenous Daily    piperacillin-tazobactam  3.375 g Intravenous Q12H    [Held by provider] rivaroxaban  15 mg Oral Dinner    sodium chloride flush  10 mL Intravenous 2 times per day    sodium chloride flush  10 mL Intravenous 2 times per day    albuterol  2.5 mg Nebulization Q4H WA    abiraterone acetate  1,000 mg Oral Nightly    [Held by provider] baclofen  5 mg Oral TID WC    calcium-vitamin D  1 tablet Oral Daily    influenza A&B vaccine  0.5 mL Intramuscular Prior to discharge    metoprolol succinate  100 mg Oral Dinner    therapeutic multivitamin-minerals  1 tablet Oral QAM    tamsulosin  0.4 mg Oral Dinner    Vitamin D  1,000 Units Oral Lunch     anticoagulant sodium citrate, 4 mL, PRN  sodium chloride flush, 10 mL, PRN  sodium chloride flush, 10 mL, PRN  acetaminophen, 650 mg, Q6H PRN    Or  acetaminophen, 650 mg, Q6H PRN  polyethylene glycol, 17 g, Daily PRN  promethazine, 12.5 mg, Q6H PRN    Or  ondansetron, 4 mg, Q6H PRN  acetaminophen, 325 mg, Q6H PRN         Objective:         Intake/Output Summary (Last 24 hours) at 3/16/2020 1215  Last data filed at 3/16/2020 1130  Gross per 24 hour   Intake 1476 ml   Output 475 ml   Net 1001 ml       BP (!) 145/59   Pulse 96   Temp 99.8 °F (37.7 °C) (Axillary)   Resp 18   Ht 5' 11\" (1.803 m)   Wt 239 lb 9.6 oz (108.7 kg)   SpO2 96%   BMI 33.42 kg/m²     Constitutional/General: wide awake chronically ill-appearing individual  Head: Normocephalic and atraumatic  Mouth: Oropharynx clear, handling secretions, no trismus  Neck: Supple, full ROM, no meningeal signs  Pulmonary: coarse rhonchi bilaterally  Cardiovascular:  Regular rate.  Irregularly irregular rhythm. No murmurs  Chest: no chest wall tenderness  Abdomen: Soft.  Non tender. Non distended.  +BS.  No rebound, guarding, or rigidity. No pulsatile masses appreciated. Musculoskeletal: The patient does have paresis of the right upper and lower extremity, the bilateral upper and lower extremities are skeletally intact, all compartments are soft.  There is erythema and swelling present of the right lower extremity  Skin: warm and dry.  Erythema present of right lower extremity  Neurologic: The patient is awake alert now extubated,unable to verbalize  Psych: Unable to be evaluated secondary to the acuity of the patient's condition.       Recent Labs     03/14/20  0602 03/15/20  0536 03/16/20  0618    143 145   K 3.0* 3.5 3.7    102 103   CO2 27 27 27   BUN 40* 44* 71*   CREATININE 5.9* 5.3* 6.8*   GLUCOSE 137* 123* 107*   CALCIUM 7.2* 7.6* 7.7*       Recent Labs     03/14/20  0602 03/15/20  0536 03/16/20  0618   WBC 11.4 8.8 8.3   RBC 3.31* 3.17* 3.26*   HGB 9.8* 9.2* 9.7*   HCT 30.1* 29.0* 29.8*   MCV 90.9 91.5 91.4   MCH 29.6 29.0 29.8   MCHC 32.6 31.7* 32.6   RDW 15.2* 15.4* 15.4*    138 162   MPV 9.2 9.4 8.5           Radiology: Xr Chest Standard (2 Vw)    Result Date: 3/6/2020  LOCATION: 200 EXAM: XR CHEST (2 VW) COMPARISON: None HISTORY: Shortness of breath TECHNIQUE: PA and lateral  views of the chest were obtained. FINDINGS:  SUPPORT DEVICES: None. LUNGS: Patchy lower lobe opacities are seen. The lungs are otherwise clear. PLEURA: No pneumothorax visualized. LUNG VOLUMES: Satisfactory inspirator effort. MEDIASTINAL STRUCTURES: No lymphadenopathy. Normal aortic contour. HEART SIZE: Normal. UPPER ABDOMEN: Unremarkable. BONES AND SOFT TISSUES: No fracture or soft tissue abnormality. Lower lobe opacities are possibly infectious.     Ct Head Wo Contrast    Result Date: 3/5/2020  LOCATION: 200 EXAM: CT HEAD WO CONTRAST COMPARISON: None HISTORY: Confusion TECHNIQUE: Noncontrast helical CT images were performed of the head. Coronal and sagittal reconstructions also obtained. Automated dose control was used for this exam. CONTRAST: No intravenous contrast administered. FINDINGS: Large remote left MCA territory infarct is identified. A moderate amount of patchy periventricular and subcortical white matter hypodensity noted, likely chronic microvascular ischemic related. There is no evidence of intracranial hemorrhage or mass. No extra-axial fluid is seen. The paranasal sinuses are clear. The globes and orbits are normal.  No abnormalities of the calvarium are identified. Large remote left MCA territory infarct. No acute hemorrhage or mass. Xr Chest Portable    Result Date: 3/5/2020  LOCATION: 200 EXAM: XR CHEST PORTABLE COMPARISON: None HISTORY: Shortness of breath TECHNIQUE: Single frontal view of the chest was obtained. FINDINGS:  SUPPORT DEVICES: None. LUNGS: Patchy airspace disease at the left lung base noted. PLEURA: No pneumothorax visualized. LUNG VOLUMES: Satisfactory inspirator effort. MEDIASTINAL STRUCTURES: No lymphadenopathy. Normal aortic contour. HEART SIZE: Enlarged. BONES AND SOFT TISSUES: No fracture or soft tissue abnormality. 1. Left lower lobe airspace disease is likely infectious. Cta Chest W Contrast    Result Date: 3/5/2020  LOCATION:200 EXAM: CTA CHEST W CONTRAST COMPARISON: None HISTORY:  Shortness of breath TECHNIQUE: Axial CT images are obtained of the chest.  Coronal and sagittal reconstructions were obtained with 3-D maximum intensity projection (MIP) reconstructed images. These were performed on a separate workstation with concurrent supervision for detailed evaluation of the pulmonary arteries.  CONTRAST: 80 mL Isovue-370 intravenous contrast. FINDINGS: SUPPORT DEVICES: None LUNGS/CENTRAL AIRWAYS/PLEURA: Linear parenchymal bands seen at the lung bases compatible with subsegmental atelectasis. The lungs are markedly limited from motion artifact. PULMONARY ARTERIES: Evaluation is limited for pulmonary embolus. No filling defects to the proximal lobar levels. HEART/PERICARDIUM/GREAT VESSELS: Cardiac size is normal.  There is no pericardial effusion. The great vessels of the chest are normal in caliber. LYMPH NODES: No thoracic adenopathy by size criteria. NECK BASE/CHEST WALL/DIAPHRAGM: No soft tissue lesions or diaphragmatic abnormality. UPPER ABDOMEN: Multiple large cysts in the liver noted. . OSSEOUS STRUCTURES: Several blastic lesions are identified involving the vertebral bodies the most conspicuous involving the T8 vertebral body involving portions of the right rib and spinous process. 1. No evidence of pulmonary embolism. 2. Lower lobe linear subsegmental atelectasis. 3. Blastic lesions in the spine and involving several ribs suspicious for metastatic disease, possibly prostate. Recommend correlation with PSA. Us Abdomen Limited    Result Date: 3/6/2020  Reading location:  100 INDICATION: Liver liver lesions on chest CT FINDINGS: Sonographic evaluation right upper quadrant. Normal caliber gallbladder without intrinsic filling defect or mural thickening or pericholecystic fluid. Normal caliber bile ducts. Simple hepatic cysts measuring 54 mm and 67 mm corresponds to the CT findings. Visualized portions of pancreas unremarkable. Right renal collecting system normal caliber. No acute finding. Us Dup Lower Extremity Right Dali    Result Date: 3/5/2020  Location:200 Exam: US DUP LOWER EXTREMITY RIGHT DALI Comparison: None Indications: Discomfort Technique: Using real-time, color, and spectral Doppler waveform analysis, ultrasound imaging of the right lower extremity deep venous system was performed, from the common femoral vein to the proximal calf.  Findings: The visualized veins of the deep venous system are grossly patent without an echogenic focus within them. These veins compress fully and demonstrate flow bilaterally. No significant reflux is seen within the greater saphenous veins. No evidence of right lower extremity deep venous thrombus from common femoral vein to proximal calf. Assessment:    Patient Active Problem List   Diagnosis Code    Cerebrovascular accident (CVA) due to occlusion of left carotid artery ([address] G43.347    Dysphagia due to recent stroke I69.391    Paroxysmal atrial fibrillation (HCC) I48.0    Prostate cancer ([address] C61    Cerebral contusion ([address] U64.454F    Subarachnoid hemorrhage ([address] I60.9    HCAP (healthcare-associated pneumonia) J18.9    Respiratory arrest ([address] R09.2    Acute respiratory failure with hypoxia ([address] J96.01    Encephalopathy acute G93.40    Palliative care encounter Z51.5    Goals of care, counseling/discussion Z71.89    DNR (do not resuscitate) discussion Z71.89    Bone metastases ([address] C79.51     Altered mental status  Metabolic encephalopathy  Acute renal failure  Cellulitis  Hypokalemia  History of CVA  Metastatic prostate cancer  Hyperlipidemia  Elevated white blood cell count  Pnuemonia  Transamanitis  Elevated troponin  A speech evaluation for dysphagia    Plan:  1. Continue hemodialysis  2. Replenish electrolytes  3. Continue antibiotics  4. Patient remains DNR CCA  5. Monitor labs  6. Supportive care  7.  Patient can be transferred out of the unit       Code Status: full code  DVT prophylaxis: patient off [address]

## 2020-03-16 NOTE — CARE COORDINATION
3/16/2020  Call from [facility] (h)- they are unable to accept this patient due to chemotherapy and new hemodialysis and pts inability to transfer to dialysis via wc- he would currently require a stretcher and that is costly about $300/ visit. Call to wife -she is working and no answer at this time- to see how often pt is getting the hormone replacement therapy (zytiga) at the [facility]. Family's second choice is [facility]- who stated they can accept BC/BS however they will have to review this particular case. No referral made to [facility] until specifics on the replacement therapy are identified. If this hormone replacement therapy is considered chemo- may not be able to get SNF to accept due to high costs. Pt is new HD, requested SS to assist and review this case. Previously had LTACH reviewing to see about placement for this loc. pts BC/BS is a local ppo/hmo pts wife is currently working so this bc/bs policy does recognize ltach loc. Call to Dr. Tamanna Jain to review discharge direction of care- Attempted to reach him via perfect serve ([phone]) and ([phone]) which said unable to connect and attempted via  direct connection and line was busy. CM/SS to follow. Carlos with [facility] waiting to hear if  and family agree with [facility], [facility] loc. Pt is a telemetry transfer since Friday.   Electronically signed by Vonita Siemens, RN-BC on 3/16/2020 at 2:10 PM

## 2020-03-16 NOTE — FLOWSHEET NOTE
03/16/20 1559   Vital Signs   /62   Temp 98.8 °F (37.1 °C)   Pulse 97   Weight 237 lb 3.4 oz (107.6 kg)   Weight Method Bed scale   Percent Weight Change -0.92   Post-Hemodialysis Assessment   Post-Treatment Procedures Blood returned;Catheter capped, clamped and heparinized x 2 ports   Machine Disinfection Process Acid/Vinegar Clean;Heat Disinfect; Exterior Machine Disinfection   Rinseback Volume (ml) 300 ml   Total Liters Processed (l/min) 47.2 l/min   Dialyzer Clearance Heavily streaked   Duration of Treatment (minutes) 240 minutes   Hemodialysis Intake (ml) 300 ml   Hemodialysis Output (ml) 1300 ml   NET Removed (ml) 1000 ml   Tolerated Treatment Good   Patient Response to Treatment cath remains with suboptimal flows removed 1000 mls with tx. pt had increased hr and decreased bp with tx thus did not remove 2L fg order cath closed with heparin no acute changes this tx    Bilateral Breath Sounds Diminished   Edema Generalized +1

## 2020-03-16 NOTE — PROGRESS NOTES
3/16/2020  [id number]  IC09/IC09-01      Patient unavailable for physical therapy treatment due to having dialysis. Will attempt treatment at a later time/date.         Rehan Mckeon PTA  LIC# [id number]

## 2020-03-16 NOTE — CARE COORDINATION
3/16/2020 spoke to wife in depth and many clinical questions answered according to the chart review. Pt's wife Carlin encouraged to call the doctors on the case for any specifics that this CM may not have been able to answer. Carlin would like to see if LTACH- [facility]- wife choiced - will be able to get the insurance approval for acceptance here at the [facility] location. She feels since pt is not at or even near his baseline and has the new hd, with the other medical problems he has- that LTACH would be a safe option for him. Need orders from pcp if you agree. LTACH Precious to start precert with bc/bs commercial coverage. PRECERT needed with orders.  koreyk  Electronically signed by Vilma Larios RN-BC on 3/16/2020 at 3:39 PM

## 2020-03-16 NOTE — PLAN OF CARE
Problem: Falls - Risk of:  Goal: Will remain free from falls  Description: Will remain free from falls  3/16/2020 0639 by Yuli Dunne RN  Outcome: Met This Shift  Goal: Absence of physical injury  Description: Absence of physical injury  3/16/2020 0639 by Yuli Dunne RN  Outcome: Met This Shift     Problem: Risk for Impaired Skin Integrity  Goal: Tissue integrity - skin and mucous membranes  Description: Structural intactness and normal physiological function of skin and  mucous membranes. 3/16/2020 0639 by Yuli Dunne RN  Outcome: Met This Shift     Problem: Gas Exchange - Impaired:  Goal: Levels of oxygenation will improve  Description: Levels of oxygenation will improve  3/16/2020 0639 by Yuli Dunne RN  Outcome: Met This Shift     Problem: Restraint Use - Nonviolent/Non-Self-Destructive Behavior:  Goal: Absence of restraint-related injury  Description: Absence of restraint-related injury  3/16/2020 0639 by Yuli Dunne RN  Outcome: Met This Shift  Note: No injuries sustained this shift     Problem: Injury - Risk of, Physical Injury:  Goal: Will remain free from falls  Description: Will remain free from falls  3/16/2020 0639 by Yuli Dunne RN  Outcome: Met This Shift  Goal: Absence of physical injury  Description: Absence of physical injury  3/16/2020 0639 by Yuli Dunne RN  Outcome: Met This Shift     Problem: Inadequate energy intake (NI-1.4)  Goal: Food and/or Nutrient Delivery  Description: Individualized approach for food/nutrient provision.   3/16/2020 1536 by Kasia Liang RD, LD  Outcome: Met This Shift     Problem: Restraint Use - Nonviolent/Non-Self-Destructive Behavior:  Goal: Absence of restraint indications  Description: Absence of restraint indications  3/16/2020 0639 by Yuli Dunne RN  Outcome: Not Met This Shift  Note: Attempts to wiggle out of restraint and reaches for NG tube during turns

## 2020-03-16 NOTE — PLAN OF CARE
Problem: Inadequate energy intake (NI-1.4)  Goal: Food and/or Nutrient Delivery  Description: Individualized approach for food/nutrient provision.   Outcome: Met This Shift

## 2020-03-16 NOTE — PROGRESS NOTES
SPEECH/LANGUAGE PATHOLOGY  CLINICAL ASSESSMENT OF SWALLOW FUNCTION    PATIENT NAME:  Vj Saeed      :  1945      TODAY'S DATE:  3/16/2020  ROOM:  [facility]/IC09-    SUMMARY OF EVALUATION     DYSPHAGIA DIAGNOSIS:  Oropharyngeal dysphagia       DIET RECOMMENDATIONS:  NPO (nothing by mouth including oral meds)  With NG tube currently      FEEDING RECOMMENDATIONS:     Assistance level:  Full assistance is needed during all oral intake      Compensatory strategies recommended: No strategies are recommended at this time    THERAPY RECOMMENDATIONS:     Dysphagia therapy is recommended 3-5 times per week for LOS or when goals are met. Ongoing assessment of PO by SLP due to inconsistent cough during eval on puree and nectar thick. Patient report:  History of stroke,  Recent MBSS 3/10 aspiration present on thin via large straw sips with a cough diet at that time was recommended to be   Dysphagia 2,  Mechanical Soft (Minced & Moist) solids with  nectar consistency (mildly thick) liquids      Diet prior to admit:    Dysphagia 2,  Mechanical Soft (Minced & Moist) solids with  nectar consistency (mildly thick) liquids    Communication/Cognition:  Impaired                 PROCEDURE     Consistencies Administered During the Evaluation   Liquids: nectar thick liquid   Solids:  pureed foods      Method of Intake:   spoon  Fed by clinician      Position:   Seated, upright    Current Respiratory Status   nasal canula                RESULTS     Oral Stage: The oral stage of swallowing was within functional limits      Pharyngeal Stage:      Latent wet cough was noted after presentation of nectar consistency liquid and pureed foods                  The Speech Language Pathologist (SLP) completed education with the patient regarding results of evaluation.    Explained that Speech Pathology intervention is warranted  at this time   Prognosis for improvements is fair +     This plan will be re-evaluated and revised in

## 2020-03-17 ENCOUNTER — APPOINTMENT (OUTPATIENT)
Dept: GENERAL RADIOLOGY | Age: 75
DRG: 208 | End: 2020-03-17
Payer: COMMERCIAL

## 2020-03-17 LAB
ANION GAP SERPL CALCULATED.3IONS-SCNC: 15 MMOL/L (ref 7–16)
BUN BLDV-MCNC: 49 MG/DL (ref 8–23)
CALCIUM IONIZED: 1.09 MMOL/L (ref 1.15–1.33)
CALCIUM SERPL-MCNC: 7.6 MG/DL (ref 8.6–10.2)
CHLORIDE BLD-SCNC: 100 MMOL/L (ref 98–107)
CO2: 27 MMOL/L (ref 22–29)
CREAT SERPL-MCNC: 4.7 MG/DL (ref 0.7–1.2)
GFR AFRICAN AMERICAN: 15
GFR NON-AFRICAN AMERICAN: 12 ML/MIN/1.73
GLUCOSE BLD-MCNC: 118 MG/DL (ref 74–99)
HCT VFR BLD CALC: 29.9 % (ref 37–54)
HEMOGLOBIN: 9.5 G/DL (ref 12.5–16.5)
MAGNESIUM: 2.1 MG/DL (ref 1.6–2.6)
MCH RBC QN AUTO: 29.5 PG (ref 26–35)
MCHC RBC AUTO-ENTMCNC: 31.8 % (ref 32–34.5)
MCV RBC AUTO: 92.9 FL (ref 80–99.9)
PDW BLD-RTO: 15.3 FL (ref 11.5–15)
PHOSPHORUS: 3.6 MG/DL (ref 2.5–4.5)
PLATELET # BLD: 175 E9/L (ref 130–450)
PMV BLD AUTO: 9.2 FL (ref 7–12)
POTASSIUM SERPL-SCNC: 3.9 MMOL/L (ref 3.5–5)
RBC # BLD: 3.22 E12/L (ref 3.8–5.8)
REASON FOR REJECTION: NORMAL
REJECTED TEST: NORMAL
SODIUM BLD-SCNC: 142 MMOL/L (ref 132–146)
WBC # BLD: 8.8 E9/L (ref 4.5–11.5)

## 2020-03-17 PROCEDURE — 6360000002 HC RX W HCPCS: Performed by: INTERNAL MEDICINE

## 2020-03-17 PROCEDURE — 1200000000 HC SEMI PRIVATE

## 2020-03-17 PROCEDURE — 80048 BASIC METABOLIC PNL TOTAL CA: CPT

## 2020-03-17 PROCEDURE — 2580000003 HC RX 258: Performed by: INTERNAL MEDICINE

## 2020-03-17 PROCEDURE — 84100 ASSAY OF PHOSPHORUS: CPT

## 2020-03-17 PROCEDURE — 97110 THERAPEUTIC EXERCISES: CPT

## 2020-03-17 PROCEDURE — 74018 RADEX ABDOMEN 1 VIEW: CPT

## 2020-03-17 PROCEDURE — 6370000000 HC RX 637 (ALT 250 FOR IP): Performed by: INTERNAL MEDICINE

## 2020-03-17 PROCEDURE — 36415 COLL VENOUS BLD VENIPUNCTURE: CPT

## 2020-03-17 PROCEDURE — 94640 AIRWAY INHALATION TREATMENT: CPT

## 2020-03-17 PROCEDURE — 2500000003 HC RX 250 WO HCPCS: Performed by: INTERNAL MEDICINE

## 2020-03-17 PROCEDURE — 83735 ASSAY OF MAGNESIUM: CPT

## 2020-03-17 PROCEDURE — 85027 COMPLETE CBC AUTOMATED: CPT

## 2020-03-17 PROCEDURE — 82330 ASSAY OF CALCIUM: CPT

## 2020-03-17 RX ADMIN — ALBUTEROL SULFATE 2.5 MG: 2.5 SOLUTION RESPIRATORY (INHALATION) at 18:15

## 2020-03-17 RX ADMIN — HYDROCORTISONE SODIUM SUCCINATE 20 MG: 100 INJECTION, POWDER, FOR SOLUTION INTRAMUSCULAR; INTRAVENOUS at 06:07

## 2020-03-17 RX ADMIN — APIXABAN 2.5 MG: 5 TABLET, FILM COATED ORAL at 21:27

## 2020-03-17 RX ADMIN — APIXABAN 2.5 MG: 5 TABLET, FILM COATED ORAL at 08:05

## 2020-03-17 RX ADMIN — ALBUTEROL SULFATE 2.5 MG: 2.5 SOLUTION RESPIRATORY (INHALATION) at 11:06

## 2020-03-17 RX ADMIN — CHOLECALCIFEROL TAB 25 MCG (1000 UNIT) 1000 UNITS: 25 TAB at 12:46

## 2020-03-17 RX ADMIN — PIPERACILLIN AND TAZOBACTAM 3.38 G: 3; .375 INJECTION, POWDER, FOR SOLUTION INTRAVENOUS at 00:42

## 2020-03-17 RX ADMIN — Medication 10 ML: at 08:05

## 2020-03-17 RX ADMIN — TAMSULOSIN HYDROCHLORIDE 0.4 MG: 0.4 CAPSULE ORAL at 17:15

## 2020-03-17 RX ADMIN — Medication 10 ML: at 21:27

## 2020-03-17 RX ADMIN — OYSTER SHELL CALCIUM WITH VITAMIN D 1 TABLET: 500; 200 TABLET, FILM COATED ORAL at 08:04

## 2020-03-17 RX ADMIN — FLUCONAZOLE 200 MG: 200 INJECTION, SOLUTION INTRAVENOUS at 23:16

## 2020-03-17 RX ADMIN — PIPERACILLIN AND TAZOBACTAM 3.38 G: 3; .375 INJECTION, POWDER, FOR SOLUTION INTRAVENOUS at 12:47

## 2020-03-17 RX ADMIN — SODIUM CHLORIDE: 9 INJECTION, SOLUTION INTRAVENOUS at 06:01

## 2020-03-17 RX ADMIN — MULTIPLE VITAMINS W/ MINERALS TAB 1 TABLET: TAB at 08:05

## 2020-03-17 RX ADMIN — ALBUTEROL SULFATE 2.5 MG: 2.5 SOLUTION RESPIRATORY (INHALATION) at 15:12

## 2020-03-17 RX ADMIN — FAMOTIDINE 20 MG: 10 INJECTION, SOLUTION INTRAVENOUS at 08:05

## 2020-03-17 RX ADMIN — ALBUTEROL SULFATE 2.5 MG: 2.5 SOLUTION RESPIRATORY (INHALATION) at 06:55

## 2020-03-17 RX ADMIN — Medication 10 ML: at 21:34

## 2020-03-17 RX ADMIN — METOPROLOL SUCCINATE 100 MG: 100 TABLET, EXTENDED RELEASE ORAL at 17:15

## 2020-03-17 RX ADMIN — SODIUM CHLORIDE: 9 INJECTION, SOLUTION INTRAVENOUS at 17:12

## 2020-03-17 RX ADMIN — HYDROCORTISONE SODIUM SUCCINATE 20 MG: 100 INJECTION, POWDER, FOR SOLUTION INTRAMUSCULAR; INTRAVENOUS at 17:15

## 2020-03-17 ASSESSMENT — PAIN SCALES - GENERAL
PAINLEVEL_OUTOF10: 0
PAINLEVEL_OUTOF10: 0

## 2020-03-17 NOTE — PROGRESS NOTES
1801    vitamin D (CHOLECALCIFEROL) tablet 1,000 Units  1,000 Units Oral Lunch Maxwell Roberts MD   1,000 Units at 03/17/20 1246       XR ABDOMEN FOR NG/OG/NE TUBE PLACEMENT   Final Result   Nasogastric tube in the stomach. MRI BRAIN WO CONTRAST   Final Result   1. No evidence of acute on chronic ischemia. 2. Large remote left MCA territory infarct. US DUP LOWER EXTREMITIES BILATERAL VENOUS   Final Result   No evidence of bilateral lower extremity deep venous   thrombus from common femoral vein to proximal calf. XR ABDOMEN FOR NG/OG/NE TUBE PLACEMENT   Final Result   NG tube tip within the stomach      XR CHEST PORTABLE   Final Result   Lines and tubes as described. IR FLUORO GUIDED CVA DEVICE PLMT/REPLACE/REMOVAL   Final Result   1. Successful placement of a temporary dialysis catheter via the right   internal jugular vein. IR ULTRASOUND GUIDANCE VASCULAR ACCESS   Final Result   Ultrasound guidance was provided during placement of a   PICC line. XR Urogram W WO Kub W WO Tomogram   Final Result   Intraoperative images as above. FL MODIFIED BARIUM SWALLOW W VIDEO   Final Result   1. Aspiration with thin liquids. 2. Please see speech therapist's report for a detailed description of   findings. XR CHEST STANDARD (2 VW)   Final Result   Lower lobe opacities are possibly infectious. US ABDOMEN LIMITED   Final Result   No acute finding. CTA CHEST W CONTRAST   Final Result   1. No evidence of pulmonary embolism. 2. Lower lobe linear subsegmental atelectasis. 3. Blastic lesions in the spine and involving several ribs suspicious   for metastatic disease, possibly prostate. Recommend correlation with   PSA. US DUP LOWER EXTREMITY RIGHT DALI   Final Result   No evidence of right lower extremity deep venous thrombus   from common femoral vein to proximal calf. CT Head WO Contrast   Final Result      Large remote left MCA territory infarct.

## 2020-03-17 NOTE — PLAN OF CARE
Problem: Falls - Risk of:  Goal: Will remain free from falls  Description: Will remain free from falls  Outcome: Met This Shift  Goal: Absence of physical injury  Description: Absence of physical injury  Outcome: Met This Shift     Problem: Airway Clearance - Ineffective:  Goal: Ability to maintain a clear airway will improve  Description: Ability to maintain a clear airway will improve  Outcome: Met This Shift     Problem: Gas Exchange - Impaired:  Goal: Levels of oxygenation will improve  Description: Levels of oxygenation will improve  Outcome: Met This Shift     Problem: Restraint Use - Nonviolent/Non-Self-Destructive Behavior:  Goal: Absence of restraint-related injury  Description: Absence of restraint-related injury  Outcome: Met This Shift     Problem: Injury - Risk of, Physical Injury:  Goal: Will remain free from falls  Description: Will remain free from falls  Outcome: Met This Shift  Goal: Absence of physical injury  Description: Absence of physical injury  Outcome: Met This Shift     Problem: Inadequate energy intake (NI-1.4)  Goal: Food and/or Nutrient Delivery  Description: Individualized approach for food/nutrient provision. 3/16/2020 1536 by Chinedu Polanco RD, LD  Outcome: Met This Shift     Problem: Risk for Impaired Skin Integrity  Goal: Tissue integrity - skin and mucous membranes  Description: Structural intactness and normal physiological function of skin and  mucous membranes.   Outcome: Ongoing     Problem: Restraint Use - Nonviolent/Non-Self-Destructive Behavior:  Goal: Absence of restraint indications  Description: Absence of restraint indications  Outcome: Not Met This Shift

## 2020-03-17 NOTE — PROGRESS NOTES
Physical Therapy Treatment Note    Room #:  IB02/XD15-91  Patient Name: Sabrina Beck  YOB: 1945  MRN: [id number]    Referring Provider: Diana Castrejon MD    Date of Service: 3/17/2020    Evaluating Physical Therap[place] [id number]     Diagnosis:   HCAP (healthcare-associated pneumonia) [J18.9]       Patient Active Problem List   Diagnosis    Cerebrovascular accident (CVA) due to occlusion of left carotid artery ([facility]    Dysphagia due to recent stroke    Paroxysmal atrial fibrillation ([address]    Prostate cancer ([address]    Cerebral contusion ([address]    Subarachnoid hemorrhage ([address]    HCAP (healthcare-associated pneumonia)    Respiratory arrest ([address]    Acute respiratory failure with hypoxia ([address]    Encephalopathy acute    Palliative care encounter    Goals of care, counseling/discussion    DNR (do not resuscitate) discussion    Bone metastases ([facility]       Tentative placement recommendation: Home Health Physical Therapy  or [facility] with Physical Therapy   Equipment recommendation: Patient has needed equipment       Prior Level of Function: Family reports patient ambulated independently with quad cane. Spouse reports patient had CVA 4 years ago that affected his R side. She reports he can only flex his R hip; which he uses to advance the R LE during gait.      Rehab Potential: good  for baseline    Past medical history:   Past Medical History:   Diagnosis Date    A-fib ([facility]     Arthritis     CVA (cerebral vascular accident) ([address] 11/15/2015    left MCA    Hyperlipemia     Prostate cancer ([facility]     mets to spine, currently on chemo     Past Surgical History:   Procedure Laterality Date    COLONOSCOPY      CYSTOSCOPY N/A 3/9/2020    CYSTOSCOPY, BILATERAL RETROGRADE PYELOGRAMS, URETHRAL DILATATION, REYEZ CATHETER INSERTION performed by Tracey Tejada MD at [address]  11/20/15    sonal       Precautions: falls risk, urinary incontinence, port in L inguinal area -- patient cannot wear Depends-type garment for this reason, unable to use R UE and LE due to CVA, aphasia -- primarily answers yes/no, some other limited speech. Right hemiparalysis. One point restraint left. Spouse claims he ambulated independently with quad cane. Today he had significant difficulty sitting at bedside. SUBJECTIVE:    Social history: Patient lives with spouse  in a ranch home  with 2 steps  to enter with [employer] in shower grab bars    Equipment owned: Shower chair, Quad cane and lift chair, grab bars    AM-PAC Basic Mobility       AM-PAC Mobility Inpatient   How much difficulty turning over in bed?: A Lot  How much difficulty sitting down on / standing up from a chair with arms?: Unable  How much difficulty moving from lying on back to sitting on side of bed?: A Lot  How much help from another person moving to and from a bed to a chair?: Total  How much help from another person needed to walk in hospital room?: Total  How much help from another person for climbing 3-5 steps with a railing?: Total  AM-PAC Inpatient Mobility Raw Score : 8  AM-PAC Inpatient T-Scale Score : 28.52  Mobility Inpatient CMS 0-100% Score: 86.62  Mobility Inpatient CMS G-Code Modifier : CM    Nursing cleared patient for PT treatment. reeval     OBJECTIVE:   Initial Evaluation  Date: 3/7/20 Reeval  3/17/2020    Short Term/ Long Term   Goals   Was pt agreeable to Eval/treatment? Yes  yes To be met in 5 days   Pain level   0/10       Bed Mobility    Rolling: Maximal assist of  2   Supine to sit: Maximal assist of  2   Sit to supine: Maximal assist of  2   Scooting: Maximal assist of  2  Rolling: Not assessed     Supine to sit: Not assessed     Sit to supine: Not assessed     Scooting: Not assessed     Rolling: Moderate assist of  2   Supine to sit: Moderate assist of  2   Sit to supine: Moderate assist of  2   Scooting:  Moderate assist of  2    Transfers Sit to stand:  Not assessed Sit to stand: Not assessed       Sit to stand:  TBD   Ambulation    not assessed  not assessed   not assessed   Stair negotiation: ascended and descended   Not assessed       TBD   ROM Within functional limits L UE and LE. No AROM R UE and LE except R hip flexor activity; limited. ROM at baseline per spouse. Strength RUE: 0/5  LUE: 5/5  RLE:  0/5  LLE:  5/5  baseline per spouse. Balance Sitting EOB:  poor. Sat x 15 min +2 A. Able to hold position in 30 sec bouts. Dynamic Standing:  not assessed   Sitting EOB:  fair   Dynamic Standing: fair      Patient is Alert & Oriented x person and does not follow directions    Sensation:  Pt denies numbness and tingling to extremities  Edema:  yes right lower extremity and right upper extremity      Patient education  Patient educated on role of Physical Therapy, risks of immobility and plan of care     Patient response to education:   Pt verbalized understanding Pt demonstrated skill Pt requires further education in this area   No No Yes       ASSESSMENT:    Comments:  Pt with PROM/AAROM with exercises on his L side; PROM on R side. RN Jayson Gordillo) stated to do just exercises today. Treatment:  Patient practiced and was instructed in the following treatment:  Pt performed supine exercises. Therapeutic Exercises: ankle pumps, heel slide, hip abduction/adduction and straight leg raise , shoulder, elbow, wrist, flex/ext, x 10 - 15 reps. Verbal/tactile cues for technique. At end of session, patient in bed with alarm call light and phone within reach,  all lines and tubes intact, nursing notified. Patient would benefit from continued skilled Physical Therapy to improve functional independence and quality of life. PLAN:    Patient is making poor progress towards established goals. Will continue with current Plan of care.       Time in  3:04  Time out  3:12    Total Treatment Time  8 minutes    CPT codes:  Therapeutic exercises (77634)   8 minutes  1

## 2020-03-17 NOTE — PROGRESS NOTES
Progress Note    Subject:Seen patient this am  in the ICU now extubated undergoing dialysis, off sedation has his eyes wide open able to say short sentences,pulled his NG tube      ROS: could not be obtained because of aphasia and prior CVA     apixaban  2.5 mg Oral BID    fluconazole  200 mg Intravenous Q24H    hydrocortisone sodium succinate PF  20 mg Intravenous BID    famotidine (PEPCID) injection  20 mg Intravenous Daily    piperacillin-tazobactam  3.375 g Intravenous Q12H    sodium chloride flush  10 mL Intravenous 2 times per day    sodium chloride flush  10 mL Intravenous 2 times per day    albuterol  2.5 mg Nebulization Q4H WA    abiraterone acetate  1,000 mg Oral Nightly    [Held by provider] baclofen  5 mg Oral TID WC    calcium-vitamin D  1 tablet Oral Daily    influenza A&B vaccine  0.5 mL Intramuscular Prior to discharge    metoprolol succinate  100 mg Oral Dinner    therapeutic multivitamin-minerals  1 tablet Oral QAM    tamsulosin  0.4 mg Oral Dinner    Vitamin D  1,000 Units Oral Lunch     anticoagulant sodium citrate, 4 mL, PRN  sodium chloride flush, 10 mL, PRN  sodium chloride flush, 10 mL, PRN  acetaminophen, 650 mg, Q6H PRN    Or  acetaminophen, 650 mg, Q6H PRN  polyethylene glycol, 17 g, Daily PRN  promethazine, 12.5 mg, Q6H PRN    Or  ondansetron, 4 mg, Q6H PRN  acetaminophen, 325 mg, Q6H PRN         Objective:         Intake/Output Summary (Last 24 hours) at 3/17/2020 1402  Last data filed at 3/17/2020 1200  Gross per 24 hour   Intake 1005 ml   Output 1810 ml   Net -805 ml       /77   Pulse 98   Temp 99.2 °F (37.3 °C) (Axillary)   Resp 20   Ht 5' 11\" (1.803 m)   Wt 237 lb 3.4 oz (107.6 kg)   SpO2 96%   BMI 33.08 kg/m²     Constitutional/General: wide awake chronically ill-appearing individual  Head: Normocephalic and atraumatic  Mouth: Oropharynx clear, handling secretions, no trismus  Neck: Supple, full ROM, no meningeal signs  Pulmonary: coarse rhonchi bilaterally  Cardiovascular:  Regular rate.  Irregularly irregular rhythm. No murmurs  Chest: no chest wall tenderness  Abdomen: Soft.  Non tender. Non distended.  +BS.  No rebound, guarding, or rigidity. No pulsatile masses appreciated. Musculoskeletal: The patient does have paresis of the right upper and lower extremity, the bilateral upper and lower extremities are skeletally intact, all compartments are soft.  There is erythema and swelling present of the right lower extremity  Skin: warm and dry.  Erythema present of right lower extremity  Neurologic: The patient is awake alert now extubated,unable to verbalize  Psych: Unable to be evaluated secondary to the acuity of the patient's condition.       Recent Labs     03/15/20  0536 03/16/20  0618 03/17/20  0741    145 142   K 3.5 3.7 3.9    103 100   CO2 27 27 27   BUN 44* 71* 49*   CREATININE 5.3* 6.8* 4.7*   GLUCOSE 123* 107* 118*   CALCIUM 7.6* 7.7* 7.6*       Recent Labs     03/15/20  0536 03/16/20  0618 03/17/20  0741   WBC 8.8 8.3 8.8   RBC 3.17* 3.26* 3.22*   HGB 9.2* 9.7* 9.5*   HCT 29.0* 29.8* 29.9*   MCV 91.5 91.4 92.9   MCH 29.0 29.8 29.5   MCHC 31.7* 32.6 31.8*   RDW 15.4* 15.4* 15.3*    162 175   MPV 9.4 8.5 9.2           Radiology: Xr Chest Standard (2 Vw)    Result Date: 3/6/2020  LOCATION: 200 EXAM: XR CHEST (2 VW) COMPARISON: None HISTORY: Shortness of breath TECHNIQUE: PA and lateral  views of the chest were obtained. FINDINGS:  SUPPORT DEVICES: None. LUNGS: Patchy lower lobe opacities are seen. The lungs are otherwise clear. PLEURA: No pneumothorax visualized. LUNG VOLUMES: Satisfactory inspirator effort. MEDIASTINAL STRUCTURES: No lymphadenopathy. Normal aortic contour. HEART SIZE: Normal. UPPER ABDOMEN: Unremarkable. BONES AND SOFT TISSUES: No fracture or soft tissue abnormality. Lower lobe opacities are possibly infectious.     Ct Head Wo Contrast    Result Date: 3/5/2020  LOCATION: 200 EXAM: CT HEAD WO CONTRAST COMPARISON: None HISTORY: Confusion TECHNIQUE: Noncontrast helical CT images were performed of the head. Coronal and sagittal reconstructions also obtained. Automated dose control was used for this exam. CONTRAST: No intravenous contrast administered. FINDINGS: Large remote left MCA territory infarct is identified. A moderate amount of patchy periventricular and subcortical white matter hypodensity noted, likely chronic microvascular ischemic related. There is no evidence of intracranial hemorrhage or mass. No extra-axial fluid is seen. The paranasal sinuses are clear. The globes and orbits are normal.  No abnormalities of the calvarium are identified. Large remote left MCA territory infarct. No acute hemorrhage or mass. Xr Chest Portable    Result Date: 3/5/2020  LOCATION: 200 EXAM: XR CHEST PORTABLE COMPARISON: None HISTORY: Shortness of breath TECHNIQUE: Single frontal view of the chest was obtained. FINDINGS:  SUPPORT DEVICES: None. LUNGS: Patchy airspace disease at the left lung base noted. PLEURA: No pneumothorax visualized. LUNG VOLUMES: Satisfactory inspirator effort. MEDIASTINAL STRUCTURES: No lymphadenopathy. Normal aortic contour. HEART SIZE: Enlarged. BONES AND SOFT TISSUES: No fracture or soft tissue abnormality. 1. Left lower lobe airspace disease is likely infectious. Cta Chest W Contrast    Result Date: 3/5/2020  LOCATION:200 EXAM: CTA CHEST W CONTRAST COMPARISON: None HISTORY:  Shortness of breath TECHNIQUE: Axial CT images are obtained of the chest.  Coronal and sagittal reconstructions were obtained with 3-D maximum intensity projection (MIP) reconstructed images. These were performed on a separate workstation with concurrent supervision for detailed evaluation of the pulmonary arteries.  CONTRAST: 80 mL Isovue-370 intravenous contrast. FINDINGS: SUPPORT DEVICES: None LUNGS/CENTRAL AIRWAYS/PLEURA: Linear parenchymal bands seen at the lung bases compatible with subsegmental atelectasis. The lungs are markedly limited from motion artifact. PULMONARY ARTERIES: Evaluation is limited for pulmonary embolus. No filling defects to the proximal lobar levels. HEART/PERICARDIUM/GREAT VESSELS: Cardiac size is normal.  There is no pericardial effusion. The great vessels of the chest are normal in caliber. LYMPH NODES: No thoracic adenopathy by size criteria. NECK BASE/CHEST WALL/DIAPHRAGM: No soft tissue lesions or diaphragmatic abnormality. UPPER ABDOMEN: Multiple large cysts in the liver noted. . OSSEOUS STRUCTURES: Several blastic lesions are identified involving the vertebral bodies the most conspicuous involving the T8 vertebral body involving portions of the right rib and spinous process. 1. No evidence of pulmonary embolism. 2. Lower lobe linear subsegmental atelectasis. 3. Blastic lesions in the spine and involving several ribs suspicious for metastatic disease, possibly prostate. Recommend correlation with PSA. Us Abdomen Limited    Result Date: 3/6/2020  Reading location:  100 INDICATION: Liver liver lesions on chest CT FINDINGS: Sonographic evaluation right upper quadrant. Normal caliber gallbladder without intrinsic filling defect or mural thickening or pericholecystic fluid. Normal caliber bile ducts. Simple hepatic cysts measuring 54 mm and 67 mm corresponds to the CT findings. Visualized portions of pancreas unremarkable. Right renal collecting system normal caliber. No acute finding. Us Dup Lower Extremity Right Dali    Result Date: 3/5/2020  Location:200 Exam: US DUP LOWER EXTREMITY RIGHT DALI Comparison: None Indications: Discomfort Technique: Using real-time, color, and spectral Doppler waveform analysis, ultrasound imaging of the right lower extremity deep venous system was performed, from the common femoral vein to the proximal calf. Findings: The visualized veins of the deep venous system are grossly patent without an echogenic focus within them.  These veins compress fully and demonstrate flow bilaterally. No significant reflux is seen within the greater saphenous veins. No evidence of right lower extremity deep venous thrombus from common femoral vein to proximal calf. Assessment:    Patient Active Problem List   Diagnosis Code    Cerebrovascular accident (CVA) due to occlusion of left carotid artery ([address] N38.111    Dysphagia due to recent stroke I69.391    Paroxysmal atrial fibrillation (H[facility]) I48.0    Prostate cancer ([address] C61    Cerebral contusion ([address] Y55.250O    Subarachnoid hemorrhage ([address] I60.9    HCAP (healthcare-associated pneumonia) J18.9    Respiratory arrest ([address] R09.2    Acute respiratory failure with hypoxia ([address] J96.01    Encephalopathy acute G93.40    Palliative care encounter Z51.5    Goals of care, counseling/discussion Z71.89    DNR (do not resuscitate) discussion Z71.89    Bone metastases ([address] C79.51     Altered mental status  Metabolic encephalopathy  Acute renal failure  Cellulitis  Hypokalemia  History of CVA  Metastatic prostate cancer  Hyperlipidemia  Elevated white blood cell count  Pnuemonia  Transamanitis  Elevated troponin  A speech evaluation for dysphagia    Plan:  1. Continue hemodialysis  2. Replenish electrolytes  3. Continue antibiotics  4. Patient remains DNR CCA  5. Monitor labs  6. Supportive care  7.  Patient can be transferred out of the unit       Code Status: full code  DVT prophylaxis: patient off [address]

## 2020-03-18 LAB
ANION GAP SERPL CALCULATED.3IONS-SCNC: 15 MMOL/L (ref 7–16)
BUN BLDV-MCNC: 69 MG/DL (ref 8–23)
CALCIUM IONIZED: 1.18 MMOL/L (ref 1.15–1.33)
CALCIUM SERPL-MCNC: 7.8 MG/DL (ref 8.6–10.2)
CHLORIDE BLD-SCNC: 100 MMOL/L (ref 98–107)
CO2: 26 MMOL/L (ref 22–29)
CREAT SERPL-MCNC: 5.9 MG/DL (ref 0.7–1.2)
GFR AFRICAN AMERICAN: 11
GFR NON-AFRICAN AMERICAN: 9 ML/MIN/1.73
GLUCOSE BLD-MCNC: 103 MG/DL (ref 74–99)
HCT VFR BLD CALC: 29.1 % (ref 37–54)
HEMOGLOBIN: 9.4 G/DL (ref 12.5–16.5)
MAGNESIUM: 2.1 MG/DL (ref 1.6–2.6)
MCH RBC QN AUTO: 29.5 PG (ref 26–35)
MCHC RBC AUTO-ENTMCNC: 32.3 % (ref 32–34.5)
MCV RBC AUTO: 91.2 FL (ref 80–99.9)
PDW BLD-RTO: 15.2 FL (ref 11.5–15)
PHOSPHORUS: 4.5 MG/DL (ref 2.5–4.5)
PLATELET # BLD: 203 E9/L (ref 130–450)
PMV BLD AUTO: 8.9 FL (ref 7–12)
POTASSIUM SERPL-SCNC: 3.7 MMOL/L (ref 3.5–5)
RBC # BLD: 3.19 E12/L (ref 3.8–5.8)
SODIUM BLD-SCNC: 141 MMOL/L (ref 132–146)
WBC # BLD: 8.4 E9/L (ref 4.5–11.5)

## 2020-03-18 PROCEDURE — 36591 DRAW BLOOD OFF VENOUS DEVICE: CPT

## 2020-03-18 PROCEDURE — 6360000002 HC RX W HCPCS: Performed by: INTERNAL MEDICINE

## 2020-03-18 PROCEDURE — 90935 HEMODIALYSIS ONE EVALUATION: CPT

## 2020-03-18 PROCEDURE — 97530 THERAPEUTIC ACTIVITIES: CPT

## 2020-03-18 PROCEDURE — 97110 THERAPEUTIC EXERCISES: CPT

## 2020-03-18 PROCEDURE — 2580000003 HC RX 258: Performed by: INTERNAL MEDICINE

## 2020-03-18 PROCEDURE — 1200000000 HC SEMI PRIVATE

## 2020-03-18 PROCEDURE — 80048 BASIC METABOLIC PNL TOTAL CA: CPT

## 2020-03-18 PROCEDURE — 6370000000 HC RX 637 (ALT 250 FOR IP): Performed by: INTERNAL MEDICINE

## 2020-03-18 PROCEDURE — 85027 COMPLETE CBC AUTOMATED: CPT

## 2020-03-18 PROCEDURE — 82330 ASSAY OF CALCIUM: CPT

## 2020-03-18 PROCEDURE — 83735 ASSAY OF MAGNESIUM: CPT

## 2020-03-18 PROCEDURE — 84100 ASSAY OF PHOSPHORUS: CPT

## 2020-03-18 PROCEDURE — 2500000003 HC RX 250 WO HCPCS: Performed by: INTERNAL MEDICINE

## 2020-03-18 PROCEDURE — 94640 AIRWAY INHALATION TREATMENT: CPT

## 2020-03-18 RX ORDER — M-VIT,TX,IRON,MINS/CALC/FOLIC 27MG-0.4MG
1 TABLET ORAL DAILY
Status: DISCONTINUED | OUTPATIENT
Start: 2020-03-19 | End: 2020-03-23 | Stop reason: HOSPADM

## 2020-03-18 RX ADMIN — Medication 10 ML: at 14:42

## 2020-03-18 RX ADMIN — FAMOTIDINE 20 MG: 10 INJECTION, SOLUTION INTRAVENOUS at 14:40

## 2020-03-18 RX ADMIN — HYDROCORTISONE SODIUM SUCCINATE 20 MG: 100 INJECTION, POWDER, FOR SOLUTION INTRAMUSCULAR; INTRAVENOUS at 06:19

## 2020-03-18 RX ADMIN — APIXABAN 2.5 MG: 5 TABLET, FILM COATED ORAL at 14:39

## 2020-03-18 RX ADMIN — ALBUTEROL SULFATE 2.5 MG: 2.5 SOLUTION RESPIRATORY (INHALATION) at 15:26

## 2020-03-18 RX ADMIN — TAMSULOSIN HYDROCHLORIDE 0.4 MG: 0.4 CAPSULE ORAL at 21:12

## 2020-03-18 RX ADMIN — SODIUM CHLORIDE: 9 INJECTION, SOLUTION INTRAVENOUS at 04:48

## 2020-03-18 RX ADMIN — CHOLECALCIFEROL TAB 25 MCG (1000 UNIT) 1000 UNITS: 25 TAB at 14:40

## 2020-03-18 RX ADMIN — Medication 10 ML: at 14:40

## 2020-03-18 RX ADMIN — APIXABAN 2.5 MG: 5 TABLET, FILM COATED ORAL at 21:12

## 2020-03-18 RX ADMIN — PIPERACILLIN AND TAZOBACTAM 3.38 G: 3; .375 INJECTION, POWDER, FOR SOLUTION INTRAVENOUS at 00:20

## 2020-03-18 RX ADMIN — MULTIPLE VITAMINS W/ MINERALS TAB 1 TABLET: TAB at 14:39

## 2020-03-18 RX ADMIN — ALBUTEROL SULFATE 2.5 MG: 2.5 SOLUTION RESPIRATORY (INHALATION) at 06:20

## 2020-03-18 RX ADMIN — METOPROLOL SUCCINATE 100 MG: 100 TABLET, EXTENDED RELEASE ORAL at 21:11

## 2020-03-18 RX ADMIN — PIPERACILLIN AND TAZOBACTAM 3.38 G: 3; .375 INJECTION, POWDER, FOR SOLUTION INTRAVENOUS at 14:38

## 2020-03-18 RX ADMIN — HYDROCORTISONE SODIUM SUCCINATE 20 MG: 100 INJECTION, POWDER, FOR SOLUTION INTRAMUSCULAR; INTRAVENOUS at 21:11

## 2020-03-18 RX ADMIN — Medication 10 ML: at 21:13

## 2020-03-18 RX ADMIN — Medication 10 ML: at 21:26

## 2020-03-18 ASSESSMENT — PAIN SCALES - GENERAL
PAINLEVEL_OUTOF10: 0
PAINLEVEL_OUTOF10: 0

## 2020-03-18 NOTE — FLOWSHEET NOTE
03/18/20 1251   Vital Signs   BP (!) 156/68   Pulse 98   Resp 20   Weight 236 lb 12.4 oz (107.4 kg)   Weight Method Bedside scale   Percent Weight Change -1.2   Post-Hemodialysis Assessment   Machine Disinfection Process Exterior Machine Disinfection   Rinseback Volume (ml) 300 ml   Total Liters Processed (l/min) 63.4 l/min   Dialyzer Clearance Moderately streaked   Duration of Treatment (minutes) 240 minutes   Heparin amount administered during treatment (units) 0 units   Hemodialysis Intake (ml) 500 ml   Hemodialysis Output (ml) 1800 ml   NET Removed (ml) 1300 ml   Tolerated Treatment Good   Patient Response to Treatment treatment complete, patient tolerated well   Bilateral Breath Sounds Diminished   RUE Edema +1   LUE Edema +1   RLE Edema +1;Trace   LLE Edema +1;Trace   Physician Notified?  No

## 2020-03-18 NOTE — PROGRESS NOTES
Progress Note    Subject:Seen patient this am  in the dialysis unit now extubated undergoing dialysis, off sedation has his eyes wide open able to say short sentences,pulled his NG tube was put back. ROS: could not be obtained because of aphasia and prior CVA     apixaban  2.5 mg Oral BID    fluconazole  200 mg Intravenous Q24H    hydrocortisone sodium succinate PF  20 mg Intravenous BID    famotidine (PEPCID) injection  20 mg Intravenous Daily    piperacillin-tazobactam  3.375 g Intravenous Q12H    sodium chloride flush  10 mL Intravenous 2 times per day    sodium chloride flush  10 mL Intravenous 2 times per day    albuterol  2.5 mg Nebulization Q4H WA    abiraterone acetate  1,000 mg Oral Nightly    [Held by provider] baclofen  5 mg Oral TID WC    calcium-vitamin D  1 tablet Oral Daily    influenza A&B vaccine  0.5 mL Intramuscular Prior to discharge    metoprolol succinate  100 mg Oral Dinner    therapeutic multivitamin-minerals  1 tablet Oral QAM    tamsulosin  0.4 mg Oral Dinner    Vitamin D  1,000 Units Oral Lunch     anticoagulant sodium citrate, 4 mL, PRN  sodium chloride flush, 10 mL, PRN  sodium chloride flush, 10 mL, PRN  acetaminophen, 650 mg, Q6H PRN    Or  acetaminophen, 650 mg, Q6H PRN  polyethylene glycol, 17 g, Daily PRN  promethazine, 12.5 mg, Q6H PRN    Or  ondansetron, 4 mg, Q6H PRN  acetaminophen, 325 mg, Q6H PRN         Objective:         Intake/Output Summary (Last 24 hours) at 3/18/2020 1102  Last data filed at 3/18/2020 0510  Gross per 24 hour   Intake 1589 ml   Output 725 ml   Net 864 ml       BP (!) 159/84   Pulse 92   Temp 97.8 °F (36.6 °C)   Resp 19   Ht 5' 11\" (1.803 m)   Wt 239 lb 10.2 oz (108.7 kg)   SpO2 91%   BMI 33.42 kg/m²     Constitutional/General: wide awake chronically ill-appearing individual  Head: Normocephalic and atraumatic  Mouth: Oropharynx clear, handling secretions, no trismus  Neck: Supple, full ROM, no meningeal signs  Pulmonary: coarse rhonchi bilaterally  Cardiovascular:  Regular rate.  Irregularly irregular rhythm. No murmurs  Chest: no chest wall tenderness  Abdomen: Soft.  Non tender. Non distended.  +BS.  No rebound, guarding, or rigidity. No pulsatile masses appreciated. Musculoskeletal: The patient does have paresis of the right upper and lower extremity, the bilateral upper and lower extremities are skeletally intact, all compartments are soft.  There is erythema and swelling present of the right lower extremity  Skin: warm and dry.  Erythema  of right lower extremity improving  Neurologic: The patient is awake alert now extubated,unable to verbalize  Psych: Unable to be evaluated secondary to the acuity of the patient's condition.       Recent Labs     03/16/20  0618 03/17/20  0741 03/18/20  0519    142 141   K 3.7 3.9 3.7    100 100   CO2 27 27 26   BUN 71* 49* 69*   CREATININE 6.8* 4.7* 5.9*   GLUCOSE 107* 118* 103*   CALCIUM 7.7* 7.6* 7.8*       Recent Labs     03/16/20  0618 03/17/20  0741 03/18/20  0519   WBC 8.3 8.8 8.4   RBC 3.26* 3.22* 3.19*   HGB 9.7* 9.5* 9.4*   HCT 29.8* 29.9* 29.1*   MCV 91.4 92.9 91.2   MCH 29.8 29.5 29.5   MCHC 32.6 31.8* 32.3   RDW 15.4* 15.3* 15.2*    175 203   MPV 8.5 9.2 8.9           Radiology: Xr Chest Standard (2 Vw)    Result Date: 3/6/2020  LOCATION: 200 EXAM: XR CHEST (2 VW) COMPARISON: None HISTORY: Shortness of breath TECHNIQUE: PA and lateral  views of the chest were obtained. FINDINGS:  SUPPORT DEVICES: None. LUNGS: Patchy lower lobe opacities are seen. The lungs are otherwise clear. PLEURA: No pneumothorax visualized. LUNG VOLUMES: Satisfactory inspirator effort. MEDIASTINAL STRUCTURES: No lymphadenopathy. Normal aortic contour. HEART SIZE: Normal. UPPER ABDOMEN: Unremarkable. BONES AND SOFT TISSUES: No fracture or soft tissue abnormality. Lower lobe opacities are possibly infectious.     Ct Head Wo Contrast    Result Date: 3/5/2020  LOCATION: 200 EXAM: CT HEAD WO CONTRAST COMPARISON: None HISTORY: Confusion TECHNIQUE: Noncontrast helical CT images were performed of the head. Coronal and sagittal reconstructions also obtained. Automated dose control was used for this exam. CONTRAST: No intravenous contrast administered. FINDINGS: Large remote left MCA territory infarct is identified. A moderate amount of patchy periventricular and subcortical white matter hypodensity noted, likely chronic microvascular ischemic related. There is no evidence of intracranial hemorrhage or mass. No extra-axial fluid is seen. The paranasal sinuses are clear. The globes and orbits are normal.  No abnormalities of the calvarium are identified. Large remote left MCA territory infarct. No acute hemorrhage or mass. Xr Chest Portable    Result Date: 3/5/2020  LOCATION: 200 EXAM: XR CHEST PORTABLE COMPARISON: None HISTORY: Shortness of breath TECHNIQUE: Single frontal view of the chest was obtained. FINDINGS:  SUPPORT DEVICES: None. LUNGS: Patchy airspace disease at the left lung base noted. PLEURA: No pneumothorax visualized. LUNG VOLUMES: Satisfactory inspirator effort. MEDIASTINAL STRUCTURES: No lymphadenopathy. Normal aortic contour. HEART SIZE: Enlarged. BONES AND SOFT TISSUES: No fracture or soft tissue abnormality. 1. Left lower lobe airspace disease is likely infectious. Cta Chest W Contrast    Result Date: 3/5/2020  LOCATION:200 EXAM: CTA CHEST W CONTRAST COMPARISON: None HISTORY:  Shortness of breath TECHNIQUE: Axial CT images are obtained of the chest.  Coronal and sagittal reconstructions were obtained with 3-D maximum intensity projection (MIP) reconstructed images. These were performed on a separate workstation with concurrent supervision for detailed evaluation of the pulmonary arteries.  CONTRAST: 80 mL Isovue-370 intravenous contrast. FINDINGS: SUPPORT DEVICES: None LUNGS/CENTRAL AIRWAYS/PLEURA: Linear parenchymal bands seen at the lung bases compatible with subsegmental atelectasis. The lungs are markedly limited from motion artifact. PULMONARY ARTERIES: Evaluation is limited for pulmonary embolus. No filling defects to the proximal lobar levels. HEART/PERICARDIUM/GREAT VESSELS: Cardiac size is normal.  There is no pericardial effusion. The great vessels of the chest are normal in caliber. LYMPH NODES: No thoracic adenopathy by size criteria. NECK BASE/CHEST WALL/DIAPHRAGM: No soft tissue lesions or diaphragmatic abnormality. UPPER ABDOMEN: Multiple large cysts in the liver noted. . OSSEOUS STRUCTURES: Several blastic lesions are identified involving the vertebral bodies the most conspicuous involving the T8 vertebral body involving portions of the right rib and spinous process. 1. No evidence of pulmonary embolism. 2. Lower lobe linear subsegmental atelectasis. 3. Blastic lesions in the spine and involving several ribs suspicious for metastatic disease, possibly prostate. Recommend correlation with PSA. Us Abdomen Limited    Result Date: 3/6/2020  Reading location:  100 INDICATION: Liver liver lesions on chest CT FINDINGS: Sonographic evaluation right upper quadrant. Normal caliber gallbladder without intrinsic filling defect or mural thickening or pericholecystic fluid. Normal caliber bile ducts. Simple hepatic cysts measuring 54 mm and 67 mm corresponds to the CT findings. Visualized portions of pancreas unremarkable. Right renal collecting system normal caliber. No acute finding. Us Dup Lower Extremity Right Dali    Result Date: 3/5/2020  Location:200 Exam: US DUP LOWER EXTREMITY RIGHT DALI Comparison: None Indications: Discomfort Technique: Using real-time, color, and spectral Doppler waveform analysis, ultrasound imaging of the right lower extremity deep venous system was performed, from the common femoral vein to the proximal calf. Findings: The visualized veins of the deep venous system are grossly patent without an echogenic focus within them.  These

## 2020-03-18 NOTE — PROGRESS NOTES
Physical Therapy Treatment Note    Room #:  YU06/BO07-13  Patient Name: Sean Yu  YOB: 1945  MRN: [id number]    Referring Provider: Harry Closs, MD    Date of Service: 3/18/2020    Evaluating Physical Therapist:  [place] [id number]     Diagnosis:   HCAP (healthcare-associated pneumonia) [J18.9]       Patient Active Problem List   Diagnosis    Cerebrovascular accident (CVA) due to occlusion of left carotid artery ([facility]    Dysphagia due to recent stroke    Paroxysmal atrial fibrillation ([address]    Prostate cancer ([address]    Cerebral contusion ([address]    Subarachnoid hemorrhage ([facility]    HCAP (healthcare-associated pneumonia)    Respiratory arrest ([address]    Acute respiratory failure with hypoxia ([facility]    Encephalopathy acute    Palliative care encounter    Goals of care, counseling/discussion    DNR (do not resuscitate) discussion    Bone metastases ([facility]       Tentative placement recommendation: Home Health Physical Therapy  or Located within [facility] with Physical Therapy   Equipment recommendation: Patient has needed equipment       Prior Level of Function: Family reports patient ambulated independently with quad cane. Spouse reports patient had CVA 4 years ago that affected his R side. She reports he can only flex his R hip; which he uses to advance the R LE during gait.      Rehab Potential: good  for baseline    Past medical history:   Past Medical History:   Diagnosis Date    A-fib ([facility]     Arthritis     CVA (cerebral vascular accident) ([address] 11/15/2015    left MCA    Hyperlipemia     Prostate cancer ([facility]     mets to spine, currently on chemo     Past Surgical History:   Procedure Laterality Date    COLONOSCOPY      CYSTOSCOPY N/A 3/9/2020    CYSTOSCOPY, BILATERAL RETROGRADE PYELOGRAMS, URETHRAL DILATATION, REYEZ CATHETER INSERTION performed by Renetta Gray MD at [address]  11/20/15    sonal       Precautions: falls risk, urinary incontinence, port in L inguinal area -- patient cannot wear Depends-type garment for this reason, unable to use R UE and LE due to CVA, aphasia -- primarily answers yes/no, some other limited speech. Right hemiparalysis. One point restraint left. Spouse claims he ambulated independently with quad cane. Today he had significant difficulty sitting at bedside. SUBJECTIVE:    Social history: Patient lives with spouse  in a ranch home  with 2 steps  to enter with [employer] in shower grab bars    Equipment owned: Shower chair, Quad cane and lift chair, [employer]    [address] cleared patient for PT treatment. reeval     OBJECTIVE:   Initial Evaluation  Date: 3/7/20 Reeval  3/18/2020    Short Term/ Long Term   Goals   Was pt agreeable to Eval/treatment? Yes  yes To be met in 5 days   Pain level   0/10       Bed Mobility    Rolling: Maximal assist of  2   Supine to sit: Maximal assist of  2   Sit to supine: Maximal assist of  2   Scooting: Maximal assist of  2  Rolling: Not assessed     Supine to sit: Dependent of  2    Sit to supine: Dependent of  2    Scooting: Maximal assist of  2    Rolling: Moderate assist of  2   Supine to sit: Moderate assist of  2   Sit to supine: Moderate assist of  2   Scooting: Moderate assist of  2    Transfers Sit to stand:  Not assessed Sit to stand: Not assessed       Sit to stand:  TBD   Ambulation    not assessed  not assessed   not assessed   Stair negotiation: ascended and descended   Not assessed   na    TBD   ROM Within functional limits L UE and LE. No AROM R UE and LE except R hip flexor activity; limited. ROM at baseline per spouse. Strength RUE: 0/5  LUE: 5/5  RLE:  0/5  LLE:  5/5  baseline per spouse. Balance Sitting EOB:  poor. Sat x 15 min +2 A. Able to hold position in 30 sec bouts.    Dynamic Standing:  not assessed  Sitting EOB:  poor  Dynamic Standing:  na   Sitting EOB:  fair   Dynamic Standing: fair      Patient is Alert & Oriented x person and does not follow directions    Sensation:  Pt denies numbness and tingling to extremities  Edema:  yes right lower extremity and right upper extremity      Patient education  Patient educated on role of Physical Therapy, risks of immobility and plan of care     Patient response to education:   Pt verbalized understanding Pt demonstrated skill Pt requires further education in this area   No No Yes       ASSESSMENT:    Comments: Pt. Agreeable to eob. Treatment:  Patient practiced and was instructed in the following treatment:  Pt. Transferred to eob, requires Mod/Max A to maintain sitting balance, leans to the left. Worked on trunk control with side bending x 5-10 reps. Pt. Sitting up x 10 minutes with encouragement. Pt. Performed exercises in supine. Therapeutic Exercises:left ankle pumps and heel slide supine x 10 reps each. Verbal/tactile cues for technique. At end of session, patient in bed with alarm call light and phone within reach,  all lines and tubes intact, nursing notified. Patient would benefit from continued skilled Physical Therapy to improve functional independence and quality of life. PLAN:    Patient is making poor progress towards established goals. Will continue with current Plan of care.       Time in  1354  Time out 1417    Total Treatment Time  25 minutes    CPT codes:  Therapeutic activities (87395)   17 minutes  1 unit(s)  Therapeutic exercises (17150)   8 minutes  1 unit(s)    Sindhu Pavon, PTA  Lic# [id number]

## 2020-03-18 NOTE — PLAN OF CARE
Injury - Risk of, Physical Injury:  Goal: Will remain free from falls  Description: Will remain free from falls  3/18/2020 1533 by Mine Parada RN  Outcome: Met This Shift  3/18/2020 0430 by Crista Tafoya RN  Outcome: Met This Shift  Goal: Absence of physical injury  Description: Absence of physical injury  3/18/2020 1533 by Mine Parada RN  Outcome: Met This Shift  3/18/2020 0430 by Crista Tafoya RN  Outcome: Met This Shift     Problem: Mood - Altered:  Goal: Mood stable  Description: Mood stable  Outcome: Met This Shift  Goal: Absence of abusive behavior  Description: Absence of abusive behavior  Outcome: Met This Shift     Problem: Psychomotor Activity - Altered:  Goal: Absence of psychomotor disturbance signs and symptoms  Description: Absence of psychomotor disturbance signs and symptoms  Outcome: Met This Shift     Problem: Sensory Perception - Impaired:  Goal: Demonstrations of improved sensory functioning will increase  Description: Demonstrations of improved sensory functioning will increase  Outcome: Met This Shift  Goal: Decrease in sensory misperception frequency  Description: Decrease in sensory misperception frequency  Outcome: Met This Shift  Goal: Able to refrain from responding to false sensory perceptions  Description: Able to refrain from responding to false sensory perceptions  Outcome: Met This Shift  Goal: Able to interrupt nonreality-based thinking  Description: Able to interrupt nonreality-based thinking  Outcome: Met This Shift     Problem: Sleep Pattern Disturbance:  Goal: Appears well-rested  Description: Appears well-rested  Outcome: Met This Shift

## 2020-03-18 NOTE — PROGRESS NOTES
sodium chloride flush 0.9 % injection 10 mL  10 mL Intravenous 2 times per day Dennise Jung MD   10 mL at 03/18/20 1440    sodium chloride flush 0.9 % injection 10 mL  10 mL Intravenous PRN Dennise Jung MD   10 mL at 03/14/20 0028    sodium chloride flush 0.9 % injection 10 mL  10 mL Intravenous 2 times per day Dennise Jung MD   10 mL at 03/18/20 1442    sodium chloride flush 0.9 % injection 10 mL  10 mL Intravenous PRN Dennise Jung MD        acetaminophen (TYLENOL) tablet 650 mg  650 mg Oral Q6H PRN Dennise Jung MD   650 mg at 03/08/20 1023    Or    acetaminophen (TYLENOL) suppository 650 mg  650 mg Rectal Q6H PRN Dennise Jung MD   650 mg at 03/09/20 0010    polyethylene glycol (GLYCOLAX) packet 17 g  17 g Oral Daily PRN Dennise Jung MD        promethazine (PHENERGAN) tablet 12.5 mg  12.5 mg Oral Q6H PRN Dennise Jung MD        Or    ondansetron (ZOFRAN) injection 4 mg  4 mg Intravenous Q6H PRN Dennise Jung MD        albuterol (PROVENTIL) nebulizer solution 2.5 mg  2.5 mg Nebulization Q4H WA Dennise Jung MD   2.5 mg at 03/18/20 0620    abiraterone acetate (ZYTIGA) 250 MG tablet TABS 1,000 mg  1,000 mg Oral Nightly Dennise Jung MD   Stopped at 03/09/20 2100    acetaminophen (TYLENOL) tablet 325 mg  325 mg Oral Q6H PRN Dennise Jung MD        [Held by provider] baclofen (LIORESAL) tablet 5 mg  5 mg Oral TID WC Dennise Jung MD   5 mg at 03/08/20 1716    calcium-vitamin D 500-200 MG-UNIT per tablet 1 tablet  1 tablet Oral Daily Dennise Jung MD   1 tablet at 03/17/20 0804    influenza A&B vaccine (FLUAD) injection 0.5 mL  0.5 mL Intramuscular Prior to discharge Dennise Jung MD        metoprolol succinate (TOPROL XL) extended release tablet 100 mg  100 mg Oral Dinner Dennise Jung MD   100 mg at 03/17/20 1715    therapeutic multivitamin-minerals 1 tablet  1 tablet Oral QAM Dennise Jung MD   1 tablet at 03/18/20 1439    tamsulosin (FLOMAX) capsule 0.4 mg  0.4 mg Oral Dinner Dennise Jung MD 0.4 mg at 03/17/20 1715    vitamin D (CHOLECALCIFEROL) tablet 1,000 Units  1,000 Units Oral Lunch Harry Closs, MD   1,000 Units at 03/18/20 1440       XR ABDOMEN FOR NG/OG/NE TUBE PLACEMENT   Final Result   Nasogastric tube in the stomach. MRI BRAIN WO CONTRAST   Final Result   1. No evidence of acute on chronic ischemia. 2. Large remote left MCA territory infarct. US DUP LOWER EXTREMITIES BILATERAL VENOUS   Final Result   No evidence of bilateral lower extremity deep venous   thrombus from common femoral vein to proximal calf. XR ABDOMEN FOR NG/OG/NE TUBE PLACEMENT   Final Result   NG tube tip within the stomach      XR CHEST PORTABLE   Final Result   Lines and tubes as described. IR FLUORO GUIDED CVA DEVICE PLMT/REPLACE/REMOVAL   Final Result   1. Successful placement of a temporary dialysis catheter via the right   internal jugular vein. IR ULTRASOUND GUIDANCE VASCULAR ACCESS   Final Result   Ultrasound guidance was provided during placement of a   PICC line. XR Urogram W WO Kub W WO Tomogram   Final Result   Intraoperative images as above. FL MODIFIED BARIUM SWALLOW W VIDEO   Final Result   1. Aspiration with thin liquids. 2. Please see speech therapist's report for a detailed description of   findings. XR CHEST STANDARD (2 VW)   Final Result   Lower lobe opacities are possibly infectious. US ABDOMEN LIMITED   Final Result   No acute finding. CTA CHEST W CONTRAST   Final Result   1. No evidence of pulmonary embolism. 2. Lower lobe linear subsegmental atelectasis. 3. Blastic lesions in the spine and involving several ribs suspicious   for metastatic disease, possibly prostate. Recommend correlation with   PSA. US DUP LOWER EXTREMITY RIGHT DALI   Final Result   No evidence of right lower extremity deep venous thrombus   from common femoral vein to proximal calf.             CT Head WO Contrast   Final Result      Large remote left MCA territory infarct. No acute hemorrhage or mass. XR CHEST PORTABLE   Final Result   1. Left lower lobe airspace disease is likely infectious. Labs:    CBC:   Recent Labs     03/16/20  0618 03/17/20  0741 03/18/20  0519   WBC 8.3 8.8 8.4   HGB 9.7* 9.5* 9.4*    175 203        No results found for: IRON, TIBC, FERRITIN    Lab Results   Component Value Date    CALCIUM 7.8 (L) 03/18/2020    CALCIUM 7.6 (L) 03/17/2020    CALCIUM 7.7 (L) 03/16/2020    CAION 1.18 03/18/2020    CAION 1.09 (L) 03/17/2020    CAION 1.07 (L) 03/16/2020    PHOS 4.5 03/18/2020    PHOS 3.6 03/17/2020    PHOS 3.4 03/16/2020    MG 2.1 03/18/2020    MG 2.1 03/17/2020    MG 2.2 03/16/2020       BMP:   Recent Labs     03/16/20  0618 03/17/20  0741 03/18/20  0519    142 141   K 3.7 3.9 3.7    100 100   CO2 27 27 26   BUN 71* 49* 69*   CREATININE 6.8* 4.7* 5.9*   GLUCOSE 107* 118* 103*             Assessment: / Plan:    1.   Acute kidney injury with no recovery of renal function.  Acute kidney injury of undetermined etiology.  Patient probably with multiple factors playing a role including renal hypoperfusion in the setting of hypotension and concurrent use of ACE inhibitor's as well as use of multiple antibiotics and urinary obstruction including vancomycin toxicity as well as acute interstitial nephritis. Serum serology is negative Myeloperoxidase not elevated at 4. ANCA 1:80 with Serine protease-3 1 not elevated, C3&4 not low, Beta Globulin 0, Anti-DS DNA (-), JOHNSON (-) Hep B& C (-).  Urine output is marginally improved. No evidence renal recovery will continue RRT with IHD. He dialyzed this AM without any issues 1.3L vol removal    2.   Volume overload/fluid retention. Improved. Attempt ultrafilteration as allowed by hemodynamics.     3.   Hypertension with chronic kidney disease stage I to stage IV.  Blood pressures are above target of  less than 130/80 mm Hg     4.   Hypokalemia.  Improved.   Dialysate adjusted.     5.    Hypocalcemia. Ionized Ca++ WNL-correcting  with IHD    6.    Metabolic acidosis.  Improved. .     7.   Anemia.  Hemoglobin stable. Will follow.      Electronically signed by Zarina Godinez MD on 3/18/2020 at 3:05 PM

## 2020-03-18 NOTE — PROGRESS NOTES
OT BEDSIDE TREATMENT NOTE      Date:3/18/2020  Patient Name: Liseth Rutherford  MRN: [id number]  : 1945  Room: [place] [address]   Referring Provider: Tres Jaquez MD   Evaluating OT: Waldemar Rodriguez OTR/L [id number]     Placement Recommendation: Subacute    Recommended Adaptive Equipment: TBD at rehab     [facility]   AM-PAC Daily Activity Inpatient   How much help for putting on and taking off regular lower body clothing?: Total  How much help for Bathing?: Total  How much help for Toileting?: Total  How much help for putting on and taking off regular upper body clothing?: A Lot  How much help for taking care of personal grooming?: A Lot  How much help for eating meals?: A Lot  AM-PAC Inpatient Daily Activity Raw Score: 9  AM-PAC Inpatient ADL T-Scale Score : 25.33  ADL Inpatient CMS 0-100% Score: 79.59  ADL Inpatient CMS G-Code Modifier : CL     Diagnosis:   1. Encephalopathy acute    2. HCAP (healthcare-associated pneumonia)    3. NSTEMI (non-ST elevated myocardial infarction) ([facility]    4. Acute respiratory failure with hypoxia (HCC)    5. Unable to void    6. Stricture of male urethra, unspecified stricture type       Pertinent Medical History:   Past Medical History        Past Medical History:   Diagnosis Date    A-fib ([facility]      Arthritis      CVA (cerebral vascular accident) ([facility] 11/15/2015     left MCA    Hyperlipemia      Prostate cancer (HCC)       mets to spine, currently on chemo            Precautions:  falls, hx of CVA with R hemibody, L UE soft restraint, aphasia   Pain Scale: Numeric Rate: no c/o pain; Nursing notified. Social history: spouse     Home architecture: single family home, 1 story, 2 steps to enter with rail, walk in shower. PLOF: needs assistance with BADL and IADL, ambulated with quad cane   Equipment owned: quad cane, shower chair, lift chair, grab bars  Cognition: oriented x 1; follows 1 step directions.                poor Problem solving skills              poor Memory poor Sequencing  Communication: intact   Visual perceptual skills: intact                Glasses: yes    Edema: no    Sensation: impaired, R hemibody      Functional Assessment:    Initial Eval Status  Date: 3/13/2020 Treatment Status  Date: 3/18/2020 STGs = LTGs  Time frame: 5-7 days   Feeding Maximal Assist   N/T - NG tube Supervision    Grooming Maximal Assist   N/T Minimal Assist    UB Dressing Maximal Assist   Max A to tie back of gown Minimal Assist    LB Dressing Dependent  Dep to don socks and don/doff heel boot Minimal Assist    Bathing Dependent  N/T Minimal Assist    Toileting Dependent   N/T Minimal Assist    Bed Mobility  Supine to sit: Dependent x 2  Scooting:Dependent x 2   Sit to supine: Dependent x 2  Dep x 2 for supine to sit; Dep x 2 for scooting; Dep x 2 for sit to supine Supine to sit: Minimal Assist   Sit to supine: Minimal Assist    Functional Transfers N/T   N/T d/t poor sitting balance at EOB- Posterior and L lateral lean noted wit mod/max A for upright sitting balance Minimal Assist    Functional Mobility N/T   N/T Minimal Assist    Activity Tolerance Fair minus  Fair minus Good       - AAROM of the L UE, PROM of the R UE exercises: 10 X in all planes of movement to increase ROM required for functional transfers/ADL participation. Exercises completed in shoulder and elbow flexion/extension, internal/external rotation, abduction/adduction, supination/pronation, digit and wrist flexion/extension, abduction/adduction and digit opposition. Pt limited in all planes. R UE flaccid with contractures noted in R digits. Ex's completed when pt seated EOB. Comments: Patient cleared by nursing staff. Upon arrival pt supine in bed. Pt educated with regards to bed mobility, hand placement, safety awareness, static sitting balance, upright sitting position, LE/UE dressing, ECT's, UE ROM ex's and trunk ex's. At end of session pt supine in bed with HOB elevated to 30* d/t tube feed. All lines and tubes intact, call light within reach. Overall, pt demonstrated decreased independence and safety during completion of ADL/functional transfers/mobility tasks. Pt would benefit from continued skilled OT to increase safety and independence with completion of ADL/IADL tasks for functional independence and quality of life.     · Pt has made fair minus/poor progress towards set goals as noted through :  · Decreased sitting balance, although increased sitting tolerance at EOB for 10 minutes  · completion of therapeutic exercises  to facilitate completion of ADL/IADL tasks and reduce risk of further contractures to R UE  · Continue with current plan of care    Treatment Time In: 1:54 PM     Treatment Time Out: 2:17PM            Treatment Charges: Mins Units   ADL/Home Mgt     15053     Thera Activities     37961 13 1   Ther Ex                 54309 10 1   Manual Therapy    [phone]     Neuro Re-ed         [id number]     Orthotic manage/training                               35435     Non Billable Time     Total Timed Treatment 23 748 [address]

## 2020-03-19 PROCEDURE — 6360000002 HC RX W HCPCS: Performed by: INTERNAL MEDICINE

## 2020-03-19 PROCEDURE — 2500000003 HC RX 250 WO HCPCS: Performed by: INTERNAL MEDICINE

## 2020-03-19 PROCEDURE — 2580000003 HC RX 258: Performed by: INTERNAL MEDICINE

## 2020-03-19 PROCEDURE — 92526 ORAL FUNCTION THERAPY: CPT | Performed by: SPEECH-LANGUAGE PATHOLOGIST

## 2020-03-19 PROCEDURE — 6370000000 HC RX 637 (ALT 250 FOR IP): Performed by: INTERNAL MEDICINE

## 2020-03-19 PROCEDURE — 97110 THERAPEUTIC EXERCISES: CPT

## 2020-03-19 PROCEDURE — 94640 AIRWAY INHALATION TREATMENT: CPT

## 2020-03-19 PROCEDURE — 1200000000 HC SEMI PRIVATE

## 2020-03-19 RX ORDER — HALOPERIDOL 5 MG/ML
0.5 INJECTION INTRAMUSCULAR EVERY 8 HOURS PRN
Status: DISCONTINUED | OUTPATIENT
Start: 2020-03-19 | End: 2020-03-23 | Stop reason: HOSPADM

## 2020-03-19 RX ADMIN — Medication 10 ML: at 20:52

## 2020-03-19 RX ADMIN — PIPERACILLIN AND TAZOBACTAM 3.38 G: 3; .375 INJECTION, POWDER, FOR SOLUTION INTRAVENOUS at 01:35

## 2020-03-19 RX ADMIN — METOPROLOL SUCCINATE 100 MG: 100 TABLET, EXTENDED RELEASE ORAL at 16:28

## 2020-03-19 RX ADMIN — ALBUTEROL SULFATE 2.5 MG: 2.5 SOLUTION RESPIRATORY (INHALATION) at 06:15

## 2020-03-19 RX ADMIN — TAMSULOSIN HYDROCHLORIDE 0.4 MG: 0.4 CAPSULE ORAL at 16:28

## 2020-03-19 RX ADMIN — ALBUTEROL SULFATE 2.5 MG: 2.5 SOLUTION RESPIRATORY (INHALATION) at 09:56

## 2020-03-19 RX ADMIN — ALBUTEROL SULFATE 2.5 MG: 2.5 SOLUTION RESPIRATORY (INHALATION) at 19:04

## 2020-03-19 RX ADMIN — APIXABAN 2.5 MG: 5 TABLET, FILM COATED ORAL at 20:57

## 2020-03-19 RX ADMIN — OYSTER SHELL CALCIUM WITH VITAMIN D 1 TABLET: 500; 200 TABLET, FILM COATED ORAL at 10:21

## 2020-03-19 RX ADMIN — HYDROCORTISONE SODIUM SUCCINATE 20 MG: 100 INJECTION, POWDER, FOR SOLUTION INTRAMUSCULAR; INTRAVENOUS at 18:33

## 2020-03-19 RX ADMIN — APIXABAN 2.5 MG: 5 TABLET, FILM COATED ORAL at 10:21

## 2020-03-19 RX ADMIN — FLUCONAZOLE 200 MG: 200 INJECTION, SOLUTION INTRAVENOUS at 00:05

## 2020-03-19 RX ADMIN — ALBUTEROL SULFATE 2.5 MG: 2.5 SOLUTION RESPIRATORY (INHALATION) at 14:51

## 2020-03-19 RX ADMIN — SODIUM CHLORIDE: 9 INJECTION, SOLUTION INTRAVENOUS at 16:29

## 2020-03-19 RX ADMIN — HYDROCORTISONE SODIUM SUCCINATE 20 MG: 100 INJECTION, POWDER, FOR SOLUTION INTRAMUSCULAR; INTRAVENOUS at 06:28

## 2020-03-19 RX ADMIN — Medication 10 ML: at 10:22

## 2020-03-19 RX ADMIN — FAMOTIDINE 20 MG: 10 INJECTION, SOLUTION INTRAVENOUS at 10:22

## 2020-03-19 RX ADMIN — CHOLECALCIFEROL TAB 25 MCG (1000 UNIT) 1000 UNITS: 25 TAB at 12:00

## 2020-03-19 RX ADMIN — MULTIPLE VITAMINS W/ MINERALS TAB 1 TABLET: TAB at 10:21

## 2020-03-19 RX ADMIN — PIPERACILLIN AND TAZOBACTAM 3.38 G: 3; .375 INJECTION, POWDER, FOR SOLUTION INTRAVENOUS at 14:01

## 2020-03-19 RX ADMIN — SODIUM CHLORIDE: 9 INJECTION, SOLUTION INTRAVENOUS at 05:35

## 2020-03-19 ASSESSMENT — PAIN SCALES - GENERAL: PAINLEVEL_OUTOF10: 0

## 2020-03-19 NOTE — PLAN OF CARE
Problem: Falls - Risk of:  Goal: Will remain free from falls  Description: Will remain free from falls  3/18/2020 2226 by Guerline Doe RN  Outcome: Met This Shift     Problem: Falls - Risk of:  Goal: Absence of physical injury  Description: Absence of physical injury  3/18/2020 2226 by Guerline Doe RN  Outcome: Met This Shift

## 2020-03-19 NOTE — PROGRESS NOTES
Progress Note    Subject:Seen patient this am  in the dialysis unit now extubated undergoing dialysis, off sedation has his eyes wide open able to say short sentences,pulled his NG tube was put back. ROS: could not be obtained because of aphasia and prior CVA     therapeutic multivitamin-minerals  1 tablet Oral Daily    apixaban  2.5 mg Oral BID    fluconazole  200 mg Intravenous Q24H    hydrocortisone sodium succinate PF  20 mg Intravenous BID    famotidine (PEPCID) injection  20 mg Intravenous Daily    piperacillin-tazobactam  3.375 g Intravenous Q12H    sodium chloride flush  10 mL Intravenous 2 times per day    sodium chloride flush  10 mL Intravenous 2 times per day    albuterol  2.5 mg Nebulization Q4H WA    abiraterone acetate  1,000 mg Oral Nightly    [Held by provider] baclofen  5 mg Oral TID WC    calcium-vitamin D  1 tablet Oral Daily    influenza A&B vaccine  0.5 mL Intramuscular Prior to discharge    metoprolol succinate  100 mg Oral Dinner    tamsulosin  0.4 mg Oral Dinner    Vitamin D  1,000 Units Oral Lunch     anticoagulant sodium citrate, 4 mL, PRN  sodium chloride flush, 10 mL, PRN  sodium chloride flush, 10 mL, PRN  acetaminophen, 650 mg, Q6H PRN  polyethylene glycol, 17 g, Daily PRN  promethazine, 12.5 mg, Q6H PRN    Or  ondansetron, 4 mg, Q6H PRN  acetaminophen, 325 mg, Q6H PRN         Objective:         Intake/Output Summary (Last 24 hours) at 3/19/2020 0739  Last data filed at 3/19/2020 0651  Gross per 24 hour   Intake 804 ml   Output 2300 ml   Net -1496 ml       /76   Pulse 86   Temp 98 °F (36.7 °C) (Oral)   Resp 16   Ht 5' 11\" (1.803 m)   Wt 236 lb 12.4 oz (107.4 kg)   SpO2 97%   BMI 33.02 kg/m²     Constitutional/General: wide awake chronically ill-appearing individual  Head: Normocephalic and atraumatic  Mouth: Oropharynx clear, handling secretions, no trismus  Neck: Supple, full ROM, no meningeal signs  Pulmonary: coarse rhonchi sagittal reconstructions also obtained. Automated dose control was used for this exam. CONTRAST: No intravenous contrast administered. FINDINGS: Large remote left MCA territory infarct is identified. A moderate amount of patchy periventricular and subcortical white matter hypodensity noted, likely chronic microvascular ischemic related. There is no evidence of intracranial hemorrhage or mass. No extra-axial fluid is seen. The paranasal sinuses are clear. The globes and orbits are normal.  No abnormalities of the calvarium are identified. Large remote left MCA territory infarct. No acute hemorrhage or mass. Xr Chest Portable    Result Date: 3/5/2020  LOCATION: 200 EXAM: XR CHEST PORTABLE COMPARISON: None HISTORY: Shortness of breath TECHNIQUE: Single frontal view of the chest was obtained. FINDINGS:  SUPPORT DEVICES: None. LUNGS: Patchy airspace disease at the left lung base noted. PLEURA: No pneumothorax visualized. LUNG VOLUMES: Satisfactory inspirator effort. MEDIASTINAL STRUCTURES: No lymphadenopathy. Normal aortic contour. HEART SIZE: Enlarged. BONES AND SOFT TISSUES: No fracture or soft tissue abnormality. 1. Left lower lobe airspace disease is likely infectious. Cta Chest W Contrast    Result Date: 3/5/2020  LOCATION:200 EXAM: CTA CHEST W CONTRAST COMPARISON: None HISTORY:  Shortness of breath TECHNIQUE: Axial CT images are obtained of the chest.  Coronal and sagittal reconstructions were obtained with 3-D maximum intensity projection (MIP) reconstructed images. These were performed on a separate workstation with concurrent supervision for detailed evaluation of the pulmonary arteries. CONTRAST: 80 mL Isovue-370 intravenous contrast. FINDINGS: SUPPORT DEVICES: None LUNGS/CENTRAL AIRWAYS/PLEURA: Linear parenchymal bands seen at the lung bases compatible with subsegmental atelectasis. The lungs are markedly limited from motion artifact.  PULMONARY ARTERIES: Evaluation is limited for pulmonary evidence of right lower extremity deep venous thrombus from common femoral vein to proximal calf. Assessment:    Patient Active Problem List   Diagnosis Code    Cerebrovascular accident (CVA) due to occlusion of left carotid artery ([address] B36.166    Dysphagia due to recent stroke I69.391    Paroxysmal atrial fibrillation (HCC) I48.0    Prostate cancer ([address] C61    Cerebral contusion ([address] Q30.345Q    Subarachnoid hemorrhage ([address] I60.9    HCAP (healthcare-associated pneumonia) J18.9    Respiratory arrest ([address] R09.2    Acute respiratory failure with hypoxia ([address] J96.01    Encephalopathy acute G93.40    Palliative care encounter Z51.5    Goals of care, counseling/discussion Z71.89    DNR (do not resuscitate) discussion Z71.89    Bone metastases ([address] C79.51     Altered mental status  Metabolic encephalopathy  Acute renal failure  Cellulitis  Hypokalemia  History of CVA  Metastatic prostate cancer  Hyperlipidemia  Elevated white blood cell count  Pnuemonia  Transamanitis  Elevated troponin  A speech evaluation for dysphagia    Plan:  1. Continue hemodialysis  2. Replenish electrolytes  3. Continue antibiotics  4. Patient remains DNR CCA  5. Monitor labs  6. Supportive care  7.  Patient can be transferred out of the unit       Code Status: full code  DVT prophylaxis: patient off [address]

## 2020-03-19 NOTE — PROGRESS NOTES
PLAN:    Patient is making poor progress towards established goals. Will continue with current Plan of care.       Time in  1341  Time out  1356  Total Treatment Time  15 minutes    CPT codes:  Therapeutic exercises (06465)   15 minutes  1 unit(s)    Yanet Rowley PTA  Lic# [id number]

## 2020-03-19 NOTE — PLAN OF CARE
Problem: Falls - Risk of:  Goal: Will remain free from falls  Description: Will remain free from falls  3/19/2020 0237 by Kate Carias RN  Outcome: Met This Shift     Problem: Falls - Risk of:  Goal: Absence of physical injury  Description: Absence of physical injury  3/19/2020 0237 by Kate Carias RN  Outcome: Met This Shift     Problem: Risk for Impaired Skin Integrity  Goal: Tissue integrity - skin and mucous membranes  Description: Structural intactness and normal physiological function of skin and  mucous membranes.   3/19/2020 0237 by Kate Carias RN  Outcome: Met This Shift     Problem: Airway Clearance - Ineffective:  Goal: Ability to maintain a clear airway will improve  Description: Ability to maintain a clear airway will improve  3/19/2020 0237 by Kate Carias RN  Outcome: Met This Shift     Problem: Gas Exchange - Impaired:  Goal: Levels of oxygenation will improve  Description: Levels of oxygenation will improve  3/19/2020 0237 by Kate Carias RN  Outcome: Met This Shift

## 2020-03-20 LAB
ANION GAP SERPL CALCULATED.3IONS-SCNC: 18 MMOL/L (ref 7–16)
BUN BLDV-MCNC: 56 MG/DL (ref 8–23)
CALCIUM IONIZED: 1.07 MMOL/L (ref 1.15–1.33)
CALCIUM SERPL-MCNC: 8 MG/DL (ref 8.6–10.2)
CHLORIDE BLD-SCNC: 101 MMOL/L (ref 98–107)
CO2: 24 MMOL/L (ref 22–29)
CREAT SERPL-MCNC: 6.1 MG/DL (ref 0.7–1.2)
GFR AFRICAN AMERICAN: 11
GFR NON-AFRICAN AMERICAN: 9 ML/MIN/1.73
GLUCOSE BLD-MCNC: 107 MG/DL (ref 74–99)
HCT VFR BLD CALC: 28.3 % (ref 37–54)
HEMOGLOBIN: 9 G/DL (ref 12.5–16.5)
MAGNESIUM: 2.3 MG/DL (ref 1.6–2.6)
MCH RBC QN AUTO: 29.3 PG (ref 26–35)
MCHC RBC AUTO-ENTMCNC: 31.8 % (ref 32–34.5)
MCV RBC AUTO: 92.2 FL (ref 80–99.9)
PDW BLD-RTO: 14.9 FL (ref 11.5–15)
PHOSPHORUS: 6.2 MG/DL (ref 2.5–4.5)
PLATELET # BLD: 255 E9/L (ref 130–450)
PMV BLD AUTO: 9.1 FL (ref 7–12)
POTASSIUM SERPL-SCNC: 3.4 MMOL/L (ref 3.5–5)
RBC # BLD: 3.07 E12/L (ref 3.8–5.8)
SODIUM BLD-SCNC: 143 MMOL/L (ref 132–146)
WBC # BLD: 7.8 E9/L (ref 4.5–11.5)

## 2020-03-20 PROCEDURE — 84100 ASSAY OF PHOSPHORUS: CPT

## 2020-03-20 PROCEDURE — 6360000002 HC RX W HCPCS: Performed by: INTERNAL MEDICINE

## 2020-03-20 PROCEDURE — 2580000003 HC RX 258: Performed by: INTERNAL MEDICINE

## 2020-03-20 PROCEDURE — 92526 ORAL FUNCTION THERAPY: CPT | Performed by: SPEECH-LANGUAGE PATHOLOGIST

## 2020-03-20 PROCEDURE — 2500000003 HC RX 250 WO HCPCS: Performed by: INTERNAL MEDICINE

## 2020-03-20 PROCEDURE — 1200000000 HC SEMI PRIVATE

## 2020-03-20 PROCEDURE — 94640 AIRWAY INHALATION TREATMENT: CPT

## 2020-03-20 PROCEDURE — 80048 BASIC METABOLIC PNL TOTAL CA: CPT

## 2020-03-20 PROCEDURE — 36415 COLL VENOUS BLD VENIPUNCTURE: CPT

## 2020-03-20 PROCEDURE — 6370000000 HC RX 637 (ALT 250 FOR IP): Performed by: INTERNAL MEDICINE

## 2020-03-20 PROCEDURE — 97110 THERAPEUTIC EXERCISES: CPT

## 2020-03-20 PROCEDURE — 82330 ASSAY OF CALCIUM: CPT

## 2020-03-20 PROCEDURE — 90935 HEMODIALYSIS ONE EVALUATION: CPT

## 2020-03-20 PROCEDURE — 85027 COMPLETE CBC AUTOMATED: CPT

## 2020-03-20 PROCEDURE — 83735 ASSAY OF MAGNESIUM: CPT

## 2020-03-20 RX ADMIN — HALOPERIDOL LACTATE 0.5 MG: 5 INJECTION INTRAMUSCULAR at 11:00

## 2020-03-20 RX ADMIN — MICONAZOLE NITRATE: 2 OINTMENT TOPICAL at 14:38

## 2020-03-20 RX ADMIN — HYDROCORTISONE SODIUM SUCCINATE 20 MG: 100 INJECTION, POWDER, FOR SOLUTION INTRAMUSCULAR; INTRAVENOUS at 17:37

## 2020-03-20 RX ADMIN — METOPROLOL SUCCINATE 100 MG: 100 TABLET, EXTENDED RELEASE ORAL at 17:38

## 2020-03-20 RX ADMIN — TAMSULOSIN HYDROCHLORIDE 0.4 MG: 0.4 CAPSULE ORAL at 17:38

## 2020-03-20 RX ADMIN — PIPERACILLIN AND TAZOBACTAM 3.38 G: 3; .375 INJECTION, POWDER, FOR SOLUTION INTRAVENOUS at 02:18

## 2020-03-20 RX ADMIN — Medication 10 ML: at 09:24

## 2020-03-20 RX ADMIN — FLUCONAZOLE 200 MG: 200 INJECTION, SOLUTION INTRAVENOUS at 23:52

## 2020-03-20 RX ADMIN — SODIUM CHLORIDE 12.5 ML/HR: 9 INJECTION, SOLUTION INTRAVENOUS at 07:06

## 2020-03-20 RX ADMIN — HYDROCORTISONE SODIUM SUCCINATE 20 MG: 100 INJECTION, POWDER, FOR SOLUTION INTRAMUSCULAR; INTRAVENOUS at 05:57

## 2020-03-20 RX ADMIN — PIPERACILLIN AND TAZOBACTAM 3.38 G: 3; .375 INJECTION, POWDER, FOR SOLUTION INTRAVENOUS at 14:45

## 2020-03-20 RX ADMIN — FAMOTIDINE 20 MG: 10 INJECTION, SOLUTION INTRAVENOUS at 09:23

## 2020-03-20 RX ADMIN — ACETAMINOPHEN 650 MG: 325 TABLET, FILM COATED ORAL at 14:37

## 2020-03-20 RX ADMIN — MULTIPLE VITAMINS W/ MINERALS TAB 1 TABLET: TAB at 09:23

## 2020-03-20 RX ADMIN — OYSTER SHELL CALCIUM WITH VITAMIN D 1 TABLET: 500; 200 TABLET, FILM COATED ORAL at 09:23

## 2020-03-20 RX ADMIN — APIXABAN 2.5 MG: 5 TABLET, FILM COATED ORAL at 20:44

## 2020-03-20 RX ADMIN — Medication 10 ML: at 23:52

## 2020-03-20 RX ADMIN — FLUCONAZOLE 200 MG: 200 INJECTION, SOLUTION INTRAVENOUS at 01:08

## 2020-03-20 RX ADMIN — SODIUM CHLORIDE: 9 INJECTION, SOLUTION INTRAVENOUS at 18:20

## 2020-03-20 RX ADMIN — ALBUTEROL SULFATE 2.5 MG: 2.5 SOLUTION RESPIRATORY (INHALATION) at 05:12

## 2020-03-20 RX ADMIN — ALBUTEROL SULFATE 2.5 MG: 2.5 SOLUTION RESPIRATORY (INHALATION) at 15:50

## 2020-03-20 RX ADMIN — APIXABAN 2.5 MG: 5 TABLET, FILM COATED ORAL at 09:23

## 2020-03-20 RX ADMIN — ALBUTEROL SULFATE 2.5 MG: 2.5 SOLUTION RESPIRATORY (INHALATION) at 09:16

## 2020-03-20 RX ADMIN — ALBUTEROL SULFATE 2.5 MG: 2.5 SOLUTION RESPIRATORY (INHALATION) at 19:32

## 2020-03-20 RX ADMIN — MICONAZOLE NITRATE: 2 OINTMENT TOPICAL at 20:45

## 2020-03-20 ASSESSMENT — PAIN SCALES - GENERAL: PAINLEVEL_OUTOF10: 5

## 2020-03-20 NOTE — PLAN OF CARE
Problem: Falls - Risk of:  Goal: Will remain free from falls  Description: Will remain free from falls  Outcome: Met This Shift     Problem: Falls - Risk of:  Goal: Absence of physical injury  Description: Absence of physical injury  Outcome: Met This Shift     Problem: Risk for Impaired Skin Integrity  Goal: Tissue integrity - skin and mucous membranes  Description: Structural intactness and normal physiological function of skin and  mucous membranes.   Outcome: Met This Shift     Problem: Airway Clearance - Ineffective:  Goal: Ability to maintain a clear airway will improve  Description: Ability to maintain a clear airway will improve  Outcome: Met This Shift     Problem: Gas Exchange - Impaired:  Goal: Levels of oxygenation will improve  Description: Levels of oxygenation will improve  Outcome: Met This Shift     Problem: Fluid Volume - Imbalance:  Goal: Absence of imbalanced fluid volume signs and symptoms  Description: Absence of imbalanced fluid volume signs and symptoms  Outcome: Met This Shift     Problem: Confusion - Acute:  Goal: Absence of continued neurological deterioration signs and symptoms  Description: Absence of continued neurological deterioration signs and symptoms  Outcome: Met This Shift     Problem: Confusion - Acute:  Goal: Mental status will be restored to baseline  Description: Mental status will be restored to baseline  Outcome: Met This Shift     Problem: Discharge Planning:  Goal: Ability to perform activities of daily living will improve  Description: Ability to perform activities of daily living will improve  Outcome: Met This Shift     Problem: Discharge Planning:  Goal: Participates in care planning  Description: Participates in care planning  Outcome: Met This Shift     Problem: Injury - Risk of, Physical Injury:  Goal: Will remain free from falls  Description: Will remain free from falls  Outcome: Met This Shift     Problem: Injury - Risk of, Physical Injury:  Goal: Absence of This Shift     Problem: Sleep Pattern Disturbance:  Goal: Appears well-rested  Description: Appears well-rested  Outcome: Met This Shift

## 2020-03-20 NOTE — CARE COORDINATION
Ss note:3/20/2020.1:06 PM Notified by [facility], liaison with Noel Robert that insurance has denied LTACH, stating pt can remain in current setting then be transferred to lower level of care. Peer to Peer is  [phone]. Liaison will contact Dr. Juani Gallego to determine if he will do a peer to peer. Additional barriers to a SNF placement is finding a SNF that will accept the pt who will require an ambulance transfer to outpt HD ( if needed) as snfs will not pay for the cost of ambulance transfer. Pt has a one on one sitter at this time. SW will follow.  PATRICE Romero

## 2020-03-20 NOTE — PROGRESS NOTES
Nutrition Assessment    Type and Reason for Visit: Reassess    Nutrition Recommendations: texture and consistency per SLP. Recommend PEG placement for supplemental nutrition. Will start Boost Pudding BID to supplement intake. Nutrition Assessment: Pt declining from a nutritional standpoint d/t pt pulled NG tube 3 days ago w/ on going poor PO intake. Pt at further nutritional risk d/t HD, dysphagia, wounds, prostate cancer w/mets, and PMH of CVA. Recommend PEG placement if intake continues to be poor. Will start ONS and monitor. Malnutrition Assessment:  · Malnutrition Status: At risk for malnutrition  · Context: Chronic illness  · Findings of the 6 clinical characteristics of malnutrition (Minimum of 2 out of 6 clinical characteristics is required to make the diagnosis of moderate or severe Protein Calorie Malnutrition based on AND/ASPEN Guidelines):  1. Energy Intake-Less than or equal to 50% of estimated energy requirement, (3 days)    2. Weight Loss-Unable to assess, unable to assess  3. Fat Loss-No significant subcutaneous fat loss,    4. Muscle Loss-No significant muscle mass loss,    5. Fluid Accumulation-Mild fluid accumulation(likely r/t ESRD on HD), Generalized  6.  Strength-Not measured    Nutrition Risk Level:  Moderate    Nutrient Needs:  · Estimated Daily Total Kcal: 2533-2858(MSJ REE= 1776 x 1.3= 2309)  · Estimated Daily Protein (g): 140-155(1.8-2.0 gm/kg IBW)  · Estimated Daily Total Fluid (ml/day): per nephrology    Nutrition Diagnosis:   · Problem: Inadequate oral intake  · Etiology: related to Increased demand for energy/nutrients     Signs and symptoms:  as evidenced by Known losses from dialysis, Intake 0-25%, Presence of wounds    Objective Information:  · Nutrition-Focused Physical Findings: Pt oriented to person only, abd WDL, +BS, -1.9L I/O, +1 generalized edema, generalized weakness, renal labs elevated, hyperphosphatemia, RUE/RLE flaccid  · Wound Type: Multiple, Surgical

## 2020-03-20 NOTE — PROGRESS NOTES
OT BEDSIDE TREATMENT NOTE      Date:3/20/2020  Patient Name: Daniela Ceron  MRN: [id number]  : 1945  Room: [address] [place] [address]-          Attempted occupational therapy this date, however pt off unit at dialysis, will continue to assess at later date/time. Continue current POC.      Lemon Safe JONES/L 20377

## 2020-03-20 NOTE — CARE COORDINATION
3/20/2020 1330 CM note: Per [address], insurance has denied LTAC.  Peer to peer to be initiated by [facility] Physician per Dr Maryam Sharp request. Margret Cunningham RN

## 2020-03-20 NOTE — PROGRESS NOTES
Physical Therapy Treatment Note    Room #:  4549/4329-13  Patient Name: Nella Cannon  YOB: 1945  MRN: [id number]    Referring Provider: Tiana Cleaning MD    Date of Service: 3/20/2020    Evaluating Physical Therapist:  [place] [id number]     Diagnosis:   HCAP (healthcare-associated pneumonia) [J18.9]       Patient Active Problem List   Diagnosis    Cerebrovascular accident (CVA) due to occlusion of left carotid artery ([address]    Dysphagia due to recent stroke    Paroxysmal atrial fibrillation ([address]    Prostate cancer ([address]    Cerebral contusion ([address]    Subarachnoid hemorrhage ([address]    HCAP (healthcare-associated pneumonia)    Respiratory arrest ([address]    Acute respiratory failure with hypoxia ([address]    Encephalopathy acute    Palliative care encounter    Goals of care, counseling/discussion    DNR (do not resuscitate) discussion    Bone metastases ([address]       Tentative placement recommendation: Home Health Physical Therapy  or [facility] with Physical Therapy   Equipment recommendation: Patient has needed equipment       Prior Level of Function: Family reports patient ambulated independently with quad cane. Spouse reports patient had CVA 4 years ago that affected his R side. She reports he can only flex his R hip; which he uses to advance the R LE during gait.      Rehab Potential: good  for baseline    Past medical history:   Past Medical History:   Diagnosis Date    [facility]-fib [facility]     Arthritis     CVA (cerebral vascular accident) ([address] 11/15/2015    left MCA    Hyperlipemia     Prostate cancer ([address]     mets to spine, currently on chemo     Past Surgical History:   Procedure Laterality Date    COLONOSCOPY      CYSTOSCOPY N/A 3/9/2020    CYSTOSCOPY, BILATERAL RETROGRADE PYELOGRAMS, URETHRAL DILATATION, REYEZ CATHETER INSERTION performed by Lucille Baer MD at [place] [address]  11/20/15    sonal       Precautions: falls risk, urinary [id number]

## 2020-03-20 NOTE — PROGRESS NOTES
(ZOSYN) 3.375 g in dextrose 5 % 50 mL IVPB extended infusion (mini-bag)  3.375 g Intravenous Q12H Shelby Mason MD 12.5 mL/hr at 03/20/20 1445 3.375 g at 03/20/20 1445    And    0.9 % sodium chloride infusion   Intravenous Q12H Shelby Mason MD   Stopped at 03/20/20 0906    sodium chloride flush 0.9 % injection 10 mL  10 mL Intravenous 2 times per day Shelby Mason MD   10 mL at 03/20/20 0924    sodium chloride flush 0.9 % injection 10 mL  10 mL Intravenous PRN Shelby Mason MD   10 mL at 03/14/20 0028    sodium chloride flush 0.9 % injection 10 mL  10 mL Intravenous 2 times per day Shelby Mason MD   10 mL at 03/20/20 0924    sodium chloride flush 0.9 % injection 10 mL  10 mL Intravenous PRN Shelby Mason MD        acetaminophen (TYLENOL) tablet 650 mg  650 mg Oral Q6H PRN Shelby Mason MD   650 mg at 03/20/20 1437    polyethylene glycol (GLYCOLAX) packet 17 g  17 g Oral Daily PRN Shelby Mason MD        promethazine (PHENERGAN) tablet 12.5 mg  12.5 mg Oral Q6H PRN Shelby Mason MD        Or    ondansetron (ZOFRAN) injection 4 mg  4 mg Intravenous Q6H PRN Shelby Mason MD        albuterol (PROVENTIL) nebulizer solution 2.5 mg  2.5 mg Nebulization Q4H WA Shelby Mason MD   2.5 mg at 03/20/20 0916    abiraterone acetate (ZYTIGA) 250 MG tablet TABS 1,000 mg  1,000 mg Oral Nightly Shelby Mason MD   Stopped at 03/09/20 2100    acetaminophen (TYLENOL) tablet 325 mg  325 mg Oral Q6H PRN Shelby Mason MD        [Held by provider] baclofen (LIORESAL) tablet 5 mg  5 mg Oral TID WC Shelby Mason MD   5 mg at 03/08/20 1716    calcium-vitamin D 500-200 MG-UNIT per tablet 1 tablet  1 tablet Oral Daily Shelby Mason MD   1 tablet at 03/20/20 3165    influenza A&B vaccine (FLUAD) injection 0.5 mL  0.5 mL Intramuscular Prior to discharge Shelby Mason MD        metoprolol succinate (TOPROL XL) extended release tablet 100 mg  100 mg Oral Dinner Shelby Mason MD   100 mg at 03/19/20 2916    tamsulosin [facility]) adjusted.     5.    Hypocalcemia. Ionized Ca++ low-correcting  with IHD    6.    Metabolic acidosis.  Improved. .     7.   Anemia.  Hemoglobin slow trend down. Will follow.      Electronically signed by Kieran Levin MD on 3/20/2020 at 3:29 PM

## 2020-03-21 LAB
ANION GAP SERPL CALCULATED.3IONS-SCNC: 16 MMOL/L (ref 7–16)
BUN BLDV-MCNC: 29 MG/DL (ref 8–23)
CALCIUM IONIZED: 1.07 MMOL/L (ref 1.15–1.33)
CALCIUM SERPL-MCNC: 7.7 MG/DL (ref 8.6–10.2)
CHLORIDE BLD-SCNC: 101 MMOL/L (ref 98–107)
CO2: 26 MMOL/L (ref 22–29)
CREAT SERPL-MCNC: 4.1 MG/DL (ref 0.7–1.2)
GFR AFRICAN AMERICAN: 17
GFR NON-AFRICAN AMERICAN: 14 ML/MIN/1.73
GLUCOSE BLD-MCNC: 90 MG/DL (ref 74–99)
HCT VFR BLD CALC: 30 % (ref 37–54)
HEMOGLOBIN: 9.4 G/DL (ref 12.5–16.5)
MAGNESIUM: 2.1 MG/DL (ref 1.6–2.6)
MCH RBC QN AUTO: 29.1 PG (ref 26–35)
MCHC RBC AUTO-ENTMCNC: 31.3 % (ref 32–34.5)
MCV RBC AUTO: 92.9 FL (ref 80–99.9)
PDW BLD-RTO: 14.8 FL (ref 11.5–15)
PHOSPHORUS: 4.2 MG/DL (ref 2.5–4.5)
PLATELET # BLD: 252 E9/L (ref 130–450)
PMV BLD AUTO: 9.7 FL (ref 7–12)
POTASSIUM SERPL-SCNC: 3.6 MMOL/L (ref 3.5–5)
RBC # BLD: 3.23 E12/L (ref 3.8–5.8)
SODIUM BLD-SCNC: 143 MMOL/L (ref 132–146)
WBC # BLD: 7 E9/L (ref 4.5–11.5)

## 2020-03-21 PROCEDURE — 83735 ASSAY OF MAGNESIUM: CPT

## 2020-03-21 PROCEDURE — 6360000002 HC RX W HCPCS: Performed by: INTERNAL MEDICINE

## 2020-03-21 PROCEDURE — 6370000000 HC RX 637 (ALT 250 FOR IP): Performed by: INTERNAL MEDICINE

## 2020-03-21 PROCEDURE — 94640 AIRWAY INHALATION TREATMENT: CPT

## 2020-03-21 PROCEDURE — 1200000000 HC SEMI PRIVATE

## 2020-03-21 PROCEDURE — 84100 ASSAY OF PHOSPHORUS: CPT

## 2020-03-21 PROCEDURE — 80048 BASIC METABOLIC PNL TOTAL CA: CPT

## 2020-03-21 PROCEDURE — 2500000003 HC RX 250 WO HCPCS: Performed by: INTERNAL MEDICINE

## 2020-03-21 PROCEDURE — 85027 COMPLETE CBC AUTOMATED: CPT

## 2020-03-21 PROCEDURE — 36415 COLL VENOUS BLD VENIPUNCTURE: CPT

## 2020-03-21 PROCEDURE — 2580000003 HC RX 258: Performed by: INTERNAL MEDICINE

## 2020-03-21 PROCEDURE — 82330 ASSAY OF CALCIUM: CPT

## 2020-03-21 RX ADMIN — OYSTER SHELL CALCIUM WITH VITAMIN D 1 TABLET: 500; 200 TABLET, FILM COATED ORAL at 09:56

## 2020-03-21 RX ADMIN — ALBUTEROL SULFATE 2.5 MG: 2.5 SOLUTION RESPIRATORY (INHALATION) at 18:18

## 2020-03-21 RX ADMIN — TAMSULOSIN HYDROCHLORIDE 0.4 MG: 0.4 CAPSULE ORAL at 17:25

## 2020-03-21 RX ADMIN — CHOLECALCIFEROL TAB 25 MCG (1000 UNIT) 1000 UNITS: 25 TAB at 13:30

## 2020-03-21 RX ADMIN — PIPERACILLIN AND TAZOBACTAM 3.38 G: 3; .375 INJECTION, POWDER, FOR SOLUTION INTRAVENOUS at 13:30

## 2020-03-21 RX ADMIN — MULTIPLE VITAMINS W/ MINERALS TAB 1 TABLET: TAB at 09:56

## 2020-03-21 RX ADMIN — SODIUM CHLORIDE: 9 INJECTION, SOLUTION INTRAVENOUS at 17:26

## 2020-03-21 RX ADMIN — Medication 10 ML: at 23:35

## 2020-03-21 RX ADMIN — ALBUTEROL SULFATE 2.5 MG: 2.5 SOLUTION RESPIRATORY (INHALATION) at 13:28

## 2020-03-21 RX ADMIN — MICONAZOLE NITRATE: 2 OINTMENT TOPICAL at 23:34

## 2020-03-21 RX ADMIN — Medication 10 ML: at 09:57

## 2020-03-21 RX ADMIN — APIXABAN 2.5 MG: 5 TABLET, FILM COATED ORAL at 23:26

## 2020-03-21 RX ADMIN — MICONAZOLE NITRATE: 2 OINTMENT TOPICAL at 09:57

## 2020-03-21 RX ADMIN — HYDROCORTISONE SODIUM SUCCINATE 20 MG: 100 INJECTION, POWDER, FOR SOLUTION INTRAMUSCULAR; INTRAVENOUS at 06:24

## 2020-03-21 RX ADMIN — ALBUTEROL SULFATE 2.5 MG: 2.5 SOLUTION RESPIRATORY (INHALATION) at 09:19

## 2020-03-21 RX ADMIN — ALBUTEROL SULFATE 2.5 MG: 2.5 SOLUTION RESPIRATORY (INHALATION) at 06:03

## 2020-03-21 RX ADMIN — Medication 10 ML: at 09:56

## 2020-03-21 RX ADMIN — SODIUM CHLORIDE: 9 INJECTION, SOLUTION INTRAVENOUS at 06:21

## 2020-03-21 RX ADMIN — HYDROCORTISONE SODIUM SUCCINATE 20 MG: 100 INJECTION, POWDER, FOR SOLUTION INTRAMUSCULAR; INTRAVENOUS at 17:26

## 2020-03-21 RX ADMIN — PIPERACILLIN AND TAZOBACTAM 3.38 G: 3; .375 INJECTION, POWDER, FOR SOLUTION INTRAVENOUS at 02:28

## 2020-03-21 RX ADMIN — Medication 10 ML: at 23:34

## 2020-03-21 RX ADMIN — APIXABAN 2.5 MG: 5 TABLET, FILM COATED ORAL at 09:56

## 2020-03-21 RX ADMIN — FAMOTIDINE 20 MG: 10 INJECTION, SOLUTION INTRAVENOUS at 09:56

## 2020-03-21 RX ADMIN — FLUCONAZOLE 200 MG: 200 INJECTION, SOLUTION INTRAVENOUS at 23:30

## 2020-03-21 RX ADMIN — METOPROLOL SUCCINATE 100 MG: 100 TABLET, EXTENDED RELEASE ORAL at 17:25

## 2020-03-21 ASSESSMENT — PAIN SCALES - PAIN ASSESSMENT IN ADVANCED DEMENTIA (PAINAD)
TOTALSCORE: 0
NEGVOCALIZATION: 0
BREATHING: 0
CONSOLABILITY: 0
TOTALSCORE: 0
CONSOLABILITY: 0
FACIALEXPRESSION: 0
BODYLANGUAGE: 0
FACIALEXPRESSION: 0
BREATHING: 0
NEGVOCALIZATION: 0
BODYLANGUAGE: 0

## 2020-03-21 NOTE — PROGRESS NOTES
performed of the head. Coronal and sagittal reconstructions also obtained. Automated dose control was used for this exam. CONTRAST: No intravenous contrast administered. FINDINGS: Large remote left MCA territory infarct is identified. A moderate amount of patchy periventricular and subcortical white matter hypodensity noted, likely chronic microvascular ischemic related. There is no evidence of intracranial hemorrhage or mass. No extra-axial fluid is seen. The paranasal sinuses are clear. The globes and orbits are normal.  No abnormalities of the calvarium are identified. Large remote left MCA territory infarct. No acute hemorrhage or mass. Xr Chest Portable    Result Date: 3/5/2020  LOCATION: 200 EXAM: XR CHEST PORTABLE COMPARISON: None HISTORY: Shortness of breath TECHNIQUE: Single frontal view of the chest was obtained. FINDINGS:  SUPPORT DEVICES: None. LUNGS: Patchy airspace disease at the left lung base noted. PLEURA: No pneumothorax visualized. LUNG VOLUMES: Satisfactory inspirator effort. MEDIASTINAL STRUCTURES: No lymphadenopathy. Normal aortic contour. HEART SIZE: Enlarged. BONES AND SOFT TISSUES: No fracture or soft tissue abnormality. 1. Left lower lobe airspace disease is likely infectious. Cta Chest W Contrast    Result Date: 3/5/2020  LOCATION:200 EXAM: CTA CHEST W CONTRAST COMPARISON: None HISTORY:  Shortness of breath TECHNIQUE: Axial CT images are obtained of the chest.  Coronal and sagittal reconstructions were obtained with 3-D maximum intensity projection (MIP) reconstructed images. These were performed on a separate workstation with concurrent supervision for detailed evaluation of the pulmonary arteries. CONTRAST: 80 mL Isovue-370 intravenous contrast. FINDINGS: SUPPORT DEVICES: None LUNGS/CENTRAL AIRWAYS/PLEURA: Linear parenchymal bands seen at the lung bases compatible with subsegmental atelectasis. The lungs are markedly limited from motion artifact.  PULMONARY ARTERIES:

## 2020-03-21 NOTE — PROGRESS NOTES
Progress Note    Subject:Seen patient this am on the floor now extubated , off sedation has his eyes wide open able to say short sentences. Discussedb case atb length with wife. ROS: could not be obtained because of aphasia and prior CVA     miconazole nitrate   Topical BID    therapeutic multivitamin-minerals  1 tablet Oral Daily    apixaban  2.5 mg Oral BID    fluconazole  200 mg Intravenous Q24H    hydrocortisone sodium succinate PF  20 mg Intravenous BID    famotidine (PEPCID) injection  20 mg Intravenous Daily    piperacillin-tazobactam  3.375 g Intravenous Q12H    sodium chloride flush  10 mL Intravenous 2 times per day    sodium chloride flush  10 mL Intravenous 2 times per day    albuterol  2.5 mg Nebulization Q4H WA    abiraterone acetate  1,000 mg Oral Nightly    [Held by provider] baclofen  5 mg Oral TID WC    calcium-vitamin D  1 tablet Oral Daily    influenza A&B vaccine  0.5 mL Intramuscular Prior to discharge    metoprolol succinate  100 mg Oral Dinner    tamsulosin  0.4 mg Oral Dinner    Vitamin D  1,000 Units Oral Lunch     haloperidol lactate, 0.5 mg, Q8H PRN  anticoagulant sodium citrate, 4 mL, PRN  sodium chloride flush, 10 mL, PRN  sodium chloride flush, 10 mL, PRN  acetaminophen, 650 mg, Q6H PRN  polyethylene glycol, 17 g, Daily PRN  promethazine, 12.5 mg, Q6H PRN    Or  ondansetron, 4 mg, Q6H PRN  acetaminophen, 325 mg, Q6H PRN         Objective:         Intake/Output Summary (Last 24 hours) at 3/21/2020 1119  Last data filed at 3/21/2020 1102  Gross per 24 hour   Intake 829.17 ml   Output 3100 ml   Net -2270.83 ml       /69   Pulse 100   Temp 98.3 °F (36.8 °C) (Axillary)   Resp 16   Ht 5' 11\" (1.803 m)   Wt 230 lb 8 oz (104.6 kg)   SpO2 96%   BMI 32.15 kg/m²     Constitutional/General: wide awake chronically ill-appearing individual  Head: Normocephalic and atraumatic  Mouth: Oropharynx clear, handling secretions, no trismus  Neck: Supple, full ROM, no meningeal signs  Pulmonary: coarse rhonchi bilaterally  Cardiovascular:  Regular rate.  Irregularly irregular rhythm. No murmurs  Chest: no chest wall tenderness  Abdomen: Soft.  Non tender. Non distended.  +BS.  No rebound, guarding, or rigidity. No pulsatile masses appreciated. Musculoskeletal: The patient does have paresis of the right upper and lower extremity, the bilateral upper and lower extremities are skeletally intact, all compartments are soft.  There is erythema and swelling present of the right lower extremity  Skin: warm and dry.  Erythema  of right lower extremity improving  Neurologic: The patient is awake alert now extubated,unable to verbalize  Psych: Unable to be evaluated secondary to the acuity of the patient's condition.       Recent Labs     03/20/20  0802 03/21/20  0522    143   K 3.4* 3.6    101   CO2 24 26   BUN 56* 29*   CREATININE 6.1* 4.1*   GLUCOSE 107* 90   CALCIUM 8.0* 7.7*       Recent Labs     03/20/20  0802 03/21/20  0522   WBC 7.8 7.0   RBC 3.07* 3.23*   HGB 9.0* 9.4*   HCT 28.3* 30.0*   MCV 92.2 92.9   MCH 29.3 29.1   MCHC 31.8* 31.3*   RDW 14.9 14.8    252   MPV 9.1 9.7           Radiology: Xr Chest Standard (2 Vw)    Result Date: 3/6/2020  LOCATION: 200 EXAM: XR CHEST (2 VW) COMPARISON: None HISTORY: Shortness of breath TECHNIQUE: PA and lateral  views of the chest were obtained. FINDINGS:  SUPPORT DEVICES: None. LUNGS: Patchy lower lobe opacities are seen. The lungs are otherwise clear. PLEURA: No pneumothorax visualized. LUNG VOLUMES: Satisfactory inspirator effort. MEDIASTINAL STRUCTURES: No lymphadenopathy. Normal aortic contour. HEART SIZE: Normal. UPPER ABDOMEN: Unremarkable. BONES AND SOFT TISSUES: No fracture or soft tissue abnormality. Lower lobe opacities are possibly infectious.     Ct Head Wo Contrast    Result Date: 3/5/2020  LOCATION: 200 EXAM: CT HEAD WO CONTRAST COMPARISON: None HISTORY: Confusion TECHNIQUE: Noncontrast helical CT images ARTERIES: Evaluation is limited for pulmonary embolus. No filling defects to the proximal lobar levels. HEART/PERICARDIUM/GREAT VESSELS: Cardiac size is normal.  There is no pericardial effusion. The great vessels of the chest are normal in caliber. LYMPH NODES: No thoracic adenopathy by size criteria. NECK BASE/CHEST WALL/DIAPHRAGM: No soft tissue lesions or diaphragmatic abnormality. UPPER ABDOMEN: Multiple large cysts in the liver noted. . OSSEOUS STRUCTURES: Several blastic lesions are identified involving the vertebral bodies the most conspicuous involving the T8 vertebral body involving portions of the right rib and spinous process. 1. No evidence of pulmonary embolism. 2. Lower lobe linear subsegmental atelectasis. 3. Blastic lesions in the spine and involving several ribs suspicious for metastatic disease, possibly prostate. Recommend correlation with PSA. Us Abdomen Limited    Result Date: 3/6/2020  Reading location:  100 INDICATION: Liver liver lesions on chest CT FINDINGS: Sonographic evaluation right upper quadrant. Normal caliber gallbladder without intrinsic filling defect or mural thickening or pericholecystic fluid. Normal caliber bile ducts. Simple hepatic cysts measuring 54 mm and 67 mm corresponds to the CT findings. Visualized portions of pancreas unremarkable. Right renal collecting system normal caliber. No acute finding. Us Dup Lower Extremity Right Dali    Result Date: 3/5/2020  Location:200 Exam: US DUP LOWER EXTREMITY RIGHT DALI Comparison: None Indications: Discomfort Technique: Using real-time, color, and spectral Doppler waveform analysis, ultrasound imaging of the right lower extremity deep venous system was performed, from the common femoral vein to the proximal calf. Findings: The visualized veins of the deep venous system are grossly patent without an echogenic focus within them. These veins compress fully and demonstrate flow bilaterally.  No significant reflux is seen within the greater saphenous veins. No evidence of right lower extremity deep venous thrombus from common femoral vein to proximal calf. Assessment:    Patient Active Problem List   Diagnosis Code    Cerebrovascular accident (CVA) due to occlusion of left carotid artery ([address] U66.879    Dysphagia due to recent stroke I69.391    Paroxysmal atrial fibrillation (HCC) I48.0    Prostate cancer ([address] C61    Cerebral contusion ([address] Q17.782F    Subarachnoid hemorrhage ([address] I60.9    HCAP (healthcare-associated pneumonia) J18.9    Respiratory arrest ([address] R09.2    Acute respiratory failure with hypoxia ([address] J96.01    Encephalopathy acute G93.40    Palliative care encounter Z51.5    Goals of care, counseling/discussion Z71.89    DNR (do not resuscitate) discussion Z71.89    Bone metastases ([address] C79.51     Altered mental status  Metabolic encephalopathy  Acute renal failure  Cellulitis  Hypokalemia  History of CVA  Metastatic prostate cancer  Hyperlipidemia  Elevated white blood cell count  Pnuemonia  Transamanitis  Elevated troponin  A speech evaluation for dysphagia    Plan:  1. Continue hemodialysis  2. Replenish electrolytes  3. Continue antibiotics  4. Patient remains DNR CCA  5. Monitor labs  6. Supportive care  7.  Social service for discharge planning       Code Status: full code  DVT prophylaxis: patient off [address]

## 2020-03-21 NOTE — PROGRESS NOTES
piperacillin-tazobactam (ZOSYN) 3.375 g in dextrose 5 % 50 mL IVPB extended infusion (mini-bag)  3.375 g Intravenous Q12H Sinan Menjivar MD   Stopped at 03/21/20 0620    And    0.9 % sodium chloride infusion   Intravenous Q12H Sinan Menjivar MD   Stopped at 03/21/20 0830    sodium chloride flush 0.9 % injection 10 mL  10 mL Intravenous 2 times per day Sinan Menjivar MD   10 mL at 03/21/20 0956    sodium chloride flush 0.9 % injection 10 mL  10 mL Intravenous PRN Sinan Menjivar MD   10 mL at 03/14/20 0028    sodium chloride flush 0.9 % injection 10 mL  10 mL Intravenous 2 times per day Sinan Menjivar MD   10 mL at 03/21/20 0957    sodium chloride flush 0.9 % injection 10 mL  10 mL Intravenous PRN Sinan Menjivar MD        acetaminophen (TYLENOL) tablet 650 mg  650 mg Oral Q6H PRN Sinan Menjivar MD   650 mg at 03/20/20 1437    polyethylene glycol (GLYCOLAX) packet 17 g  17 g Oral Daily PRN Sinan Menjivar MD        promethazine (PHENERGAN) tablet 12.5 mg  12.5 mg Oral Q6H PRN Sinan Menjivar MD        Or    ondansetron (ZOFRAN) injection 4 mg  4 mg Intravenous Q6H PRN Sinan Menjivar MD        albuterol (PROVENTIL) nebulizer solution 2.5 mg  2.5 mg Nebulization Q4H [place] Sinan Menjivar MD   2.5 mg at 03/21/20 0919    abiraterone acetate (ZYTIGA) 250 MG tablet TABS 1,000 mg  1,000 mg Oral Nightly Sinan Menjivar MD   Stopped at 03/09/20 2100    acetaminophen (TYLENOL) tablet 325 mg  325 mg Oral Q6H PRN Sinan Menjivar MD        [Held by provider] baclofen (LIORESAL) tablet 5 mg  5 mg Oral TID WC Sinan Menjivar MD   5 mg at 03/08/20 1716    calcium-vitamin D 500-200 MG-UNIT per tablet 1 tablet  1 tablet Oral Daily Sinan Menjivar MD   1 tablet at 03/21/20 0956    influenza A&B vaccine (FLUAD) injection 0.5 mL  0.5 mL Intramuscular Prior to discharge Sinan Menjivar MD        metoprolol succinate (TOPROL XL) extended release tablet 100 mg  100 mg Oral Dinner Sinan Menjivar MD   100 mg at 03/20/20 3210    tamsulosin (FLOMAX) capsule Contrast   Final Result      Large remote left MCA territory infarct. No acute hemorrhage or mass. XR CHEST PORTABLE   Final Result   1. Left lower lobe airspace disease is likely infectious. Labs:    CBC:   Recent Labs     03/20/20  0802 03/21/20  0522   WBC 7.8 7.0   HGB 9.0* 9.4*    252        No results found for: IRON, TIBC, FERRITIN    Lab Results   Component Value Date    CALCIUM 7.7 (L) 03/21/2020    CALCIUM 8.0 (L) 03/20/2020    CALCIUM 7.8 (L) 03/18/2020    CAION 1.07 (L) 03/21/2020    CAION 1.07 (L) 03/20/2020    CAION 1.18 03/18/2020    PHOS 4.2 03/21/2020    PHOS 6.2 (H) 03/20/2020    PHOS 4.5 03/18/2020    MG 2.1 03/21/2020    MG 2.3 03/20/2020    MG 2.1 03/18/2020       BMP:   Recent Labs     03/20/20  0802 03/21/20  0522    143   K 3.4* 3.6    101   CO2 24 26   BUN 56* 29*   CREATININE 6.1* 4.1*   GLUCOSE 107* 90             Assessment: / Plan:    1.   Acute kidney injury with no recovery of renal function.  Acute kidney injury of undetermined etiology.  Patient probably with multiple factors playing a role including renal hypoperfusion in the setting of hypotension and concurrent use of ACE inhibitor's as well as use of multiple antibiotics and urinary obstruction including vancomycin toxicity as well as acute interstitial nephritis. Serum serology is negative Myeloperoxidase not elevated at 4. ANCA 1:80 with Serine protease-3 1 not elevated, C3&4 not low, Beta Globulin 0, Anti-DS DNA (-), JOHNSON (-) Hep B& C (-).  Urine output is marginally improved. No evidence renal recovery will continue RRT with IHD. PLAN:1. Next treatment 3/23    2.   Volume overload/fluid retention. Improved. Attempt ultrafilteration as allowed by hemodynamics.     3.   Hypertension with chronic kidney disease stage I to stage IV.  Blood pressures are at target of  less than 130/80 mm Hg     4.   Hypokalemia.  Improved. Dialysate adjusted.     5.    Hypocalcemia.  Ionized Ca++

## 2020-03-21 NOTE — PLAN OF CARE
Problem: Falls - Risk of:  Goal: Will remain free from falls  Description: Will remain free from falls  Outcome: Met This Shift     Problem: Falls - Risk of:  Goal: Absence of physical injury  Description: Absence of physical injury  Outcome: Met This Shift     Problem: Risk for Impaired Skin Integrity  Goal: Tissue integrity - skin and mucous membranes  Description: Structural intactness and normal physiological function of skin and  mucous membranes.   Outcome: Met This Shift     Problem: Airway Clearance - Ineffective:  Goal: Ability to maintain a clear airway will improve  Description: Ability to maintain a clear airway will improve  Outcome: Met This Shift     Problem: Gas Exchange - Impaired:  Goal: Levels of oxygenation will improve  Description: Levels of oxygenation will improve  Outcome: Met This Shift     Problem: Fluid Volume - Imbalance:  Goal: Absence of imbalanced fluid volume signs and symptoms  Description: Absence of imbalanced fluid volume signs and symptoms  Outcome: Met This Shift     Problem: Confusion - Acute:  Goal: Absence of continued neurological deterioration signs and symptoms  Description: Absence of continued neurological deterioration signs and symptoms  Outcome: Met This Shift     Problem: Confusion - Acute:  Goal: Mental status will be restored to baseline  Description: Mental status will be restored to baseline  Outcome: Met This Shift     Problem: Discharge Planning:  Goal: Ability to perform activities of daily living will improve  Description: Ability to perform activities of daily living will improve  Outcome: Met This Shift     Problem: Discharge Planning:  Goal: Participates in care planning  Description: Participates in care planning  Outcome: Met This Shift     Problem: Injury - Risk of, Physical Injury:  Goal: Will remain free from falls  Description: Will remain free from falls  Outcome: Met This Shift     Problem: Injury - Risk of, Physical Injury:  Goal: Absence of This Shift     Problem: Sleep Pattern Disturbance:  Goal: Appears well-rested  Description: Appears well-rested  Outcome: Met This Shift     Problem: Inadequate energy intake (NI-1.4)  Goal: Food and/or Nutrient Delivery  Description: Individualized approach for food/nutrient provision.   3/20/2020 1356 by Mariela Amaro RD, LD  Outcome: Met This Shift

## 2020-03-22 LAB
ANION GAP SERPL CALCULATED.3IONS-SCNC: 17 MMOL/L (ref 7–16)
BUN BLDV-MCNC: 42 MG/DL (ref 8–23)
CALCIUM IONIZED: 1.03 MMOL/L (ref 1.15–1.33)
CALCIUM SERPL-MCNC: 7.9 MG/DL (ref 8.6–10.2)
CHLORIDE BLD-SCNC: 101 MMOL/L (ref 98–107)
CO2: 25 MMOL/L (ref 22–29)
CREAT SERPL-MCNC: 5.6 MG/DL (ref 0.7–1.2)
GFR AFRICAN AMERICAN: 12
GFR NON-AFRICAN AMERICAN: 10 ML/MIN/1.73
GLUCOSE BLD-MCNC: 97 MG/DL (ref 74–99)
HCT VFR BLD CALC: 30.8 % (ref 37–54)
HEMOGLOBIN: 9.6 G/DL (ref 12.5–16.5)
MAGNESIUM: 2.2 MG/DL (ref 1.6–2.6)
MCH RBC QN AUTO: 29 PG (ref 26–35)
MCHC RBC AUTO-ENTMCNC: 31.2 % (ref 32–34.5)
MCV RBC AUTO: 93.1 FL (ref 80–99.9)
PDW BLD-RTO: 14.7 FL (ref 11.5–15)
PHOSPHORUS: 5.5 MG/DL (ref 2.5–4.5)
PLATELET # BLD: 291 E9/L (ref 130–450)
PMV BLD AUTO: 9.2 FL (ref 7–12)
POTASSIUM SERPL-SCNC: 3.3 MMOL/L (ref 3.5–5)
RBC # BLD: 3.31 E12/L (ref 3.8–5.8)
SODIUM BLD-SCNC: 143 MMOL/L (ref 132–146)
WBC # BLD: 6.8 E9/L (ref 4.5–11.5)

## 2020-03-22 PROCEDURE — 6370000000 HC RX 637 (ALT 250 FOR IP): Performed by: INTERNAL MEDICINE

## 2020-03-22 PROCEDURE — 2580000003 HC RX 258: Performed by: INTERNAL MEDICINE

## 2020-03-22 PROCEDURE — 82330 ASSAY OF CALCIUM: CPT

## 2020-03-22 PROCEDURE — 6360000002 HC RX W HCPCS: Performed by: INTERNAL MEDICINE

## 2020-03-22 PROCEDURE — 1200000000 HC SEMI PRIVATE

## 2020-03-22 PROCEDURE — 85027 COMPLETE CBC AUTOMATED: CPT

## 2020-03-22 PROCEDURE — 2500000003 HC RX 250 WO HCPCS: Performed by: INTERNAL MEDICINE

## 2020-03-22 PROCEDURE — 84100 ASSAY OF PHOSPHORUS: CPT

## 2020-03-22 PROCEDURE — 94640 AIRWAY INHALATION TREATMENT: CPT

## 2020-03-22 PROCEDURE — 80048 BASIC METABOLIC PNL TOTAL CA: CPT

## 2020-03-22 PROCEDURE — 83735 ASSAY OF MAGNESIUM: CPT

## 2020-03-22 PROCEDURE — 36415 COLL VENOUS BLD VENIPUNCTURE: CPT

## 2020-03-22 RX ORDER — POTASSIUM CHLORIDE 1.5 G/1.77G
20 POWDER, FOR SOLUTION ORAL ONCE
Status: DISCONTINUED | OUTPATIENT
Start: 2020-03-22 | End: 2020-03-22 | Stop reason: CLARIF

## 2020-03-22 RX ADMIN — ALBUTEROL SULFATE 2.5 MG: 2.5 SOLUTION RESPIRATORY (INHALATION) at 09:59

## 2020-03-22 RX ADMIN — HYDROCORTISONE SODIUM SUCCINATE 20 MG: 100 INJECTION, POWDER, FOR SOLUTION INTRAMUSCULAR; INTRAVENOUS at 18:06

## 2020-03-22 RX ADMIN — PIPERACILLIN AND TAZOBACTAM 3.38 G: 3; .375 INJECTION, POWDER, FOR SOLUTION INTRAVENOUS at 03:25

## 2020-03-22 RX ADMIN — FAMOTIDINE 20 MG: 10 INJECTION, SOLUTION INTRAVENOUS at 08:34

## 2020-03-22 RX ADMIN — MULTIPLE VITAMINS W/ MINERALS TAB 1 TABLET: TAB at 08:34

## 2020-03-22 RX ADMIN — OYSTER SHELL CALCIUM WITH VITAMIN D 1 TABLET: 500; 200 TABLET, FILM COATED ORAL at 08:34

## 2020-03-22 RX ADMIN — MICONAZOLE NITRATE: 2 OINTMENT TOPICAL at 19:44

## 2020-03-22 RX ADMIN — Medication 10 ML: at 08:34

## 2020-03-22 RX ADMIN — APIXABAN 2.5 MG: 5 TABLET, FILM COATED ORAL at 08:34

## 2020-03-22 RX ADMIN — PIPERACILLIN AND TAZOBACTAM 3.38 G: 3; .375 INJECTION, POWDER, FOR SOLUTION INTRAVENOUS at 15:07

## 2020-03-22 RX ADMIN — TAMSULOSIN HYDROCHLORIDE 0.4 MG: 0.4 CAPSULE ORAL at 18:06

## 2020-03-22 RX ADMIN — ALBUTEROL SULFATE 2.5 MG: 2.5 SOLUTION RESPIRATORY (INHALATION) at 14:03

## 2020-03-22 RX ADMIN — ALBUTEROL SULFATE 2.5 MG: 2.5 SOLUTION RESPIRATORY (INHALATION) at 06:11

## 2020-03-22 RX ADMIN — Medication 10 ML: at 19:42

## 2020-03-22 RX ADMIN — POTASSIUM BICARBONATE 20 MEQ: 782 TABLET, EFFERVESCENT ORAL at 15:07

## 2020-03-22 RX ADMIN — FLUCONAZOLE 200 MG: 200 INJECTION, SOLUTION INTRAVENOUS at 23:44

## 2020-03-22 RX ADMIN — HYDROCORTISONE SODIUM SUCCINATE 20 MG: 100 INJECTION, POWDER, FOR SOLUTION INTRAMUSCULAR; INTRAVENOUS at 06:23

## 2020-03-22 RX ADMIN — APIXABAN 2.5 MG: 5 TABLET, FILM COATED ORAL at 19:43

## 2020-03-22 RX ADMIN — SODIUM CHLORIDE: 9 INJECTION, SOLUTION INTRAVENOUS at 19:42

## 2020-03-22 RX ADMIN — CHOLECALCIFEROL TAB 25 MCG (1000 UNIT) 1000 UNITS: 25 TAB at 12:09

## 2020-03-22 RX ADMIN — METOPROLOL SUCCINATE 100 MG: 100 TABLET, EXTENDED RELEASE ORAL at 18:06

## 2020-03-22 ASSESSMENT — PAIN SCALES - PAIN ASSESSMENT IN ADVANCED DEMENTIA (PAINAD)
TOTALSCORE: 0
BREATHING: 0
BODYLANGUAGE: 0
CONSOLABILITY: 0
FACIALEXPRESSION: 0
NEGVOCALIZATION: 0
BREATHING: 0
TOTALSCORE: 0
NEGVOCALIZATION: 0
CONSOLABILITY: 0
FACIALEXPRESSION: 0
BODYLANGUAGE: 0

## 2020-03-22 ASSESSMENT — PAIN SCALES - GENERAL: PAINLEVEL_OUTOF10: 0

## 2020-03-22 NOTE — PLAN OF CARE
Problem: Falls - Risk of:  Goal: Will remain free from falls  Description: Will remain free from falls  Outcome: Met This Shift     Problem: Falls - Risk of:  Goal: Absence of physical injury  Description: Absence of physical injury  Outcome: Met This Shift     Problem: Airway Clearance - Ineffective:  Goal: Ability to maintain a clear airway will improve  Description: Ability to maintain a clear airway will improve  Outcome: Met This Shift     Problem: Gas Exchange - Impaired:  Goal: Levels of oxygenation will improve  Description: Levels of oxygenation will improve  Outcome: Met This Shift     Problem: Injury - Risk of, Physical Injury:  Goal: Will remain free from falls  Description: Will remain free from falls  Outcome: Met This Shift     Problem: Injury - Risk of, Physical Injury:  Goal: Absence of physical injury  Description: Absence of physical injury  Outcome: Met This Shift     Problem: Psychomotor Activity - Altered:  Goal: Absence of psychomotor disturbance signs and symptoms  Description: Absence of psychomotor disturbance signs and symptoms  Outcome: Met This Shift     Problem: Sleep Pattern Disturbance:  Goal: Appears well-rested  Description: Appears well-rested  Outcome: Met This Shift     Problem: Risk for Impaired Skin Integrity  Goal: Tissue integrity - skin and mucous membranes  Description: Structural intactness and normal physiological function of skin and  mucous membranes.   Outcome: Not Met This Shift     Problem: Fluid Volume - Imbalance:  Goal: Absence of imbalanced fluid volume signs and symptoms  Description: Absence of imbalanced fluid volume signs and symptoms  Outcome: Not Met This Shift     Problem: Confusion - Acute:  Goal: Absence of continued neurological deterioration signs and symptoms  Description: Absence of continued neurological deterioration signs and symptoms  Outcome: Not Met This Shift     Problem: Confusion - Acute:  Goal: Mental status will be restored to baseline  Description: Mental status will be restored to baseline  Outcome: Not Met This Shift     Problem: Discharge Planning:  Goal: Ability to perform activities of daily living will improve  Description: Ability to perform activities of daily living will improve  Outcome: Not Met This Shift     Problem: Discharge Planning:  Goal: Participates in care planning  Description: Participates in care planning  Outcome: Not Met This Shift     Problem: Mood - Altered:  Goal: Mood stable  Description: Mood stable  Outcome: Not Met This Shift     Problem: Sensory Perception - Impaired:  Goal: Able to refrain from responding to false sensory perceptions  Description: Able to refrain from responding to false sensory perceptions  Outcome: Not Met This Shift     Problem: Sensory Perception - Impaired:  Goal: Decrease in sensory misperception frequency  Description: Decrease in sensory misperception frequency  Outcome: Not Met This Shift     Problem: Sensory Perception - Impaired:  Goal: Demonstrates accurate environmental perceptions  Description: Demonstrates accurate environmental perceptions  Outcome: Not Met This Shift     Problem: Sensory Perception - Impaired:  Goal: Able to distinguish between reality-based and nonreality-based thinking  Description: Able to distinguish between reality-based and nonreality-based thinking  Outcome: Not Met This Shift

## 2020-03-22 NOTE — PROGRESS NOTES
motion artifact. PULMONARY ARTERIES: Evaluation is limited for pulmonary embolus. No filling defects to the proximal lobar levels. HEART/PERICARDIUM/GREAT VESSELS: Cardiac size is normal.  There is no pericardial effusion. The great vessels of the chest are normal in caliber. LYMPH NODES: No thoracic adenopathy by size criteria. NECK BASE/CHEST WALL/DIAPHRAGM: No soft tissue lesions or diaphragmatic abnormality. UPPER ABDOMEN: Multiple large cysts in the liver noted. . OSSEOUS STRUCTURES: Several blastic lesions are identified involving the vertebral bodies the most conspicuous involving the T8 vertebral body involving portions of the right rib and spinous process. 1. No evidence of pulmonary embolism. 2. Lower lobe linear subsegmental atelectasis. 3. Blastic lesions in the spine and involving several ribs suspicious for metastatic disease, possibly prostate. Recommend correlation with PSA. Us Abdomen Limited    Result Date: 3/6/2020  Reading location:  100 INDICATION: Liver liver lesions on chest CT FINDINGS: Sonographic evaluation right upper quadrant. Normal caliber gallbladder without intrinsic filling defect or mural thickening or pericholecystic fluid. Normal caliber bile ducts. Simple hepatic cysts measuring 54 mm and 67 mm corresponds to the CT findings. Visualized portions of pancreas unremarkable. Right renal collecting system normal caliber. No acute finding. Us Dup Lower Extremity Right Dali    Result Date: 3/5/2020  Location:200 Exam: US DUP LOWER EXTREMITY RIGHT DALI Comparison: None Indications: Discomfort Technique: Using real-time, color, and spectral Doppler waveform analysis, ultrasound imaging of the right lower extremity deep venous system was performed, from the common femoral vein to the proximal calf. Findings: The visualized veins of the deep venous system are grossly patent without an echogenic focus within them. These veins compress fully and demonstrate flow bilaterally.  No

## 2020-03-22 NOTE — PROGRESS NOTES
03/22/20 0834    piperacillin-tazobactam (ZOSYN) 3.375 g in dextrose 5 % 50 mL IVPB extended infusion (mini-bag)  3.375 g Intravenous Q12H Gladys Camargo MD   Stopped at 03/22/20 0725    And    0.9 % sodium chloride infusion   Intravenous Q12H Gladys Camargo MD   Stopped at 03/21/20 1930    sodium chloride flush 0.9 % injection 10 mL  10 mL Intravenous 2 times per day Gladys Camargo MD   10 mL at 03/22/20 0834    sodium chloride flush 0.9 % injection 10 mL  10 mL Intravenous PRN Gladys Camargo MD   10 mL at 03/14/20 0028    sodium chloride flush 0.9 % injection 10 mL  10 mL Intravenous 2 times per day Gladys Camargo MD   Stopped at 03/22/20 1032    sodium chloride flush 0.9 % injection 10 mL  10 mL Intravenous PRN Gladys Camargo MD        acetaminophen (TYLENOL) tablet 650 mg  650 mg Oral Q6H PRN Gladys Camargo MD   650 mg at 03/20/20 1437    polyethylene glycol (GLYCOLAX) packet 17 g  17 g Oral Daily PRN [place] MD Mary Jo        promethazine (PHENERGAN) tablet 12.5 mg  12.5 mg Oral Q6H PRN Gladys Camargo MD        Or    ondansetron (ZOFRAN) injection 4 mg  4 mg Intravenous Q6H PRN [place] MD Mary Jo        albuterol (PROVENTIL) nebulizer solution 2.5 mg  2.5 mg Nebulization Q4H PATRICE Camargo MD   2.5 mg at 03/22/20 0959    abiraterone acetate (ZYTIGA) 250 MG tablet TABS 1,000 mg  1,000 mg Oral Nightly Gladys Camargo MD   Stopped at 03/09/20 2100    acetaminophen (TYLENOL) tablet 325 mg  325 mg Oral Q6H PRN Gladys Camargo MD        [Held by provider] baclofen (LIORESAL) tablet 5 mg  5 mg Oral TID WC Gladys Camargo MD   5 mg at 03/08/20 1716    calcium-vitamin D 500-200 MG-UNIT per tablet 1 tablet  1 tablet Oral Daily Gladys Camargo MD   1 tablet at 03/22/20 0834    influenza A&B vaccine (FLUAD) injection 0.5 mL  0.5 mL Intramuscular Prior to discharge Gladys Camargo MD        metoprolol succinate (TOPROL XL) extended release tablet 100 mg  100 mg Oral Dinner Gladys Camargo MD   100 mg at 03/21/20 6284    tamsulosin (FLOMAX) capsule 0.4 mg  0.4 mg Oral Dinner Ilia Salmeron MD   0.4 mg at 03/21/20 1725    vitamin D (CHOLECALCIFEROL) tablet 1,000 Units  1,000 Units Oral Lunch Ilia Salmeron MD   1,000 Units at 03/22/20 1209       XR ABDOMEN FOR NG/OG/NE TUBE PLACEMENT   Final Result   Nasogastric tube in the stomach. MRI BRAIN WO CONTRAST   Final Result   1. No evidence of acute on chronic ischemia. 2. Large remote left MCA territory infarct. US DUP LOWER EXTREMITIES BILATERAL VENOUS   Final Result   No evidence of bilateral lower extremity deep venous   thrombus from common femoral vein to proximal calf. XR ABDOMEN FOR NG/OG/NE TUBE PLACEMENT   Final Result   NG tube tip within the stomach      XR CHEST PORTABLE   Final Result   Lines and tubes as described. IR FLUORO GUIDED CVA DEVICE PLMT/REPLACE/REMOVAL   Final Result   1. Successful placement of a temporary dialysis catheter via the right   internal jugular vein. IR ULTRASOUND GUIDANCE VASCULAR ACCESS   Final Result   Ultrasound guidance was provided during placement of a   PICC line. XR Urogram W WO Kub W WO Tomogram   Final Result   Intraoperative images as above. FL MODIFIED BARIUM SWALLOW W VIDEO   Final Result   1. Aspiration with thin liquids. 2. Please see speech therapist's report for a detailed description of   findings. XR CHEST STANDARD (2 VW)   Final Result   Lower lobe opacities are possibly infectious. US ABDOMEN LIMITED   Final Result   No acute finding. CTA CHEST W CONTRAST   Final Result   1. No evidence of pulmonary embolism. 2. Lower lobe linear subsegmental atelectasis. 3. Blastic lesions in the spine and involving several ribs suspicious   for metastatic disease, possibly prostate. Recommend correlation with   PSA. US DUP LOWER EXTREMITY RIGHT DALI   Final Result   No evidence of right lower extremity deep venous thrombus   from common femoral vein to proximal calf. CT Head WO Contrast   Final Result      Large remote left MCA territory infarct. No acute hemorrhage or mass. XR CHEST PORTABLE   Final Result   1. Left lower lobe airspace disease is likely infectious. Labs:    CBC:   Recent Labs     03/20/20  0802 03/21/20  0522 03/22/20  0537   WBC 7.8 7.0 6.8   HGB 9.0* 9.4* 9.6*    252 291        No results found for: IRON, TIBC, FERRITIN    Lab Results   Component Value Date    CALCIUM 7.9 (L) 03/22/2020    CALCIUM 7.7 (L) 03/21/2020    CALCIUM 8.0 (L) 03/20/2020    CAION 1.03 (L) 03/22/2020    CAION 1.07 (L) 03/21/2020    CAION 1.07 (L) 03/20/2020    PHOS 5.5 (H) 03/22/2020    PHOS 4.2 03/21/2020    PHOS 6.2 (H) 03/20/2020    MG 2.2 03/22/2020    MG 2.1 03/21/2020    MG 2.3 03/20/2020       BMP:   Recent Labs     03/20/20  0802 03/21/20  0522 03/22/20  0537    143 143   K 3.4* 3.6 3.3*    101 101   CO2 24 26 25   BUN 56* 29* 42*   CREATININE 6.1* 4.1* 5.6*   GLUCOSE 107* 90 97             Assessment: / Plan:    1.   Acute kidney injury with no recovery of renal function.  Acute kidney injury of undetermined etiology.  Patient probably with multiple factors playing a role including renal hypoperfusion in the setting of hypotension and concurrent use of ACE inhibitor's as well as use of multiple antibiotics and urinary obstruction including vancomycin toxicity as well as acute interstitial nephritis. Serum serology is negative Myeloperoxidase not elevated at 4. ANCA 1:80 with Serine protease-3 1 not elevated, C3&4 not low, Beta Globulin 0, Anti-DS DNA (-), JOHNSON (-) Hep B& C (-).  Urine output is marginally improved. No evidence renal recovery will continue RRT with IHD. PLAN:1. Next treatment 3/23    2.   Volume overload/fluid retention. Improved.   Attempt ultrafilteration as allowed by hemodynamics.     3.   Hypertension with chronic kidney disease stage I to stage IV.  Blood pressures are at target of  less than 130/80 mm Hg     4.   Hypokalemia.  Improved. Dialysate adjusted.     5.    Hypocalcemia. Ionized Ca++ low-correcting  with IHD    6.    Metabolic acidosis.  Improved. .     7.   Anemia.  Hemoglobin stable  Will follow.      8. Hypokalemia-will supplement today and use a 4K++ bath on dialysis    Electronically signed by Padma Irwin MD on 3/22/2020 at 12:36 PM

## 2020-03-22 NOTE — PROGRESS NOTES
ICU called to place IV due to multiple tries with no success for a site. ICU \"put him on the board\" to be tried when they are free.

## 2020-03-23 VITALS
WEIGHT: 198.41 LBS | OXYGEN SATURATION: 94 % | HEART RATE: 97 BPM | BODY MASS INDEX: 27.78 KG/M2 | DIASTOLIC BLOOD PRESSURE: 85 MMHG | TEMPERATURE: 98.1 F | SYSTOLIC BLOOD PRESSURE: 145 MMHG | HEIGHT: 71 IN | RESPIRATION RATE: 20 BRPM

## 2020-03-23 LAB
ANION GAP SERPL CALCULATED.3IONS-SCNC: 19 MMOL/L (ref 7–16)
BUN BLDV-MCNC: 49 MG/DL (ref 8–23)
CALCIUM IONIZED: 1.03 MMOL/L (ref 1.15–1.33)
CALCIUM SERPL-MCNC: 7.6 MG/DL (ref 8.6–10.2)
CHLORIDE BLD-SCNC: 103 MMOL/L (ref 98–107)
CO2: 25 MMOL/L (ref 22–29)
CREAT SERPL-MCNC: 6.2 MG/DL (ref 0.7–1.2)
GFR AFRICAN AMERICAN: 11
GFR NON-AFRICAN AMERICAN: 9 ML/MIN/1.73
GLUCOSE BLD-MCNC: 99 MG/DL (ref 74–99)
HCT VFR BLD CALC: 30.8 % (ref 37–54)
HEMOGLOBIN: 9.6 G/DL (ref 12.5–16.5)
MAGNESIUM: 2.2 MG/DL (ref 1.6–2.6)
MCH RBC QN AUTO: 29.3 PG (ref 26–35)
MCHC RBC AUTO-ENTMCNC: 31.2 % (ref 32–34.5)
MCV RBC AUTO: 93.9 FL (ref 80–99.9)
PDW BLD-RTO: 14.7 FL (ref 11.5–15)
PHOSPHORUS: 5.8 MG/DL (ref 2.5–4.5)
PLATELET # BLD: 291 E9/L (ref 130–450)
PMV BLD AUTO: 9.1 FL (ref 7–12)
POTASSIUM SERPL-SCNC: 3.6 MMOL/L (ref 3.5–5)
RBC # BLD: 3.28 E12/L (ref 3.8–5.8)
SODIUM BLD-SCNC: 147 MMOL/L (ref 132–146)
WBC # BLD: 5.7 E9/L (ref 4.5–11.5)

## 2020-03-23 PROCEDURE — 97110 THERAPEUTIC EXERCISES: CPT

## 2020-03-23 PROCEDURE — 6370000000 HC RX 637 (ALT 250 FOR IP): Performed by: INTERNAL MEDICINE

## 2020-03-23 PROCEDURE — 2580000003 HC RX 258: Performed by: INTERNAL MEDICINE

## 2020-03-23 PROCEDURE — 90935 HEMODIALYSIS ONE EVALUATION: CPT

## 2020-03-23 PROCEDURE — 6360000002 HC RX W HCPCS: Performed by: INTERNAL MEDICINE

## 2020-03-23 PROCEDURE — 97530 THERAPEUTIC ACTIVITIES: CPT

## 2020-03-23 PROCEDURE — 82330 ASSAY OF CALCIUM: CPT

## 2020-03-23 PROCEDURE — 36415 COLL VENOUS BLD VENIPUNCTURE: CPT

## 2020-03-23 PROCEDURE — 2500000003 HC RX 250 WO HCPCS: Performed by: INTERNAL MEDICINE

## 2020-03-23 PROCEDURE — 84100 ASSAY OF PHOSPHORUS: CPT

## 2020-03-23 PROCEDURE — 83735 ASSAY OF MAGNESIUM: CPT

## 2020-03-23 PROCEDURE — 85027 COMPLETE CBC AUTOMATED: CPT

## 2020-03-23 PROCEDURE — 80048 BASIC METABOLIC PNL TOTAL CA: CPT

## 2020-03-23 PROCEDURE — 94640 AIRWAY INHALATION TREATMENT: CPT

## 2020-03-23 RX ORDER — PROMETHAZINE HYDROCHLORIDE 12.5 MG/1
12.5 TABLET ORAL EVERY 6 HOURS PRN
Qty: 28 TABLET | Refills: 0 | Status: SHIPPED | OUTPATIENT
Start: 2020-03-23 | End: 2020-03-30

## 2020-03-23 RX ORDER — ALBUTEROL SULFATE 2.5 MG/3ML
2.5 SOLUTION RESPIRATORY (INHALATION)
Qty: 120 EACH | Refills: 3 | Status: SHIPPED | OUTPATIENT
Start: 2020-03-23 | End: 2022-07-06

## 2020-03-23 RX ORDER — FUROSEMIDE 10 MG/ML
80 INJECTION INTRAMUSCULAR; INTRAVENOUS 2 TIMES DAILY
Status: DISCONTINUED | OUTPATIENT
Start: 2020-03-23 | End: 2020-03-23 | Stop reason: HOSPADM

## 2020-03-23 RX ORDER — POLYETHYLENE GLYCOL 3350 17 G/17G
17 POWDER, FOR SOLUTION ORAL DAILY PRN
Qty: 527 G | Refills: 1 | Status: SHIPPED | OUTPATIENT
Start: 2020-03-23 | End: 2020-04-22

## 2020-03-23 RX ORDER — ONDANSETRON 2 MG/ML
4 INJECTION INTRAMUSCULAR; INTRAVENOUS EVERY 6 HOURS PRN
Qty: 84 ML | Refills: 0
Start: 2020-03-23 | End: 2022-07-06

## 2020-03-23 RX ORDER — HALOPERIDOL 5 MG/ML
0.5 INJECTION INTRAMUSCULAR EVERY 8 HOURS PRN
Qty: 2 ML | Refills: 0 | Status: SHIPPED | OUTPATIENT
Start: 2020-03-23 | End: 2022-07-06

## 2020-03-23 RX ADMIN — HYDROCORTISONE SODIUM SUCCINATE 20 MG: 100 INJECTION, POWDER, FOR SOLUTION INTRAMUSCULAR; INTRAVENOUS at 05:32

## 2020-03-23 RX ADMIN — FAMOTIDINE 20 MG: 10 INJECTION, SOLUTION INTRAVENOUS at 10:17

## 2020-03-23 RX ADMIN — MULTIPLE VITAMINS W/ MINERALS TAB 1 TABLET: TAB at 10:16

## 2020-03-23 RX ADMIN — ALBUTEROL SULFATE 2.5 MG: 2.5 SOLUTION RESPIRATORY (INHALATION) at 09:26

## 2020-03-23 RX ADMIN — MICONAZOLE NITRATE: 2 OINTMENT TOPICAL at 10:18

## 2020-03-23 RX ADMIN — APIXABAN 2.5 MG: 5 TABLET, FILM COATED ORAL at 10:17

## 2020-03-23 RX ADMIN — ALBUTEROL SULFATE 2.5 MG: 2.5 SOLUTION RESPIRATORY (INHALATION) at 06:07

## 2020-03-23 RX ADMIN — HYDROCORTISONE SODIUM SUCCINATE 20 MG: 100 INJECTION, POWDER, FOR SOLUTION INTRAMUSCULAR; INTRAVENOUS at 17:35

## 2020-03-23 RX ADMIN — PIPERACILLIN AND TAZOBACTAM 3.38 G: 3; .375 INJECTION, POWDER, FOR SOLUTION INTRAVENOUS at 01:40

## 2020-03-23 RX ADMIN — SODIUM CHLORIDE: 9 INJECTION, SOLUTION INTRAVENOUS at 05:30

## 2020-03-23 RX ADMIN — PIPERACILLIN AND TAZOBACTAM 3.38 G: 3; .375 INJECTION, POWDER, FOR SOLUTION INTRAVENOUS at 16:08

## 2020-03-23 RX ADMIN — CHOLECALCIFEROL TAB 25 MCG (1000 UNIT) 1000 UNITS: 25 TAB at 10:16

## 2020-03-23 RX ADMIN — OYSTER SHELL CALCIUM WITH VITAMIN D 1 TABLET: 500; 200 TABLET, FILM COATED ORAL at 10:16

## 2020-03-23 RX ADMIN — Medication 10 ML: at 10:17

## 2020-03-23 ASSESSMENT — PAIN SCALES - PAIN ASSESSMENT IN ADVANCED DEMENTIA (PAINAD)
BREATHING: 0
NEGVOCALIZATION: 0
FACIALEXPRESSION: 0
BREATHING: 0
TOTALSCORE: 0
FACIALEXPRESSION: 0
BODYLANGUAGE: 0
TOTALSCORE: 0
CONSOLABILITY: 0
NEGVOCALIZATION: 0
BODYLANGUAGE: 0
CONSOLABILITY: 0

## 2020-03-23 NOTE — PLAN OF CARE
Problem: Falls - Risk of:  Goal: Will remain free from falls  Description: Will remain free from falls  3/23/2020 1227 by Etelvina Baxter RN  Outcome: Met This Shift     Problem: Falls - Risk of:  Goal: Absence of physical injury  Description: Absence of physical injury  3/23/2020 1227 by Etelvina Baxter RN  Outcome: Met This Shift     Problem: Gas Exchange - Impaired:  Goal: Levels of oxygenation will improve  Description: Levels of oxygenation will improve  Outcome: Met This Shift     Problem: Injury - Risk of, Physical Injury:  Goal: Will remain free from falls  Description: Will remain free from falls  3/23/2020 1227 by Etelvina Baxter RN  Outcome: Met This Shift     Problem: Injury - Risk of, Physical Injury:  Goal: Absence of physical injury  Description: Absence of physical injury  3/23/2020 1227 by Etelvina Baxter RN  Outcome: Met This Shift     Problem: Mood - Altered:  Goal: Mood stable  Description: Mood stable  Outcome: Met This Shift     Problem: Mood - Altered:  Goal: Absence of abusive behavior  Description: Absence of abusive behavior  Outcome: Met This Shift     Problem: Risk for Impaired Skin Integrity  Goal: Tissue integrity - skin and mucous membranes  Description: Structural intactness and normal physiological function of skin and  mucous membranes.   3/23/2020 1227 by Etelvina Baxter RN  Outcome: Not Met This Shift     Problem: Airway Clearance - Ineffective:  Goal: Ability to maintain a clear airway will improve  Description: Ability to maintain a clear airway will improve  3/23/2020 1227 by Etelvina Baxter RN  Outcome: Not Met This Shift     Problem: Fluid Volume - Imbalance:  Goal: Absence of imbalanced fluid volume signs and symptoms  Description: Absence of imbalanced fluid volume signs and symptoms  Outcome: Not Met This Shift     Problem: Confusion - Acute:  Goal: Absence of continued neurological deterioration signs and symptoms  Description: Absence of continued neurological deterioration signs and symptoms  Outcome: Not Met This Shift     Problem: Confusion - Acute:  Goal: Mental status will be restored to baseline  Description: Mental status will be restored to baseline  Outcome: Not Met This Shift     Problem: Discharge Planning:  Goal: Ability to perform activities of daily living will improve  Description: Ability to perform activities of daily living will improve  Outcome: Not Met This Shift     Problem: Discharge Planning:  Goal: Participates in care planning  Description: Participates in care planning  Outcome: Not Met This Shift     Problem: Psychomotor Activity - Altered:  Goal: Absence of psychomotor disturbance signs and symptoms  Description: Absence of psychomotor disturbance signs and symptoms  Outcome: Not Met This Shift     Problem: Sensory Perception - Impaired:  Goal: Demonstrations of improved sensory functioning will increase  Description: Demonstrations of improved sensory functioning will increase  Outcome: Not Met This Shift     Problem: Sensory Perception - Impaired:  Goal: Able to refrain from responding to false sensory perceptions  Description: Able to refrain from responding to false sensory perceptions  Outcome: Not Met This Shift     Problem: Sensory Perception - Impaired:  Goal: Able to distinguish between reality-based and nonreality-based thinking  Description: Able to distinguish between reality-based and nonreality-based thinking  Outcome: Not Met This Shift     Problem: Sleep Pattern Disturbance:  Goal: Appears well-rested  Description: Appears well-rested  Outcome: Not Met This Shift

## 2020-03-23 NOTE — PROGRESS NOTES
[facility] Quality Flow/Interdisciplinary Rounds Progress Note        Quality Flow Rounds held on March 23, 2020    Disciplines Attending:  Bedside Nurse, ,  and Nursing Unit Leadership    Lis Woodard was admitted on 3/5/2020  7:37 AM    Anticipated Discharge Date:  Expected Discharge Date: 03/23/20    Disposition:    Montez Score:  Montez Scale Score: 13    Readmission Risk              Risk of Unplanned Readmission:        39           Discussed patient goal for the day, patient clinical progression, and barriers to discharge.   The following Goal(s) of the Day/Commitment(s) have been identified:  Activity progression, HD, peer to peer to [facility] today, RA, TS, IV ATX      Arne Canavan  March 23, 2020

## 2020-03-23 NOTE — PROGRESS NOTES
Nephrology Progress Note  3/23/2020 5:44 PM  Subjective:   Admit Date: 3/5/2020  PCP: Cody Ellis MD    Interval History: Sitting up in bed comfortably watching TV not on oxygen denying pain and denying shortness of breath has good appetite with no nausea vomiting or diarrhea. Had short hemodialysis treatment earlier today because of poor blood flow from has temporary internal jugular dialysis catheter. IR is consulted for a tunneled dialysis catheter. Diet: DIET DYSPHAGIA PUREED; Dysphagia Soft and Bite-Sized; Moderately Thick (Honey)  Dietary Nutrition Supplements: Other Oral Supplement (see comment)    Data:     Scheduled Meds:   miconazole nitrate   Topical BID    therapeutic multivitamin-minerals  1 tablet Oral Daily    apixaban  2.5 mg Oral BID    fluconazole  200 mg Intravenous Q24H    hydrocortisone sodium succinate PF  20 mg Intravenous BID    famotidine (PEPCID) injection  20 mg Intravenous Daily    piperacillin-tazobactam  3.375 g Intravenous Q12H    sodium chloride flush  10 mL Intravenous 2 times per day    sodium chloride flush  10 mL Intravenous 2 times per day    albuterol  2.5 mg Nebulization Q4H WA    abiraterone acetate  1,000 mg Oral Nightly    [Held by provider] baclofen  5 mg Oral TID     calcium-vitamin D  1 tablet Oral Daily    influenza A&B vaccine  0.5 mL Intramuscular Prior to discharge    metoprolol succinate  100 mg Oral Dinner    tamsulosin  0.4 mg Oral Dinner    Vitamin D  1,000 Units Oral Lunch     Continuous Infusions:   sodium chloride Stopped (03/23/20 0730)     PRN Meds:haloperidol lactate, anticoagulant sodium citrate, sodium chloride flush, sodium chloride flush, acetaminophen **OR** [DISCONTINUED] acetaminophen, polyethylene glycol, promethazine **OR** ondansetron, acetaminophen  I/O last 3 completed shifts: In: 180 [P.O.:30; IV Piggyback:150]  Out: 650 [Urine:650]  I/O this shift:   In: 400   Out: 1200     Intake/Output Summary (Last 24 hours) at 3/23/2020 1744  Last data filed at 3/23/2020 1709  Gross per 24 hour   Intake 580 ml   Output 1850 ml   Net -1270 ml     CBC:   Recent Labs     03/21/20  0522 03/22/20  0537 03/23/20  0510   WBC 7.0 6.8 5.7   HGB 9.4* 9.6* 9.6*    291 291     BMP:    Recent Labs     03/21/20  0522 03/22/20  0537 03/23/20  0510    143 147*   K 3.6 3.3* 3.6    101 103   CO2 26 25 25   BUN 29* 42* 49*   CREATININE 4.1* 5.6* 6.2*   GLUCOSE 90 97 99     Protein/ Albumin:    Lab Results   Component Value Date    LABALBU 2.1 (L) 03/11/2020        Objective:     Vitals: BP (!) 145/85   Pulse 97   Temp 98.1 °F (36.7 °C)   Resp 20   Ht 5' 11\" (1.803 m)   Wt 198 lb 6.6 oz (90 kg)   SpO2 94%   BMI 27.67 kg/m²   General appearance: Awake alert and oriented not in distress not on oxygen  Lungs: Good air movement  Heart: No S3, No rub  Abdomen: Lax, soft No tenderness  L. Extremities: No edema    Assessment & Plan:     Acute kidney injury requiring initiation of hemodialysis likely reflecting acute tubular injury. The patient has Tobin catheter and started to make some urine am hoping for improvement in serum creatinine meanwhile, plan is to continue hemodialysis await IR to place an IJ tunneled dialysis catheter    Patient volume status seems to be improving he does not have edema and he is not short of breath not on oxygen. I started loop diuretic today as he started to make some urine    Hypertension in the context of acute kidney injury: May improve with IV Lasix if diuresis improves    Mild hyponatremia: Patient is awake and has access to free water to drink. Expect improvement    Very mild hypocalcemia: On supplement, recheck ionized calcium    Hyperphosphatemia: Patient had hemodialysis earlier today I will keep him on calcium-based phosphate binder    Stable anemia      This note was created using voice recognition software.     Electronically signed by Mak Kyle MD on 3/23/2020 at 5:44 PM

## 2020-03-23 NOTE — PROGRESS NOTES
Physical Therapy Treatment Note    Room #:  3312/8270-71  Patient Name: Kevin Garsia  YOB: 1945  MRN: [id number]    Referring Provider: Ilia Salmeron MD    Date of Service: 3/23/2020    Evaluating Physical Therapist:  [place] [id number]     Diagnosis:   HCAP (healthcare-associated pneumonia) [J18.9]       Patient Active Problem List   Diagnosis    Cerebrovascular accident (CVA) due to occlusion of left carotid artery [facility]    Dysphagia due to recent stroke    Paroxysmal atrial fibrillation ([address]    Prostate cancer ([address]    Cerebral contusion ([address]    Subarachnoid hemorrhage ([address]    HCAP (healthcare-associated pneumonia)    Respiratory arrest ([address]    Acute respiratory failure with hypoxia ([address]    Encephalopathy acute    Palliative care encounter    Goals of care, counseling/discussion    DNR (do not resuscitate) discussion    Bone metastases ([facility]       Tentative placement recommendation: Home Health Physical Therapy  or [facility] with Physical Therapy   Equipment recommendation: Patient has needed equipment       Prior Level of Function: Family reports patient ambulated independently with quad cane. Spouse reports patient had CVA 4 years ago that affected his R side. She reports he can only flex his R hip; which he uses to advance the R LE during gait.      Rehab Potential: good  for baseline    Past medical history:   Past Medical History:   Diagnosis Date    A-fib ([facility]     Arthritis     CVA (cerebral vascular accident) ([address] 11/15/2015    left MCA    Hyperlipemia     Prostate cancer ([facility]     mets to spine, currently on chemo     Past Surgical History:   Procedure Laterality Date    COLONOSCOPY      CYSTOSCOPY N/A 3/9/2020    CYSTOSCOPY, BILATERAL RETROGRADE PYELOGRAMS, URETHRAL DILATATION, REYEZ CATHETER INSERTION performed by Jaimie Barajas MD at [address]  11/20/15    sonal       Precautions: falls risk, urinary stand:  Not assessed Sit to stand: Not assessed       Sit to stand:  TBD   Ambulation    not assessed  not assessed   not assessed   Stair negotiation: ascended and descended   Not assessed   na    TBD   ROM Within functional limits L UE and LE. No AROM R UE and LE except R hip flexor activity; limited. ROM at baseline per spouse. Strength RUE: 0/5  LUE: 5/5  RLE:  0/5  LLE:  5/5  baseline per spouse. Balance Sitting EOB:  poor. Sat x 15 min +2 A. Able to hold position in 30 sec bouts. Dynamic Standing:  not assessed  Sitting EOB:  fair   Dynamic Standing:  na   Sitting EOB:  fair   Dynamic Standing: fair      Patient is Alert & Oriented x person and does not follow directions    Sensation:  Pt denies numbness and tingling to extremities  Edema:  yes right lower extremity and right upper extremity      Patient education  Patient educated on role of Physical Therapy, risks of immobility and plan of care     Patient response to education:   Pt verbalized understanding Pt demonstrated skill Pt requires further education in this area   No No Yes       ASSESSMENT:    Comments: Pt. Agreeable to eo. One on one sitter. Treatment:  Patient transferred to [facility] and sat up x 1o minutes with Mod A 2 for sitting balance. Pt. Performed trunk flexion and side bending x 5 reps each way. Therapist assisted with LE ex. Therapeutic Exercises:left A/A ankle pumps and long arc quad x 10 reps ea. Verbal/tactile cues for technique. Pt. With decreased c/o pain today when moving. At end of session, patient returned to supine and  in bed with alarm call light and phone within reach,  all lines and tubes intact, nursing notified. Patient would benefit from continued skilled Physical Therapy to improve functional independence and quality of life. PLAN:    Patient is making fair progress towards established goals. Will continue with current Plan of care.       Time in  1130  Time out 1154  Total Treatment Time 24 minutes    CPT codes:  Therapeutic activities (90590)   14 minutes  1 unit(s)  Therapeutic exercises (15405)   10 minutes  1 unit(s)    Neomia Show, PTA  Lic# [id number]

## 2020-03-23 NOTE — CARE COORDINATION
Ss note:3/23/2020.7:57 AM Per lianoelle Thomson with [facility], Peer to Peer to be done today 3-23-20 at 2:30 pm.  Will await outcome.    (Tentative outpt HD at [facility] is MWF at 3:10 pm with arrival at 2:55 pm. Per saumya that the facility.) sw following, IF pt requires an ambulance transfer to HD, this will be problematic due to cost IF pt requires SNF placement.) PATRICE Logan

## 2020-03-23 NOTE — CARE COORDINATION
Ss note:3/23/2020.3:31 PM Per Ilan Sapp with [address] Peer to Peer completed and decision overturned. Pt can admit to [facility] today. Ilan Sapp has contacted the 4th floor to obtain a discharge order. Ilan Sapp will also contact the pts wife to inform of room and transfer time.  PATRICE Hoyos

## 2020-03-23 NOTE — FLOWSHEET NOTE
03/23/20 1510   Vital Signs   BP (!) 145/85   Temp 98.1 °F (36.7 °C)   Pulse 97   Resp 20   Weight 198 lb 6.6 oz (90 kg)   Percent Weight Change -0.55   Post-Hemodialysis Assessment   Post-Treatment Procedures Blood returned;Catheter capped, clamped and heparinized x 2 ports   Machine Disinfection Process Acid/Vinegar Clean;Heat Disinfect; Exterior Machine Disinfection   Rinseback Volume (ml) 200 ml   Total Liters Processed (l/min) 38.2 l/min   Dialyzer Clearance Lightly streaked   Duration of Treatment (minutes) 178 minutes   Hemodialysis Intake (ml) 400 ml   Hemodialysis Output (ml) 1200 ml   NET Removed (ml) 800 ml   Tolerated Treatment Good   Patient Response to Treatment Catheter very positional. Treatment ended early due to frequent arterial alarms. IR consulted for permanent catheter.    Bilateral Breath Sounds Diminished;Clear

## 2020-03-23 NOTE — PROGRESS NOTES
Speech Language Pathology      NAME:  Blanca Witt  :  1945  DATE: 3/23/2020  ROOM:  7652/2898-92    Attempted to work with patient at noon patient out of room will continue POC    HCAP (healthcare-associated pneumonia) [J18.9]        Veronica Hardy MSCCC/SLP  Speech Language Pathologist  [id number]

## 2020-03-23 NOTE — PROGRESS NOTES
Attempted tx with pt, however pt unavailable d/t dialysis. Will attempt tx with pt at later time/date.  Sabas Sesay, [address]

## 2020-03-23 NOTE — DISCHARGE INSTR - COC
Prostate cancer ([facility] C61    Cerebral contusion ([facility] E62.702A    Subarachnoid hemorrhage (HCC) I60.9    HCAP (healthcare-associated pneumonia) J18.9    Respiratory arrest ([facility] R09.2    Acute respiratory failure with hypoxia ([facility] J96.01    Encephalopathy acute G93.40    Palliative care encounter Z51.5    Goals of care, counseling/discussion Z71.89    DNR (do not resuscitate) discussion Z71.89    Bone metastases (HCC) C79.51       Isolation/Infection:   Isolation          No Isolation        Patient Infection Status     None to display          Nurse Assessment:  Last Vital Signs: BP (!) 145/85   Pulse 97   Temp 98.1 °F (36.7 °C)   Resp 20   Ht 5' 11\" (1.803 m)   Wt 198 lb 6.6 oz (90 kg)   SpO2 94%   BMI 27.67 kg/m²     Last documented pain score (0-10 scale): Pain Level: 0  Last Weight:   Wt Readings from Last 1 Encounters:   03/23/20 198 lb 6.6 oz (90 kg)     Mental Status:  {IP PT MENTAL STATUS:20030}    IV Access:  {[facility] IV ACCESS:713258774}    Nursing Mobility/ADLs:  Walking   Dependent  Transfer  Dependent  Bathing  Dependent  Dressing  Dependent  Toileting  Dependent  Feeding  Dependent  Med Admin  Dependent  Med Delivery   crushed    Wound Care Documentation and Therapy:  Wound 03/07/20 Sacrum Mid (Active)   Dressing Status Clean;Dry; Intact 3/23/2020  8:30 AM   Dressing Changed Other (Comment) 3/21/2020 11:00 PM   Dressing/Treatment Foam 3/23/2020  8:30 AM   Wound Cleansed Rinsed/Irrigated with saline 3/10/2020  8:21 PM   Wound Length (cm) 2 cm 3/7/2020  8:00 PM   Wound Width (cm) 0.5 cm 3/7/2020  8:00 PM   Wound Depth (cm) 0.2 cm 3/7/2020  8:00 PM   Wound Surface Area (cm^2) 1 cm^2 3/7/2020  8:00 PM   Wound Volume (cm^3) 0.2 cm^3 3/7/2020  8:00 PM   Wound Assessment Red; Other (Comment) 3/20/2020  4:45 PM   Drainage Amount None 3/20/2020  4:45 PM   Drainage Description Serosanguinous 3/22/2020  7:00 PM   Odor None 3/20/2020  4:45 PM   Margins Attached edges 3/20/2020  4:45 PM   Milena-wound {Prognosis:7117333103}    Condition at Discharge: Tono Garcia Patient Condition:[id number]}    Rehab Potential (if transferring to Rehab): {Prognosis:3429979254}    Recommended Labs or Other Treatments After Discharge: ***    Physician Certification: I certify the above information and transfer of Justine Lobo  is necessary for the continuing treatment of the diagnosis listed and that he requires {Admit to Appropriate Level of Care:79395} for {GREATER/LESS:710762350} 30 days.      Update Admission H&P: {CHP DME Changes in LNZWS:108955039}    PHYSICIAN SIGNATURE:  {Esignature:906845752}

## 2020-03-23 NOTE — PROGRESS NOTES
Palliative Medicine   Progression of Care     Placed call to patient's wife. Reviewed notes in chart. Patient's wife updated on patient. Thanked me for the call.      Palliative Care following closely for support of patient and family   Abraham BAPTISTE-AFSHIN, AGAJUAN MANUELP-BC

## 2020-03-23 NOTE — PLAN OF CARE
Problem: Falls - Risk of:  Goal: Will remain free from falls  Description: Will remain free from falls  3/22/2020 2153 by Alfred Undrewood RN  Outcome: Met This Shift     Problem: Falls - Risk of:  Goal: Absence of physical injury  Description: Absence of physical injury  3/22/2020 2153 by Alfred Underwood RN  Outcome: Met This Shift

## 2020-03-24 NOTE — DISCHARGE SUMMARY
Discharge Summary    Riya Graves  :  1945  MRN:  [id number]    Admit date:  3/5/2020  Discharge date:  3/23/2020    Admitting Physician:  Bhavin Mishra MD    Discharge Diagnoses:    Patient Active Problem List   Diagnosis    Cerebrovascular accident (CVA) due to occlusion of left carotid artery ([address]    Dysphagia due to recent stroke    Paroxysmal atrial fibrillation ([address]    Prostate cancer ([address]    HCAP (healthcare-associated pneumonia)    Respiratory arrest ([address]    Acute respiratory failure with hypoxia ([address]    Acute kidney injury    Encephalopathy acute    Bone metastases ([address]       Consults:  Pulmonary, Nephrology, Urology, Hematology/Oncology    HPI/Hospital Course: This is a 70year old male patient with a past medical history of atrial fibrillation, hyperlipidemia, CVA, and prostate cancer diagnosed in  with mets to the spine and brain diagnosed in . He was recently admitted with altered mental status. He was found to have pneumonia. He also had cellulitis to the right lower extremity. He has expressive aphasia and right hemiparesis secondary to massive CVA in 2015. Cat scan on admission showed old infarct but no new process. He was treated with IV antibiotics. He had a swallow study done that showed aspiration. Diet was adjusted to thickened liquid diet. Hematology Oncology and Urology were consulted due to metastatic prostate cancer. On 2020 he had cystopanendoscopy and lopes catheter insertion done with Dr. Xiang Ley. He developed acute kidney failure most likely mulifactorial in origin. His renal function continued to decline. The patient was advised for renal replacement. He had an HD catheter put in and patient was started on dialysis. On 03/10/2020 patient went into respiratory distress with pulse ox readings down to 60's secondary to mucous plugging. He was intubated and transferred to ICU. On 2020 he was successfully extubated.   Over the known as:  CHOLECALCIFEROL        STOP taking these medications    atorvastatin 40 MG tablet  Commonly known as:  LIPITOR     influenza virus trivalent vaccine injection  Commonly known as:  FLUZONE     lisinopril 5 MG tablet  Commonly known as:  PRINIVIL;ZESTRIL     rivaroxaban 20 MG Tabs tablet  Commonly known as:  Mealorie Douglas           Where to Get Your Medications      These medications were sent to [address] [place] [address] [address]  [address] [place] [address] Pl[address] [facility] [address]    Phone:  [phone]   · albuterol (2.5 MG/3ML) 0.083% nebulizer solution  · apixaban 2.5 MG Tabs tablet  · haloperidol lactate 5 MG/ML injection  · miconazole nitrate 2 % Oint  · polyethylene glycol packet  · promethazine 12.5 MG tablet     You can get these medications from any pharmacy    Bring a paper prescription for each of these medications  · Full Kit Nebulizer Set Misc     Information about where to get these medications is not yet available    Ask your nurse or doctor about these medications  · ondansetron 4 MG/2ML injection         Disposition:   Transferred to [facility]    Follow-up:  I will continue to follow patient at [facility].     Note that over 30 minutes was spent in preparing discharge papers, discussing discharge with patient, medication review, etc.      Signed:  Paddy Cano  3/24/2020, 9:40 AM

## 2020-03-26 PROCEDURE — 76937 US GUIDE VASCULAR ACCESS: CPT | Performed by: RADIOLOGY

## 2020-03-26 PROCEDURE — 77001 FLUOROGUIDE FOR VEIN DEVICE: CPT | Performed by: RADIOLOGY

## 2020-03-26 PROCEDURE — 36558 INSERT TUNNELED CV CATH: CPT | Performed by: RADIOLOGY

## 2020-04-18 PROBLEM — N18.6 END STAGE RENAL DISEASE (HCC): Chronic | Status: ACTIVE | Noted: 2020-04-18

## 2020-04-19 ENCOUNTER — ANESTHESIA (OUTPATIENT)
Dept: OPERATING ROOM | Age: 75
End: 2020-04-19
Payer: COMMERCIAL

## 2020-04-19 ENCOUNTER — ANESTHESIA EVENT (OUTPATIENT)
Dept: OPERATING ROOM | Age: 75
End: 2020-04-19
Payer: COMMERCIAL

## 2020-04-19 ENCOUNTER — HOSPITAL ENCOUNTER (OUTPATIENT)
Age: 75
Setting detail: SURGERY ADMIT
Discharge: NURSING, REHAB OR LONG-TERM CARE | End: 2020-04-24
Attending: SURGERY
Payer: COMMERCIAL

## 2020-04-19 VITALS
DIASTOLIC BLOOD PRESSURE: 66 MMHG | SYSTOLIC BLOOD PRESSURE: 94 MMHG | OXYGEN SATURATION: 100 % | RESPIRATION RATE: 20 BRPM

## 2020-04-19 VITALS
RESPIRATION RATE: 22 BRPM | OXYGEN SATURATION: 96 % | HEART RATE: 86 BPM | DIASTOLIC BLOOD PRESSURE: 71 MMHG | TEMPERATURE: 97 F | SYSTOLIC BLOOD PRESSURE: 120 MMHG

## 2020-04-19 PROCEDURE — 3600000012 HC SURGERY LEVEL 2 ADDTL 15MIN: Performed by: SURGERY

## 2020-04-19 PROCEDURE — 7100000001 HC PACU RECOVERY - ADDTL 15 MIN: Performed by: SURGERY

## 2020-04-19 PROCEDURE — 7100000000 HC PACU RECOVERY - FIRST 15 MIN: Performed by: SURGERY

## 2020-04-19 PROCEDURE — 3600000002 HC SURGERY LEVEL 2 BASE: Performed by: SURGERY

## 2020-04-19 PROCEDURE — 6360000002 HC RX W HCPCS: Performed by: NURSE ANESTHETIST, CERTIFIED REGISTERED

## 2020-04-19 PROCEDURE — 3700000001 HC ADD 15 MINUTES (ANESTHESIA): Performed by: SURGERY

## 2020-04-19 PROCEDURE — 2500000003 HC RX 250 WO HCPCS: Performed by: SURGERY

## 2020-04-19 PROCEDURE — 2580000003 HC RX 258: Performed by: NURSE ANESTHETIST, CERTIFIED REGISTERED

## 2020-04-19 PROCEDURE — 3700000000 HC ANESTHESIA ATTENDED CARE: Performed by: SURGERY

## 2020-04-19 PROCEDURE — 36589 REMOVAL TUNNELED CV CATH: CPT | Performed by: SURGERY

## 2020-04-19 RX ORDER — PHENYLEPHRINE HYDROCHLORIDE 10 MG/ML
INJECTION INTRAVENOUS PRN
Status: DISCONTINUED | OUTPATIENT
Start: 2020-04-19 | End: 2020-04-19 | Stop reason: SDUPTHER

## 2020-04-19 RX ORDER — PROPOFOL 10 MG/ML
INJECTION, EMULSION INTRAVENOUS PRN
Status: DISCONTINUED | OUTPATIENT
Start: 2020-04-19 | End: 2020-04-19 | Stop reason: SDUPTHER

## 2020-04-19 RX ORDER — PROPOFOL 10 MG/ML
INJECTION, EMULSION INTRAVENOUS CONTINUOUS PRN
Status: DISCONTINUED | OUTPATIENT
Start: 2020-04-19 | End: 2020-04-19 | Stop reason: SDUPTHER

## 2020-04-19 RX ORDER — SODIUM CHLORIDE 9 MG/ML
INJECTION, SOLUTION INTRAVENOUS CONTINUOUS PRN
Status: DISCONTINUED | OUTPATIENT
Start: 2020-04-19 | End: 2020-04-19 | Stop reason: SDUPTHER

## 2020-04-19 RX ADMIN — PROPOFOL 30 MCG/KG/MIN: 10 INJECTION, EMULSION INTRAVENOUS at 11:20

## 2020-04-19 RX ADMIN — SODIUM CHLORIDE: 9 INJECTION, SOLUTION INTRAVENOUS at 11:20

## 2020-04-19 RX ADMIN — PHENYLEPHRINE HYDROCHLORIDE 50 MCG: 10 INJECTION INTRAVENOUS at 11:27

## 2020-04-19 RX ADMIN — PROPOFOL 20 MG: 10 INJECTION, EMULSION INTRAVENOUS at 11:20

## 2020-04-19 ASSESSMENT — PAIN SCALES - GENERAL
PAINLEVEL_OUTOF10: 0
PAINLEVEL_OUTOF10: 0

## 2020-04-19 ASSESSMENT — ENCOUNTER SYMPTOMS: SHORTNESS OF BREATH: 0

## 2020-04-19 NOTE — OP NOTE
Operative Note      Patient: Mai Salter  YOB: 1945  MRN: [id number]    Date of Procedure: 4/19/2020    Pre-Op Diagnosis: tesio not necessary    Post-Op Diagnosis: Same       Procedure(s):  CATHETER REMOVAL will do on patient bed    Surgeon(s):  Len Abreu MD    Assistant:   Resident: Rene Hooper MD    Anesthesia: Monitor Anesthesia Care    Estimated Blood Loss (mL): Minimal    Complications: None    Specimens:   * No specimens in log *    Implants:  * No implants in log *      Drains:   Urethral Catheter Double-lumen; Latex 16 fr (Active)   Catheter Indications Urology/Urologist seeing this patient or inserted indwelling catheter;Need for fluid management in critically ill patients in a critical care setting not able to be managed by other means such as BSC with hat, bedpan, urinal, condom catheter, or short term intermittent urethral catherization 3/23/2020  4:00 PM   Site Assessment No urethral drainage 3/23/2020  4:00 PM   Urine Color Yellow 3/23/2020  4:00 PM   Urine Appearance Clear 3/23/2020  4:00 PM   Urine Odor Malodorous 3/20/2020  4:45 PM   Output (mL) 300 mL 3/23/2020  5:15 AM       [REMOVED] NG/OG/NJ/NE Tube Nasogastric Right nostril (Removed)   Surrounding Skin Reddened 3/17/2020  8:00 PM   Securement device Yes 3/17/2020  8:00 PM   Status Other (Comment) 3/17/2020  8:00 PM   Placement Verified by X-Ray (Initial) 3/17/2020  8:00 PM   NG/OG/NJ/NE External Measurement (cm) 65 cm 3/17/2020  8:00 PM   Drainage Appearance Curtis;Bile 3/17/2020  8:00 PM   Tube Feeding Immune Enhancing 3/17/2020  8:00 PM   Tube Feeding Status Continuous 3/17/2020  8:00 PM   Rate/Schedule 50 mL/hr 3/17/2020  8:00 PM   Tube Feeding Intake (mL) 32 ml 3/18/2020  5:04 PM   Free Water Flush (mL) 30 mL 3/18/2020  5:04 PM   Free Water Rate 30cc q4hr  3/17/2020  8:00 PM   Residual Volume (ml) 0 ml 3/17/2020  8:00 PM   Output (mL) 50 ml 3/13/2020  1:44 PM       [REMOVED] Gastrostomy/Enterostomy/Jejunostomy
Gastrostomy/Enterostomy/Jejunostomy Percutaneous endoscopic gastrostomy (PEG) Umbilicus (Removed)       [REMOVED] Urethral Catheter (Removed)       [REMOVED] External Urinary Catheter (Removed)   Output (mL) 0 mL 3/8/2020 10:56 PM       Findings: Intact hemodialysis catheter    Detailed Description of Procedure:   Giovani Maikel  1945      DATE OF PROCEDURE: 4/19/2020     SURGEON: Debra Sanchez MD     ASSISTANT: Little King     PREOPERATIVE DIAGNOSIS: Chronic renal failure. With recovered renal function     POSTOPERATIVE DIAGNOSIS: Same    OPERATION:   [address]49  Removal of right internal jugular vein tunneled hemodialysis catheter. ANESTHESIA: Local     ESTIMATED BLOOD LOSS: Minimal     COMPLICATIONS: None    DESCRIPTION OF PROCEDURE: The patient was identified and the procedure was confirmed. The right neck, chest, and catheter were prepped and draped in the usual sterile fashion. Next, 1% lidocaine mixed with 0.25% Marcaine was used for local anesthesia. Through the catheter exit site the cuff was freed from the surrounding tissues. Traction was applied to the catheter and it was removed intact. Pressure was held for hemostasis and a sterile pressure dressing was applied to the exit site in the operating room. Needle, sponge, and instrument counts were reported as correct x2. The patient tolerated the procedure and was transferred to the recovery area in satisfactory condition.     CC : Jairo Cohen MD    Electronically signed by Enrique Robison MD on 4/19/2020 at 12:20 PM

## 2020-04-19 NOTE — H&P
Vascular Surgery Consult Note        Chief Complaint: Recovered kidney function request for removal of tunneled dialysis catheter     HISTORY OF PRESENT ILLNESS:                 The patient is a 70 y.o. male who presents to the hospital with multiple comorbidities. He has a history of a cerebrovascular accident. At this point we been consulted secondary to the removal of a tunneled dialysis catheter. He cannot give much of a history. Secondary to his stroke.   He does not move much of his extremities but appears pleasant and comfortable.     Past Medical History        Past Medical History:   Diagnosis Date    A-fib [facility])      Arthritis      CVA [facility]cerebral vascular accident) ([facility] 11/15/2015     left MCA    Hyperlipemia      Prostate cancer ([facility]       mets to spine, currently on chemo            Past Surgical History         Past Surgical History:   Procedure Laterality Date    COLONOSCOPY        CYSTOSCOPY N/A 3/9/2020     CYSTOSCOPY, BILATERAL RETROGRADE PYELOGRAMS, URETHRAL DILATATION, REYEZ CATHETER INSERTION performed by Erica Wren MD at [place] [address]   11/20/15     sonal            Current Medications:   Current Medication   No current facility-administered medications for this encounter.         Allergies:  Patient has no known allergies.     Social History               Socioeconomic History    Marital status:        Spouse name: Not on file    Number of children: Not on file    Years of education: Not on file    Highest education level: Not on file   Occupational History    Not on file   Social Needs    Financial resource strain: Not on file    Food insecurity       Worry: Not on file       Inability: Not on file    Transportation needs       Medical: Not on file       Non-medical: Not on file   Tobacco Use    Smoking status: Former Smoker       Types: Cigarettes       Last attempt to quit: 2015       Years since quittin.6    Smokeless to auscultation bilaterally no wheezes rales or rhonchi good respiratory changes noted. CARDIOVASCULAR: Currently regular rate and rhythm when he was examined. He does have a history of atrial fibrillation   ABDOMEN: Soft nontender no rebound or guarding, positive bowel sounds no pulsatile abdominal masses. No organosplenomegaly that I can appreciate. EXTREMITIES: No real purposeful movement of the upper or lower extremities no gross deformities. No gross signs of vascular ischemia  MUSKULOSKELETAL: Adequately aligned spine, range of motion appears to be intact with regards to the spine and upper and lower extremities. No joint tenderness or erythema. Normal muscular development. NEURO: Would not be thoroughly assessed secondary to stroke   psychiatric: Could not be thoroughly assessed secondary to his stroke        LABS:          Lab Results   Component Value Date     WBC 7.0 04/17/2020     HGB 9.9 (L) 04/17/2020     HCT 32.3 (L) 04/17/2020      04/17/2020     PROTIME 13.5 (H) 03/26/2020     INR 1.2 03/26/2020     APTT 30.5 03/10/2020     K 4.5 04/17/2020     BUN 13 04/17/2020     CREATININE 1.5 (H) 04/17/2020         RADIOLOGY:  No orders to display         IMPRESSION:       Active Hospital Problems     Diagnosis    [facility] [N18.6]         PLAN: #1 end-stage renal disease. At this point he is recovered his kidney function status. We have been asked to remove the tunneled dialysis catheter.     We will plan a tunneled dialysis catheter removal.  Consent will be achieved.   I discussed this case with the staff.       No change     Electronically signed by Gaurav Webber MD on 4/18/2020 at 9:00 AM

## 2020-04-19 NOTE — ANESTHESIA PRE PROCEDURE
Department of Anesthesiology  Preprocedure Note       Name:  Ericka Nieto   Age:  70 y.o.  :  1945                                          MRN:  [id number]         Date:  2020      Surgeon: Olman Greene):  Vernon Neal MD    Procedure: CATHETER REMOVAL will do on patient bed (N/A )    Medications prior to admission:   Prior to Admission medications    Medication Sig Start Date End Date Taking? Authorizing Provider   albuterol (PROVENTIL) (2.5 MG/3ML) 0.083% nebulizer solution Take 3 mLs by nebulization every 4 hours (while awake) 3/23/20   Radha Tejada MD   apixaban (ELIQUIS) 2.5 MG TABS tablet Take 1 tablet by mouth 2 times daily 3/23/20   Radha Tejada MD   haloperidol lactate (HALDOL) 5 MG/ML injection Inject 0.1 mLs into the muscle every 8 hours as needed for Agitation 3/23/20   Radha Tejada MD   miconazole nitrate 2 % OINT Apply topically 2 times daily 3/23/20   Radha Tejada MD   polyethylene glycol [facility]) packet Take 17 g by mouth daily as needed for Constipation 3/23/20 4/22/20  Radha Tejada MD   [facility] 4 MG/2ML injection Infuse 2 mLs intravenously every 6 hours as needed for Nausea or Vomiting 3/23/20   Radha Tejada MD   Respiratory Therapy Supplies (FULL KIT NEBULIZER SET) MISC Use as directed with nebulized medication.  3/23/20   Radha Tejada MD   Calcium Carb-Cholecalciferol (SM CALCIUM/VITAMIN D) 600-800 MG-UNIT TABS Take 1 tablet by mouth daily    Historical Provider, MD   vitamin D (CHOLECALCIFEROL) 25 MCG (1000 UT) TABS tablet Take 1,000 Units by mouth Daily with lunch    Historical Provider, MD   metoprolol succinate (TOPROL XL) 100 MG extended release tablet Take 100 mg by mouth Daily with supper    Historical Provider, MD   Ascorbic Acid (VITAMIN C PLUS WILD CAL HIPS PO) Take 1,000 mg by mouth Daily with lunch    Historical Provider, MD   predniSONE (DELTASONE) 5 MG tablet Take 5 mg by mouth every morning  20   Historical Provider, MD   abiraterone SJWZ OR    GASTROSTOMY TUBE PLACEMENT  11/20/15    sonal       Social History:    Social History     Tobacco Use    Smoking status: Former Smoker     Types: Cigarettes     Last attempt to quit: 2015     Years since quittin.6    Smokeless tobacco: Never Used   Substance Use Topics    Alcohol use: No     Alcohol/week: 0.0 standard drinks                                Counseling given: Not Answered      Vital Signs (Current): There were no vitals filed for this visit. BP Readings from Last 3 Encounters:   20 (!) 145/85   20 130/72   20 122/72       NPO Status:                                                                                 BMI:   Wt Readings from Last 3 Encounters:   20 198 lb 6.6 oz (90 kg)   20 210 lb (95.3 kg)   19 215 lb (97.5 kg)     There is no height or weight on file to calculate BMI.    CBC:   Lab Results   Component Value Date    WBC 7.0 2020    RBC 3.48 2020    HGB 9.9 2020    HCT 32.3 2020    MCV 92.8 2020    RDW 14.0 2020     2020       CMP:   Lab Results   Component Value Date     2020    K 4.5 2020    K 3.7 2020     2020    CO2 23 2020    BUN 13 2020    CREATININE 1.5 2020    GFRAA 55 2020    LABGLOM 46 2020    GLUCOSE 85 2020    PROT 7.5 2020    CALCIUM 8.4 2020    BILITOT 0.3 2020    ALKPHOS 136 2020    AST 21 2020    ALT 19 2020       POC Tests: No results for input(s): POCGLU, POCNA, POCK, POCCL, POCBUN, POCHEMO, POCHCT in the last 72 hours.     Coags:   Lab Results   Component Value Date    PROTIME 13.5 2020    INR 1.2 2020    APTT 30.5 03/10/2020       HCG (If Applicable): No results found for: PREGTESTUR, PREGSERUM, HCG, HCGQUANT     ABGs:   Lab Results   Component Value Date    PO2ART 47.5 2020    UHR0MOV 34.6 03/05/2020    DNR2YIP 23.5 03/05/2020        Type & Screen (If Applicable):  No results found for: LABABO, [address]    Anesthesia Evaluation  Patient summary reviewed  Airway: Mallampati: III  TM distance: >3 FB   Neck ROM: full  Mouth opening: > = 3 FB Dental:          Pulmonary: breath sounds clear to auscultation  (+) pneumonia: unresolved,      (-) shortness of breath                           Cardiovascular:  Exercise tolerance: no interval change,   (+) dysrhythmias: atrial fibrillation,       ECG reviewed  Rhythm: irregular  Rate: normal  Echocardiogram reviewed                  Neuro/Psych:   (+) CVA: residual symptoms,              ROS comment: Altered mental status GI/Hepatic/Renal:   (+) morbid obesity     (-) liver disease and no renal disease       Endo/Other:    (+) malignancy/cancer (Prostate cancer with metas to brain and spine). Abdominal:   (+) obese,         Vascular:   + PVD, aortic or cerebral, . Anesthesia Plan      MAC     ASA 4       Induction: intravenous. MIPS: Prophylactic antiemetics administered. Anesthetic plan and risks discussed with patient and healthcare power of . Plan discussed with CRNA.                   Bindu Musa MD   4/19/2020

## 2020-06-24 ENCOUNTER — HOSPITAL ENCOUNTER (EMERGENCY)
Age: 75
Discharge: HOME | End: 2020-06-24
Attending: EMERGENCY MEDICINE
Payer: COMMERCIAL

## 2020-06-24 ENCOUNTER — APPOINTMENT (OUTPATIENT)
Dept: GENERAL RADIOLOGY | Age: 75
End: 2020-06-24
Payer: COMMERCIAL

## 2020-06-24 VITALS
DIASTOLIC BLOOD PRESSURE: 88 MMHG | HEIGHT: 71 IN | OXYGEN SATURATION: 95 % | RESPIRATION RATE: 18 BRPM | SYSTOLIC BLOOD PRESSURE: 111 MMHG | TEMPERATURE: 98.9 F | WEIGHT: 205 LBS | HEART RATE: 96 BPM | BODY MASS INDEX: 28.7 KG/M2

## 2020-06-24 LAB
ANION GAP SERPL CALCULATED.3IONS-SCNC: 11 MMOL/L (ref 7–16)
BASOPHILS ABSOLUTE: 0.03 E9/L (ref 0–0.2)
BASOPHILS RELATIVE PERCENT: 0.4 % (ref 0–2)
BUN BLDV-MCNC: 13 MG/DL (ref 8–23)
CALCIUM SERPL-MCNC: 9.3 MG/DL (ref 8.6–10.2)
CHLORIDE BLD-SCNC: 100 MMOL/L (ref 98–107)
CO2: 28 MMOL/L (ref 22–29)
CREAT SERPL-MCNC: 1.1 MG/DL (ref 0.7–1.2)
EOSINOPHILS ABSOLUTE: 0.08 E9/L (ref 0.05–0.5)
EOSINOPHILS RELATIVE PERCENT: 1.1 % (ref 0–6)
GFR AFRICAN AMERICAN: >60
GFR NON-AFRICAN AMERICAN: >60 ML/MIN/1.73
GLUCOSE BLD-MCNC: 114 MG/DL (ref 74–99)
HCT VFR BLD CALC: 33.6 % (ref 37–54)
HEMOGLOBIN: 10.5 G/DL (ref 12.5–16.5)
IMMATURE GRANULOCYTES #: 0.03 E9/L
IMMATURE GRANULOCYTES %: 0.4 % (ref 0–5)
LYMPHOCYTES ABSOLUTE: 1.09 E9/L (ref 1.5–4)
LYMPHOCYTES RELATIVE PERCENT: 14.9 % (ref 20–42)
MCH RBC QN AUTO: 30.8 PG (ref 26–35)
MCHC RBC AUTO-ENTMCNC: 31.3 % (ref 32–34.5)
MCV RBC AUTO: 98.5 FL (ref 80–99.9)
MONOCYTES ABSOLUTE: 0.6 E9/L (ref 0.1–0.95)
MONOCYTES RELATIVE PERCENT: 8.2 % (ref 2–12)
NEUTROPHILS ABSOLUTE: 5.47 E9/L (ref 1.8–7.3)
NEUTROPHILS RELATIVE PERCENT: 75 % (ref 43–80)
PDW BLD-RTO: 14.9 FL (ref 11.5–15)
PLATELET # BLD: 271 E9/L (ref 130–450)
PMV BLD AUTO: 9.7 FL (ref 7–12)
POTASSIUM SERPL-SCNC: 4.3 MMOL/L (ref 3.5–5)
PRO-BNP: 3276 PG/ML (ref 0–125)
RBC # BLD: 3.41 E12/L (ref 3.8–5.8)
SODIUM BLD-SCNC: 139 MMOL/L (ref 132–146)
TROPONIN: 0.04 NG/ML (ref 0–0.03)
WBC # BLD: 7.3 E9/L (ref 4.5–11.5)

## 2020-06-24 PROCEDURE — 83880 ASSAY OF NATRIURETIC PEPTIDE: CPT

## 2020-06-24 PROCEDURE — 99285 EMERGENCY DEPT VISIT HI MDM: CPT

## 2020-06-24 PROCEDURE — 85025 COMPLETE CBC W/AUTO DIFF WBC: CPT

## 2020-06-24 PROCEDURE — 36415 COLL VENOUS BLD VENIPUNCTURE: CPT

## 2020-06-24 PROCEDURE — 84484 ASSAY OF TROPONIN QUANT: CPT

## 2020-06-24 PROCEDURE — 80048 BASIC METABOLIC PNL TOTAL CA: CPT

## 2020-06-24 PROCEDURE — 93005 ELECTROCARDIOGRAM TRACING: CPT | Performed by: EMERGENCY MEDICINE

## 2020-06-24 PROCEDURE — 71045 X-RAY EXAM CHEST 1 VIEW: CPT

## 2020-06-24 RX ORDER — ALBUTEROL SULFATE 90 UG/1
2 AEROSOL, METERED RESPIRATORY (INHALATION) EVERY 6 HOURS PRN
Qty: 1 INHALER | Refills: 0 | Status: SHIPPED | OUTPATIENT
Start: 2020-06-24 | End: 2022-07-06

## 2020-06-24 RX ORDER — FUROSEMIDE 20 MG/1
20 TABLET ORAL 2 TIMES DAILY
Qty: 10 TABLET | Refills: 0 | Status: SHIPPED | OUTPATIENT
Start: 2020-06-24 | End: 2022-07-06

## 2020-06-24 ASSESSMENT — PAIN SCALES - GENERAL: PAINLEVEL_OUTOF10: 6

## 2020-06-24 ASSESSMENT — ENCOUNTER SYMPTOMS: SHORTNESS OF BREATH: 1

## 2020-06-24 ASSESSMENT — PAIN DESCRIPTION - LOCATION: LOCATION: CHEST;HEAD

## 2020-06-24 ASSESSMENT — PAIN DESCRIPTION - PAIN TYPE: TYPE: ACUTE PAIN

## 2020-06-24 NOTE — ED PROVIDER NOTES
--------------------------------------------- PAST HISTORY ---------------------------------------------  Past Medical History:  has a past medical history [facility] Arthritis, CVA (cerebral vascular accident) ([facility] Hyperlipemia, and Prostate cancer ([facility] Past Surgical History:  has a past surgical history that includes Gastrostomy tube placement (11/20/15); Colonoscopy; Cystoscopy (N/A, 3/9/2020); and Catheter Removal (N/A, 4/19/2020). Social History:  reports that he quit smoking about 4 years ago. His smoking use included cigarettes. He has never used smokeless tobacco. He reports that he does not drink alcohol or use drugs. Family History: family history includes Cancer in his mother; Heart Disease in his father. The patients home medications have been reviewed. Allergies: Patient has no known allergies.     -------------------------------------------------- RESULTS -------------------------------------------------  Labs:  Results for orders placed or performed during the hospital encounter of 06/24/20   CBC auto differential   Result Value Ref Range    WBC 7.3 4.5 - 11.5 E9/L    RBC 3.41 (L) 3.80 - 5.80 E12/L    Hemoglobin 10.5 (L) 12.5 - 16.5 g/dL    Hematocrit 33.6 (L) 37.0 - 54.0 %    MCV 98.5 80.0 - 99.9 fL    MCH 30.8 26.0 - 35.0 pg    MCHC 31.3 (L) 32.0 - 34.5 %    RDW 14.9 11.5 - 15.0 fL    Platelets 414 787 - 802 E9/L    MPV 9.7 7.0 - 12.0 fL    Neutrophils % 75.0 43.0 - 80.0 %    Immature Granulocytes % 0.4 0.0 - 5.0 %    Lymphocytes % 14.9 (L) 20.0 - 42.0 %    Monocytes % 8.2 2.0 - 12.0 %    Eosinophils % 1.1 0.0 - 6.0 %    Basophils % 0.4 0.0 - 2.0 %    Neutrophils Absolute 5.47 1.80 - 7.30 E9/L    Immature Granulocytes # 0.03 E9/L    Lymphocytes Absolute 1.09 (L) 1.50 - 4.00 E9/L    Monocytes Absolute 0.60 0.10 - 0.95 E9/L    Eosinophils Absolute 0.08 0.05 - 0.50 E9/L    Basophils Absolute 0.03 0.00 - 0.20 M8/E   Basic Metabolic Panel   Result Value Ref Range    Sodium 139 132 - 146 mmol/L    Potassium 4.3 3.5 - 5.0 mmol/L    Chloride 100 98 - 107 mmol/L    CO2 28 22 - 29 mmol/L    Anion Gap 11 7 - 16 mmol/L    Glucose 114 (H) 74 - 99 mg/dL    BUN 13 8 - 23 mg/dL    CREATININE 1.1 0.7 - 1.2 mg/dL    GFR Non-African American >60 >=60 mL/min/1.73    GFR African American >60     Calcium 9.3 8.6 - 10.2 mg/dL   Troponin   Result Value Ref Range    Troponin 0.04 (H) 0.00 - 0.03 ng/mL   Brain Natriuretic Peptide   Result Value Ref Range    Pro-BNP 3,276 (H) 0 - 125 pg/mL   EKG 12 Lead   Result Value Ref Range    Ventricular Rate 92 BPM    Atrial Rate 78 BPM    QRS Duration 82 ms    Q-T Interval 370 ms    QTc Calculation (Bazett) 457 ms    R Axis -34 degrees    T Axis -32 degrees       Radiology:  XR CHEST PORTABLE   Final Result   No acute pulmonary process. ------------------------- NURSING NOTES AND VITALS REVIEWED ---------------------------  Date / Time Roomed:  6/24/2020  3:02 PM  ED Bed Assignment:  10/10    The nursing notes within the ED encounter and vital signs as below have been reviewed. /66   Pulse 95   Temp 98.9 °F (37.2 °C) (Oral)   Resp 24   Ht 5' 11\" (1.803 m)   Wt 205 lb (93 kg)   SpO2 94%   BMI 28.59 kg/m²   Oxygen Saturation Interpretation: Normal      ------------------------------------------ PROGRESS NOTES ------------------------------------------  I have spoken with the spouse and discussed todays results, in addition to providing specific details for the plan of care and counseling regarding the diagnosis and prognosis. Their questions are answered at this time and they are agreeable with the plan. I discussed at length with them reasons for immediate return here for re evaluation. They will followup with primary care by calling their office tomorrow.       --------------------------------- ADDITIONAL PROVIDER NOTES ---------------------------------  At this time the patient is without objective evidence of an acute process requiring hospitalization or inpatient management. They have remained hemodynamically stable throughout their entire ED visit and are stable for discharge with outpatient follow-up. The plan has been discussed in detail and they are aware of the specific conditions for emergent return, as well as the importance of follow-up. New Prescriptions    ALBUTEROL SULFATE HFA (PROAIR HFA) 108 (90 BASE) MCG/ACT INHALER    Inhale 2 puffs into the lungs every 6 hours as needed for Wheezing    FUROSEMIDE (LASIX) 20 MG TABLET    Take 1 tablet by mouth 2 times daily       Diagnosis:  1. Shortness of breath        Disposition:  Patient's disposition: Discharge to home  Patient's condition is stable.            Cheryl Gaspar DO  06/24/20 [id number]

## 2020-06-25 ENCOUNTER — CARE COORDINATION (OUTPATIENT)
Dept: CARE COORDINATION | Age: 75
End: 2020-06-25

## 2020-06-25 LAB
EKG ATRIAL RATE: 78 BPM
EKG Q-T INTERVAL: 370 MS
EKG QRS DURATION: 82 MS
EKG QTC CALCULATION (BAZETT): 457 MS
EKG R AXIS: -34 DEGREES
EKG T AXIS: -32 DEGREES
EKG VENTRICULAR RATE: 92 BPM

## 2020-06-25 PROCEDURE — 93010 ELECTROCARDIOGRAM REPORT: CPT | Performed by: INTERNAL MEDICINE

## 2020-06-25 NOTE — CARE COORDINATION
no  Patient's preferred e-mail: NA    Patient's preferred phone number: NA  Based on Loop alert triggers, patient will be contacted by nurse care manager for worsening symptoms. Plan for follow-up call in 3-5 days based on severity of symptoms and risk factors.

## 2020-06-29 ENCOUNTER — CARE COORDINATION (OUTPATIENT)
Dept: CARE COORDINATION | Age: 75
End: 2020-06-29

## 2020-07-06 ENCOUNTER — CARE COORDINATION (OUTPATIENT)
Dept: CARE COORDINATION | Age: 75
End: 2020-07-06

## 2020-07-06 NOTE — CARE COORDINATION
Patient contacted regarding COVID-19 risk and screening. Discussed COVID-19 related testing which was not done at this time. Test results were not done. Patient informed of results, if available? No.    Care Transition Nurse/ Ambulatory Care Manager contacted the family by telephone to perform follow-up assessment. Verified name and  with family as identifiers. Patient has following risk factors of: immunocompromised and chronic kidney disease. Symptoms reviewed with patient who verbalized the following symptoms: no new symptoms. Patient has an appt with Dr. Francisco Amador today. Will inquire again about nebulizer. Due to no new or worsening symptoms encounter was not routed to provider for escalation. Education provided regarding infection prevention, and signs and symptoms of COVID-19 and when to seek medical attention with family who verbalized understanding. Discussed exposure protocols and quarantine from [facility] at higher risk for severe illness  and given an opportunity for questions and concerns. The family agrees to contact the COVID-19 hotline [phone] or PCP office for questions related to their healthcare. CTN/ACM provided contact information for future reference. From CDC: Are you at higher risk for severe illness?  Wash your hands often.  Avoid close contact (6 feet, which is about two arm lengths) with people who are sick.  Put distance between yourself and other people if COVID-19 is spreading in your community.  Clean and disinfect frequently touched surfaces.  Avoid all cruise travel and non-essential air travel.  Call your healthcare professional if you have concerns about COVID-19 and your underlying condition or if you are sick. For more information on steps you can take to protect yourself, see CDC's How to [address] [place] [address] for follow-up call in 3-5 days based on severity of symptoms and risk factors.

## 2020-07-09 ENCOUNTER — CARE COORDINATION (OUTPATIENT)
Dept: CARE COORDINATION | Age: 75
End: 2020-07-09

## 2020-07-09 NOTE — CARE COORDINATION
You Patient resolved from the Care Transitions episode on 7/9/20   Discussed COVID-19 related testing which was not done at this time. Test results were not done. Patient informed of results, if available? No    Patient/family has been provided the following resources and education related to COVID-19:                         Signs, symptoms and red flags related to COVID-19            [facility] exposure and quarantine guidelines            [employer] exposure contact - [phone]            Contact for their local Department of Health                 Patient currently reports that the following symptoms have improved:  no new/worsening symptoms. Patient received nebulizer and has been using at home. Patient states he is breathing much better especially at night. No covid symptoms at this time. No further outreach scheduled with this CTN/ACM. Episode of Care resolved. Patient has this CTN/ACM contact information if future needs arise.

## 2020-07-10 ENCOUNTER — TELEPHONE (OUTPATIENT)
Dept: ADMINISTRATIVE | Age: 75
End: 2020-07-10

## 2020-07-10 NOTE — TELEPHONE ENCOUNTER
Called and spoke with the patient wife and scheduled the patient for follow up visit after botox. He is scheduled on 7-.

## 2020-07-21 ENCOUNTER — TELEMEDICINE (OUTPATIENT)
Dept: PHYSICAL MEDICINE AND REHAB | Age: 75
End: 2020-07-21
Payer: COMMERCIAL

## 2020-07-21 PROCEDURE — 99214 OFFICE O/P EST MOD 30 MIN: CPT | Performed by: PHYSICAL MEDICINE & REHABILITATION

## 2020-07-21 RX ORDER — FERROUS SULFATE 325(65) MG
TABLET ORAL
COMMUNITY
Start: 2020-07-06

## 2020-07-21 NOTE — PROGRESS NOTES
Florinda Yo D.O. [place] [facility]  [address] [facility] [place]  Phone: [phone]  Fax: [phone]        7/21/20    Chief Complaint   Patient presents with    Hand Pain     rated 7/10 follow up after botox    Leg Pain     rated 6/10 follow up after botox     Start time: 1:10  End time: 1:22  Services were provided through a video synchronous discussion virtually to substitute for in-person clinic visit through [address] The patient was advised of the risks, benefits and alternatives to telemedicine and agreed to proceed with this type of visit. Pursuant to the emergency declaration under the [address] [place] [address] waiver authority and the [employer] Act, this Virtual  Visit was conducted, with patient's consent, to reduce the patient's risk of exposure to COVID-19 and provide continuity of care for an established patient. The patient was also advised the privacy standards of a standard visit continue to apply to telemedicine visits. HPI:  Toi Andujar is a 70y.o. year old man seen today in follow up regarding spasticity. Interval history:  On 3/4/20, the patient had botox injection in the following muscles:     Right Upper extremity  Pectoralis major 50 units  Pronator teres 30 units  FCR 80 units  FDS 70 uniys  FDP 60 units  FCU 30 units    Right lower extremity  RF 50 units  Vastus medialis 50 units  Total units 420. Total waste 80. Since the last visit he was hospitalized 3/5/20 until April for pneumonia and then one month of rehab at [facility]. He became deconditioned and his wife is having increased difficulty with transfers and providing care. Hand hygiene has been significantly improved with the Botox injection and there has been no skin  Today, the pain is rated  7/10 where 0 is no pain and 10 is pain as bad as it can be.  The pain is located in the right arm and leg,  does not radiate  and is described as tight. This pain occurs all day. The symptoms have been unchanged since onset. Symptoms are exacerbated by touch and use. Factors which relieve the pain include Botox. Other associated symptoms include none. Otherwise, the pain assessment has not changed since the last visit. Past Medical History:   Diagnosis Date    A-fib [facility])     Arthritis     CVA (cerebral vascular accident) ([facility] [address] 11/15/2015    left MCA    Hyperlipemia     Prostate cancer ([facility] [address]     mets to spine, currently on chemo       Past Surgical History:   Procedure Laterality Date    CATHETER REMOVAL N/A 2020    CATHETER REMOVAL will do on patient bed performed by Amy Hardin MD at [address] N/A 3/9/2020    CYSTOSCOPY, BILATERAL RETROGRADE PYELOGRAMS, URETHRAL DILATATION, REYEZ CATHETER INSERTION performed by Genevieve Flowers MD at [address]  11/20/15    sonal       Social History     Tobacco Use    Smoking status: Former Smoker     Types: Cigarettes     Last attempt to quit: 2015     Years since quittin.9    Smokeless tobacco: Never Used   Substance Use Topics    Alcohol use: No     Alcohol/week: 0.0 standard drinks    Drug use: No       Family History   Problem Relation Age of Onset    Cancer Mother     Heart Disease Father        Current Outpatient Medications   Medication Sig Dispense Refill    albuterol (PROVENTIL) (2.5 MG/3ML) 0.083% nebulizer solution Take 3 mLs by nebulization every 4 hours (while awake) 120 each 3    miconazole nitrate 2 % OINT Apply topically 2 times daily 1 Tube 0    ondansetron (ZOFRAN) 4 MG/2ML injection Infuse 2 mLs intravenously every 6 hours as needed for Nausea or Vomiting 84 mL 0    Respiratory Therapy Supplies (FULL KIT NEBULIZER SET) MISC Use as directed with nebulized medication.  1 each 0    Calcium Carb-Cholecalciferol (SM CALCIUM/VITAMIN D) 600-800 MG-UNIT TABS Take 1 tablet by mouth daily      vitamin D (CHOLECALCIFEROL) 25 MCG (1000 UT) TABS tablet Take 1,000 Units by mouth Daily with lunch      metoprolol succinate (TOPROL XL) 100 MG extended release tablet Take 100 mg by mouth Daily with supper      Ascorbic Acid (VITAMIN C PLUS WILD CAL HIPS PO) Take 1,000 mg by mouth Daily with lunch      predniSONE (DELTASONE) 5 MG tablet Take 5 mg by mouth every morning       abiraterone acetate (ZYTIGA) 250 MG tablet Take 1,000 mg by mouth nightly       baclofen (LIORESAL) 5 mg TABS Take 5 mg by mouth 3 times daily (with meals)       tamsulosin (FLOMAX) 0.4 MG capsule Take 0.4 mg by mouth Daily with supper       acetaminophen (TYLENOL) 325 MG tablet Take 325 mg by mouth every 6 hours as needed for Pain or Fever       Multiple Vitamins-Minerals (THERAPEUTIC MULTIVITAMIN-MINERALS) tablet Take 1 tablet by mouth every morning       ferrous sulfate (IRON 325) 325 (65 Fe) MG tablet TAKE 1 TABLET BY MOUTH TWICE A DAY      albuterol sulfate HFA (PROAIR HFA) 108 (90 Base) MCG/ACT inhaler Inhale 2 puffs into the lungs every 6 hours as needed for Wheezing 1 Inhaler 0    furosemide (LASIX) 20 MG tablet Take 1 tablet by mouth 2 times daily (Patient not taking: Reported on 7/21/2020) 10 tablet 0    apixaban (ELIQUIS) 2.5 MG TABS tablet Take 1 tablet by mouth 2 times daily (Patient not taking: Reported on 7/21/2020) 60 tablet 0    haloperidol lactate (HALDOL) 5 MG/ML injection Inject 0.1 mLs into the muscle every 8 hours as needed for Agitation (Patient not taking: Reported on 7/21/2020) 2 mL 0     No current facility-administered medications for this visit. No Known Allergies    Review of Systems:  No new weakness, paresthesia, incontinence of bowel or bladder, saddle anesthesia, falls or gait dysfunction. Otherwise, per HPI. Physical Exam:   Respiratory rate is 20. GENERAL: The patient is in no apparent distress.  Body habitus is non-obese. HEENT: No rhinorrhea, sneezing, yawning, or lacrimation. No scleral icterus or conjunctival injection. SKIN: No piloerection. No tract marks. No rash. PSYCH: Mood and affect are appropriate. Hygiene appears appropriate. CARDIOVASCULAR There is no edema. RESPIRATORY: Respirations are regular and unlabored. There is no cyanosis. GASTROINTESTINAL: Soft abdomen, non-tender (patient elicited)  MSK: Spinal curvatures were normal in standing. Pelvis was level in standing. Active range of motion was limited due to stroke in the right arm and leg. There was no obvious joint effusion, deformity. The patient reports the  does not fell warm and there is no visible redness. Resting hand posture is in wrist and finger flexion. Wife is able to extend wrist and fingers. Neurologic: Awake, alert and oriented in three planes. Speech is non-fluent. + facial weakness. Hearing is intact for conversation. Pupils are equal and round. Right spastic hemiparesis. Gait was not assessed as requires assistance of 2. Due to this being a TeleHealth encounter, evaluation of the following organ systems is limited: Vitals/Constitutional/EENT/Resp/CV/GI//MS/Neuro/Skin/Heme-Lymph-Imm. Impression:   1. Cerebrovascular accident (CVA) due to occlusion of left carotid artery (HCC)    2. Spasticity    3. Impaired mobility and ADLs        Plan:  The patient has tried and failed: therapy, splinting, anti-spasticity medications including: Baclofen. PA for Botox spasticity using EMG guidance to maximize toxin effect, minimize risk to nearby neurovascular structures and minimize distant spread of toxin. The following muscles will be injected:     Right Upper extremity  Pectoralis major 50 units  Pronator teres 30 units  FCR 80 units  FDS 70 uniys  FDP 60 units  FCU 30 units    Right lower extremity  RF 50 units  Vastus medialis 50 units  Total units 420. Total waste 80.       Informed Consent:  The indications, risks, benefits, and  alternatives of onabotulinum toxin A were discussed with the patient. I explained to the patient the potential side effects including but not limited to: distant spread of toxin effect causing droopy eyelids, facial drooping, neck pain, headache, double vision, muscle pain/spasm/weakness/stiffness,  bronchitis,  injection site pain, increased blood pressure, hypersensitivity, anaphylaxis, difficulty swallowing, difficulty speaking, urinary incontinence and breathing difficulty. The patient is aware that swallowing and breathing difficulties can be life threatening and there have been reports of death in some cases where onabotulinum toxin was injected. The patient was advised of the expected benefit to include less headache days per month, and the anticipated duration of effect of 3 months. The patient was educated about the diagnosis, prognosis, indications, risks and benefits of treatment. An opportunity to ask questions was given to the patient and questions were answered. The patient agreed to proceed with the recommended treatment as described above. Follow up for Botox injection  Thank you for allowing me to participate in the care of your patient. Rojas Almaraz D.O., P.T.   Board Certified Physical Medicine and Rehabilitation  Board Certified Electrodiagnostic Medicine

## 2020-08-13 ENCOUNTER — OFFICE VISIT (OUTPATIENT)
Dept: PHYSICAL MEDICINE AND REHAB | Age: 75
End: 2020-08-13
Payer: COMMERCIAL

## 2020-08-13 VITALS — HEIGHT: 71 IN | TEMPERATURE: 97.2 F | BODY MASS INDEX: 28.59 KG/M2

## 2020-08-13 PROCEDURE — 95874 GUIDE NERV DESTR NEEDLE EMG: CPT | Performed by: PHYSICAL MEDICINE & REHABILITATION

## 2020-08-13 PROCEDURE — 64643 CHEMODENERV 1 EXTREM 1-4 EA: CPT | Performed by: PHYSICAL MEDICINE & REHABILITATION

## 2020-08-13 PROCEDURE — 64646 CHEMODENERV TRUNK MUSC 1-5: CPT | Performed by: PHYSICAL MEDICINE & REHABILITATION

## 2020-08-13 PROCEDURE — 64644 CHEMODENERV 1 EXTREM 5/> MUS: CPT | Performed by: PHYSICAL MEDICINE & REHABILITATION

## 2020-08-13 NOTE — PROGRESS NOTES
Alfie Beyer D.O. [place] [facility]  [address] [facility] [place]  Phone: [phone]  Fax: [phone]  8/14/2020    Chief Complaint   Patient presents with    Arm Pain     EMG-Guided Botox 500 Units for spasticity    Leg Pain       · Informed Consent:  The indications, risks, benefits, and  alternatives of onabotulinum toxin A were discussed with the patient. I explained to the patient the potential side effects including but not limited to: distant spread of toxin effect causing droopy eyelids, facial drooping, neck pain, headache, double vision, muscle pain/spasm/weakness/stiffness,  bronchitis,  injection site pain, increased blood pressure, hypersensitivity, anaphylaxis, difficulty swallowing, difficulty speaking, urinary incontinence and breathing difficulty. The patient is aware that swallowing and breathing difficulties can be life threatening and there have been reports of death in some cases where onabotulinum toxin was injected. The patient was advised of the expected benefit to include decreased spasticity in the injected muscles, and the anticipated duration of effect of 3 months. A permit was signed and scanned into the media. · Last injection: 3/4/20  · Taking anticoagulants/antiplatelets: Yes  · Diabetic: No  · Febrile/active infection: No    · Procedure note: After obtaining consent,  EMG guided onabotulinum toxin A injection was performed of the right upper and lower extremities at the locations and doses listed below. units of onabotulinum toxin A was reconstituted using 4 cc of preservative free normal saline ( 5 units per 0.1 ml). EMG guidance was necessary to adequately localize muscle due to avoid nearby neurovascular structures and  to maximize the response to the toxin. At each injection site, the skin was prepared with alcohol and using a no touch technique the Myoject needle was directed to the desired muscle.  Negative aspiration was performed at each site prior to injection. Right Upper extremity  Pectoralis major 50 units  Pronator teres 30 units  FCR 80 units  FDS 70 uniys  FDP 60 units  FCU 30 units  Intrinsic hand 20 units at 4 sites    Right lower extremity  RF 50 units  Vastus medialis 50 units  Total units 440. Total waste 60. · Adequate hemostasis was obtained and bandages were applied to the injection sites. The patient was monitored clinically in the office after the injection and left the office without incident. The patient was educated in post-procedure care including ice 20 minutes on/20 minutes off prn pain/bruising, call if any fevers chills, night sweats, drainage, increased pain, weakness, difficulty breathing or swallowing. The patient was advised to anticipate the Botox to start working in about 2 weeks.  follow up 6 weeks      Margaret Griggs D.O., P.T.   Board Certified Physical Medicine and Rehabilitation  Board Certified Electrodiagnostic Medicine    Administrations This Visit     Onabotulinumtoxin A (BOTOX (COSMETIC)) injection 400 Units     Admin Date  08/13/2020  11:50 Action  Given Dose  400 Units Route  Intramuscular Site  Other Administered By  Connor Ayala MA    Ordering Provider:  Jatin Coulter DO    NDC:  5968-3394-17    Lot#:  [id number]    :  Rudy Woods    Patient Supplied?:  No    Comments:  exp: 3/2023          onabotulinumtoxin A (BOTOX) injection 100 Units     Admin Date  08/13/2020  11:51 Action  Given Dose  100 Units Route  Intramuscular Site  Other Administered By  Connor Ayala MA    Ordering Provider:  Jatin Coulter DO    NDC:  9997-8968-63    Lot#:  [id number]    :  Vonrylande Woods    Patient Supplied?:  No    Comments:  EXP: 02/2023

## 2020-09-25 ENCOUNTER — TELEMEDICINE (OUTPATIENT)
Dept: PHYSICAL MEDICINE AND REHAB | Age: 75
End: 2020-09-25
Payer: OTHER GOVERNMENT

## 2020-09-25 PROCEDURE — 99213 OFFICE O/P EST LOW 20 MIN: CPT | Performed by: PHYSICAL MEDICINE & REHABILITATION

## 2020-09-25 NOTE — PROGRESS NOTES
Suzie Cordero D.O. [place] [facility]  [address] [facility] [place]  Phone: [phone]  Fax: [phone]        9/25/20    Chief Complaint   Patient presents with    Leg Pain     6 week follow up after botox pain level 4     Start time: 2:36  End time: 2:47  Services were provided through a video synchronous discussion virtually to substitute for in-person clinic visit through [address] The patient was advised of the risks, benefits and alternatives to telemedicine and agreed to proceed with this type of visit. Pursuant to the emergency declaration under the [facility], [facility] waiver authority and the [employer] Act, this Virtual  Visit was conducted, with patient's consent, to reduce the patient's risk of exposure to COVID-19 and provide continuity of care for an established patient. The patient was also advised the privacy standards of a standard visit continue to apply to telemedicine visits. HPI:  Fidel Riggins is a 70y.o. year old man seen today in follow up regarding spasticity. Interval history:  On 8/13/20, the patient had botox injection in the following muscles:   Right Upper extremity  Pectoralis major 50 units  Pronator teres 30 units  FCR 80 units  FDS 70 uniys  FDP 60 units  FCU 30 units  Intrinsic hand 20 units at 4 sites    Right lower extremity  RF 50 units  Vastus medialis 50 units  Total units 440. Total waste 60. Since the last visit he was hospitalized 3/5/20 until April for pneumonia and then one month of rehab at [facility]. He became deconditioned and his wife is having increased difficulty with transfers and providing care. Hand hygiene has been significantly improved with the Botox injection and there has been no skin  Today, the pain is rated  7/10 where 0 is no pain and 10 is pain as bad as it can be.  The pain is located in Agitation 2 mL 0    miconazole nitrate 2 % OINT Apply topically 2 times daily 1 Tube 0    ondansetron (ZOFRAN) 4 MG/2ML injection Infuse 2 mLs intravenously every 6 hours as needed for Nausea or Vomiting 84 mL 0    Respiratory Therapy Supplies (FULL KIT NEBULIZER SET) MISC Use as directed with nebulized medication. 1 each 0    Calcium Carb-Cholecalciferol ( CALCIUM/VITAMIN D) 600-800 MG-UNIT TABS Take 1 tablet by mouth daily      vitamin D (CHOLECALCIFEROL) 25 MCG (1000 UT) TABS tablet Take 1,000 Units by mouth Daily with lunch      metoprolol succinate (TOPROL XL) 100 MG extended release tablet Take 100 mg by mouth Daily with supper      Ascorbic Acid (VITAMIN C PLUS WILD CAL HIPS PO) Take 1,000 mg by mouth Daily with lunch      predniSONE (DELTASONE) 5 MG tablet Take 5 mg by mouth every morning       abiraterone acetate (ZYTIGA) 250 MG tablet Take 1,000 mg by mouth nightly       baclofen (LIORESAL) 5 mg TABS Take 5 mg by mouth 3 times daily (with meals)       tamsulosin (FLOMAX) 0.4 MG capsule Take 0.4 mg by mouth Daily with supper       acetaminophen (TYLENOL) 325 MG tablet Take 325 mg by mouth every 6 hours as needed for Pain or Fever       Multiple Vitamins-Minerals (THERAPEUTIC MULTIVITAMIN-MINERALS) tablet Take 1 tablet by mouth every morning       albuterol sulfate HFA (PROAIR HFA) 108 (90 Base) MCG/ACT inhaler Inhale 2 puffs into the lungs every 6 hours as needed for Wheezing 1 Inhaler 0     No current facility-administered medications for this visit. No Known Allergies    Review of Systems:  No new weakness, paresthesia, incontinence of bowel or bladder, saddle anesthesia, falls or gait dysfunction. Otherwise, per HPI. Physical Exam:   Respiratory rate is 20. GENERAL: The patient is in no apparent distress. Body habitus is non-obese. HEENT: No rhinorrhea, sneezing, yawning, or lacrimation. No scleral icterus or conjunctival injection. SKIN: No piloerection. No tract marks. intermedius 25 units    Total units 500. Total waste 0. Informed Consent:  The indications, risks, benefits, and  alternatives of onabotulinum toxin A were discussed with the patient. I explained to the patient the potential side effects including but not limited to: distant spread of toxin effect causing droopy eyelids, facial drooping, neck pain, headache, double vision, muscle pain/spasm/weakness/stiffness,  bronchitis,  injection site pain, increased blood pressure, hypersensitivity, anaphylaxis, difficulty swallowing, difficulty speaking, urinary incontinence and breathing difficulty. The patient is aware that swallowing and breathing difficulties can be life threatening and there have been reports of death in some cases where onabotulinum toxin was injected. The patient was advised of the expected benefit to include less headache days per month, and the anticipated duration of effect of 3 months. The patient was educated about the diagnosis, prognosis, indications, risks and benefits of treatment. An opportunity to ask questions was given to the patient and questions were answered. The patient agreed to proceed with the recommended treatment as described above. Follow up for Botox injection  Consider STEPHANIE brace for right MCP joint extension and right knee flexion. Thank you for allowing me to participate in the care of your patient. Yvonne Morton D.O., P.T.   Board Certified Physical Medicine and Rehabilitation  Board Certified Electrodiagnostic Medicine

## 2020-10-20 ENCOUNTER — HOSPITAL ENCOUNTER (OUTPATIENT)
Age: 75
Discharge: HOME | End: 2020-10-22
Payer: COMMERCIAL

## 2020-10-20 LAB
ALBUMIN SERPL-MCNC: 3.4 G/DL (ref 3.5–5.2)
ALP BLD-CCNC: 131 U/L (ref 40–129)
ALT SERPL-CCNC: 8 U/L (ref 0–40)
ANION GAP SERPL CALCULATED.3IONS-SCNC: 14 MMOL/L (ref 7–16)
AST SERPL-CCNC: 15 U/L (ref 0–39)
BASOPHILS ABSOLUTE: 0.05 E9/L (ref 0–0.2)
BASOPHILS RELATIVE PERCENT: 0.8 % (ref 0–2)
BILIRUB SERPL-MCNC: 0.4 MG/DL (ref 0–1.2)
BUN BLDV-MCNC: 16 MG/DL (ref 8–23)
CALCIUM SERPL-MCNC: 8.7 MG/DL (ref 8.6–10.2)
CHLORIDE BLD-SCNC: 105 MMOL/L (ref 98–107)
CO2: 25 MMOL/L (ref 22–29)
CREAT SERPL-MCNC: 1 MG/DL (ref 0.7–1.2)
EOSINOPHILS ABSOLUTE: 0.2 E9/L (ref 0.05–0.5)
EOSINOPHILS RELATIVE PERCENT: 3.2 % (ref 0–6)
GFR AFRICAN AMERICAN: >60
GFR NON-AFRICAN AMERICAN: >60 ML/MIN/1.73
GLUCOSE BLD-MCNC: 86 MG/DL (ref 74–99)
HCT VFR BLD CALC: 35.1 % (ref 37–54)
HEMOGLOBIN: 10.6 G/DL (ref 12.5–16.5)
IMMATURE GRANULOCYTES #: 0.02 E9/L
IMMATURE GRANULOCYTES %: 0.3 % (ref 0–5)
LYMPHOCYTES ABSOLUTE: 1.94 E9/L (ref 1.5–4)
LYMPHOCYTES RELATIVE PERCENT: 31.5 % (ref 20–42)
MCH RBC QN AUTO: 30.1 PG (ref 26–35)
MCHC RBC AUTO-ENTMCNC: 30.2 % (ref 32–34.5)
MCV RBC AUTO: 99.7 FL (ref 80–99.9)
MONOCYTES ABSOLUTE: 0.29 E9/L (ref 0.1–0.95)
MONOCYTES RELATIVE PERCENT: 4.7 % (ref 2–12)
NEUTROPHILS ABSOLUTE: 3.66 E9/L (ref 1.8–7.3)
NEUTROPHILS RELATIVE PERCENT: 59.5 % (ref 43–80)
PDW BLD-RTO: 14.5 FL (ref 11.5–15)
PLATELET # BLD: 222 E9/L (ref 130–450)
PMV BLD AUTO: 11.5 FL (ref 7–12)
POTASSIUM SERPL-SCNC: 3.9 MMOL/L (ref 3.5–5)
PROSTATE SPECIFIC ANTIGEN: 0.1 NG/ML (ref 0–4)
RBC # BLD: 3.52 E12/L (ref 3.8–5.8)
SODIUM BLD-SCNC: 144 MMOL/L (ref 132–146)
TOTAL PROTEIN: 5.9 G/DL (ref 6.4–8.3)
WBC # BLD: 6.2 E9/L (ref 4.5–11.5)

## 2020-10-20 PROCEDURE — 85025 COMPLETE CBC W/AUTO DIFF WBC: CPT

## 2020-10-20 PROCEDURE — G0103 PSA SCREENING: HCPCS

## 2020-10-20 PROCEDURE — 80053 COMPREHEN METABOLIC PANEL: CPT

## 2020-11-30 LAB
ALBUMIN SERPL-MCNC: 4 G/DL (ref 3.5–5.2)
ALP BLD-CCNC: 144 U/L (ref 40–129)
ALT SERPL-CCNC: 11 U/L (ref 0–40)
ANION GAP SERPL CALCULATED.3IONS-SCNC: 13 MMOL/L (ref 7–16)
AST SERPL-CCNC: 19 U/L (ref 0–39)
BASOPHILS ABSOLUTE: 0.06 E9/L (ref 0–0.2)
BASOPHILS RELATIVE PERCENT: 1 % (ref 0–2)
BILIRUB SERPL-MCNC: 0.4 MG/DL (ref 0–1.2)
BUN BLDV-MCNC: 21 MG/DL (ref 8–23)
CALCIUM SERPL-MCNC: 9.4 MG/DL (ref 8.6–10.2)
CHLORIDE BLD-SCNC: 103 MMOL/L (ref 98–107)
CO2: 29 MMOL/L (ref 22–29)
CREAT SERPL-MCNC: 1 MG/DL (ref 0.7–1.2)
EOSINOPHILS ABSOLUTE: 0.22 E9/L (ref 0.05–0.5)
EOSINOPHILS RELATIVE PERCENT: 3.8 % (ref 0–6)
GFR AFRICAN AMERICAN: >60
GFR NON-AFRICAN AMERICAN: >60 ML/MIN/1.73
GLUCOSE BLD-MCNC: 80 MG/DL (ref 74–99)
HCT VFR BLD CALC: 40.8 % (ref 37–54)
HEMOGLOBIN: 12.1 G/DL (ref 12.5–16.5)
IMMATURE GRANULOCYTES #: 0.01 E9/L
IMMATURE GRANULOCYTES %: 0.2 % (ref 0–5)
LYMPHOCYTES ABSOLUTE: 2.24 E9/L (ref 1.5–4)
LYMPHOCYTES RELATIVE PERCENT: 38.8 % (ref 20–42)
MCH RBC QN AUTO: 30.2 PG (ref 26–35)
MCHC RBC AUTO-ENTMCNC: 29.7 % (ref 32–34.5)
MCV RBC AUTO: 101.7 FL (ref 80–99.9)
MONOCYTES ABSOLUTE: 0.29 E9/L (ref 0.1–0.95)
MONOCYTES RELATIVE PERCENT: 5 % (ref 2–12)
NEUTROPHILS ABSOLUTE: 2.95 E9/L (ref 1.8–7.3)
NEUTROPHILS RELATIVE PERCENT: 51.2 % (ref 43–80)
PDW BLD-RTO: 14.6 FL (ref 11.5–15)
PLATELET # BLD: 253 E9/L (ref 130–450)
PMV BLD AUTO: 11.8 FL (ref 7–12)
POTASSIUM SERPL-SCNC: 4.1 MMOL/L (ref 3.5–5)
PROSTATE SPECIFIC ANTIGEN: 0.16 NG/ML (ref 0–4)
RBC # BLD: 4.01 E12/L (ref 3.8–5.8)
SODIUM BLD-SCNC: 145 MMOL/L (ref 132–146)
TOTAL PROTEIN: 7.2 G/DL (ref 6.4–8.3)
WBC # BLD: 5.8 E9/L (ref 4.5–11.5)

## 2021-02-19 ENCOUNTER — TELEPHONE (OUTPATIENT)
Dept: ADMINISTRATIVE | Age: 76
End: 2021-02-19

## 2021-02-19 NOTE — TELEPHONE ENCOUNTER
Patient wife called in wants to know when is patients next botox injection.  Best call back is [phone]

## 2021-03-23 ENCOUNTER — OFFICE VISIT (OUTPATIENT)
Dept: PHYSICAL MEDICINE AND REHAB | Age: 76
End: 2021-03-23
Payer: COMMERCIAL

## 2021-03-23 VITALS — TEMPERATURE: 96.8 F

## 2021-03-23 DIAGNOSIS — I63.232 CEREBROVASCULAR ACCIDENT (CVA) DUE TO OCCLUSION OF LEFT CAROTID ARTERY (HCC): Primary | ICD-10-CM

## 2021-03-23 DIAGNOSIS — R25.2 SPASTICITY: ICD-10-CM

## 2021-03-23 PROCEDURE — 64643 CHEMODENERV 1 EXTREM 1-4 EA: CPT | Performed by: PHYSICAL MEDICINE & REHABILITATION

## 2021-03-23 PROCEDURE — 95874 GUIDE NERV DESTR NEEDLE EMG: CPT | Performed by: PHYSICAL MEDICINE & REHABILITATION

## 2021-03-23 PROCEDURE — 64644 CHEMODENERV 1 EXTREM 5/> MUS: CPT | Performed by: PHYSICAL MEDICINE & REHABILITATION

## 2021-03-23 NOTE — PROGRESS NOTES
Len Berger D.O. [place] [facility]  [address] [facility] [place]  Phone: [phone]  Fax: [phone]  3/23/2021    Chief Complaint   Patient presents with    Arm Pain     EMG guided botox 500 units, right arm/leg       · Informed Consent:  The indications, risks, benefits, and  alternatives of onabotulinum toxin A were discussed with the patient. I explained to the patient the potential side effects including but not limited to: distant spread of toxin effect causing droopy eyelids, facial drooping, neck pain, headache, double vision, muscle pain/spasm/weakness/stiffness,  bronchitis,  injection site pain, increased blood pressure, hypersensitivity, anaphylaxis, difficulty swallowing, difficulty speaking, urinary incontinence and breathing difficulty. The patient is aware that swallowing and breathing difficulties can be life threatening and there have been reports of death in some cases where onabotulinum toxin was injected. The patient was advised of the expected benefit to include decreased spasticity in the injected muscles, and the anticipated duration of effect of 3 months. A permit was signed and scanned into the media. · Last injection: 8/13/20  · Taking anticoagulants/antiplatelets: Yes  · Diabetic: No  · Febrile/active infection: No    · Procedure note: After obtaining consent,  EMG guided onabotulinum toxin A injection was performed of the right upper and lower extremities at the locations and doses listed below. units of onabotulinum toxin A was reconstituted using 4 cc of preservative free normal saline ( 5 units per 0.1 ml). EMG guidance was necessary to adequately localize muscle due to avoid nearby neurovascular structures and  to maximize the response to the toxin. At each injection site, the skin was prepared with alcohol and using a no touch technique the Myoject needle was directed to the desired muscle.  Negative aspiration was

## 2021-05-04 ENCOUNTER — OFFICE VISIT (OUTPATIENT)
Dept: PHYSICAL MEDICINE AND REHAB | Age: 76
End: 2021-05-04
Payer: COMMERCIAL

## 2021-05-04 VITALS
HEART RATE: 93 BPM | SYSTOLIC BLOOD PRESSURE: 104 MMHG | BODY MASS INDEX: 27.2 KG/M2 | HEIGHT: 70 IN | WEIGHT: 190 LBS | DIASTOLIC BLOOD PRESSURE: 69 MMHG

## 2021-05-04 DIAGNOSIS — I69.30 CHRONIC ISCHEMIC LEFT MCA STROKE: ICD-10-CM

## 2021-05-04 DIAGNOSIS — R25.2 SPASTICITY: ICD-10-CM

## 2021-05-04 DIAGNOSIS — Z78.9 IMPAIRED MOBILITY AND ADLS: ICD-10-CM

## 2021-05-04 DIAGNOSIS — I63.232 CEREBROVASCULAR ACCIDENT (CVA) DUE TO OCCLUSION OF LEFT CAROTID ARTERY (HCC): Primary | ICD-10-CM

## 2021-05-04 DIAGNOSIS — Z74.09 IMPAIRED MOBILITY AND ADLS: ICD-10-CM

## 2021-05-04 PROCEDURE — 99213 OFFICE O/P EST LOW 20 MIN: CPT | Performed by: PHYSICAL MEDICINE & REHABILITATION

## 2021-05-04 RX ORDER — APALUTAMIDE 60 MG/1
60 TABLET, FILM COATED ORAL DAILY
COMMUNITY
Start: 2021-04-21

## 2021-05-04 NOTE — PROGRESS NOTES
Nikhil Abdi D.O. [facility]  [address] [facility] [place]  Phone: [phone]  Fax: [phone]        5/4/21    Chief Complaint   Patient presents with    Arm Pain     6 week follow up after Botox     Leg Pain       HPI:  Andrea Driscoll is a 78y.o. year old man seen today in follow up regarding Botox injection for spasticity. Interval history: Since the last visit the patient had Botox injection in the right upper and lower extremities on  3/23/21. Right Upper extremity  Pectoralis major 50 units  Latissimus dorsi 25 units  Pronator teres 30 units  FCR 80 units  FDS 70 uniys  FDP 60 units  FCU 30 units  Intrinsic hand 40 units at 4 sites    Right lower extremity  Rectus femoris 30 units  Vastus medialis 30 units  Vastus lateralis 30 units  Vastus intermedius 25 units    Total units 500. Total waste 0. There were no complications from the injection. Functional improvements have included ability to perform hand hygiene. Some improvement noted in dressing but it is still difficult to abduct the shoulder and straighten or bend the elbow. Today, the pain is rated Pain Score:   3 where 0 is no pain and 10 is pain as bad as it can be.     Past Medical History:   Diagnosis Date    A-fib [facility])     Arthritis     CVA (cerebral vascular accident) ([facility] [address] 11/15/2015    left MCA    Hyperlipemia     Prostate cancer ([facility] [address]     mets to spine, currently on chemo       Past Surgical History:   Procedure Laterality Date    CATHETER REMOVAL N/A 4/19/2020    CATHETER REMOVAL will do on patient bed performed by Mae Tinajero MD at [address] N/A 3/9/2020    CYSTOSCOPY, BILATERAL RETROGRADE PYELOGRAMS, URETHRAL DILATATION, REYEZ CATHETER INSERTION performed by Bushra Rothman MD at [address]  11/20/15    sonal       Social History     Tobacco Use    Smoking status: Former Smoker     Types: Cigarettes     Quit date: 2015     Years since quittin.7    Smokeless tobacco: Never Used   Substance Use Topics    Alcohol use: No     Alcohol/week: 0.0 standard drinks    Drug use: No       Family History   Problem Relation Age of Onset    Cancer Mother     Heart Disease Father        Current Outpatient Medications   Medication Sig Dispense Refill    ERLEADA 60 MG TABS Take 60 mg by mouth daily      ferrous sulfate (IRON 325) 325 (65 Fe) MG tablet TAKE 1 TABLET BY MOUTH TWICE A DAY      furosemide (LASIX) 20 MG tablet Take 1 tablet by mouth 2 times daily 10 tablet 0    albuterol (PROVENTIL) (2.5 MG/3ML) 0.083% nebulizer solution Take 3 mLs by nebulization every 4 hours (while awake) 120 each 3    apixaban (ELIQUIS) 2.5 MG TABS tablet Take 1 tablet by mouth 2 times daily 60 tablet 0    haloperidol lactate (HALDOL) 5 MG/ML injection Inject 0.1 mLs into the muscle every 8 hours as needed for Agitation 2 mL 0    miconazole nitrate 2 % OINT Apply topically 2 times daily 1 Tube 0    ondansetron (ZOFRAN) 4 MG/2ML injection Infuse 2 mLs intravenously every 6 hours as needed for Nausea or Vomiting 84 mL 0    Respiratory Therapy Supplies (FULL KIT NEBULIZER SET) MISC Use as directed with nebulized medication.  1 each 0    Calcium Carb-Cholecalciferol ( CALCIUM/VITAMIN D) 600-800 MG-UNIT TABS Take 1 tablet by mouth daily      vitamin D (CHOLECALCIFEROL) 25 MCG (1000 UT) TABS tablet Take 1,000 Units by mouth Daily with lunch      metoprolol succinate (TOPROL XL) 100 MG extended release tablet Take 100 mg by mouth Daily with supper      Ascorbic Acid (VITAMIN C PLUS WILD CAL HIPS PO) Take 1,000 mg by mouth Daily with lunch      predniSONE (DELTASONE) 5 MG tablet Take 5 mg by mouth every morning       abiraterone acetate (ZYTIGA) 250 MG tablet Take 1,000 mg by mouth nightly       baclofen (LIORESAL) 5 mg TABS Take 5 mg by mouth 3 times daily (with meals)       tamsulosin (FLOMAX) 0.4 MG capsule Take 0.4 mg by mouth Daily with supper       acetaminophen (TYLENOL) 325 MG tablet Take 325 mg by mouth every 6 hours as needed for Pain or Fever       Multiple Vitamins-Minerals (THERAPEUTIC MULTIVITAMIN-MINERALS) tablet Take 1 tablet by mouth every morning       albuterol sulfate HFA (PROAIR HFA) 108 (90 Base) MCG/ACT inhaler Inhale 2 puffs into the lungs every 6 hours as needed for Wheezing 1 Inhaler 0     No current facility-administered medications for this visit. No Known Allergies    Review of Systems:  No new weakness, paresthesia, incontinence of bowel or bladder, saddle anesthesia, falls or gait dysfunction. Otherwise, per HPI. Physical Exam:   Blood pressure 104/69, pulse 93, height 5' 10\" (1.778 m), weight 190 lb (86.2 kg). GENERAL: The patient is in no apparent distress. Body habitus is obese. MSK: PROM in shoulder abduction is 80*,MCP extension is -90* . There is no joint effusion, deformity, instability, swelling, erythema or warmth.   Spinal curvatures are normal.      NEURO:  Right hemiparesis     Upper Limb Resting Posture:   [  ]  Adducted/Internally rotated shoulder  Kelsea.Medin  ] Pronated Flexed Elbow  [  ]  Supinated Flexed Elbow  [  ]  Flexed wrist  [ X ]  Flexed Fingers  [  ]  Intrinsic Plus  [  ]  Thumb in Palm    Lower Limb Posture:   [  ]  Flexed hip   [  ]  Adducted thigh  [  ] Flexed knee  Kelsea.Medin  ]  Extended knee  [ X ]  Flexed ankle  [  ]  Flexed toes  [ X ]  Inverted/supinated Foot  [  ]  Striatal toe      Modified Cherelle Scale:   0: No increase in muscle tone  1: Slight increase in muscle tone, manifested by a catch and release or by minimal resistance at the end of the range of motion when the affected part(s) is moved in flexion or extension 1+: Slight increase in muscle tone, manifested by a catch, followed by minimal resistance throughout the remainder (less than half) of the ROM  2: More marked increase in muscle tone through most of the ROM, but affected part(s) easily moved   3: Considerable increase in muscle tone, passive movement difficult   4: Affected part(s) rigid in flexion or extension    Upper extremity:   Shoulder adduction MAS 1  Elbow flexion  MAS 1  Elbow extension MAS 1  Forearm pronation MAS 1  Wrist flexion MAS 0  Wrist extension MAS 0  Radial deviation MAS 0  Ulnar deviation MAS 0  Finger flexors @ MCP  MAS 2  Finger flexors @PIP MAS 0  Finger flexors @ DIP MAS 0  Thumb flexors MAS 0    Lower extremity:   Hip flexors MAS 0  Hip Adductors MAS 0   Knee flexors MAS 0  Knee extensors MAS 0  Ankle plantar flexors MAS 0   Ankle inverters MAS 0   Toe flexors MAS 0   Toe extensors MAS 0    Impression:   1. Cerebrovascular accident (CVA) due to occlusion of left carotid artery (HCC)    2. Spasticity    3. Impaired mobility and ADLs    4. Chronic ischemic left MCA stroke          Plan:   The patient has tried and failed:  therapy, splinting, anti-spasticity medications including: Baclofen. The patient's treatment goals are to be able to perform hand hygiene and increase safety and efficiency for gait and stairs. PA for Botox spasticity using EMG guidance to maximize toxin effect, minimize risk to nearby neurovascular structures and minimize distant spread of toxin. The following muscles will be injected:     Right Upper extremity  Pectoralis major 75 units  Latissimus dorsi 50 units  Pronator teres 30 units  FCR 80 units  FDS 70 units  FDP 60 units  FCU 30 units  Intrinsic hand 60 units at 4 sites    Right lower extremity  Rectus femoris 30 units  Vastus medialis 30 units  Vastus lateralis 30 units  Vastus intermedius 25 units    Total units 570. Total waste 30. Informed Consent:  The indications, risks, benefits, and  alternatives of onabotulinum toxin A were discussed with the patient.  I explained to the patient the potential side effects including but not limited to: distant spread of toxin effect causing droopy eyelids, facial drooping, neck pain, headache, double vision, muscle pain/spasm/weakness/stiffness,  bronchitis,  injection site pain, increased blood pressure, hypersensitivity, anaphylaxis, difficulty swallowing, difficulty speaking, urinary incontinence and breathing difficulty. The patient is aware that swallowing and breathing difficulties can be life threatening and there have been reports of death in some cases where onabotulinum toxin was injected. The patient was advised of the expected benefit to include less headache days per month, and the anticipated duration of effect of 3 months.  Resting splints at h.s. And 2 hours on 2 hours off during waking hours.  PROM daily. Continue home exercises.  The patient was educated about the diagnosis, prognosis, indications, risks and benefits of treatment. An opportunity to ask questions was given to the patient and questions were answered. The patient agreed to proceed with the recommended treatment as described above.  Follow up 6 weeks     Thank you for the consultation and for allowing me to participate in the care of this patient. Sincerely,         Ivanna Kwan D.O., P.T.   Board Certified Physical Medicine and Rehabilitation  Board Certified Electrodiagnostic Medicine

## 2021-06-21 ENCOUNTER — OFFICE VISIT (OUTPATIENT)
Dept: PHYSICAL MEDICINE AND REHAB | Age: 76
End: 2021-06-21
Payer: COMMERCIAL

## 2021-06-21 VITALS
SYSTOLIC BLOOD PRESSURE: 85 MMHG | HEIGHT: 70 IN | WEIGHT: 187 LBS | DIASTOLIC BLOOD PRESSURE: 57 MMHG | TEMPERATURE: 96.4 F | HEART RATE: 71 BPM | BODY MASS INDEX: 26.77 KG/M2

## 2021-06-21 DIAGNOSIS — R25.2 SPASTICITY: ICD-10-CM

## 2021-06-21 DIAGNOSIS — Z78.9 IMPAIRED MOBILITY AND ADLS: ICD-10-CM

## 2021-06-21 DIAGNOSIS — Z74.09 IMPAIRED MOBILITY AND ADLS: ICD-10-CM

## 2021-06-21 DIAGNOSIS — I69.30 CHRONIC ISCHEMIC LEFT MCA STROKE: ICD-10-CM

## 2021-06-21 DIAGNOSIS — I63.232 CEREBROVASCULAR ACCIDENT (CVA) DUE TO OCCLUSION OF LEFT CAROTID ARTERY (HCC): Primary | ICD-10-CM

## 2021-06-21 PROCEDURE — 99214 OFFICE O/P EST MOD 30 MIN: CPT | Performed by: PHYSICAL MEDICINE & REHABILITATION

## 2021-06-21 RX ORDER — LISINOPRIL 10 MG/1
TABLET ORAL
COMMUNITY
Start: 2020-11-02 | End: 2021-12-20

## 2021-06-21 RX ORDER — ATORVASTATIN CALCIUM 40 MG/1
40 TABLET, FILM COATED ORAL
COMMUNITY
Start: 2021-04-22

## 2021-06-21 RX ORDER — WARFARIN SODIUM 5 MG/1
7 TABLET ORAL
COMMUNITY

## 2021-06-21 RX ORDER — LOPERAMIDE HYDROCHLORIDE 2 MG/1
CAPSULE ORAL
COMMUNITY
Start: 2021-06-03

## 2021-06-21 RX ORDER — NITROFURANTOIN 25; 75 MG/1; MG/1
CAPSULE ORAL
COMMUNITY
Start: 2021-06-16 | End: 2022-07-06

## 2021-06-21 NOTE — PROGRESS NOTES
Donte Chavez D.O. [facility]  [address] [facility] [place]  Phone: [phone]  Fax: [phone]        6/21/21    Chief Complaint   Patient presents with    Arm Pain     6 week follow up botox       HPI:  Erendira Yadav is a 78y.o. year old man seen today in follow up regarding Botox injection for spasticity. Interval history: Since the last visit the patient had Botox injection in the right upper and lower extremities 5/4/21:     Right Upper extremity  Pectoralis major 75 units  Latissimus dorsi 50 units  Biceps brachii 25 units  Pronator teres 30 units  FCR 70 units  FDS 60 units  FDP 60 units  FCU 30 units  Intrinsic hand 80 units at 4 sites    Right lower extremity  Rectus femoris 30 units  Vastus medialis 30 units  Vastus lateralis 30 units  Vastus intermedius 25 units    Total units 600  Total waste 0    There were no complications from the injection. Functional improvements have included ability to perform hand hygiene. His caregiver/wife notes that he has been able to walk with less extensor tone but now notes that the right foot is inverting on stairs. Today, the pain is rated Pain Score:   4 where 0 is no pain and 10 is pain as bad as it can be.     Past Medical History:   Diagnosis Date    A-fib [facility])     Arthritis     CVA (cerebral vascular accident) ([facility] [address] 11/15/2015    left MCA    Hyperlipemia     Prostate cancer ([facility] [address]     mets to spine, currently on chemo     Past Surgical History:   Procedure Laterality Date    CATHETER REMOVAL N/A 4/19/2020    CATHETER REMOVAL will do on patient bed performed by Rosaura Bentley MD at [address] N/A 3/9/2020    CYSTOSCOPY, BILATERAL RETROGRADE PYELOGRAMS, URETHRAL DILATATION, REYEZ CATHETER INSERTION performed by Gutierrez Mooney MD at [place] [address]  11/20/15    sonal     Social History     Tobacco Use    Smoking status: Former Smoker Types: Cigarettes     Quit date: 2015     Years since quittin.8    Smokeless tobacco: Never Used   Substance Use Topics    Alcohol use: No     Alcohol/week: 0.0 standard drinks    Drug use: No     Family History   Problem Relation Age of Onset    Cancer Mother     Heart Disease Father        Current Outpatient Medications   Medication Sig Dispense Refill    nitrofurantoin, macrocrystal-monohydrate, (MACROBID) 100 MG capsule       lisinopril (PRINIVIL;ZESTRIL) 10 MG tablet TAKE ONE TABLET BY MOUTH IN THE MORNING      loperamide (IMODIUM) 2 MG capsule TAKE ONE CAPSULE BY MOUTH FOUR TIMES A DAY AS NEEDED LOOSE STOOL/DIARHEA      enoxaparin (LOVENOX) 40 MG/0.4ML injection       atorvastatin (LIPITOR) 80 MG tablet TAKE ONE-HALF TABLET BY MOUTH AT BEDTIME FOR CHOLESTEROL      warfarin (COUMADIN) 5 MG tablet Take 5 mg by mouth      ERLEADA 60 MG TABS Take 60 mg by mouth daily      ferrous sulfate (IRON 325) 325 (65 Fe) MG tablet TAKE 1 TABLET BY MOUTH TWICE A DAY      furosemide (LASIX) 20 MG tablet Take 1 tablet by mouth 2 times daily 10 tablet 0    albuterol (PROVENTIL) (2.5 MG/3ML) 0.083% nebulizer solution Take 3 mLs by nebulization every 4 hours (while awake) 120 each 3    haloperidol lactate (HALDOL) 5 MG/ML injection Inject 0.1 mLs into the muscle every 8 hours as needed for Agitation 2 mL 0    miconazole nitrate 2 % OINT Apply topically 2 times daily 1 Tube 0    ondansetron (ZOFRAN) 4 MG/2ML injection Infuse 2 mLs intravenously every 6 hours as needed for Nausea or Vomiting 84 mL 0    Respiratory Therapy Supplies (FULL KIT NEBULIZER SET) MISC Use as directed with nebulized medication.  1 each 0    Calcium Carb-Cholecalciferol (SM CALCIUM/VITAMIN D) 600-800 MG-UNIT TABS Take 1 tablet by mouth daily      vitamin D (CHOLECALCIFEROL) 25 MCG (1000 UT) TABS tablet Take 1,000 Units by mouth Daily with lunch      metoprolol succinate (TOPROL XL) 100 MG extended release tablet Take 100 mg by mouth Daily with supper      Ascorbic Acid (VITAMIN C PLUS WILD ACL HIPS PO) Take 1,000 mg by mouth Daily with lunch      predniSONE (DELTASONE) 5 MG tablet Take 5 mg by mouth every morning       abiraterone acetate (ZYTIGA) 250 MG tablet Take 1,000 mg by mouth nightly       baclofen (LIORESAL) 5 mg TABS Take 5 mg by mouth 3 times daily (with meals)       tamsulosin (FLOMAX) 0.4 MG capsule Take 0.4 mg by mouth Daily with supper       acetaminophen (TYLENOL) 325 MG tablet Take 325 mg by mouth every 6 hours as needed for Pain or Fever       Multiple Vitamins-Minerals (THERAPEUTIC MULTIVITAMIN-MINERALS) tablet Take 1 tablet by mouth every morning       albuterol sulfate HFA (PROAIR HFA) 108 (90 Base) MCG/ACT inhaler Inhale 2 puffs into the lungs every 6 hours as needed for Wheezing 1 Inhaler 0     No current facility-administered medications for this visit. No Known Allergies    Review of Systems:  No new weakness, paresthesia, incontinence of bowel or bladder, saddle anesthesia, falls or gait dysfunction. Otherwise, per HPI. Physical Exam:   Blood pressure (!) 85/57, pulse 71, temperature 96.4 °F (35.8 °C), height 5' 10\" (1.778 m), weight 187 lb (84.8 kg). GENERAL: The patient is in no apparent distress. Body habitus is obese. MSK: PROM in shoulder abduction is 80*,MCP extension is -90* . There is no joint effusion, deformity, instability, swelling, erythema or warmth.   Spinal curvatures are normal.      NEURO:  Right hemiparesis     Upper Limb Resting Posture:   [  ]  Adducted/Internally rotated shoulder  Kaler  ] Pronated Flexed Elbow  [  ]  Supinated Flexed Elbow  [  ]  Flexed wrist  [ X ]  Flexed Fingers  [  ]  Intrinsic Plus  [  ]  Thumb in Palm    Lower Limb Posture:   [  ]  Flexed hip   [  ]  Adducted thigh  [  ] Flexed knee  KadirChinyereler  ]  Extended knee  [ X ]  Flexed ankle  [  ]  Flexed toes  [ X ]  Inverted/supinated Foot  [  ]  Striatal toe      Modified Cherelle Scale:   0: No increase in muscle tone  1: Slight increase in muscle tone, manifested by a catch and release or by minimal resistance at the end of the range of motion when the affected part(s) is moved in flexion or extension 1+: Slight increase in muscle tone, manifested by a catch, followed by minimal resistance throughout the remainder (less than half) of the ROM  2: More marked increase in muscle tone through most of the ROM, but affected part(s) easily moved   3: Considerable increase in muscle tone, passive movement difficult   4: Affected part(s) rigid in flexion or extension    Upper extremity:   Shoulder adduction MAS2  Elbow flexion  MAS 2  Forearm pronation MAS 1  Wrist flexion MAS 0  Wrist extension MAS 0  Radial deviation MAS 0  Ulnar deviation MAS 0  Finger flexors @ MCP  MAS 2  Finger flexors @PIP MAS 0  Finger flexors @ DIP MAS 0  Thumb flexors MAS 0    Lower extremity:   Hip flexors MAS 0  Hip Adductors MAS 0   Knee flexors MAS 0  Knee extensors MAS 1  Ankle plantar flexors MAS 2   Ankle inverters MAS 2   Toe flexors MAS 0   Toe extensors MAS 0    Impression:   1. Cerebrovascular accident (CVA) due to occlusion of left carotid artery (HCC)    2. Spasticity    3. Impaired mobility and ADLs    4. Chronic ischemic left MCA stroke      Plan:   The patient has tried and failed:  therapy, splinting, anti-spasticity medications including: Baclofen. The patient's treatment goals are to be able to perform hand hygiene and increase safety and efficiency for gait and stairs. PA for Botox spasticity using EMG guidance to maximize toxin effect, minimize risk to nearby neurovascular structures and minimize distant spread of toxin.     The following muscles will be injected:     Right Upper extremity  Pectoralis major 75 units  Latissimus dorsi 50 units  Biceps brachii 25 units  Pronator teres 30 units  FCR 70 units  FDS 60 units  FDP 60 units  FCU 30 units  Intrinsic hand 80 units at 4 sites    Right lower extremity  Rectus femoris 30

## 2021-08-17 ENCOUNTER — OFFICE VISIT (OUTPATIENT)
Dept: PHYSICAL MEDICINE AND REHAB | Age: 76
End: 2021-08-17
Payer: COMMERCIAL

## 2021-08-17 VITALS — TEMPERATURE: 97.2 F | BODY MASS INDEX: 26.77 KG/M2 | WEIGHT: 187 LBS | HEIGHT: 70 IN

## 2021-08-17 DIAGNOSIS — I63.232 CEREBROVASCULAR ACCIDENT (CVA) DUE TO OCCLUSION OF LEFT CAROTID ARTERY (HCC): Primary | ICD-10-CM

## 2021-08-17 DIAGNOSIS — R25.2 SPASTICITY: ICD-10-CM

## 2021-08-17 PROCEDURE — 95874 GUIDE NERV DESTR NEEDLE EMG: CPT | Performed by: PHYSICAL MEDICINE & REHABILITATION

## 2021-08-17 PROCEDURE — 64643 CHEMODENERV 1 EXTREM 1-4 EA: CPT | Performed by: PHYSICAL MEDICINE & REHABILITATION

## 2021-08-17 PROCEDURE — 64646 CHEMODENERV TRUNK MUSC 1-5: CPT | Performed by: PHYSICAL MEDICINE & REHABILITATION

## 2021-08-17 PROCEDURE — 64644 CHEMODENERV 1 EXTREM 5/> MUS: CPT | Performed by: PHYSICAL MEDICINE & REHABILITATION

## 2021-08-17 RX ORDER — LORAZEPAM 1 MG/1
TABLET ORAL
Qty: 1 TABLET | Refills: 4 | Status: SHIPPED | OUTPATIENT
Start: 2021-08-17 | End: 2021-11-15

## 2021-08-17 NOTE — PROGRESS NOTES
Cy Delcid D.O. [facility]  [address] [facility] [place]  Phone: [phone]  Fax: [phone]  8/17/2021    Chief Complaint   Patient presents with    Muscle Pain    Botox Injection       · Informed Consent:  The indications, risks, benefits, and  alternatives of onabotulinum toxin A were discussed with the patient. I explained to the patient the potential side effects including but not limited to: distant spread of toxin effect causing droopy eyelids, facial drooping, neck pain, headache, double vision, muscle pain/spasm/weakness/stiffness,  bronchitis,  injection site pain, increased blood pressure, hypersensitivity, anaphylaxis, difficulty swallowing, difficulty speaking, urinary incontinence and breathing difficulty. The patient is aware that swallowing and breathing difficulties can be life threatening and there have been reports of death in some cases where onabotulinum toxin was injected. The patient was advised of the expected benefit to include decreased spasticity in the injected muscles, and the anticipated duration of effect of 3 months. A permit was signed and scanned into the media. · Last injection: 8/13/20  · Taking anticoagulants/antiplatelets: Yes  · Diabetic: No  · Febrile/active infection: No    · Procedure note: After obtaining consent,  EMG guided onabotulinum toxin A injection was performed of the right upper and lower extremities at the locations and doses listed below. units of onabotulinum toxin A was reconstituted using 4 cc of preservative free normal saline ( 5 units per 0.1 ml). EMG guidance was necessary to adequately localize muscle due to avoid nearby neurovascular structures and  to maximize the response to the toxin. At each injection site, the skin was prepared with alcohol and using a no touch technique the Myoject needle was directed to the desired muscle.  Negative aspiration was performed at each site prior to injection. Right Upper extremity  Pectoralis major 75 units  Latissimus dorsi 50 units  Biceps brachii 25 units  Pronator teres 30 units  FCR 70 units  FDS 60 units  FDP 60 units  FCU 30 units  Intrinsic hand 80 units at 4 sites    Right lower extremity  Rectus femoris 30 units  Vastus medialis 30 units  Vastus lateralis 30 units  Vastus intermedius 25 units    Total units 600  Total waste 0    · Adequate hemostasis was obtained and bandages were applied to the injection sites. The patient was monitored clinically in the office after the injection and left the office without incident. The patient was educated in post-procedure care including ice 20 minutes on/20 minutes off prn pain/bruising, call if any fevers chills, night sweats, drainage, increased pain, weakness, difficulty breathing or swallowing. The patient was advised to anticipate the Botox to start working in about 2 weeks.  follow up 6 weeks      Shyam Brown D.O., P.T.   Board Certified Physical Medicine and Rehabilitation  Board Certified Electrodiagnostic Medicine    Administrations This Visit     Onabotulinumtoxin A (BOTOX (COSMETIC)) injection 600 Units     Admin Date  08/17/2021  13:32 Action  Given Dose  600 Units Route  Intramuscular Site  Other Administered By  Helena Torres MA    Ordering Provider: Julee Rush DO    NDC: 2566-5376-46    Lot#: [id number]    : Eleni Sousa    Patient Supplied?: No

## 2021-09-27 ENCOUNTER — OFFICE VISIT (OUTPATIENT)
Dept: PHYSICAL MEDICINE AND REHAB | Age: 76
End: 2021-09-27
Payer: COMMERCIAL

## 2021-09-27 VITALS
HEIGHT: 70 IN | DIASTOLIC BLOOD PRESSURE: 69 MMHG | WEIGHT: 186 LBS | SYSTOLIC BLOOD PRESSURE: 101 MMHG | HEART RATE: 76 BPM | BODY MASS INDEX: 26.63 KG/M2

## 2021-09-27 DIAGNOSIS — R25.2 SPASTICITY: ICD-10-CM

## 2021-09-27 DIAGNOSIS — I63.232 CEREBROVASCULAR ACCIDENT (CVA) DUE TO OCCLUSION OF LEFT CAROTID ARTERY (HCC): Primary | ICD-10-CM

## 2021-09-27 DIAGNOSIS — Z78.9 IMPAIRED MOBILITY AND ADLS: ICD-10-CM

## 2021-09-27 DIAGNOSIS — Z74.09 IMPAIRED MOBILITY AND ADLS: ICD-10-CM

## 2021-09-27 PROCEDURE — 99214 OFFICE O/P EST MOD 30 MIN: CPT | Performed by: PHYSICAL MEDICINE & REHABILITATION

## 2021-09-27 NOTE — PROGRESS NOTES
Quit date: 2015     Years since quittin.1    Smokeless tobacco: Never Used   Substance Use Topics    Alcohol use: No     Alcohol/week: 0.0 standard drinks    Drug use: No     Family History   Problem Relation Age of Onset    Cancer Mother     Heart Disease Father        Current Outpatient Medications   Medication Sig Dispense Refill    LORazepam (ATIVAN) 1 MG tablet Take one tablet one hour before procedure. 1 tablet 4    nitrofurantoin, macrocrystal-monohydrate, (MACROBID) 100 MG capsule       lisinopril (PRINIVIL;ZESTRIL) 10 MG tablet TAKE ONE TABLET BY MOUTH IN THE MORNING      loperamide (IMODIUM) 2 MG capsule TAKE ONE CAPSULE BY MOUTH FOUR TIMES A DAY AS NEEDED LOOSE STOOL/DIARHEA      enoxaparin (LOVENOX) 40 MG/0.4ML injection       atorvastatin (LIPITOR) 80 MG tablet TAKE ONE-HALF TABLET BY MOUTH AT BEDTIME FOR CHOLESTEROL      warfarin (COUMADIN) 5 MG tablet Take 7 mg by mouth       ERLEADA 60 MG TABS Take 60 mg by mouth daily      ferrous sulfate (IRON 325) 325 (65 Fe) MG tablet TAKE 1 TABLET BY MOUTH TWICE A DAY      furosemide (LASIX) 20 MG tablet Take 1 tablet by mouth 2 times daily 10 tablet 0    albuterol (PROVENTIL) (2.5 MG/3ML) 0.083% nebulizer solution Take 3 mLs by nebulization every 4 hours (while awake) 120 each 3    haloperidol lactate (HALDOL) 5 MG/ML injection Inject 0.1 mLs into the muscle every 8 hours as needed for Agitation 2 mL 0    miconazole nitrate 2 % OINT Apply topically 2 times daily 1 Tube 0    ondansetron (ZOFRAN) 4 MG/2ML injection Infuse 2 mLs intravenously every 6 hours as needed for Nausea or Vomiting 84 mL 0    Respiratory Therapy Supplies (FULL KIT NEBULIZER SET) MISC Use as directed with nebulized medication.  1 each 0    Calcium Carb-Cholecalciferol (SM CALCIUM/VITAMIN D) 600-800 MG-UNIT TABS Take 1 tablet by mouth daily      vitamin D (CHOLECALCIFEROL) 25 MCG (1000 UT) TABS tablet Take 1,000 Units by mouth Daily with lunch      metoprolol succinate (TOPROL XL) 100 MG extended release tablet Take 100 mg by mouth Daily with supper      Ascorbic Acid (VITAMIN C PLUS WILD CAL HIPS PO) Take 1,000 mg by mouth Daily with lunch      predniSONE (DELTASONE) 5 MG tablet Take 5 mg by mouth every morning       abiraterone acetate (ZYTIGA) 250 MG tablet Take 1,000 mg by mouth nightly       baclofen (LIORESAL) 5 mg TABS Take 5 mg by mouth 3 times daily (with meals)       tamsulosin (FLOMAX) 0.4 MG capsule Take 0.4 mg by mouth Daily with supper       acetaminophen (TYLENOL) 325 MG tablet Take 325 mg by mouth every 6 hours as needed for Pain or Fever       Multiple Vitamins-Minerals (THERAPEUTIC MULTIVITAMIN-MINERALS) tablet Take 1 tablet by mouth every morning       albuterol sulfate HFA (PROAIR HFA) 108 (90 Base) MCG/ACT inhaler Inhale 2 puffs into the lungs every 6 hours as needed for Wheezing 1 Inhaler 0     No current facility-administered medications for this visit. No Known Allergies    Review of Systems:  No new weakness, paresthesia, incontinence of bowel or bladder, saddle anesthesia, falls or gait dysfunction. Otherwise, per HPI. Physical Exam:   Blood pressure 101/69, pulse 76, height 5' 10\" (1.778 m), weight 186 lb (84.4 kg). GENERAL: The patient is in no apparent distress. Body habitus is obese. MSK: PROM in shoulder abduction is 80*,MCP extension is -90* . There is no joint effusion, deformity, instability, swelling, erythema or warmth.   Spinal curvatures are normal.      NEURO:  Right hemiparesis     Upper Limb Resting Posture:   [  ]  Adducted/Internally rotated shoulder  Maribel.Mussel  ] Pronated Flexed Elbow  [  ]  Supinated Flexed Elbow  [  ]  Flexed wrist  [ X ]  Flexed Fingers  [  ]  Intrinsic Plus  [  ]  Thumb in Palm    Lower Limb Posture:   [  ]  Flexed hip   [  ]  Adducted thigh  [  ] Flexed knee  Maribel.Mussel  ]  Extended knee  [ X ]  Flexed ankle  [  ]  Flexed toes  [ X ]  Inverted/supinated Foot  [  ]  Striatal toe      Modified Cherelle Scale:   0: No increase in muscle tone  1: Slight increase in muscle tone, manifested by a catch and release or by minimal resistance at the end of the range of motion when the affected part(s) is moved in flexion or extension 1+: Slight increase in muscle tone, manifested by a catch, followed by minimal resistance throughout the remainder (less than half) of the ROM  2: More marked increase in muscle tone through most of the ROM, but affected part(s) easily moved   3: Considerable increase in muscle tone, passive movement difficult   4: Affected part(s) rigid in flexion or extension    Upper extremity:   Shoulder adduction MAS2  Elbow flexion  MAS 0  Forearm pronation MAS 1  Wrist flexion MAS 0  Wrist extension MAS 0  Radial deviation MAS 0  Ulnar deviation MAS 0  Finger flexors @ MCP  MAS 1  Finger flexors @PIP MAS 0  Finger flexors @ DIP MAS 0  Thumb flexors MAS 0    Lower extremity:   Hip flexors MAS 0  Hip Adductors MAS 0   Knee flexors MAS 0  Knee extensors MAS 0  Ankle plantar flexors MAS 0   Ankle inverters MAS 0   Toe flexors MAS 0   Toe extensors MAS 0    Impression:   1. Cerebrovascular accident (CVA) due to occlusion of left carotid artery (HCC)    2. Spasticity    3. Impaired mobility and ADLs      Plan:   The patient has tried and failed:  therapy, splinting, anti-spasticity medications including: Baclofen. The patient's treatment goals are to be able to perform hand hygiene and increase safety and efficiency for gait and stairs. PA for Botox spasticity using EMG guidance to maximize toxin effect, minimize risk to nearby neurovascular structures and minimize distant spread of toxin.     The following muscles will be injected:     Right Upper extremity  Pectoralis major 75 units  Latissimus dorsi 50 units  Biceps brachii 25 units  Pronator teres 30 units  FCR 70 units  FDS 60 units  FDP 60 units  FCU 30 units  Intrinsic hand 80 units at 4 sites    Right lower extremity  Rectus femoris 30 units  Vastus medialis 30 units  Vastus lateralis 30 units  Vastus intermedius 25 units    Total units 600  Total waste 0    Informed Consent:  The indications, risks, benefits, and  alternatives of onabotulinum toxin A were discussed with the patient. I explained to the patient the potential side effects including but not limited to: distant spread of toxin effect causing droopy eyelids, facial drooping, neck pain, headache, double vision, muscle pain/spasm/weakness/stiffness,  bronchitis,  injection site pain, increased blood pressure, hypersensitivity, anaphylaxis, difficulty swallowing, difficulty speaking, urinary incontinence and breathing difficulty. The patient is aware that swallowing and breathing difficulties can be life threatening and there have been reports of death in some cases where onabotulinum toxin was injected. The patient was advised of the expected benefit to include less headache days per month, and the anticipated duration of effect of 3 months.  Resting splints at h.s. And 2 hours on 2 hours off during waking hours.  PROM daily. Continue home exercises.  The patient was educated about the diagnosis, prognosis, indications, risks and benefits of treatment. An opportunity to ask questions was given to the patient and questions were answered. The patient agreed to proceed with the recommended treatment as described above.  Follow up 6 weeks     Thank you for the consultation and for allowing me to participate in the care of this patient. Sincerely,         Maryse Abdi D.O., P.T.   Board Certified Physical Medicine and Rehabilitation  Board Certified Electrodiagnostic Medicine

## 2021-11-08 ENCOUNTER — OFFICE VISIT (OUTPATIENT)
Dept: PHYSICAL MEDICINE AND REHAB | Age: 76
End: 2021-11-08
Payer: COMMERCIAL

## 2021-11-08 VITALS — BODY MASS INDEX: 26.63 KG/M2 | HEIGHT: 70 IN | WEIGHT: 186 LBS | TEMPERATURE: 97.2 F

## 2021-11-08 DIAGNOSIS — Z74.09 IMPAIRED MOBILITY AND ADLS: ICD-10-CM

## 2021-11-08 DIAGNOSIS — I69.30 CHRONIC ISCHEMIC LEFT MCA STROKE: ICD-10-CM

## 2021-11-08 DIAGNOSIS — I63.232 CEREBROVASCULAR ACCIDENT (CVA) DUE TO OCCLUSION OF LEFT CAROTID ARTERY (HCC): Primary | ICD-10-CM

## 2021-11-08 DIAGNOSIS — Z78.9 IMPAIRED MOBILITY AND ADLS: ICD-10-CM

## 2021-11-08 DIAGNOSIS — R25.2 SPASTICITY: ICD-10-CM

## 2021-11-08 PROCEDURE — 95874 GUIDE NERV DESTR NEEDLE EMG: CPT | Performed by: PHYSICAL MEDICINE & REHABILITATION

## 2021-11-08 PROCEDURE — 64646 CHEMODENERV TRUNK MUSC 1-5: CPT | Performed by: PHYSICAL MEDICINE & REHABILITATION

## 2021-11-08 PROCEDURE — 64643 CHEMODENERV 1 EXTREM 1-4 EA: CPT | Performed by: PHYSICAL MEDICINE & REHABILITATION

## 2021-11-08 PROCEDURE — 64644 CHEMODENERV 1 EXTREM 5/> MUS: CPT | Performed by: PHYSICAL MEDICINE & REHABILITATION

## 2021-11-08 NOTE — PROGRESS NOTES
Kiran Pond D.O. [facility]  [address] [place]  Phone: [phone]  Fax: [phone]  11/8/2021    Chief Complaint   Patient presents with    Arm Pain     emg guided botox 600 units    Leg Pain       · Informed Consent:  The indications, risks, benefits, and  alternatives of onabotulinum toxin A were discussed with the patient. I explained to the patient the potential side effects including but not limited to: distant spread of toxin effect causing droopy eyelids, facial drooping, neck pain, headache, double vision, muscle pain/spasm/weakness/stiffness,  bronchitis,  injection site pain, increased blood pressure, hypersensitivity, anaphylaxis, difficulty swallowing, difficulty speaking, urinary incontinence and breathing difficulty. The patient is aware that swallowing and breathing difficulties can be life threatening and there have been reports of death in some cases where onabotulinum toxin was injected. The patient was advised of the expected benefit to include decreased spasticity in the injected muscles, and the anticipated duration of effect of 3 months. A permit was signed and scanned into the media. · Last injection: 8/17/21  · Taking anticoagulants/antiplatelets: Yes  · Diabetic: No  · Febrile/active infection: No    · Procedure note: After obtaining consent,  EMG guided onabotulinum toxin A injection was performed of the right upper and lower extremities at the locations and doses listed below. units of onabotulinum toxin A was reconstituted using 4 cc of preservative free normal saline ( 5 units per 0.1 ml). EMG guidance was necessary to adequately localize muscle due to avoid nearby neurovascular structures and  to maximize the response to the toxin. At each injection site, the skin was prepared with alcohol and using a no touch technique the Myoject needle was directed to the desired muscle.  Negative aspiration was performed at each site prior to injection. Right Upper extremity  Pectoralis major 75 units  Latissimus dorsi 50 units  Biceps brachii 25 units  Pronator teres 30 units  FCR 70 units  FDS 60 units  FDP 60 units  FCU 30 units  Intrinsic hand 80 units at 4 sites    Right lower extremity  Rectus femoris 30 units  Vastus medialis 30 units  Vastus lateralis 30 units  Vastus intermedius 25 units    Total units 600  Total waste 0    · Adequate hemostasis was obtained and bandages were applied to the injection sites. The patient was monitored clinically in the office after the injection and left the office without incident. The patient was educated in post-procedure care including ice 20 minutes on/20 minutes off prn pain/bruising, call if any fevers chills, night sweats, drainage, increased pain, weakness, difficulty breathing or swallowing. The patient was advised to anticipate the Botox to start working in about 2 weeks.  follow up 6 weeks      Kira Lebron D.O., P.T.   Board Certified Physical Medicine and Rehabilitation  Board Certified Electrodiagnostic Medicine    Administrations This Visit     Onabotulinumtoxin A (BOTOX (COSMETIC)) injection 600 Units     Admin Date  11/08/2021  13:24 Action  Given Dose  600 Units Route  IntraMUSCular Site  Other Administered By  Valeria Allison MA    Ordering Provider: Radha Sherwood [facility]: [id number]    Lot#: [id number]    : Windy Carota    Patient Supplied?: No

## 2021-12-20 ENCOUNTER — OFFICE VISIT (OUTPATIENT)
Dept: PHYSICAL MEDICINE AND REHAB | Age: 76
End: 2021-12-20
Payer: COMMERCIAL

## 2021-12-20 VITALS
HEIGHT: 70 IN | WEIGHT: 186 LBS | SYSTOLIC BLOOD PRESSURE: 95 MMHG | DIASTOLIC BLOOD PRESSURE: 66 MMHG | HEART RATE: 89 BPM | BODY MASS INDEX: 26.63 KG/M2

## 2021-12-20 DIAGNOSIS — I63.232 CEREBROVASCULAR ACCIDENT (CVA) DUE TO OCCLUSION OF LEFT CAROTID ARTERY (HCC): Primary | ICD-10-CM

## 2021-12-20 DIAGNOSIS — R25.2 SPASTICITY: ICD-10-CM

## 2021-12-20 DIAGNOSIS — Z74.09 IMPAIRED MOBILITY AND ADLS: ICD-10-CM

## 2021-12-20 DIAGNOSIS — Z78.9 IMPAIRED MOBILITY AND ADLS: ICD-10-CM

## 2021-12-20 DIAGNOSIS — I69.30 CHRONIC ISCHEMIC LEFT MCA STROKE: ICD-10-CM

## 2021-12-20 PROCEDURE — 99214 OFFICE O/P EST MOD 30 MIN: CPT | Performed by: PHYSICAL MEDICINE & REHABILITATION

## 2021-12-20 RX ORDER — LISINOPRIL 2.5 MG/1
TABLET ORAL
COMMUNITY
Start: 2021-12-12 | End: 2022-01-31

## 2021-12-20 RX ORDER — BACLOFEN 5 MG/1
TABLET ORAL
COMMUNITY
Start: 2021-12-13 | End: 2021-12-20

## 2021-12-20 NOTE — PROGRESS NOTES
Harvinder Mccormick D.O. [facility]  [address] [facility] [place]  Phone: [phone]  Fax: [phone]        12/20/21    Chief Complaint   Patient presents with    Arm Pain     6 week follow up botox       HPI:  Cathy Mcintosh is a 78y.o. year old man seen today in follow up regarding Botox injection for spasticity. Interval history: Since the last visit the patient had Botox injection in the right upper and lower extremities 11/8/21:     Right Upper extremity:  Pectoralis major 75 units  Latissimus dorsi 50 units  Biceps brachii 25 units  Pronator teres 30 units  FCR 70 units  FDS 60 units  FDP 60 units  FCU 30 units  Intrinsic hand 80 units at 4 sites    Right lower extremity  Rectus femoris 30 units  Vastus medialis 30 units  Vastus lateralis 30 units  Vastus intermedius 25 units    Total units 600  Total waste 0  There were no complications from the injection. Functional improvements have included ability to perform hand hygiene. His caregiver/wife notes that he has been able to walk with less extensor tone. Still having difficulty with the right shoulder abduction. Today, the pain is rated Pain Score:   0 - No pain where 0 is no pain and 10 is pain as bad as it can be.     Past Medical History:   Diagnosis Date    A-fib [facility])     Arthritis     CVA (cerebral vascular accident) ([facility] [address] 11/15/2015    left MCA    Hyperlipemia     Prostate cancer ([facility] [address]     mets to spine, currently on chemo     Past Surgical History:   Procedure Laterality Date    CATARACT REMOVAL Right     CATHETER REMOVAL N/A 4/19/2020    CATHETER REMOVAL will do on patient bed performed by Rande Dandy, MD at [address] N/A 3/9/2020    CYSTOSCOPY, BILATERAL RETROGRADE PYELOGRAMS, URETHRAL DILATATION, REYEZ CATHETER INSERTION performed by Cesilia Dickerson MD at [address]  11/20/15    sonal     Social History Tobacco Use    Smoking status: Former Smoker     Types: Cigarettes     Quit date: 2015     Years since quittin.3    Smokeless tobacco: Never Used   Vaping Use    Vaping Use: Never used   Substance Use Topics    Alcohol use: No     Alcohol/week: 0.0 standard drinks    Drug use: No     Family History   Problem Relation Age of Onset    Cancer Mother     Heart Disease Father        Current Outpatient Medications   Medication Sig Dispense Refill    lisinopril (PRINIVIL;ZESTRIL) 2.5 MG tablet       nitrofurantoin, macrocrystal-monohydrate, (MACROBID) 100 MG capsule       loperamide (IMODIUM) 2 MG capsule TAKE ONE CAPSULE BY MOUTH FOUR TIMES A DAY AS NEEDED LOOSE STOOL/DIARHEA      enoxaparin (LOVENOX) 40 MG/0.4ML injection       atorvastatin (LIPITOR) 80 MG tablet TAKE ONE-HALF TABLET BY MOUTH AT BEDTIME FOR CHOLESTEROL      warfarin (COUMADIN) 5 MG tablet Take 7 mg by mouth       ERLEADA 60 MG TABS Take 60 mg by mouth daily      ferrous sulfate (IRON 325) 325 (65 Fe) MG tablet TAKE 1 TABLET BY MOUTH TWICE A DAY      furosemide (LASIX) 20 MG tablet Take 1 tablet by mouth 2 times daily 10 tablet 0    albuterol (PROVENTIL) (2.5 MG/3ML) 0.083% nebulizer solution Take 3 mLs by nebulization every 4 hours (while awake) 120 each 3    haloperidol lactate (HALDOL) 5 MG/ML injection Inject 0.1 mLs into the muscle every 8 hours as needed for Agitation 2 mL 0    miconazole nitrate 2 % OINT Apply topically 2 times daily 1 Tube 0    ondansetron (ZOFRAN) 4 MG/2ML injection Infuse 2 mLs intravenously every 6 hours as needed for Nausea or Vomiting 84 mL 0    Respiratory Therapy Supplies (FULL KIT NEBULIZER SET) MISC Use as directed with nebulized medication.  1 each 0    Calcium Carb-Cholecalciferol (SM CALCIUM/VITAMIN D) 600-800 MG-UNIT TABS Take 1 tablet by mouth daily      vitamin D (CHOLECALCIFEROL) 25 MCG (1000 UT) TABS tablet Take 1,000 Units by mouth Daily with lunch      metoprolol succinate (TOPROL XL) 100 MG extended release tablet Take 100 mg by mouth Daily with supper      Ascorbic Acid (VITAMIN C PLUS WILD CAL HIPS PO) Take 1,000 mg by mouth Daily with lunch      predniSONE (DELTASONE) 5 MG tablet Take 5 mg by mouth every morning       abiraterone acetate (ZYTIGA) 250 MG tablet Take 1,000 mg by mouth nightly       baclofen (LIORESAL) 5 mg TABS Take 5 mg by mouth 3 times daily (with meals)       tamsulosin (FLOMAX) 0.4 MG capsule Take 0.4 mg by mouth Daily with supper       acetaminophen (TYLENOL) 325 MG tablet Take 325 mg by mouth every 6 hours as needed for Pain or Fever       Multiple Vitamins-Minerals (THERAPEUTIC MULTIVITAMIN-MINERALS) tablet Take 1 tablet by mouth every morning       albuterol sulfate HFA (PROAIR HFA) 108 (90 Base) MCG/ACT inhaler Inhale 2 puffs into the lungs every 6 hours as needed for Wheezing 1 Inhaler 0     No current facility-administered medications for this visit. No Known Allergies    Review of Systems:  No new weakness, paresthesia, incontinence of bowel or bladder, saddle anesthesia, falls or gait dysfunction. Otherwise, per HPI. Physical Exam:   Blood pressure 95/66, pulse 89, height 5' 10\" (1.778 m), weight 186 lb (84.4 kg). GENERAL: The patient is in no apparent distress. Body habitus is obese. MSK: PROM in shoulder abduction is 90*,MCP extension is to neutral, IP extension is to neutral .   There is no joint effusion, deformity, instability, swelling, erythema or warmth.   Spinal curvatures are normal.      NEURO:  Right hemiparesis     Upper Limb Resting Posture:   [ X ]  Adducted/Internally rotated shoulder  [  ] Pronated Flexed Elbow  [  ]  Supinated Flexed Elbow  [  ]  Flexed wrist  [  ]  Flexed Fingers  [  ]  Intrinsic Plus  [  ]  Thumb in Palm    Lower Limb Posture:   [  ]  Flexed hip   [  ]  Adducted thigh  [  ] Flexed knee  [  ]  Extended knee  [  ]  Flexed ankle  [  ]  Flexed toes  [  ]  Inverted/supinated Foot  [  ]  Striatal toe      Modified Cherelle Scale:   0: No increase in muscle tone  1: Slight increase in muscle tone, manifested by a catch and release or by minimal resistance at the end of the range of motion when the affected part(s) is moved in flexion or extension 1+: Slight increase in muscle tone, manifested by a catch, followed by minimal resistance throughout the remainder (less than half) of the ROM  2: More marked increase in muscle tone through most of the ROM, but affected part(s) easily moved   3: Considerable increase in muscle tone, passive movement difficult   4: Affected part(s) rigid in flexion or extension    Upper extremity:   Shoulder adduction MAS 1   Elbow flexion  MAS 0  Forearm pronation MAS 0  Wrist flexion MAS 0  Wrist extension MAS 0  Radial deviation MAS 0  Ulnar deviation MAS 0  Finger flexors @ MCP  MAS 1  Finger flexors @PIP MAS 1  Finger flexors @ DIP MAS 1  Thumb flexors MAS 0    Lower extremity:   Hip flexors MAS 0  Hip Adductors MAS 0   Knee flexors MAS 0  Knee extensors MAS 0  Ankle plantar flexors MAS 0   Ankle inverters MAS 0   Toe flexors MAS 0   Toe extensors MAS 0    Impression:   1. Cerebrovascular accident (CVA) due to occlusion of left carotid artery (HCC)    2. Spasticity    3. Impaired mobility and ADLs    4. Chronic ischemic left MCA stroke         Plan:   The patient has tried and failed:  therapy, splinting, anti-spasticity medications including: Baclofen. The patient's treatment goals are to be able to perform hand hygiene and increase safety and efficiency for gait and stairs. PA for Botox spasticity using EMG guidance to maximize toxin effect, minimize risk to nearby neurovascular structures and minimize distant spread of toxin.     The following muscles will be injected:     Right Upper extremity  Pectoralis major 100 units  Latissimus dorsi 75 units  Biceps brachii 25 units  Pronator teres 30 units  FCR 70 units  FDS 60 units  FDP 60 units  FCU 30 units  Intrinsic hand 80 units at 4 sites    Right lower extremity  Rectus femoris 30 units  Vastus medialis 30 units  Vastus lateralis 30 units  Vastus intermedius 25 units    Total units 650  Total waste 50     Informed Consent:  The indications, risks, benefits, and  alternatives of onabotulinum toxin A were discussed with the patient. I explained to the patient the potential side effects including but not limited to: distant spread of toxin effect causing droopy eyelids, facial drooping, neck pain, headache, double vision, muscle pain/spasm/weakness/stiffness,  bronchitis,  injection site pain, increased blood pressure, hypersensitivity, anaphylaxis, difficulty swallowing, difficulty speaking, urinary incontinence and breathing difficulty. The patient is aware that swallowing and breathing difficulties can be life threatening and there have been reports of death in some cases where onabotulinum toxin was injected. The patient was advised of the expected benefit to include less headache days per month, and the anticipated duration of effect of 3 months.  Resting splints at h.s. And 2 hours on 2 hours off during waking hours.  PROM daily. Continue home exercises.  The patient was educated about the diagnosis, prognosis, indications, risks and benefits of treatment. An opportunity to ask questions was given to the patient and questions were answered. The patient agreed to proceed with the recommended treatment as described above.  Follow up 6 weeks     Thank you for the consultation and for allowing me to participate in the care of this patient. Sincerely,         Madelyn Boateng D.O., P.T.   Board Certified Physical Medicine and Rehabilitation  Board Certified Electrodiagnostic Medicine

## 2022-01-14 ENCOUNTER — HOSPITAL ENCOUNTER (OUTPATIENT)
Dept: SLEEP CENTER | Age: 77
Discharge: HOME | End: 2022-01-14
Payer: COMMERCIAL

## 2022-01-14 ENCOUNTER — TELEPHONE (OUTPATIENT)
Dept: SLEEP CENTER | Age: 77
End: 2022-01-14

## 2022-01-14 DIAGNOSIS — G47.33 OSA (OBSTRUCTIVE SLEEP APNEA): Primary | ICD-10-CM

## 2022-01-14 PROCEDURE — 95811 POLYSOM 6/>YRS CPAP 4/> PARM: CPT

## 2022-01-18 ENCOUNTER — TELEPHONE (OUTPATIENT)
Dept: ADMINISTRATIVE | Age: 77
End: 2022-01-18

## 2022-01-18 NOTE — TELEPHONE ENCOUNTER
Please advise, Patient wife calling in regards to prior Auth on Botox.  Request to speak with Clinical staff

## 2022-01-27 LAB — STATUS: NORMAL

## 2022-01-27 PROCEDURE — 95811 POLYSOM 6/>YRS CPAP 4/> PARM: CPT | Performed by: STUDENT IN AN ORGANIZED HEALTH CARE EDUCATION/TRAINING PROGRAM

## 2022-01-31 ENCOUNTER — OFFICE VISIT (OUTPATIENT)
Dept: PHYSICAL MEDICINE AND REHAB | Age: 77
End: 2022-01-31
Payer: COMMERCIAL

## 2022-01-31 VITALS
SYSTOLIC BLOOD PRESSURE: 91 MMHG | WEIGHT: 186 LBS | BODY MASS INDEX: 26.63 KG/M2 | DIASTOLIC BLOOD PRESSURE: 66 MMHG | HEART RATE: 81 BPM | HEIGHT: 70 IN | TEMPERATURE: 96.4 F

## 2022-01-31 DIAGNOSIS — I63.232 CEREBROVASCULAR ACCIDENT (CVA) DUE TO OCCLUSION OF LEFT CAROTID ARTERY (HCC): Primary | ICD-10-CM

## 2022-01-31 DIAGNOSIS — R25.2 SPASTICITY: ICD-10-CM

## 2022-01-31 PROCEDURE — 64644 CHEMODENERV 1 EXTREM 5/> MUS: CPT | Performed by: PHYSICAL MEDICINE & REHABILITATION

## 2022-01-31 PROCEDURE — 64646 CHEMODENERV TRUNK MUSC 1-5: CPT | Performed by: PHYSICAL MEDICINE & REHABILITATION

## 2022-01-31 PROCEDURE — 64643 CHEMODENERV 1 EXTREM 1-4 EA: CPT | Performed by: PHYSICAL MEDICINE & REHABILITATION

## 2022-01-31 PROCEDURE — 95874 GUIDE NERV DESTR NEEDLE EMG: CPT | Performed by: PHYSICAL MEDICINE & REHABILITATION

## 2022-01-31 NOTE — PROGRESS NOTES
Edward Grubbs D.O. [facility]  [address] [facility] [place]  Phone: [phone]  Fax: [phone]  1/31/2022    Chief Complaint   Patient presents with    Arm Pain     emg guided botox 700 units     Leg Pain       · Informed Consent:  The indications, risks, benefits, and  alternatives of onabotulinum toxin A were discussed with the patient. I explained to the patient the potential side effects including but not limited to: distant spread of toxin effect causing droopy eyelids, facial drooping, neck pain, headache, double vision, muscle pain/spasm/weakness/stiffness,  bronchitis,  injection site pain, increased blood pressure, hypersensitivity, anaphylaxis, difficulty swallowing, difficulty speaking, urinary incontinence and breathing difficulty. The patient is aware that swallowing and breathing difficulties can be life threatening and there have been reports of death in some cases where onabotulinum toxin was injected. The patient was advised of the expected benefit to include decreased spasticity in the injected muscles, and the anticipated duration of effect of 3 months. A permit was signed and scanned into the media. · Last injection: 11/8/21  · Taking anticoagulants/antiplatelets: Yes  · Diabetic: No  · Febrile/active infection: No    · Procedure note: After obtaining consent,  EMG guided onabotulinum toxin A injection was performed of the right upper and lower extremities at the locations and doses listed below. units of onabotulinum toxin A was reconstituted using 4 cc of preservative free normal saline ( 5 units per 0.1 ml). EMG guidance was necessary to adequately localize muscle due to avoid nearby neurovascular structures and  to maximize the response to the toxin. At each injection site, the skin was prepared with alcohol and using a no touch technique the Myoject needle was directed to the desired muscle.  Negative aspiration was performed at each site prior to injection. Right Upper extremity  Pectoralis major 100 units  Latissimus dorsi 75 units  Biceps brachii 25 units  Pronator teres 30 units  FCR 70 units  FDS 60 units  FDP 60 units  FCU 30 units  Intrinsic hand 80 units at 4 sites    Right lower extremity  Rectus femoris 30 units  Vastus medialis 30 units  Vastus lateralis 30 units  Vastus intermedius 25 units    Total units 650  Total waste 50    · Adequate hemostasis was obtained and bandages were applied to the injection sites. The patient was monitored clinically in the office after the injection and left the office without incident. The patient was educated in post-procedure care including ice 20 minutes on/20 minutes off prn pain/bruising, call if any fevers chills, night sweats, drainage, increased pain, weakness, difficulty breathing or swallowing. The patient was advised to anticipate the Botox to start working in about 2 weeks.  follow up 6 weeks      Karson Quiroz D.O., P.T.   Board Certified Physical Medicine and Rehabilitation  Board Certified Electrodiagnostic Medicine    Administrations This Visit     Onabotulinumtoxin A (BOTOX (COSMETIC)) injection 600 Units     Admin Date  01/31/2022  09:04 Action  Given Dose  600 Units Route  IntraMUSCular Site  Other Administered By  Ibis Davis MA    Ordering Provider: Russ Morgan DO    NDC: 1646-4621-55    Lot#: [id number]    : Sheree Masker    Patient Supplied?: No          onabotulinumtoxin A (BOTOX) injection 100 Units     Admin Date  01/31/2022  09:05 Action  Given Dose  100 Units Route  IntraMUSCular Site  Other Administered By  Ibis Davis MA    Ordering Provider: DO Ben Kaufman 47: [id number]    Lot#: [id number]    : Sheree Masker    Patient Supplied?: No

## 2022-03-15 ENCOUNTER — OFFICE VISIT (OUTPATIENT)
Dept: PHYSICAL MEDICINE AND REHAB | Age: 77
End: 2022-03-15
Payer: COMMERCIAL

## 2022-03-15 VITALS
SYSTOLIC BLOOD PRESSURE: 94 MMHG | DIASTOLIC BLOOD PRESSURE: 66 MMHG | WEIGHT: 186 LBS | HEART RATE: 67 BPM | HEIGHT: 71 IN | BODY MASS INDEX: 26.04 KG/M2

## 2022-03-15 DIAGNOSIS — Z78.9 IMPAIRED MOBILITY AND ADLS: ICD-10-CM

## 2022-03-15 DIAGNOSIS — I63.232 CEREBROVASCULAR ACCIDENT (CVA) DUE TO OCCLUSION OF LEFT CAROTID ARTERY (HCC): ICD-10-CM

## 2022-03-15 DIAGNOSIS — R25.2 SPASTICITY: Primary | ICD-10-CM

## 2022-03-15 DIAGNOSIS — Z74.09 IMPAIRED MOBILITY AND ADLS: ICD-10-CM

## 2022-03-15 PROCEDURE — 99214 OFFICE O/P EST MOD 30 MIN: CPT | Performed by: PHYSICAL MEDICINE & REHABILITATION

## 2022-03-15 NOTE — PROGRESS NOTES
Saira Hankins D.O. [facility]  [address] [facility] [place]  Phone: [phone]  Fax: [phone]        3/16/22    Chief Complaint   Patient presents with    Arm Pain     6 week follow up after botox     Leg Pain       HPI:  Eladio Bello is a 78y.o. year old man seen today in follow up regarding Botox injection for spasticity. Interval history: Since the last visit the patient had Botox injection in the right upper and lower extremities 113/21     Right Upper extremity  Pectoralis major 100 units  Latissimus dorsi 75 units  Biceps brachii 25 units  Pronator teres 30 units  FCR 70 units  FDS 60 units  FDP 60 units  FCU 30 units  Intrinsic hand 80 units at 4 sites    Right lower extremity  Rectus femoris 30 units  Vastus medialis 30 units  Vastus lateralis 30 units  Vastus intermedius 25 units    Total units 650  Total waste 50    There were no complications from the injection. Functional improvements have included ability to perform hand hygiene. His caregiver/wife notes that he has been able to walk with less extensor tone. Still having difficulty with the right shoulder abduction. Today, the pain is rated Pain Score:   4 where 0 is no pain and 10 is pain as bad as it can be.     Past Medical History:   Diagnosis Date    A-fib [facility])     Arthritis     CVA (cerebral vascular accident) ([facility] [address] 11/15/2015    left MCA    Hyperlipemia     Prostate cancer ([facility] [address]     mets to spine, currently on chemo     Past Surgical History:   Procedure Laterality Date    CATARACT REMOVAL Right     CATHETER REMOVAL N/A 4/19/2020    CATHETER REMOVAL will do on patient bed performed by Herb Jasso MD at [address] N/A 3/9/2020    CYSTOSCOPY, BILATERAL RETROGRADE PYELOGRAMS, URETHRAL DILATATION, REYEZ CATHETER INSERTION performed by Kory Richardson MD at [address]  11/20/15    sonal     Social History Tobacco Use    Smoking status: Former Smoker     Types: Cigarettes     Quit date: 2015     Years since quittin.5    Smokeless tobacco: Never Used   Vaping Use    Vaping Use: Never used   Substance Use Topics    Alcohol use: No     Alcohol/week: 0.0 standard drinks    Drug use: No     Family History   Problem Relation Age of Onset    Cancer Mother     Heart Disease Father        Current Outpatient Medications   Medication Sig Dispense Refill    nitrofurantoin, macrocrystal-monohydrate, (MACROBID) 100 MG capsule       loperamide (IMODIUM) 2 MG capsule TAKE ONE CAPSULE BY MOUTH FOUR TIMES A DAY AS NEEDED LOOSE STOOL/DIARHEA      atorvastatin (LIPITOR) 80 MG tablet TAKE ONE-HALF TABLET BY MOUTH AT BEDTIME FOR CHOLESTEROL      warfarin (COUMADIN) 5 MG tablet Take 7 mg by mouth       ERLEADA 60 MG TABS Take 60 mg by mouth daily      ferrous sulfate (IRON 325) 325 (65 Fe) MG tablet TAKE 1 TABLET BY MOUTH TWICE A DAY      furosemide (LASIX) 20 MG tablet Take 1 tablet by mouth 2 times daily 10 tablet 0    albuterol (PROVENTIL) (2.5 MG/3ML) 0.083% nebulizer solution Take 3 mLs by nebulization every 4 hours (while awake) 120 each 3    haloperidol lactate (HALDOL) 5 MG/ML injection Inject 0.1 mLs into the muscle every 8 hours as needed for Agitation 2 mL 0    miconazole nitrate 2 % OINT Apply topically 2 times daily 1 Tube 0    ondansetron (ZOFRAN) 4 MG/2ML injection Infuse 2 mLs intravenously every 6 hours as needed for Nausea or Vomiting 84 mL 0    Respiratory Therapy Supplies (FULL KIT NEBULIZER SET) MISC Use as directed with nebulized medication.  1 each 0    Calcium Carb-Cholecalciferol (SM CALCIUM/VITAMIN D) 600-800 MG-UNIT TABS Take 1 tablet by mouth daily      vitamin D (CHOLECALCIFEROL) 25 MCG (1000 UT) TABS tablet Take 1,000 Units by mouth Daily with lunch      metoprolol succinate (TOPROL XL) 100 MG extended release tablet Take 100 mg by mouth Daily with supper      Ascorbic Acid (VITAMIN C PLUS WILD CAL HIPS PO) Take 1,000 mg by mouth Daily with lunch      predniSONE (DELTASONE) 5 MG tablet Take 5 mg by mouth every morning       abiraterone acetate (ZYTIGA) 250 MG tablet Take 1,000 mg by mouth nightly       acetaminophen (TYLENOL) 325 MG tablet Take 325 mg by mouth every 6 hours as needed for Pain or Fever       Multiple Vitamins-Minerals (THERAPEUTIC MULTIVITAMIN-MINERALS) tablet Take 1 tablet by mouth every morning       albuterol sulfate HFA (PROAIR HFA) 108 (90 Base) MCG/ACT inhaler Inhale 2 puffs into the lungs every 6 hours as needed for Wheezing 1 Inhaler 0     No current facility-administered medications for this visit. No Known Allergies    Review of Systems:  No new weakness, paresthesia, incontinence of bowel or bladder, saddle anesthesia, falls or gait dysfunction. Otherwise, per HPI. Physical Exam:   Blood pressure 94/66, pulse 67, height 5' 11\" (1.803 m), weight 186 lb (84.4 kg). GENERAL: The patient is in no apparent distress. Body habitus is obese. MSK: PROM in shoulder abduction is 90*,MCP extension is to neutral, IP extension is to neutral .   There is no joint effusion, deformity, instability, swelling, erythema or warmth.   Spinal curvatures are normal.      NEURO:  Right hemiparesis     Upper Limb Resting Posture:   [ X ]  Adducted/Internally rotated shoulder  [  ] Pronated Flexed Elbow  [  ]  Supinated Flexed Elbow  [  ]  Flexed wrist  [  ]  Flexed Fingers  [  ]  Intrinsic Plus  [  ]  Thumb in Palm    Lower Limb Posture:   [  ]  Flexed hip   [  ]  Adducted thigh  [  ] Flexed knee  [  ]  Extended knee  [  ]  Flexed ankle  [  ]  Flexed toes  [  ]  Inverted/supinated Foot  [  ]  Striatal toe      Modified Cherelle Scale:   0: No increase in muscle tone  1: Slight increase in muscle tone, manifested by a catch and release or by minimal resistance at the end of the range of motion when the affected part(s) is moved in flexion or extension 1+: Slight increase in muscle tone, manifested by a catch, followed by minimal resistance throughout the remainder (less than half) of the ROM  2: More marked increase in muscle tone through most of the ROM, but affected part(s) easily moved   3: Considerable increase in muscle tone, passive movement difficult   4: Affected part(s) rigid in flexion or extension    Upper extremity:   Shoulder adduction MAS 1   Elbow flexion  MAS 0  Forearm pronation MAS 0  Wrist flexion MAS 0  Wrist extension MAS 0  Radial deviation MAS 0  Ulnar deviation MAS 0  Finger flexors @ MCP  MAS 1  Finger flexors @PIP MAS 1  Finger flexors @ DIP MAS 1  Thumb flexors MAS 0    Lower extremity:   Hip flexors MAS 0  Hip Adductors MAS 0   Knee flexors MAS 0  Knee extensors MAS 0  Ankle plantar flexors MAS 0   Ankle inverters MAS 0   Toe flexors MAS 0   Toe extensors MAS 0    Impression:   1. Spasticity    2. Cerebrovascular accident (CVA) due to occlusion of left carotid artery ([facility] [address]    3. Impaired mobility and ADLs         Plan:   The patient has tried and failed:  therapy, splinting, anti-spasticity medications including: Baclofen. The patient's treatment goals are to be able to perform hand hygiene and increase safety and efficiency for gait and stairs. PA for Botox spasticity using EMG guidance to maximize toxin effect, minimize risk to nearby neurovascular structures and minimize distant spread of toxin.     The following muscles will be injected:     Right Upper extremity  Pectoralis major 100 units  Latissimus dorsi 75 units  Triceps brachii 25 units  Pronator teres 30 units  FCR 70 units  FDS 60 units  FDP 60 units  FCU 30 units  Intrinsic hand 80 units at 4 sites    Right lower extremity  Rectus femoris 30 units  Vastus medialis 30 units  Vastus lateralis 30 units  Vastus intermedius 25 units    Total units 650  Total waste 50       Informed Consent:  The indications, risks, benefits, and  alternatives of onabotulinum toxin A were discussed with the patient. I explained to the patient the potential side effects including but not limited to: distant spread of toxin effect causing droopy eyelids, facial drooping, neck pain, headache, double vision, muscle pain/spasm/weakness/stiffness,  bronchitis,  injection site pain, increased blood pressure, hypersensitivity, anaphylaxis, difficulty swallowing, difficulty speaking, urinary incontinence and breathing difficulty. The patient is aware that swallowing and breathing difficulties can be life threatening and there have been reports of death in some cases where onabotulinum toxin was injected. The patient was advised of the expected benefit to include less headache days per month, and the anticipated duration of effect of 3 months.  Resting splints at h.s. And 2 hours on 2 hours off during waking hours.  PROM daily. Continue home exercises.  The patient was educated about the diagnosis, prognosis, indications, risks and benefits of treatment. An opportunity to ask questions was given to the patient and questions were answered. The patient agreed to proceed with the recommended treatment as described above.  Follow up 6 weeks     Thank you for the consultation and for allowing me to participate in the care of this patient. Sincerely,         Hakeem Mendoza D.O., P.T.   Board Certified Physical Medicine and Rehabilitation  Board Certified Electrodiagnostic Medicine

## 2022-05-02 ENCOUNTER — OFFICE VISIT (OUTPATIENT)
Dept: PHYSICAL MEDICINE AND REHAB | Age: 77
End: 2022-05-02
Payer: COMMERCIAL

## 2022-05-02 VITALS — BODY MASS INDEX: 26.63 KG/M2 | WEIGHT: 186 LBS | HEIGHT: 70 IN | TEMPERATURE: 96.8 F

## 2022-05-02 DIAGNOSIS — R25.2 SPASTICITY: Primary | ICD-10-CM

## 2022-05-02 PROCEDURE — 64643 CHEMODENERV 1 EXTREM 1-4 EA: CPT | Performed by: PHYSICAL MEDICINE & REHABILITATION

## 2022-05-02 PROCEDURE — 64646 CHEMODENERV TRUNK MUSC 1-5: CPT | Performed by: PHYSICAL MEDICINE & REHABILITATION

## 2022-05-02 PROCEDURE — 95874 GUIDE NERV DESTR NEEDLE EMG: CPT | Performed by: PHYSICAL MEDICINE & REHABILITATION

## 2022-05-02 PROCEDURE — 64644 CHEMODENERV 1 EXTREM 5/> MUS: CPT | Performed by: PHYSICAL MEDICINE & REHABILITATION

## 2022-05-02 NOTE — PROGRESS NOTES
Ulis Burkitt, D.O. [facility]  [address] [facility] [place]  Phone: [phone]  Fax: [phone]  5/2/2022    Chief Complaint   Patient presents with    Arm Pain     EMG Guided Botox 700 units    Leg Pain       · Informed Consent:  The indications, risks, benefits, and  alternatives of onabotulinum toxin A were discussed with the patient. I explained to the patient the potential side effects including but not limited to: distant spread of toxin effect causing droopy eyelids, facial drooping, neck pain, headache, double vision, muscle pain/spasm/weakness/stiffness,  bronchitis,  injection site pain, increased blood pressure, hypersensitivity, anaphylaxis, difficulty swallowing, difficulty speaking, urinary incontinence and breathing difficulty. The patient is aware that swallowing and breathing difficulties can be life threatening and there have been reports of death in some cases where onabotulinum toxin was injected. The patient was advised of the expected benefit to include decreased spasticity in the injected muscles, and the anticipated duration of effect of 3 months. A permit was signed and scanned into the media. · Last injection: 1/31/31  · Taking anticoagulants/antiplatelets: Yes  · Diabetic: No  · Febrile/active infection: No    Ambulatory Procedure Time Out  Correct Patient: Yes  Correct Procedure: Yes  Correct Site/Side: Yes  Correct Site(s) Marked: Yes  Informed Consent Signed: Yes  Allergies Verified: Yes  Staff Present & Credential[de-identified] Dr. Jose Carlos Foster LPN, DO    · Procedure note: After obtaining consent,  EMG guided onabotulinum toxin A injection was performed of the right upper and lower extremity, trunk at the locations and doses listed below. 700 units of onabotulinum toxin A was reconstituted using 4 cc of preservative free normal saline ( 5 units per 0.1 ml).  EMG guidance was necessary to adequately localize muscle due to nearby neurovascular structures to maximize the response to the toxin. At each injection site, the skin was prepared with alcohol and using a no touch technique the Myoject needle was directed to the desired muscle. Negative aspiration was performed at each site prior to injection. Right Upper extremity  Pectoralis major 100 units  Latissimus dorsi 75 units  Triceps brachii 25 units  Pronator teres 30 units  FCR 70 units  FDS 60 units  FDP 60 units  FCU 30 units  Intrinsic hand 80 units at 4 sites    Right lower extremity  Rectus femoris 30 units  Vastus medialis 30 units  Vastus lateralis 30 units  Vastus intermedius 25 units    Total units 650  Total waste 50    · Adequate hemostasis was obtained and bandages were applied to the injection sites. The patient was monitored clinically in the office after the injection and left the office without incident. The patient was educated in post-procedure care including ice 20 minutes on/20 minutes off prn pain/bruising, call if any fevers chills, night sweats, drainage, increased pain, weakness, difficulty breathing or swallowing. The patient was advised to anticipate the Botox to start working in about 2 weeks.  follow up 6 weeks      Yoni Ward D.O., P.T.   Board Certified Physical Medicine and Rehabilitation  Board Certified Electrodiagnostic Medicine    Administrations This Visit     Onabotulinumtoxin A (BOTOX (COSMETIC)) injection 600 Units     Admin Date  05/02/2022  09:08 Action  Given Dose  600 Units Route  IntraMUSCular Site  Other Administered By  Georgia Agudelo MA    Ordering Provider: Genevieve Jay DO NDC: [id number]    Lot#: [id number]    : Merissa Biggs    Patient Supplied?: No          onabotulinumtoxin A (BOTOX) injection 100 Units     Admin Date  05/02/2022  09:09 Action  Given Dose  100 Units Route  IntraMUSCular Site  Other Administered By  Georgia Agudelo MA    Ordering Provider: Genevieve Jay DO    NDC: 0269-9173-35    Lot#: [id number]    : Liza White    Patient Supplied?: No

## 2022-06-21 ENCOUNTER — OFFICE VISIT (OUTPATIENT)
Dept: PHYSICAL MEDICINE AND REHAB | Age: 77
End: 2022-06-21
Payer: COMMERCIAL

## 2022-06-21 VITALS
SYSTOLIC BLOOD PRESSURE: 91 MMHG | HEIGHT: 71 IN | BODY MASS INDEX: 25.2 KG/M2 | DIASTOLIC BLOOD PRESSURE: 59 MMHG | HEART RATE: 80 BPM | WEIGHT: 180 LBS

## 2022-06-21 DIAGNOSIS — I63.232 CEREBROVASCULAR ACCIDENT (CVA) DUE TO OCCLUSION OF LEFT CAROTID ARTERY (HCC): ICD-10-CM

## 2022-06-21 DIAGNOSIS — R25.2 SPASTICITY: Primary | ICD-10-CM

## 2022-06-21 DIAGNOSIS — Z78.9 IMPAIRED MOBILITY AND ADLS: ICD-10-CM

## 2022-06-21 DIAGNOSIS — Z74.09 IMPAIRED MOBILITY AND ADLS: ICD-10-CM

## 2022-06-21 PROCEDURE — 99214 OFFICE O/P EST MOD 30 MIN: CPT | Performed by: PHYSICAL MEDICINE & REHABILITATION

## 2022-06-21 PROCEDURE — 1123F ACP DISCUSS/DSCN MKR DOCD: CPT | Performed by: PHYSICAL MEDICINE & REHABILITATION

## 2022-06-21 NOTE — PROGRESS NOTES
Yesi Lord D.O. [facility]  [address] [facility]  Phone: [phone]  Fax: [phone]        6/21/22    Chief Complaint   Patient presents with    Arm Pain     6 week follow up after botox    Leg Pain       HPI:  Alvina Grover is a 78y.o. year old man seen today in follow up regarding Botox injection for spasticity. Interval history: Since the last visit the patient had Botox injection in the right upper and lower extremities 5/2/21. Right Upper extremity  Pectoralis major 100 units  Latissimus dorsi 75 units  Biceps brachii 25 units  Pronator teres 30 units  FCR 70 units  FDS 60 units  FDP 60 units  FCU 30 units  Intrinsic hand 80 units at 4 sites    Right lower extremity  Rectus femoris 30 units  Vastus medialis 30 units  Vastus lateralis 30 units  Vastus intermedius 25 units    Total units 650  Total waste 50    There were no complications from the injection. Functional improvements have included ability to perform hand hygiene. 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 She is looking for a palliative provider in the home. He is not currently in any PT. Today, the pain is rated Pain Score:   4 where 0 is no pain and 10 is pain as bad as it can be.     Past Medical History:   Diagnosis Date    A-fib [facility])     Arthritis     CVA (cerebral vascular accident) ([facility] [facility] 11/15/2015    left MCA    Hyperlipemia     Prostate cancer ([facility] [facility]     mets to spine, currently on chemo     Past Surgical History:   Procedure Laterality Date    CATARACT REMOVAL Right     CATHETER REMOVAL N/A 4/19/2020    CATHETER REMOVAL will do on patient bed performed by Starr Gonzales MD at [address] [place] [address] 3/9/2020    CYSTOSCOPY, BILATERAL RETROGRADE PYELOGRAMS, URETHRAL DILATATION, REYEZ CATHETER INSERTION performed by Byron Simeon MD at [address]  11/20/15    sonal     Social History     Tobacco Use    Smoking status: Former Smoker     Types: Cigarettes     Quit date: 2015     Years since quittin.8    Smokeless tobacco: Never Used   Vaping Use    Vaping Use: Never used   Substance Use Topics    Alcohol use: No     Alcohol/week: 0.0 standard drinks    Drug use: No     Family History   Problem Relation Age of Onset    Cancer Mother     Heart Disease Father        Current Outpatient Medications   Medication Sig Dispense Refill    nitrofurantoin, macrocrystal-monohydrate, (MACROBID) 100 MG capsule       loperamide (IMODIUM) 2 MG capsule TAKE ONE CAPSULE BY MOUTH FOUR TIMES A DAY AS NEEDED LOOSE STOOL/DIARHEA      atorvastatin (LIPITOR) 80 MG tablet TAKE ONE-HALF TABLET BY MOUTH AT BEDTIME FOR CHOLESTEROL      warfarin (COUMADIN) 5 MG tablet Take 7 mg by mouth       ERLEADA 60 MG TABS Take 60 mg by mouth daily      ferrous sulfate (IRON 325) 325 (65 Fe) MG tablet TAKE 1 TABLET BY MOUTH TWICE A DAY      furosemide (LASIX) 20 MG tablet Take 1 tablet by mouth 2 times daily 10 tablet 0    albuterol (PROVENTIL) (2.5 MG/3ML) 0.083% nebulizer solution Take 3 mLs by nebulization every 4 hours (while awake) 120 each 3    haloperidol lactate (HALDOL) 5 MG/ML injection Inject 0.1 mLs into the muscle every 8 hours as needed for Agitation 2 mL 0    miconazole nitrate 2 % OINT Apply topically 2 times daily 1 Tube 0    ondansetron (ZOFRAN) 4 MG/2ML injection Infuse 2 mLs intravenously every 6 hours as needed for Nausea or Vomiting 84 mL 0    Respiratory Therapy Supplies (FULL KIT NEBULIZER SET) MISC Use as directed with nebulized medication.  1 each 0    Calcium Carb-Cholecalciferol (SM CALCIUM/VITAMIN D) 600-800 MG-UNIT TABS Take 1 tablet by mouth daily      vitamin D (CHOLECALCIFEROL) 25 MCG (1000 UT) TABS tablet Take 1,000 Units by mouth Daily with lunch      metoprolol succinate (TOPROL XL) 100 MG extended release tablet Take 100 mg by mouth Daily with supper      Ascorbic Acid (VITAMIN C PLUS WILD CAL HIPS PO) Take 1,000 mg by mouth Daily with lunch      predniSONE (DELTASONE) 5 MG tablet Take 5 mg by mouth every morning       abiraterone acetate (ZYTIGA) 250 MG tablet Take 1,000 mg by mouth nightly       acetaminophen (TYLENOL) 325 MG tablet Take 325 mg by mouth every 6 hours as needed for Pain or Fever       Multiple Vitamins-Minerals (THERAPEUTIC MULTIVITAMIN-MINERALS) tablet Take 1 tablet by mouth every morning       albuterol sulfate HFA (PROAIR HFA) 108 (90 Base) MCG/ACT inhaler Inhale 2 puffs into the lungs every 6 hours as needed for Wheezing 1 Inhaler 0     No current facility-administered medications for this visit. No Known Allergies    Review of Systems:  No new weakness, paresthesia, incontinence of bowel or bladder, saddle anesthesia, falls or gait dysfunction. Otherwise, per HPI. Physical Exam:   Blood pressure (!) 91/59, pulse 80, height 5' 11\" (1.803 m), weight 180 lb (81.6 kg). GENERAL: The patient is in no apparent distress. Body habitus is obese. MSK: PROM in shoulder abduction is 90*,MCP extension is to neutral, IP extension is to neutral .   There is no joint effusion, deformity, instability, swelling, erythema or warmth.   Spinal curvatures are normal.      NEURO:  Right hemiparesis     Upper Limb Resting Posture:   [ X ]  Adducted/Internally rotated shoulder  [  ] Pronated Flexed Elbow  [  ]  Supinated Flexed Elbow  [  ]  Flexed wrist  [  ]  Flexed Fingers  [  ]  Intrinsic Plus  [  ]  Thumb in Palm    Lower Limb Posture:   [  ]  Flexed hip   [  ]  Adducted thigh  [  ] Flexed knee  [  ]  Extended knee  [  ]  Flexed ankle  [  ]  Flexed toes  [  ]  Inverted/supinated Foot  [  ]  Striatal toe      Modified Cherelle Scale:   0: No increase in muscle tone  1: Slight increase in muscle tone, manifested by a catch and release or by minimal resistance at the end of the range of motion when the affected part(s) is moved in flexion or extension 1+: Slight increase in muscle tone, manifested by a catch, followed by minimal resistance throughout the remainder (less than half) of the ROM  2: More marked increase in muscle tone through most of the ROM, but affected part(s) easily moved   3: Considerable increase in muscle tone, passive movement difficult   4: Affected part(s) rigid in flexion or extension    Upper extremity:   Shoulder adduction MAS 1   Elbow flexion  MAS 0  Forearm pronation MAS 0  Wrist flexion MAS 0  Wrist extension MAS 0  Radial deviation MAS 0  Ulnar deviation MAS 0  Finger flexors @ MCP  MAS 1  Finger flexors @PIP MAS 1  Finger flexors @ DIP MAS 1  Thumb flexors MAS 0    Lower extremity:   Hip flexors MAS 0  Hip Adductors MAS 0   Knee flexors MAS 0  Knee extensors MAS 0  Ankle plantar flexors MAS 0   Ankle inverters MAS 0   Toe flexors MAS 0   Toe extensors MAS 0    Impression:   1. Spasticity    2. Cerebrovascular accident (CVA) due to occlusion of left carotid artery ([address]    3. Impaired mobility and ADLs         Plan:   The patient has tried and failed:  therapy, splinting, anti-spasticity medications including: Baclofen. The patient's treatment goals are to be able to perform hand hygiene and increase safety and efficiency for gait and stairs. PA for Botox spasticity using EMG guidance to maximize toxin effect, minimize risk to nearby neurovascular structures and minimize distant spread of toxin.     The following muscles will be injected:     Right Upper extremity  Pectoralis major 100 units  Latissimus dorsi 75 units  Triceps brachii 25 units  Pronator teres 30 units  FCR 70 units  FDS 60 units  FDP 60 units  FCU 30 units  Intrinsic hand 80 units at 4 sites    Right lower extremity  Rectus femoris 30 units  Vastus medialis 30 units  Vastus lateralis 30 units  Vastus intermedius 25 units    Total units 650  Total waste 50    Informed Consent:  The indications, risks, benefits, and  alternatives of onabotulinum toxin A were discussed with the patient. I explained to the patient the potential side effects including but not limited to: distant spread of toxin effect causing droopy eyelids, facial drooping, neck pain, headache, double vision, muscle pain/spasm/weakness/stiffness,  bronchitis,  injection site pain, increased blood pressure, hypersensitivity, anaphylaxis, difficulty swallowing, difficulty speaking, urinary incontinence and breathing difficulty. The patient is aware that swallowing and breathing difficulties can be life threatening and there have been reports of death in some cases where onabotulinum toxin was injected. The patient was advised of the expected benefit to include less headache days per month, and the anticipated duration of effect of 3 months. Orders Placed This Encounter   Procedures    External Referral To Home Health     Referral Priority:   Routine     Referral Type:   Home Health Care     Referral Reason:   Specialty Services Required     Requested Specialty:   [address]     Number of Visits Requested:   1      Resting splints at h.s. And 2 hours on 2 hours off during waking hours.  PROM daily. Continue home exercises.  The patient was educated about the diagnosis, prognosis, indications, risks and benefits of treatment. An opportunity to ask questions was given to the patient and questions were answered. The patient agreed to proceed with the recommended treatment as described above.  Follow up 6 weeks     Thank you for the consultation and for allowing me to participate in the care of this patient. Sincerely,         Judit Rae D.O., P.T.   Board Certified Physical Medicine and Rehabilitation  Board Certified Electrodiagnostic Medicine

## 2022-06-22 ENCOUNTER — TELEPHONE (OUTPATIENT)
Dept: PHYSICAL MEDICINE AND REHAB | Age: 77
End: 2022-06-22

## 2022-06-22 DIAGNOSIS — R25.2 SPASTICITY: Primary | ICD-10-CM

## 2022-06-22 DIAGNOSIS — I63.232 CEREBROVASCULAR ACCIDENT (CVA) DUE TO OCCLUSION OF LEFT CAROTID ARTERY (HCC): ICD-10-CM

## 2022-06-22 DIAGNOSIS — Z74.09 IMPAIRED MOBILITY AND ADLS: ICD-10-CM

## 2022-06-22 DIAGNOSIS — Z78.9 IMPAIRED MOBILITY AND ADLS: ICD-10-CM

## 2022-06-22 NOTE — TELEPHONE ENCOUNTER
We were talking about working on walking. OT typically does not work on mobility just the hand and ADL's. If she thinks that is a bigger need we can switch it but just want to make sure she knows they won't really work on walking.

## 2022-06-22 NOTE — TELEPHONE ENCOUNTER
Patient wife rosy called in and requesting referral be placed for occupational therapy not physical therapy she feels that would be better please advise  Thanks

## 2022-06-23 ENCOUNTER — TELEPHONE (OUTPATIENT)
Dept: PALLATIVE CARE | Age: 77
End: 2022-06-23

## 2022-06-23 DIAGNOSIS — Z74.09 IMPAIRED MOBILITY AND ADLS: Primary | ICD-10-CM

## 2022-06-23 DIAGNOSIS — Z78.9 IMPAIRED MOBILITY AND ADLS: Primary | ICD-10-CM

## 2022-06-23 NOTE — TELEPHONE ENCOUNTER
Called and spoke with patients wife, she would just like the PT ordered for patient not the OT for now. Patient needs home PT ordered.

## 2022-06-23 NOTE — TELEPHONE ENCOUNTER
Called to Home number for Buffy Dupree, spoke with his wife Alicia Mack regarding referral for Palliative care. Alicia Mack would like home visits. She would like a nurse and bath aide visits. Explained to Alicia Mack that we do not provide those services, that would be [facility]. Alicia Mack shares that her  will be coming to [facility] for radiation therapy for new metastasis. Support provided through active listneing. Offered to set up Palliative appointment in Clinic to coordinate with his radiation treatment or to have my provider come to radiation to see her . Alicia Mack then stated that she would not be coming with her  for radiation as she has to work and she does not think we are a good fit for what she is looking for. Encouraged her to call if needed. She states she will talk to Dr. Kalpana Holbrook about this. Patient refused to use BIPAP at night while she was sleeping-RT and RN explained the importance of using it. She continued to try and remove the mask. Vapotherm FIO2 was weaned to 40% and then switched to HFNC. HFNC was weaned from 12L to 2L.  She maintaine oxygenation  Description: INTERVENTIONS:  - Assess for changes in respiratory status  - Assess for changes in mentation and behavior  - Position to facilitate oxygenation and minimize respiratory effort  - Oxygen supplementation based on oxygen saturation Ulcer prevention bundle as indicated  Outcome: Progressing     Problem: MUSCULOSKELETAL - ADULT  Goal: Return mobility to safest level of function  Description: INTERVENTIONS:  - Assess patient stability and activity tolerance for standing, transferring an environment to reduce risk of injury  - Provide assistive devices as appropriate  - Consider OT/PT consult to assist with strengthening/mobility  - Encourage toileting schedule  - Bed locked in lowest position  - Call light and belongings within reach  - F

## 2022-06-23 NOTE — TELEPHONE ENCOUNTER
Jerson Dorsey called back in and said [facility] will not come into the home so she will call back with who she wants referrals sent to.

## 2022-06-23 NOTE — TELEPHONE ENCOUNTER
She wanted it switched to [facility] if possible so that everything was being handled by [facility].

## 2022-06-28 ENCOUNTER — TELEPHONE (OUTPATIENT)
Dept: PHYSICAL MEDICINE AND REHAB | Age: 77
End: 2022-06-28

## 2022-06-28 NOTE — TELEPHONE ENCOUNTER
There's an order into home health for PT from Dr. Perez Mckeon from 6/21/22.  That order can be used

## 2022-06-28 NOTE — TELEPHONE ENCOUNTER
Patient of Dr. Elsa Tracey. Patient wife called in and would like the physical therapy order be put through to [facility] as they will work with the palliative care company that she found. Please advise.

## 2022-06-29 NOTE — TELEPHONE ENCOUNTER
Theodis Mcburney called in and requested referral be faxed to [facility]. Faxed order.

## 2022-07-06 ENCOUNTER — APPOINTMENT (OUTPATIENT)
Dept: ULTRASOUND IMAGING | Age: 77
End: 2022-07-06
Payer: COMMERCIAL

## 2022-07-06 ENCOUNTER — APPOINTMENT (OUTPATIENT)
Dept: GENERAL RADIOLOGY | Age: 77
End: 2022-07-06
Payer: COMMERCIAL

## 2022-07-06 ENCOUNTER — HOSPITAL ENCOUNTER (EMERGENCY)
Age: 77
Discharge: HOME | End: 2022-07-06
Attending: EMERGENCY MEDICINE
Payer: COMMERCIAL

## 2022-07-06 VITALS
HEART RATE: 88 BPM | SYSTOLIC BLOOD PRESSURE: 118 MMHG | DIASTOLIC BLOOD PRESSURE: 66 MMHG | RESPIRATION RATE: 23 BRPM | TEMPERATURE: 98 F | WEIGHT: 180 LBS | OXYGEN SATURATION: 96 % | BODY MASS INDEX: 25.2 KG/M2 | HEIGHT: 71 IN

## 2022-07-06 DIAGNOSIS — L03.115 CELLULITIS OF RIGHT LOWER EXTREMITY: Primary | ICD-10-CM

## 2022-07-06 LAB
ALBUMIN SERPL-MCNC: 2.7 G/DL (ref 3.5–5.2)
ALP BLD-CCNC: 158 U/L (ref 40–129)
ALT SERPL-CCNC: 16 U/L (ref 0–40)
ANION GAP SERPL CALCULATED.3IONS-SCNC: 12 MMOL/L (ref 7–16)
AST SERPL-CCNC: 31 U/L (ref 0–39)
BASOPHILS ABSOLUTE: 0.04 E9/L (ref 0–0.2)
BASOPHILS RELATIVE PERCENT: 0.6 % (ref 0–2)
BILIRUB SERPL-MCNC: 0.2 MG/DL (ref 0–1.2)
BUN BLDV-MCNC: 18 MG/DL (ref 6–23)
CALCIUM SERPL-MCNC: 9.3 MG/DL (ref 8.6–10.2)
CHLORIDE BLD-SCNC: 103 MMOL/L (ref 98–107)
CO2: 21 MMOL/L (ref 22–29)
CREAT SERPL-MCNC: 0.9 MG/DL (ref 0.7–1.2)
EOSINOPHILS ABSOLUTE: 0.27 E9/L (ref 0.05–0.5)
EOSINOPHILS RELATIVE PERCENT: 3.9 % (ref 0–6)
GFR AFRICAN AMERICAN: >60
GFR NON-AFRICAN AMERICAN: >60 ML/MIN/1.73
GLUCOSE BLD-MCNC: 99 MG/DL (ref 74–99)
HCT VFR BLD CALC: 43.2 % (ref 37–54)
HEMOGLOBIN: 13.5 G/DL (ref 12.5–16.5)
IMMATURE GRANULOCYTES #: 0.02 E9/L
IMMATURE GRANULOCYTES %: 0.3 % (ref 0–5)
LACTIC ACID, SEPSIS: 1.6 MMOL/L (ref 0.5–1.9)
LYMPHOCYTES ABSOLUTE: 1.68 E9/L (ref 1.5–4)
LYMPHOCYTES RELATIVE PERCENT: 24.3 % (ref 20–42)
MCH RBC QN AUTO: 29.7 PG (ref 26–35)
MCHC RBC AUTO-ENTMCNC: 31.3 % (ref 32–34.5)
MCV RBC AUTO: 95.2 FL (ref 80–99.9)
MONOCYTES ABSOLUTE: 0.55 E9/L (ref 0.1–0.95)
MONOCYTES RELATIVE PERCENT: 7.9 % (ref 2–12)
NEUTROPHILS ABSOLUTE: 4.36 E9/L (ref 1.8–7.3)
NEUTROPHILS RELATIVE PERCENT: 63 % (ref 43–80)
PDW BLD-RTO: 14 FL (ref 11.5–15)
PLATELET # BLD: 294 E9/L (ref 130–450)
PMV BLD AUTO: 9.8 FL (ref 7–12)
POTASSIUM REFLEX MAGNESIUM: 5.4 MMOL/L (ref 3.5–5)
RBC # BLD: 4.54 E12/L (ref 3.8–5.8)
SODIUM BLD-SCNC: 136 MMOL/L (ref 132–146)
TOTAL PROTEIN: 7.1 G/DL (ref 6.4–8.3)
WBC # BLD: 6.9 E9/L (ref 4.5–11.5)

## 2022-07-06 PROCEDURE — 93971 EXTREMITY STUDY: CPT

## 2022-07-06 PROCEDURE — 73630 X-RAY EXAM OF FOOT: CPT

## 2022-07-06 PROCEDURE — 83605 ASSAY OF LACTIC ACID: CPT

## 2022-07-06 PROCEDURE — 85025 COMPLETE CBC W/AUTO DIFF WBC: CPT

## 2022-07-06 PROCEDURE — 6370000000 HC RX 637 (ALT 250 FOR IP): Performed by: EMERGENCY MEDICINE

## 2022-07-06 PROCEDURE — 80053 COMPREHEN METABOLIC PANEL: CPT

## 2022-07-06 PROCEDURE — 73590 X-RAY EXAM OF LOWER LEG: CPT

## 2022-07-06 PROCEDURE — 87040 BLOOD CULTURE FOR BACTERIA: CPT

## 2022-07-06 PROCEDURE — 99284 EMERGENCY DEPT VISIT MOD MDM: CPT

## 2022-07-06 PROCEDURE — 93971 EXTREMITY STUDY: CPT | Performed by: RADIOLOGY

## 2022-07-06 RX ORDER — CEPHALEXIN 500 MG/1
500 CAPSULE ORAL 4 TIMES DAILY
Qty: 28 CAPSULE | Refills: 0 | Status: SHIPPED | OUTPATIENT
Start: 2022-07-06 | End: 2022-07-13

## 2022-07-06 RX ORDER — CEPHALEXIN 500 MG/1
500 CAPSULE ORAL ONCE
Status: COMPLETED | OUTPATIENT
Start: 2022-07-06 | End: 2022-07-06

## 2022-07-06 RX ORDER — CEPHALEXIN 500 MG/1
500 CAPSULE ORAL 4 TIMES DAILY
Qty: 28 CAPSULE | Refills: 0 | Status: SHIPPED | OUTPATIENT
Start: 2022-07-06 | End: 2022-07-06 | Stop reason: SDUPTHER

## 2022-07-06 RX ADMIN — CEPHALEXIN 500 MG: 500 CAPSULE ORAL at 19:27

## 2022-07-06 ASSESSMENT — ENCOUNTER SYMPTOMS
NAUSEA: 0
TROUBLE SWALLOWING: 0
COLOR CHANGE: 1
RHINORRHEA: 0
DIARRHEA: 0
VOMITING: 0
ABDOMINAL PAIN: 0
SHORTNESS OF BREATH: 0
BLOOD IN STOOL: 0
COUGH: 0

## 2022-07-06 ASSESSMENT — PAIN SCALES - WONG BAKER: WONGBAKER_NUMERICALRESPONSE: 8

## 2022-07-06 ASSESSMENT — PAIN - FUNCTIONAL ASSESSMENT: PAIN_FUNCTIONAL_ASSESSMENT: FACE, LEGS, ACTIVITY, CRY, AND CONSOLABILITY (FLACC)

## 2022-07-06 ASSESSMENT — PAIN DESCRIPTION - LOCATION: LOCATION: LEG;FOOT

## 2022-07-06 ASSESSMENT — PAIN DESCRIPTION - ORIENTATION: ORIENTATION: RIGHT

## 2022-07-06 NOTE — ED NOTES
Unable to obtain second set of blood cultures. Patient is a hard stick and was poked multiple times from staff.       Enrique Szymanski RN  07/06/22 [id number]

## 2022-07-06 NOTE — ED PROVIDER NOTES
ED PROVIDER NOTE    Chief Complaint   Patient presents with    Foot Swelling     redness and swelling on right foot w/an open area between first and second toe. HPI:  7/6/22,   Time: 7:18 PM EDT       Savi Yu is a 78 y.o. male presenting to the ED for right leg redness and swelling. Gradual onset yesterday, persistent since onset, moderate in severity, no aggravating/alleviating factors. Started after wife applied lotion to cracked skin between 1st two toes on the right foot. Associated swelling of RLE. No calf or thigh pain. No fever, chills, chest pain, shortness of breath, nausea, vomiting, diarrhea. Normal po intake and urine output. 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 Chart review: hx of afib on warfrin, CVA, SAH    Review of Systems:     Review of Systems   Constitutional: Negative for appetite change, chills and fever. HENT: Negative for congestion, rhinorrhea and trouble swallowing. Eyes: Negative for visual disturbance. Respiratory: Negative for cough and shortness of breath. Cardiovascular: Positive for leg swelling. Negative for chest pain. Gastrointestinal: Negative for abdominal pain, blood in stool, diarrhea, nausea and vomiting. Genitourinary: Negative for decreased urine volume, difficulty urinating, dysuria, frequency, hematuria and urgency. Musculoskeletal: Negative for myalgias, neck pain and neck stiffness. Skin: Positive for color change.    Neurological: Negative for dizziness, syncope, weakness, light-headedness, numbness and headaches.         --------------------------------------------- PAST HISTORY ---------------------------------------------  Past Medical History:   Past Medical History:   Diagnosis Date    [facility]     Arthritis     CVA (cerebral vascular accident) ([facility] 11/15/2015    left MCA    Hyperlipemia     Pro[facility] ([facility]     mets to spine, currently on chemo or Organization Meetings: Not on file    Marital Status: Not on file   Intimate Partner Violence:     Fear of Current or Ex-Partner: Not on file    Emotionally Abused: Not on file    Physically Abused: Not on file    Sexually Abused: Not on file   Housing Stability:     Unable to Pay for Housing in the Last Year: Not on file    Number of Yuly in the Last Year: Not on file    Unstable Housing in the Last Year: Not on file       Family History:   Family History   Problem Relation Age of Onset    Cancer Mother     Heart Disease Father        The patients home medications have been reviewed. Allergies:   No Known Allergies        ---------------------------------------------------PHYSICAL EXAM--------------------------------------    /64   Pulse 86   Temp 98.1 °F (36.7 °C)   Resp 16   Ht 5' 11\" (1.803 m)   Wt 180 lb (81.6 kg)   SpO2 99%   BMI 25.10 kg/m²     Physical Exam  Vitals and nursing note reviewed. Constitutional:       General: He is not in acute distress. Appearance: He is not toxic-appearing. HENT:      Mouth/Throat:      Mouth: Mucous membranes are moist.   Eyes:      General: No scleral icterus. Extraocular Movements: Extraocular movements intact. Pupils: Pupils are equal, round, and reactive to light. Cardiovascular:      Rate and Rhythm: Normal rate and regular rhythm. Pulses: Normal pulses. Heart sounds: Normal heart sounds. No murmur heard. Pulmonary:      Effort: Pulmonary effort is normal. No respiratory distress. Breath sounds: Normal breath sounds. No wheezing or rales. Abdominal:      General: There is no distension. Palpations: Abdomen is soft. Tenderness: There is no abdominal tenderness. There is no guarding or rebound. Musculoskeletal:         General: Swelling present. No tenderness. Normal range of motion. Cervical back: Normal range of motion and neck supple. No rigidity. No muscular tenderness. Comments: RLE 2+ pitting edema to knee. Erythema over anterior lower leg and foot, mild induration, no crepitus/fluctuance/drainage. Skin:     General: Skin is warm and dry. Neurological:      Mental Status: He is alert and oriented to person, place, and time. Comments: Right hemiplegia            -------------------------------------------------- RESULTS -------------------------------------------------  I have personally reviewed all laboratory and imaging results for this patient. Results are listed below. LABS:  Labs Reviewed   CBC WITH AUTO DIFFERENTIAL - Abnormal; Notable for the following components:       Result Value    MCHC 31.3 (*)     All other components within normal limits   COMPREHENSIVE METABOLIC PANEL W/ REFLEX TO MG FOR LOW K - Abnormal; Notable for the following components:    Potassium reflex Magnesium 5.4 (*)     CO2 21 (*)     Albumin 2.7 (*)     Alkaline Phosphatase 158 (*)     All other components within normal limits   CULTURE, BLOOD 1   CULTURE, BLOOD 2   LACTATE, SEPSIS   LACTATE, SEPSIS       RADIOLOGY:  Interpreted personally and by Radiologist.  Kanwal SANTAMARIA LOWER EXTREMITY RIGHT DALI   Final Result   Within the visualized vessels there is no evidence for deep venous   thrombosis               XR TIBIA FIBULA RIGHT (2 VIEWS)   Final Result   No radiographic evidence of osteomyelitis or other acute osseous abnormality. XR FOOT RIGHT (MIN 3 VIEWS)   Final Result   No radiographic evidence of osteomyelitis or other acute osseous abnormality.                 ------------------------- NURSING NOTES AND VITALS REVIEWED ---------------------------   The nursing notes within the ED encounter and vital signs as below have been reviewed by myself.   /64   Pulse 86   Temp 98.1 °F (36.7 °C)   Resp 16   Ht 5' 11\" (1.803 m)   Wt 180 lb (81.6 kg)   SpO2 99%   BMI 25.10 kg/m²   Oxygen Saturation Interpretation: Normal    The patients available past medical records and past encounters were reviewed. ------------------------------ ED COURSE/MEDICAL DECISION MAKING----------------------  Medications   cephALEXin (KEFLEX) capsule 500 mg (has no administration in time range)         Counseling: The emergency provider has spoken with the patient and discussed todays results, in addition to providing specific details for the plan of care and counseling regarding the diagnosis and prognosis. Questions are answered at this time and they are agreeable with the plan. ED Course/Medical Decision Makin y.o. male here with right leg redness and swelling likely 2/2 cellulitis. Non-toxic appearing, afebrile, hemodynamically stable, and in no acute distress. Neurovascularly intact throughout. Imaging unrevealing. After discussion of findings and return precautions, patient agrees with plan for discharge and outpatient follow up with PCP. Rx cephalexin.       --------------------------------- IMPRESSION AND DISPOSITION ---------------------------------    IMPRESSION  1. Cellulitis of right lower extremity        DISPOSITION  Disposition: Discharge to home  Patient condition is good    NOTE: This report was transcribed using voice recognition software.  Every effort was made to ensure accuracy; however, inadvertent computerized transcription errors may be present    Soco Nava MD  Attending Emergency Physician         Soco Nava MD  22 [phone]

## 2022-07-07 ENCOUNTER — TELEPHONE (OUTPATIENT)
Dept: RADIATION ONCOLOGY | Age: 77
End: 2022-07-07

## 2022-07-07 ENCOUNTER — HOSPITAL ENCOUNTER (OUTPATIENT)
Dept: RADIATION ONCOLOGY | Age: 77
Discharge: HOME | End: 2022-07-07
Payer: COMMERCIAL

## 2022-07-07 VITALS
BODY MASS INDEX: 25.1 KG/M2 | RESPIRATION RATE: 18 BRPM | DIASTOLIC BLOOD PRESSURE: 57 MMHG | OXYGEN SATURATION: 99 % | SYSTOLIC BLOOD PRESSURE: 85 MMHG | TEMPERATURE: 97.1 F | HEART RATE: 66 BPM | WEIGHT: 180 LBS

## 2022-07-07 DIAGNOSIS — C61 PROSTATE CANCER (HCC): Primary | ICD-10-CM

## 2022-07-07 PROCEDURE — 99205 OFFICE O/P NEW HI 60 MIN: CPT

## 2022-07-07 PROCEDURE — 77263 THER RADIOLOGY TX PLNG CPLX: CPT | Performed by: RADIOLOGY

## 2022-07-07 PROCEDURE — 99205 OFFICE O/P NEW HI 60 MIN: CPT | Performed by: RADIOLOGY

## 2022-07-07 NOTE — PROGRESS NOTES
Radiation Oncology      [facility]. Lamin Ortega MD [address] [place] [address]      Referring Physician: CC      Primary Care Physician:Kenny Harris MD   Primary Oncologist: YAIMA      Diagnosis: metastatic prostate cancer   -s/p brachytherapy in 2013 + ADT (2014/2016) [GS 4+3=7]   -current on apalutimide and Xgeva      Service:  Radiation Oncology consultation performed on 7/7/22        HPI:        Negar Burdick is a pleasant 78year old with metastatic prostate cancer and low back pain. Patient has a history of Prostate Cancer diagnosed back in 2013. Patient is s/p I-125 seed implantation in January of 2014. Patient states that a rising PSA in 2015 led to restaging with discovery of diffuse bone metastases on a bone scan. Patient was started on ADT with Bicalutamide. Patient is s/p 5 cycles of Docetaxel (8/15-11/15) and began Lupron's/biclautamide in July of 2016. Patient started Sweta Logan treatment on 09/26/16 and Loretha Furnish on 08/2017. Patient's initial PSA level from 12/09/13was 4.18 with a Chang score of 7 (4+3). Patient's PSA level on 02/25/15- 7.19, 08/05/15 was 68.96, on 08/19/15-10.54, 09/09/15-0.72, 05/25/16-0.21, 07/01/16-1.34, 09/26/16-<0.03. Patient had a CT of Chest done on 06/16/22 at [facility] which showed since 03/10/21 unchanged diffuse sclerotic osseous metastases. No non-osseous metastatic disease within the chest. CT of Abd/Pelvis done on 0616/22 showed progression of sclerotic osseous metastases within the right L1 vertebra. May be source of pt.'s reported back pain. No non-osseous metastatic disease within the abd/pelvis.  On 06/16/22 patient also had a Bone Scan done which showed redemonstrated are numerous foci of abnormal osseous uptake, including the right glenoid, several vertebra (T8, T9, T12), multiple bilateral ribs, left sacrum, both iliac bones, consistent with osseous metastases, multiple new sites of abnormal radiotracer osseous BILATERAL RETROGRADE PYELOGRAMS, URETHRAL DILATATION, REYEZ CATHETER INSERTION performed by Des Alcazar MD at [address]  11/20/15    sonal       Family History   Problem Relation Age of Onset    Cancer Mother     Heart Disease Father        Current Outpatient Medications   Medication Sig Dispense Refill    IPRATROPIUM BROMIDE IN Inhale into the lungs 2 times daily      cephALEXin (KEFLEX) 500 MG capsule Take 1 capsule by mouth 4 times daily for 7 days 28 capsule 0    loperamide (IMODIUM) 2 MG capsule TAKE ONE CAPSULE BY MOUTH FOUR TIMES A DAY AS NEEDED LOOSE STOOL/DIARHEA      atorvastatin (LIPITOR) 40 MG tablet 40 mg       warfarin (COUMADIN) 5 MG tablet Take 7 mg by mouth 5 mg on Wed      ERLEADA 60 MG TABS Take 60 mg by mouth daily      ferrous sulfate (IRON 325) 325 (65 Fe) MG tablet TAKE 1 TABLET BY MOUTH TWICE A DAY      miconazole nitrate 2 % OINT Apply topically 2 times daily 1 Tube 0    Calcium Carb-Cholecalciferol (SM CALCIUM/VITAMIN D) 600-800 MG-UNIT TABS Take 1 tablet by mouth daily 40 mcg(1600IU)      vitamin D (CHOLECALCIFEROL) 25 MCG (1000 UT) TABS tablet Take 5,000 Units by mouth Daily with lunch       metoprolol succinate (TOPROL XL) 100 MG extended release tablet Take 100 mg by mouth Daily with supper      Ascorbic Acid (VITAMIN C PLUS WILD CAL HIPS PO) Take 1,000 mg by mouth Daily with lunch      predniSONE (DELTASONE) 5 MG tablet Take 5 mg by mouth every morning       acetaminophen (TYLENOL) 325 MG tablet Take 325 mg by mouth every 6 hours as needed for Pain or Fever       Multiple Vitamins-Minerals (THERAPEUTIC MULTIVITAMIN-MINERALS) tablet Take 1 tablet by mouth every morning        No current facility-administered medications for this encounter.        No Known Allergies    Social History     Socioeconomic History    Marital status:      Spouse name: None    Number of children: 3    Years of education: None    Highest education level: None   Occupational History    None   Tobacco Use    Smoking status: Former Smoker     Types: Cigarettes     Quit date: 2013     Years since quittin.8    Smokeless tobacco: Never Used   Vaping Use    Vaping Use: Never used   Substance and Sexual Activity    Alcohol use: No     Alcohol/week: 0.0 standard drinks    Drug use: No    Sexual activity: Not Currently   Other Topics Concern    None   Social History Narrative    None     Social Determinants of Health     Financial Resource Strain:     Difficulty of Paying Living Expenses: Not on file   Food Insecurity:     Worried About Running Out of Food in the Last Year: Not on file    Tima of Food in the Last Year: Not on file   Transportation Needs:     Lack of Transportation (Medical): Not on file    Lack of Transportation (Non-Medical):  Not on file   Physical Activity:     Days of Exercise per Week: Not on file    Minutes of Exercise per Session: Not on file   Stress:     Feeling of Stress : Not on file   Social Connections:     Frequency of Communication with Friends and Family: Not on file    Frequency of Social Gatherings with Friends and Family: Not on file    Attends Mu-ism Services: Not on file    Active Member of [address] [place] [address] [employer] or Organizations: Not on file    Attends Club or Organization Meetings: Not on file    Marital Status: Not on file   Intimate Partner Violence:     Fear of Current or Ex-Partner: Not on file    Emotionally Abused: Not on file    Physically Abused: Not on file    Sexually Abused: Not on file   Housing Stability:     Unable to Pay for Housing in the Last Year: Not on file    Number of [place] in the Last Year: Not on file    Unstable Housing in the Last Year: Not on file           Review of Systems - History obtained from chart review and the patient  General ROS: positive for  - fatigue  Psychological ROS: negative  Ophthalmic ROS: negative  ENT ROS: negative  Allergy and Immunology ROS: No  -Dental evaluation prior to treatment:No  -Social Work requested: Yes  -Oncology Nurse Navigator requested: Yes  -pre + post treatment PT / Rehab / PM+R evaluation considered: Yes  -ICD: No   -ICD brand: -  -[facility] patient navigator: Tamela Adarsh  -Nurse Practitioners for Radiation Oncology:    ---Anna Hansen, MSN, RN, FNP-C   ---Amina Louis MSN, RN, FNP-BC        Assessment and Plan: Paulette Huntley is a pleasant and cooperative 78year old with a recent diagnosis of AJCC stage group IV prostate cancer. We recommend palliate fractionated external beam radiation therapy to L1. NCCN reviewed today with pt and family. Supporting literature included extrapolated outcomes from 9714. The risks, benefits, alternatives, process and logistics of external beam radiation were reviewed today. We answered all of the patient's questions to the best of our ability. Paulette Huntley verbalized understanding and seemed satisfied. Radiation planning will commence within 3 days; the next step in management being the simulation scan, with external beam radiation to commence in a timely fashion thereafter. It was a pleasure meeting Paulette Huntley today and we appreciate the referral and opportunity to be involved in his care. We had an extensive discussion today regarding the course to date (including a focused review of the applicable radiographic and laboratory information), multidisciplinary approach to cancer care, and indications for external beam radiation therapy as a component therein. A literature review and multidisciplinary discussion was performed after seeing this patient due to the complexity of the medical decision making in this case. I personally spent greater than 80 minutes on this case and with this patient. I performed the complete history and physical as above at today's visit, at least 45 minutes was in direct discussion and  regarding disease management.          -pall.  RT to L1 with image guidance        [place]. Shiraz Wallace MD [place] [address] Oncology  Cell: [phone]    [facility]:  [facility]: [phone]  [facility]:  [phone]   FAX:    [phone]  [address] [place] [address] Road:  [phone]   FAX:  [phone]        NOTE: This report was transcribed using voice recognition software. Every effort was made to ensure accuracy; however, inadvertent computerized transcription errors may be present.

## 2022-07-07 NOTE — PATIENT INSTRUCTIONS
ARA Nelson. Dariel Ohara MD [place] [address] Oncology  Cell: [phone]    [employer]:  [phone]   FAX: [phone]  [facility]:  [address] [place] [address] Avenue:    [phone]  [address] [place] [address] Road:  [phone]   FAX:  [phone]  Email: [email or web address]

## 2022-07-07 NOTE — PROGRESS NOTES
Fuentes Gagnon  1945 78 y.o. Referring Physician: Dr. Josafat Peraza    PCP: Maurisio Jane MD     Vitals:    07/07/22 1127   BP: (!) 85/57   Pulse: 66   Resp: 18   Temp: 97.1 °F (36.2 °C)   SpO2: 99%        Wt Readings from Last 3 Encounters:   07/07/22 180 lb (81.6 kg)   07/06/22 180 lb (81.6 kg)   06/21/22 180 lb (81.6 kg)        Body mass index is 25.1 kg/m². Chief Complaint: No chief complaint on file. Cancer Staging  No matching staging information was found for the patient. Prior Radiation Therapy? YES: Site Treated: Prostate          Facility: [facility]          Date: 01/2014    Concurrent Chemo/radiation? NO    Prior Chemotherapy? YES: Site Treated: Bone Mets          Facility: [facility]          Date: 2015    Prior Hormonal Therapy? YES: Site Treated: Prostate          Facility: [facility]          Date: 07/2016    Head and Neck Cancer? No, patient does NOT have HN cancer.               Current Outpatient Medications   Medication Sig Dispense Refill    IPRATROPIUM BROMIDE IN Inhale into the lungs 2 times daily      cephALEXin (KEFLEX) 500 MG capsule Take 1 capsule by mouth 4 times daily for 7 days 28 capsule 0    loperamide (IMODIUM) 2 MG capsule TAKE ONE CAPSULE BY MOUTH FOUR TIMES A DAY AS NEEDED LOOSE STOOL/DIARHEA      atorvastatin (LIPITOR) 40 MG tablet 40 mg       warfarin (COUMADIN) 5 MG tablet Take 7 mg by mouth 5 mg on Wed      ERLEADA 60 MG TABS Take 60 mg by mouth daily      ferrous sulfate (IRON 325) 325 (65 Fe) MG tablet TAKE 1 TABLET BY MOUTH TWICE A DAY      miconazole nitrate 2 % OINT Apply topically 2 times daily 1 Tube 0    Calcium Carb-Cholecalciferol (SM CALCIUM/VITAMIN D) 600-800 MG-UNIT TABS Take 1 tablet by mouth daily 40 mcg(1600IU)      vitamin D (CHOLECALCIFEROL) 25 MCG (1000 UT) TABS tablet Take 5,000 Units by mouth Daily with lunch       metoprolol succinate (TOPROL XL) 100 MG extended release tablet Take 100 mg by mouth Daily with supper      Ascorbic Acid (VITAMIN C PLUS WILD CAL HIPS PO) Take 1,000 mg by mouth Daily with lunch      predniSONE (DELTASONE) 5 MG tablet Take 5 mg by mouth every morning       acetaminophen (TYLENOL) 325 MG tablet Take 325 mg by mouth every 6 hours as needed for Pain or Fever       Multiple Vitamins-Minerals (THERAPEUTIC MULTIVITAMIN-MINERALS) tablet Take 1 tablet by mouth every morning        No current facility-administered medications for this encounter.        Past Medical History:   Diagnosis Date    A-fib [facility])     Arthritis     CVA (cerebral vascular accident) ([facility] [address] 11/15/2015    left MCA    Hyperlipemia     Prostate cancer ([facility] [address]     mets to spine, currently on chemo       Past Surgical History:   Procedure Laterality Date    CATARACT REMOVAL Right     CATHETER REMOVAL N/A 2020    CATHETER REMOVAL will do on patient bed performed by Kurtis Neff MD at [address] N/A 3/9/2020    CYSTOSCOPY, BILATERAL RETROGRADE PYELOGRAMS, URETHRAL DILATATION, REYEZ CATHETER INSERTION performed by Adeola Murphy MD at [address]  11/20/15    sonal       Family History   Problem Relation Age of Onset    Cancer Mother     Heart Disease Father        Social History     Socioeconomic History    Marital status:      Spouse name: Not on file    Number of children: 3    Years of education: Not on file    Highest education level: Not on file   Occupational History    Not on file   Tobacco Use    Smoking status: Former Smoker     Types: Cigarettes     Quit date: 2013     Years since quittin.8    Smokeless tobacco: Never Used   Vaping Use    Vaping Use: Never used   Substance and Sexual Activity    Alcohol use: No     Alcohol/week: 0.0 standard drinks    Drug use: No    Sexual activity: Not Currently   Other Topics Concern    Not on file   Social History Narrative    Not on file     Social Determinants of Health     Financial Resource Strain:     Difficulty of Paying Living Expenses: Not on file   Food Insecurity:     Worried About Running Out of Food in the Last Year: Not on file    Tima of Food in the Last Year: Not on file   Transportation Needs:     Lack of Transportation (Medical): Not on file    Lack of Transportation (Non-Medical): Not on file   Physical Activity:     Days of Exercise per Week: Not on file    Minutes of Exercise per Session: Not on file   Stress:     Feeling of Stress : Not on file   Social Connections:     Frequency of Communication with Friends and Family: Not on file    Frequency of Social Gatherings with Friends and Family: Not on file    Attends Rastafari Services: Not on file    Active Member of [address] [place] [address] [employer] or Organizations: Not on file    Attends Club or Organization Meetings: Not on file    Marital Status: Not on file   Intimate Partner Violence:     Fear of Current or Ex-Partner: Not on file    Emotionally Abused: Not on file    Physically Abused: Not on file    Sexually Abused: Not on file   Housing Stability:     Unable to Pay for Housing in the Last Year: Not on file    Number of [place] in the Last Year: Not on file    Unstable Housing in the Last Year: Not on file           Occupation: Retired  Retired:  YES: Patient is retired from [address] [place] [address]: <<For Level 5, 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 Patient has a history of Prostate Cancer diagnosed back in 2013. Patient is s/p I-125 seed implantation in January of 2014. Patient states that a rising PSA in 2015 led to restaging with discovery of diffuse bone metastases on a bone scan. Patient was started on ADT with Bicalutamide. Patient is s/p 5 cycles of Docetaxel (8/15-11/15) and began Lupron's/biclautamide in July of 2016.  Patient started Kashif Leaks treatment on 09/26/16 and Zytiga on 08/2017. Patient's initial PSA level from 12/09/13was 4.18 with a Belmont score of 7 (4+3). Patient's PSA level on 02/25/15- 7.19, 08/05/15 was 68.96, on 08/19/15-10.54, 09/09/15-0.72, 05/25/16-0.21, 07/01/16-1.34, 09/26/16-<0.03. Patient had a CT of Chest done on 06/16/22 at [facility] which showed since 03/10/21 unchanged diffuse sclerotic osseous metastases. No non-osseous metastatic disease within the chest. CT of Abd/Pelvis done on 0616/22 showed progression of sclerotic osseous metastases within the right L1 vertebra. May be source of pt.'s reported back pain. No non-osseous metastatic disease within the abd/pelvis. On 06/16/22 patient also had a Bone Scan done which showed redemonstrated are numerous foci of abnormal osseous uptake, including the right glenoid, several vertebra (T8, T9, T12), multiple bilateral ribs, left sacrum, both iliac bones, consistent with osseous metastases, multiple new sites of abnormal radiotracer osseous activity are seen, including the left scapular spine, along segment of the posterior right 9th rib, the posterior 10th rib, the L1 vertebra, left iliac crest, left superior acetabulum & proximal left femur, consistent with progression of osseous metastatic disease. Patient is following with Dr. Trini Madrigal for Med Onc, last encounter was telephone visit on 06/20/22 where , [facility] referred patient for RT to L1. Patient verbalizes understanding of care and all questions were answered from a nursing perspective. Pacemaker/Defibulator/ICD:  No    Mediport: No        FALLS RISK SCREENING ASSESSMENT    Instructions:  Assess the patient and enter the appropriate indicators that are present for fall risk identification. Total the numbers entered and assign a fall risk score from Table 2.  Reassess patient at a minimum every 12 weeks or with status change. Assessment   Date  7/7/2022     1. Mental Ability: confusion/cognitively impaired No - 0       2.   Elimination Issues: incontinence, frequency Yes - 3       3. Ambulatory: use of assistive devices (walker, cane, off-loading devices), attached to equipment (IV pole, oxygen) Yes - 2     4. Sensory Limitations: dizziness, vertigo, impaired vision No - 0       5. Age 72 years or greater - 1       10. Medication: diuretics, strong analgesics, hypnotics, sedatives, antihypertensive agents   Yes - 3   7. Falls:  recent history of falls within the last 3 months (not to include slipping or tripping)   No - 0   TOTAL 9    If score of 4 or greater was education given? Yes       TABLE 2   Risk Score Risk Level Plan of Care   0-3 Little or  No Risk 1. Provide assistance as indicated for ambulation activities  2. Reorient confused/cognitively impaired patient  3. Call-light/bell within patient's reach  4. Chair/bed in low position, stretcher/bed with siderails up except when performing patient care activities  5. Educate patient/family/caregiver on falls prevention  6.  Reassess in 12 weeks or with any noted change in patient condition which places them at a risk for a fall   4-6 Moderate Risk 1. Provide assistance as indicated for ambulation activities  2. Reorient confused/cognitively impaired patient  3. Call-light/bell within patient's reach  4. Chair/bed in low position, stretcher/bed with siderails up except when performing patient care activities  5. Educate patient/family/caregiver on falls prevention  6. Falls risk precaution (Yellow sticker Level II) placed on patient chart   7 or   Higher High Risk 1. Place patient in easily observable treatment room  2. Patient attended at all times by family member or staff  3. Provide assistance as indicated for ambulation activities  4. Reorient confused/cognitively impaired patient  5. Call-light/bell within patient's reach  6. Chair/bed in low position, stretcher/bed with siderails up except when performing patient care activities  7.   Educate patient/family/caregiver on falls walking: Yes     · Do you need to take rest breaks when you are walking: Yes     · Any pain on a scale of 1-10 that limits your mobility: No 0/10    ARE ANY OF THE ABOVE ARE ANSWERED YES: Yes - but NO PT referral request sent due to patient already being seen by PT. PELVIC FLOOR DYSFUNCTION SCREENING ASSESSMENT    - Do you have any pelvic pain? No     - Do you have any fecal constipation or fecal incontinence? No     - Do you have any urinary incontinence or difficulty starting to urinate? Yes     ARE ANY OF THE ABOVE ARE ANSWERED YES: Yes - but NO PT referral request sent due to patient already being seen by PT. PREHAB AUDIOLOGY REFERRAL    - Is patient planned to receive Cisplatin? No. This patient is not planned to start Cisplatin. - Is patient planned to receive radiation therapy that may be directed toward auditory canals or nerves? No. Patient is not planned to start radiation therapy to auditory canals or nerves. - Is patient complaining of new onset hearing loss? No. Patient is not complaining of new onset hearing loss. Patient education given on RT ti Lumbar Spine. The patient expresses understanding and acceptance of instructions.  Cielo Padilla RN 7/7/2022 11:33 AM           Cielo Padilla RN

## 2022-07-07 NOTE — TELEPHONE ENCOUNTER
Pt is a 78 y.o. male attending clinic for a consultation with Dr. Domonique Garibay.  introduced himself and role of oncology social work to pt and wife, Carlin. Pt denied any needs at this time and wife reported that many services have been put in place through other offices and that they have been fortunate to not have many needs. Pt appeared A&O, mood appeared euthymic, affect congruent. Pt was encouraged to reach out to  should any needs arise.       Jeanette Shah MSW, LISW-S  Oncology Social Worker

## 2022-07-11 ENCOUNTER — TELEPHONE (OUTPATIENT)
Dept: PHYSICAL MEDICINE AND REHAB | Age: 77
End: 2022-07-11

## 2022-07-11 LAB — BLOOD CULTURE, ROUTINE: NORMAL

## 2022-07-11 NOTE — TELEPHONE ENCOUNTER
Called [facility] and account set up for patient for botox, spoke to [employer] pharmacist at [facility] and order was sent in, instructed her to bill under medical,  once she receives paid co pay from insurance they will call patient to get consent to ship and they will call office after that to set up shipment date.

## 2022-07-12 ENCOUNTER — HOSPITAL ENCOUNTER (OUTPATIENT)
Dept: RADIATION ONCOLOGY | Age: 77
Discharge: HOME | End: 2022-07-12
Payer: COMMERCIAL

## 2022-07-12 PROCEDURE — 77334 RADIATION TREATMENT AID(S): CPT | Performed by: RADIOLOGY

## 2022-07-18 NOTE — TELEPHONE ENCOUNTER
[facility] specialty pharmacy to see if they spoke to Elzbieta Calvo spouse to see if they received consent and they did not as of yet. Spoke to [facility] from [employer], they marked message as urgent to get this processed quicker since patients appointment is august 3rd. I also spoke to rosy to let her know they should be calling her for consent to ship.

## 2022-07-22 NOTE — TELEPHONE ENCOUNTER
Called [facility] again spoke to Savana who said that [facility] is the one who supplies botox under patients insurance not [facility] so called [facility] spoke to ismael that said [facility] quit sending botox since January. I attempted to call patients medical plan to verify J code and do authorization for botox, was on hold twice for 1 hour 30 min and then second time over 2 hours, unable to reach someone in authorization department to get J code botox approved.  Will try again Monday July 25 2022

## 2022-07-25 NOTE — TELEPHONE ENCOUNTER
Mitch Mariano from [employer]/ prior authorization department called back and stated that no authorization is required for [address] [place] [address] Reference # [id number]. She then transferred me to benefits department to see where he can go for specialty pharmacy for the Botox.   Was transferred to Zena ROBERT from [facility] benefits and the specialty pharmacy is [facility].  [phone]

## 2022-07-25 NOTE — TELEPHONE ENCOUNTER
Called and left message on [employer] prior authorization department for prior authorization for Botox. Will wait for return call from [employer]/BS.

## 2022-07-25 NOTE — TELEPHONE ENCOUNTER
Called and spoke with [place] from [employer] they can not bill in [facility] for Ousmane Izquierdo. So this would have to be a buy & bill.

## 2022-07-29 ENCOUNTER — HOSPITAL ENCOUNTER (OUTPATIENT)
Dept: RADIATION ONCOLOGY | Age: 77
Discharge: HOME | End: 2022-07-29
Payer: COMMERCIAL

## 2022-07-29 PROCEDURE — 77301 RADIOTHERAPY DOSE PLAN IMRT: CPT | Performed by: RADIOLOGY

## 2022-07-29 PROCEDURE — 77338 DESIGN MLC DEVICE FOR IMRT: CPT | Performed by: RADIOLOGY

## 2022-07-29 PROCEDURE — 77300 RADIATION THERAPY DOSE PLAN: CPT | Performed by: RADIOLOGY

## 2022-08-03 ENCOUNTER — HOSPITAL ENCOUNTER (OUTPATIENT)
Dept: RADIATION ONCOLOGY | Age: 77
Discharge: HOME | End: 2022-08-03
Payer: COMMERCIAL

## 2022-08-03 ENCOUNTER — OFFICE VISIT (OUTPATIENT)
Dept: PHYSICAL MEDICINE AND REHAB | Age: 77
End: 2022-08-03
Payer: COMMERCIAL

## 2022-08-03 VITALS — BODY MASS INDEX: 25.2 KG/M2 | HEIGHT: 71 IN | WEIGHT: 180 LBS | TEMPERATURE: 97.1 F

## 2022-08-03 DIAGNOSIS — I69.30 CHRONIC ISCHEMIC LEFT MCA STROKE: ICD-10-CM

## 2022-08-03 DIAGNOSIS — R25.2 SPASTICITY: ICD-10-CM

## 2022-08-03 DIAGNOSIS — I63.232 CEREBROVASCULAR ACCIDENT (CVA) DUE TO OCCLUSION OF LEFT CAROTID ARTERY (HCC): ICD-10-CM

## 2022-08-03 DIAGNOSIS — Z78.9 IMPAIRED MOBILITY AND ADLS: Primary | ICD-10-CM

## 2022-08-03 DIAGNOSIS — Z74.09 IMPAIRED MOBILITY AND ADLS: Primary | ICD-10-CM

## 2022-08-03 PROCEDURE — 64643 CHEMODENERV 1 EXTREM 1-4 EA: CPT | Performed by: PHYSICAL MEDICINE & REHABILITATION

## 2022-08-03 PROCEDURE — 95874 GUIDE NERV DESTR NEEDLE EMG: CPT | Performed by: PHYSICAL MEDICINE & REHABILITATION

## 2022-08-03 PROCEDURE — 64644 CHEMODENERV 1 EXTREM 5/> MUS: CPT | Performed by: PHYSICAL MEDICINE & REHABILITATION

## 2022-08-03 PROCEDURE — 77014 PR CT GUIDANCE PLACEMENT RAD THERAPY FIELDS: CPT | Performed by: RADIOLOGY

## 2022-08-03 PROCEDURE — 77386 HC NTSTY MODUL RAD TX DLVR CPLX: CPT | Performed by: RADIOLOGY

## 2022-08-03 RX ORDER — HYDROCODONE BITARTRATE AND ACETAMINOPHEN 5; 325 MG/1; MG/1
TABLET ORAL
COMMUNITY
Start: 2022-08-02

## 2022-08-03 RX ORDER — MIRTAZAPINE 15 MG/1
TABLET, FILM COATED ORAL
COMMUNITY
Start: 2022-07-26

## 2022-08-03 NOTE — PROGRESS NOTES
Filbert Runner, D.O. [facility]  [address] [facility] [address]  Phone: [phone]  Fax: [phone]  8/3/2022    Chief Complaint   Patient presents with    Arm Pain     Right side Botox Injection 700 units    Leg Pain       Informed Consent:  The indications, risks, benefits, and  alternatives of onabotulinum toxin A were discussed with the patient. I explained to the patient the potential side effects including but not limited to: distant spread of toxin effect causing droopy eyelids, facial drooping, neck pain, headache, double vision, muscle pain/spasm/weakness/stiffness,  bronchitis,  injection site pain, increased blood pressure, hypersensitivity, anaphylaxis, difficulty swallowing, difficulty speaking, urinary incontinence and breathing difficulty. The patient is aware that swallowing and breathing difficulties can be life threatening and there have been reports of death in some cases where onabotulinum toxin was injected. The patient was advised of the expected benefit to include decreased spasticity in the injected muscles, and the anticipated duration of effect of 3 months. A permit was signed and scanned into the media. Last injection: 5/2/22  Taking anticoagulants/antiplatelets: Yes  Diabetic: No  Febrile/active infection: No    Ambulatory Procedure Time Out  Correct Patient: Yes  Correct Procedure: Yes  Correct Site/Side: Yes  Correct Site(s) Marked: Yes  Informed Consent Signed: Yes  Allergies Verified: Yes  Staff Present & Credential[de-identified] Concepcion Coombs LPN, Dr. Adalberto Reza DO    Procedure note: After obtaining consent,  EMG guided onabotulinum toxin A injection was performed of the right upper and lower extremity, trunk at the locations and doses listed below. 700 units of onabotulinum toxin A was reconstituted using 4 cc of preservative free normal saline ( 5 units per 0.1 ml).  EMG guidance was necessary to adequately localize muscle due to nearby neurovascular structures to maximize the response to the toxin. At each injection site, the skin was prepared with alcohol and using a no touch technique the Myoject needle was directed to the desired muscle. Negative aspiration was performed at each site prior to injection. Right Upper extremity  Pectoralis major 100 units  Latissimus dorsi 75 units  Triceps brachii 25 units  Pronator teres 30 units  FCR 70 units  FDS 60 units  FDP 60 units  FCU 30 units  Intrinsic hand 80 units at 4 sites    Right lower extremity  Rectus femoris 30 units  Vastus medialis 30 units  Vastus lateralis 30 units  Vastus intermedius 25 units    Total units 650  Total waste 50    Adequate hemostasis was obtained and bandages were applied to the injection sites. The patient was monitored clinically in the office after the injection and left the office without incident. The patient was educated in post-procedure care including ice 20 minutes on/20 minutes off prn pain/bruising, call if any fevers chills, night sweats, drainage, increased pain, weakness, difficulty breathing or swallowing. The patient was advised to anticipate the Botox to start working in about 2 weeks. follow up 6 weeks      Angie Garcia D.O., P.T.   Board Certified Physical Medicine and Rehabilitation  Board Certified Electrodiagnostic Medicine    Administrations This Visit       Onabotulinumtoxin A (BOTOX (COSMETIC)) injection 600 Units       Admin Date  08/03/2022  13:31 Action  Given Dose  600 Units Route  IntraMUSCular Site  Other Administered By  Trisha Villeda RN    Ordering Provider: Kraig Cockayne, DO    NDC: 6338-6589-11    Lot#: [id number]    : [facility]    Patient Supplied?: No              onabotulinumtoxin A (BOTOX) injection 100 Units       Admin Date  08/03/2022  13:32 Action  Given Dose  100 Units Route  IntraMUSCular Site  Other Administered By  Trisha Villeda RN    Ordering Provider: Kraig Cockayne, DO    NDC: 0650-6762-63 Lot#: [id number]    : [facility]    Patient Supplied?: No

## 2022-08-04 ENCOUNTER — HOSPITAL ENCOUNTER (OUTPATIENT)
Dept: RADIATION ONCOLOGY | Age: 77
Discharge: HOME | End: 2022-08-04
Payer: COMMERCIAL

## 2022-08-04 PROCEDURE — 77386 HC NTSTY MODUL RAD TX DLVR CPLX: CPT | Performed by: RADIOLOGY

## 2022-08-04 PROCEDURE — 77014 PR CT GUIDANCE PLACEMENT RAD THERAPY FIELDS: CPT | Performed by: RADIOLOGY

## 2022-08-05 ENCOUNTER — HOSPITAL ENCOUNTER (OUTPATIENT)
Dept: RADIATION ONCOLOGY | Age: 77
Discharge: HOME | End: 2022-08-05
Payer: COMMERCIAL

## 2022-08-05 PROCEDURE — 77386 HC NTSTY MODUL RAD TX DLVR CPLX: CPT | Performed by: RADIOLOGY

## 2022-08-05 PROCEDURE — 77014 PR CT GUIDANCE PLACEMENT RAD THERAPY FIELDS: CPT | Performed by: RADIOLOGY

## 2022-08-08 ENCOUNTER — HOSPITAL ENCOUNTER (OUTPATIENT)
Dept: RADIATION ONCOLOGY | Age: 77
Discharge: HOME | End: 2022-08-08
Payer: COMMERCIAL

## 2022-08-08 PROCEDURE — 77386 HC NTSTY MODUL RAD TX DLVR CPLX: CPT | Performed by: RADIOLOGY

## 2022-08-08 PROCEDURE — 77014 PR CT GUIDANCE PLACEMENT RAD THERAPY FIELDS: CPT | Performed by: RADIOLOGY

## 2022-08-09 ENCOUNTER — TELEPHONE (OUTPATIENT)
Dept: RADIATION ONCOLOGY | Age: 77
End: 2022-08-09

## 2022-08-09 ENCOUNTER — HOSPITAL ENCOUNTER (OUTPATIENT)
Dept: RADIATION ONCOLOGY | Age: 77
Discharge: HOME | End: 2022-08-09
Payer: COMMERCIAL

## 2022-08-09 VITALS
BODY MASS INDEX: 22.51 KG/M2 | SYSTOLIC BLOOD PRESSURE: 110 MMHG | DIASTOLIC BLOOD PRESSURE: 60 MMHG | TEMPERATURE: 97.2 F | WEIGHT: 161.4 LBS | RESPIRATION RATE: 18 BRPM | OXYGEN SATURATION: 98 % | HEART RATE: 90 BPM

## 2022-08-09 DIAGNOSIS — C79.51 BONE METASTASES (HCC): Primary | ICD-10-CM

## 2022-08-09 PROCEDURE — 77014 PR CT GUIDANCE PLACEMENT RAD THERAPY FIELDS: CPT | Performed by: RADIOLOGY

## 2022-08-09 PROCEDURE — 77427 RADIATION TX MANAGEMENT X5: CPT | Performed by: RADIOLOGY

## 2022-08-09 PROCEDURE — 77336 RADIATION PHYSICS CONSULT: CPT | Performed by: RADIOLOGY

## 2022-08-09 PROCEDURE — 77386 HC NTSTY MODUL RAD TX DLVR CPLX: CPT | Performed by: RADIOLOGY

## 2022-08-09 NOTE — PROGRESS NOTES
Genevive Servant  8/9/2022  Wt Readings from Last 3 Encounters:   08/09/22 161 lb 6.4 oz (73.2 kg)   08/03/22 180 lb (81.6 kg)   07/07/22 180 lb (81.6 kg)     Body mass index is 22.51 kg/m². Treatment Area:CTV L1    Patient was seen today for weekly visit. Comfort Alteration  KPS:50%  Fatigue: Yes      Nutritional Alteration  Anorexia: No   Nausea: No   Vomiting: No     Elimination Alterations  Constipation: no  Diarrhea:  yes  Bowel Incontinence: No  Urinary Incontinence: No    Skin Alteration   Sensation:no    Radiation Dermatitis:  no      Emotional  Coping: somewhat effective    Sexuality Alteration  na    Injury, potential bleeding or infection: no        Lab Results   Component Value Date    WBC 6.9 07/06/2022     07/06/2022       /60   Pulse 90   Temp 97.2 °F (36.2 °C) (Temporal)   Resp 18   Wt 161 lb 6.4 oz (73.2 kg)   SpO2 98%   BMI 22.51 kg/m²   BP within normal range? yes       Assessment/Plan:5/10;1500/3000cGy completed and tolerating. Decreased appetite.     Lesley Sylvester RN

## 2022-08-09 NOTE — TELEPHONE ENCOUNTER
Miladis Estimable  8/9/2022  Wt Readings from Last 10 Encounters:   08/09/22 161 lb 6.4 oz (73.2 kg)   08/03/22 180 lb (81.6 kg)   07/07/22 180 lb (81.6 kg)   07/06/22 180 lb (81.6 kg)   06/21/22 180 lb (81.6 kg)   05/02/22 186 lb (84.4 kg)   03/15/22 186 lb (84.4 kg)   01/31/22 186 lb (84.4 kg)   12/20/21 186 lb (84.4 kg)   11/08/21 186 lb (84.4 kg)     Ht Readings from Last 1 Encounters:   08/03/22 5' 11\" (1.803 m)     BMI=22.51    Assessment: Visited with Vani Daily and his wife while in for OTV. Received rad tx 5/10 for bone mets. Medical onc out of [address]. Wife reports poor appetite, and decreased since beginning radiation. [facility] has seen [facility] and was started on Remeron, which was effective but now decreased again. Judy Santos reports that breakfast is usually best intake of the day but that has decreased. Eats little amounts the rest of the day. Discussed oral nutrition supplement use but Judy Santos said since he takes Coumadin that they have had a difficult time regulating his INR,  that previous attempts of ONS affected is INR too much. She said she does have whey protein that she adds to food that does not seem to affect it. Discussed some snack ideas that are nutrient dense and also home made shakes that are high in calorie/protein. Provided resources and recipes for snacks and shakes. Judy Santos was appreciative of information. Contact information provided for further assistance as needed. Weight change: Weight one month ago was a stated weight d/t he was in a wheel chair. 13.23% significant weight change x 3 and 6 months  Appetite: Poor appetite. Wife reports breakfast best meal of the day but that has even decreased. Nutritional Side Effects:  Anorexia, weight loss  Calculated Needs: 28-32kcals/kg/LZV=3438-4866uxynu, 1.3-1.5gm/kg/CBW=95-110gm protein, 1ml/kcal=2050-2350ml fluids  Malnutrition Status: Severe  Nutrition Diagnosis: Severe malnutrition r/t prostate CA with bone mets AEB significant weight loss of 13.23% past 3 and 6 months, eating <75% of energy needs for >1month, severe muscle wasting of temple and clavicle region, severe fat loss of orbital and cheek region.       Recommendations:     Zac Ortiz RD

## 2022-08-09 NOTE — PROGRESS NOTES
DEPARTMENT OF RADIATION ONCOLOGY   ON TREATMENT VISIT       8/9/2022      NAME:  Savi Yu    YOB: 1945    DIAGNOSIS: bone metatases    SUBJECTIVE:   Savi Yu has now received 1500 cGy in 5 fractions directed to the L1. Past medical, surgical, social and family histories reviewed and updated as indicated. PAIN: Intense    ALLERGIES:  Patient has no known allergies. Current Outpatient Medications   Medication Sig Dispense Refill    HYDROcodone-acetaminophen (NORCO) 5-325 MG per tablet       mirtazapine (REMERON) 15 MG tablet take 1/2 tablet by mouth at bedtime      IPRATROPIUM BROMIDE IN Inhale into the lungs 2 times daily      loperamide (IMODIUM) 2 MG capsule TAKE ONE CAPSULE BY MOUTH FOUR TIMES A DAY AS NEEDED LOOSE STOOL/DIARHEA      atorvastatin (LIPITOR) 40 MG tablet 40 mg       warfarin (COUMADIN) 5 MG tablet Take 7 mg by mouth 5 mg on Wed      ERLEADA 60 MG TABS Take 60 mg by mouth daily      ferrous sulfate (IRON 325) 325 (65 Fe) MG tablet TAKE 1 TABLET BY MOUTH TWICE A DAY      miconazole nitrate 2 % OINT Apply topically 2 times daily 1 Tube 0    Calcium Carb-Cholecalciferol 600-800 MG-UNIT TABS Take 1 tablet by mouth daily 40 mcg(1600IU)      vitamin D (CHOLECALCIFEROL) 25 MCG (1000 UT) TABS tablet Take 5,000 Units by mouth Daily with lunch       metoprolol succinate (TOPROL XL) 100 MG extended release tablet Take 100 mg by mouth Daily with supper      Ascorbic Acid (VITAMIN C PLUS WILD CAL HIPS PO) Take 1,000 mg by mouth Daily with lunch      predniSONE (DELTASONE) 5 MG tablet Take 5 mg by mouth every morning       acetaminophen (TYLENOL) 325 MG tablet Take 325 mg by mouth every 6 hours as needed for Pain or Fever       Multiple Vitamins-Minerals (THERAPEUTIC MULTIVITAMIN-MINERALS) tablet Take 1 tablet by mouth every morning        No current facility-administered medications for this encounter. OBJECTIVE:  Alert and fully ambulatory.  Pleasant and conversant. Physical Examination:General appearance - alert, well appearing, and in no distress and chronically ill appearing:  Constitutional: A well developed, well nourished 78 y.o. male who is alert, oriented, cooperative and in no apparent distress. HEENT:   Skin:  Warm and dry. No obvious rashes. Vitals:    08/09/22 1222   BP: 110/60   Pulse: 90   Resp: 18   Temp: 97.2 °F (36.2 °C)   TempSrc: Temporal   SpO2: 98%   Weight: 161 lb 6.4 oz (73.2 kg)       Wt Readings from Last 3 Encounters:   08/09/22 161 lb 6.4 oz (73.2 kg)   08/03/22 180 lb (81.6 kg)   07/07/22 180 lb (81.6 kg)       ASSESSMENT/PLAN:     Patient is tolerating treatments well with expected toxicities. Patient complains of a flareup of pain. Instructed about the correct use of opiates. Instructed about bowel management. Current and planned dose reviewed. Goals of treatment and potential side effects were reviewed with the patient. Treatment imaging has been personally reviewed for accuracy and precision. Questions answered to apparent satisfaction. Treatments will continue as planned. Thank you for the opportunity to participate in multidisciplinary management of this remarkable and pleasant patient.       Alina Mascorro MD    Department of Radiation Oncology  [facility]) [facility]: [phone] (WDA: [phone])  [facility]: [phone] (EEW: [phone])  [facility]:  [phone] (VGN:  [phone])

## 2022-08-10 ENCOUNTER — HOSPITAL ENCOUNTER (OUTPATIENT)
Dept: RADIATION ONCOLOGY | Age: 77
Discharge: HOME | End: 2022-08-10
Payer: COMMERCIAL

## 2022-08-10 PROCEDURE — 77386 HC NTSTY MODUL RAD TX DLVR CPLX: CPT | Performed by: RADIOLOGY

## 2022-08-10 PROCEDURE — 77014 PR CT GUIDANCE PLACEMENT RAD THERAPY FIELDS: CPT | Performed by: RADIOLOGY

## 2022-08-11 ENCOUNTER — HOSPITAL ENCOUNTER (OUTPATIENT)
Dept: RADIATION ONCOLOGY | Age: 77
Discharge: HOME | End: 2022-08-11
Payer: COMMERCIAL

## 2022-08-11 PROCEDURE — 77014 PR CT GUIDANCE PLACEMENT RAD THERAPY FIELDS: CPT | Performed by: RADIOLOGY

## 2022-08-11 PROCEDURE — 77386 HC NTSTY MODUL RAD TX DLVR CPLX: CPT | Performed by: RADIOLOGY

## 2022-08-12 ENCOUNTER — HOSPITAL ENCOUNTER (OUTPATIENT)
Dept: RADIATION ONCOLOGY | Age: 77
Discharge: HOME | End: 2022-08-12
Payer: COMMERCIAL

## 2022-08-12 PROCEDURE — 77014 PR CT GUIDANCE PLACEMENT RAD THERAPY FIELDS: CPT | Performed by: RADIOLOGY

## 2022-08-12 PROCEDURE — 77386 HC NTSTY MODUL RAD TX DLVR CPLX: CPT | Performed by: RADIOLOGY

## 2022-08-15 ENCOUNTER — HOSPITAL ENCOUNTER (OUTPATIENT)
Dept: RADIATION ONCOLOGY | Age: 77
Discharge: HOME | End: 2022-08-15
Payer: COMMERCIAL

## 2022-08-15 PROCEDURE — 77386 HC NTSTY MODUL RAD TX DLVR CPLX: CPT | Performed by: RADIOLOGY

## 2022-08-15 PROCEDURE — 77014 PR CT GUIDANCE PLACEMENT RAD THERAPY FIELDS: CPT | Performed by: RADIOLOGY

## 2022-08-16 ENCOUNTER — HOSPITAL ENCOUNTER (OUTPATIENT)
Dept: RADIATION ONCOLOGY | Age: 77
Discharge: HOME | End: 2022-08-16
Payer: COMMERCIAL

## 2022-08-16 VITALS
RESPIRATION RATE: 18 BRPM | SYSTOLIC BLOOD PRESSURE: 118 MMHG | HEART RATE: 74 BPM | DIASTOLIC BLOOD PRESSURE: 64 MMHG | TEMPERATURE: 97.3 F

## 2022-08-16 DIAGNOSIS — C79.51 SECONDARY CANCER OF BONE (HCC): Primary | ICD-10-CM

## 2022-08-16 PROCEDURE — 77336 RADIATION PHYSICS CONSULT: CPT | Performed by: RADIOLOGY

## 2022-08-16 PROCEDURE — 99999 PR OFFICE/OUTPT VISIT,PROCEDURE ONLY: CPT | Performed by: RADIOLOGY

## 2022-08-16 PROCEDURE — 77014 PR CT GUIDANCE PLACEMENT RAD THERAPY FIELDS: CPT | Performed by: RADIOLOGY

## 2022-08-16 PROCEDURE — 77427 RADIATION TX MANAGEMENT X5: CPT | Performed by: RADIOLOGY

## 2022-08-16 PROCEDURE — 77386 HC NTSTY MODUL RAD TX DLVR CPLX: CPT | Performed by: RADIOLOGY

## 2022-08-16 NOTE — PATIENT INSTRUCTIONS
Continue daily fractionated radiation therapy as scheduled. Please see weekly OTV note and intial consultation letter in [facility] for clinical details. Claudio Hurd. Korin Lopez MD MS [facility]:  [phone]   FAX: [phone]  [facility]:  [phone]   FAX:    [phone] [address]:  [phone]   FAX:  [phone]  Email: [email or web address]

## 2022-08-16 NOTE — PROGRESS NOTES
Janae Daleyjenifer  8/16/2022  Wt Readings from Last 3 Encounters:   08/09/22 161 lb 6.4 oz (73.2 kg)   08/03/22 180 lb (81.6 kg)   07/07/22 180 lb (81.6 kg)     There is no height or weight on file to calculate BMI. Treatment Area:Prostates with bone mets     Patient was seen today for weekly visit. Comfort Alteration  KPS:129868 cGy%  Fatigue: Severe      Nutritional Alteration  Anorexia: Yes   Nausea: No   Vomiting: No     Elimination Alterations  Constipation: no  Diarrhea:  no  Bowel Incontinence: No  Urinary Incontinence: Yes    Skin Alteration   Sensation:na    Radiation Dermatitis:  none      Emotional  Coping: effective    Sexuality Alteration  na    Injury, potential bleeding or infection: na        Lab Results   Component Value Date    WBC 6.9 07/06/2022     07/06/2022       There were no vitals taken for this visit. BP within normal range? yes       Assessment/Plan:  Completed treatment today 10 fractions / 3000 cGy. Discharge instructions given, Sharonda verbalizes understanding.     Jam Leach RN

## 2022-08-16 NOTE — PROGRESS NOTES
Miladis Estimable  8/16/2022  7:41 AM          Current Outpatient Medications   Medication Sig Dispense Refill    HYDROcodone-acetaminophen (NORCO) 5-325 MG per tablet       mirtazapine (REMERON) 15 MG tablet take 1/2 tablet by mouth at bedtime      IPRATROPIUM BROMIDE IN Inhale into the lungs 2 times daily      loperamide (IMODIUM) 2 MG capsule TAKE ONE CAPSULE BY MOUTH FOUR TIMES A DAY AS NEEDED LOOSE STOOL/DIARHEA      atorvastatin (LIPITOR) 40 MG tablet 40 mg       warfarin (COUMADIN) 5 MG tablet Take 7 mg by mouth 5 mg on Wed      ERLEADA 60 MG TABS Take 60 mg by mouth daily      ferrous sulfate (IRON 325) 325 (65 Fe) MG tablet TAKE 1 TABLET BY MOUTH TWICE A DAY      miconazole nitrate 2 % OINT Apply topically 2 times daily 1 Tube 0    Calcium Carb-Cholecalciferol 600-800 MG-UNIT TABS Take 1 tablet by mouth daily 40 mcg(1600IU)      vitamin D (CHOLECALCIFEROL) 25 MCG (1000 UT) TABS tablet Take 5,000 Units by mouth Daily with lunch       metoprolol succinate (TOPROL XL) 100 MG extended release tablet Take 100 mg by mouth Daily with supper      Ascorbic Acid (VITAMIN C PLUS WILD CAL HIPS PO) Take 1,000 mg by mouth Daily with lunch      predniSONE (DELTASONE) 5 MG tablet Take 5 mg by mouth every morning       acetaminophen (TYLENOL) 325 MG tablet Take 325 mg by mouth every 6 hours as needed for Pain or Fever       Multiple Vitamins-Minerals (THERAPEUTIC MULTIVITAMIN-MINERALS) tablet Take 1 tablet by mouth every morning        No current facility-administered medications for this encounter. This is an up-to-date medication list.    Please take this list to your next care provider, and discard any previous medication lists.

## 2022-08-29 ENCOUNTER — OFFICE VISIT (OUTPATIENT)
Dept: ONCOLOGY | Age: 77
End: 2022-08-29
Payer: COMMERCIAL

## 2022-08-29 ENCOUNTER — TELEPHONE (OUTPATIENT)
Dept: CASE MANAGEMENT | Age: 77
End: 2022-08-29

## 2022-08-29 ENCOUNTER — HOSPITAL ENCOUNTER (OUTPATIENT)
Dept: INFUSION THERAPY | Age: 77
Discharge: HOME | End: 2022-08-29

## 2022-08-29 ENCOUNTER — TELEPHONE (OUTPATIENT)
Dept: INFUSION THERAPY | Age: 77
End: 2022-08-29

## 2022-08-29 VITALS
HEART RATE: 55 BPM | BODY MASS INDEX: 22.51 KG/M2 | TEMPERATURE: 96.4 F | SYSTOLIC BLOOD PRESSURE: 106 MMHG | HEIGHT: 71 IN | OXYGEN SATURATION: 92 % | DIASTOLIC BLOOD PRESSURE: 71 MMHG

## 2022-08-29 DIAGNOSIS — C61 PROSTATE CANCER (HCC): Primary | ICD-10-CM

## 2022-08-29 PROCEDURE — 1123F ACP DISCUSS/DSCN MKR DOCD: CPT | Performed by: INTERNAL MEDICINE

## 2022-08-29 PROCEDURE — 99205 OFFICE O/P NEW HI 60 MIN: CPT | Performed by: INTERNAL MEDICINE

## 2022-08-29 PROCEDURE — 99214 OFFICE O/P EST MOD 30 MIN: CPT | Performed by: INTERNAL MEDICINE

## 2022-08-29 RX ORDER — ACETAMINOPHEN 325 MG/1
650 TABLET ORAL
Status: CANCELLED | OUTPATIENT
Start: 2022-09-15

## 2022-08-29 RX ORDER — EPINEPHRINE 1 MG/ML
0.3 INJECTION, SOLUTION, CONCENTRATE INTRAVENOUS PRN
Status: CANCELLED | OUTPATIENT
Start: 2022-09-15

## 2022-08-29 RX ORDER — SODIUM CHLORIDE 9 MG/ML
INJECTION, SOLUTION INTRAVENOUS CONTINUOUS
Status: CANCELLED | OUTPATIENT
Start: 2022-09-15

## 2022-08-29 RX ORDER — DIPHENHYDRAMINE HYDROCHLORIDE 50 MG/ML
50 INJECTION INTRAMUSCULAR; INTRAVENOUS
Status: CANCELLED | OUTPATIENT
Start: 2022-09-15

## 2022-08-29 RX ORDER — ONDANSETRON 2 MG/ML
8 INJECTION INTRAMUSCULAR; INTRAVENOUS
Status: CANCELLED | OUTPATIENT
Start: 2022-09-15

## 2022-08-29 RX ORDER — ALBUTEROL SULFATE 90 UG/1
4 AEROSOL, METERED RESPIRATORY (INHALATION) PRN
Status: CANCELLED | OUTPATIENT
Start: 2022-09-15

## 2022-08-29 NOTE — PROGRESS NOTES
Genevive Servant  1945 78 y.o. Referring Physician:  Dr. Yuli Mccarthy    PCP: Michelle Hare MD    Vitals:    22 0935   BP: 106/71   Pulse: (!) 143   Temp: (!) 96.4 °F (35.8 °C)   SpO2: 92%        Wt Readings from Last 3 Encounters:   22 161 lb 6.4 oz (73.2 kg)   22 180 lb (81.6 kg)   22 180 lb (81.6 kg)        Body mass index is 22.51 kg/m². Chief Complaint:   Chief Complaint   Patient presents with    New Patient         Cancer Staging  No matching staging information was found for the patient. Prior Radiation Therapy? YES: Site Treated: L1 Spine          [facility]          Date: 8/3/2022- 2022 (3000 cGy)    Concurrent Chemo/radiation? NO    Prior Chemotherapy? YES: Site Treated: Taxol          Facility: [facility]          Date: , Delmas Kussmaul & Criss (3 mo) due 2022    Prior Hormonal Therapy? YES: Site Treated: Lupron (3 mo shot)          Facility: [facility]          Date: Due 2022    Head and Neck Cancer? No, patient does NOT have HN cancer.       LMP: na    Age at first Menses: na    : na    Para: na      Current Outpatient Medications:     HYDROcodone-acetaminophen (NORCO) 5-325 MG per tablet, , Disp: , Rfl:     mirtazapine (REMERON) 15 MG tablet, take 1/2 tablet by mouth at bedtime, Disp: , Rfl:     IPRATROPIUM BROMIDE IN, Inhale into the lungs 2 times daily, Disp: , Rfl:     loperamide (IMODIUM) 2 MG capsule, TAKE ONE CAPSULE BY MOUTH FOUR TIMES A DAY AS NEEDED LOOSE STOOL/DIARHEA, Disp: , Rfl:     atorvastatin (LIPITOR) 40 MG tablet, 40 mg , Disp: , Rfl:     warfarin (COUMADIN) 5 MG tablet, Take 7 mg by mouth 5 mg on Wed, Disp: , Rfl:     ERLEADA 60 MG TABS, Take 60 mg by mouth daily, Disp: , Rfl:     ferrous sulfate (IRON 325) 325 (65 Fe) MG tablet, TAKE 1 TABLET BY MOUTH TWICE A DAY, Disp: , Rfl:     miconazole nitrate 2 % OINT, Apply topically 2 times daily, Disp: 1 Tube, Rfl: 0    Calcium Carb-Cholecalciferol 600-800 MG-UNIT TABS, Take 1 tablet by mouth daily 40 mcg(1600IU), Disp: , Rfl:     vitamin D (CHOLECALCIFEROL) 25 MCG (1000 UT) TABS tablet, Take 5,000 Units by mouth Daily with lunch , Disp: , Rfl:     metoprolol succinate (TOPROL XL) 100 MG extended release tablet, Take 100 mg by mouth Daily with supper, Disp: , Rfl:     Ascorbic Acid (VITAMIN C PLUS WILD CAL HIPS PO), Take 1,000 mg by mouth Daily with lunch, Disp: , Rfl:     predniSONE (DELTASONE) 5 MG tablet, Take 5 mg by mouth every morning , Disp: , Rfl:     acetaminophen (TYLENOL) 325 MG tablet, Take 325 mg by mouth every 6 hours as needed for Pain or Fever , Disp: , Rfl:     Multiple Vitamins-Minerals (THERAPEUTIC MULTIVITAMIN-MINERALS) tablet, Take 1 tablet by mouth every morning , Disp: , Rfl:        Past Medical History:   Diagnosis Date    A-fib ([facility]     Arthritis     [facility] 11/15/2015    left MCA    Hyperlipemia     Prostate cancer ([facility]     mets to spine, currently on chemo       Past Surgical History:   Procedure Laterality Date    CATARACT REMOVAL Right     CATHETER REMOVAL N/A 2020    CATHETER REMOVAL will do on patient bed performed by Jay Mohr MD at [address] [place] [address] N/A 3/9/2020    CYSTOSCOPY, BILATERAL RETROGRADE PYELOGRAMS, URETHRAL DILATATION, REYEZ CATHETER INSERTION performed by Shawna Aguilar MD at [address] [place] [address]  11/20/15    sonal       Family History   Problem Relation Age of Onset    Cancer Mother     Heart Disease Father        Social History     Socioeconomic History    Marital status:      Spouse name: Not on file    Number of children: 3    Years of education: Not on file    Highest education level: Not on file   Occupational History    Not on file   Tobacco Use    Smoking status: Former     Types: Cigarettes     Quit date: 2013     Years since quittin.0    Smokeless tobacco: Never   Vaping Use    Vaping Use: Never used   Substance and Sexual Activity Alcohol use: No     Alcohol/week: 0.0 standard drinks    Drug use: No    Sexual activity: Not Currently   Other Topics Concern    Not on file   Social History Narrative    Not on file     Social Determinants of Health     Financial Resource Strain: Not on file   Food Insecurity: Not on file   Transportation Needs: Not on file   Physical Activity: Not on file   Stress: Not on file   Social Connections: Not on file   Intimate Partner Violence: Not on file   Housing Stability: Not on file           Occupation: [employer]- Stroke at 78 yrs old during chemo  Retired:  YES: Patient is retired from [address] [place] [address]. Pacemaker/Defibulator/ICD:  No    Mediport: No           FALLS RISK SCREENING ASSESSMENT    Instructions:  Assess the patient and [place] the appropriate indicators that are present for fall risk identification. Total the numbers circled and assign a fall risk score from Table 2.  Reassess patient at a minimum every 12 weeks or with status change. Assessment   Date  8/29/2022     1. Mental Ability: confusion/cognitively impaired No - 0       2. Elimination Issues: incontinence, frequency Yes - 3       3. Ambulatory: use of assistive devices (walker, cane, off-loading devices), attached to equipment (IV pole, oxygen) Yes - 2     4. Sensory Limitations: dizziness, vertigo, impaired vision No - 0       5. Age 72 years or greater - 1       10. Medication: diuretics, strong analgesics, hypnotics, sedatives, antihypertensive agents   Yes - 3   7. Falls:  recent history of falls within the last 3 months (not to include slipping or tripping)   Yes - 7   TOTAL 16    If score of 4 or greater was education given? Yes       TABLE 2   Risk Score Risk Level Plan of Care   0-3 Little or  No Risk 1. Provide assistance as indicated for ambulation activities  2. Reorient confused/cognitively impaired patient  3. Call-light/bell within patient's reach  4.   Chair/bed in low position, stretcher/bed with siderails up except when performing patient care activities  5. Educate patient/family/caregiver on falls prevention  6.  Reassess in 12 weeks or with any noted change in patient condition which places them at a risk for a fall   4-6 Moderate Risk 1. Provide assistance as indicated for ambulation activities  2. Reorient confused/cognitively impaired patient  3. Call-light/bell within patient's reach  4. Chair/bed in low position, stretcher/bed with siderails up except when performing patient care activities  5. Educate patient/family/caregiver on falls prevention  6. Falls risk precaution (Yellow sticker Level II) placed on patient chart   7 or   Higher High Risk 1. Place patient in easily observable treatment room  2. Patient attended at all times by family member or staff  3. Provide assistance as indicated for ambulation activities  4. Reorient confused/cognitively impaired patient  5. Call-light/bell within patient's reach  6. Chair/bed in low position, stretcher/bed with siderails up except when performing patient care activities  7. Educate patient/family/caregiver on falls prevention  8. Falls risk precaution (Yellow sticker Level III) placed on patient chart           MALNUTRITION RISK SCREENING ASSESSMENT    Instructions:  Assess the patient and enter the appropriate indicators that are present for nutrition risk identification. Total the numbers entered and assign a risk score. Follow the appropriate action for total score listed below. Assessment   Date  8/29/2022     Have you lost weight without trying? 2- Unsure     Have you been eating poorly because of a decreased appetite? 1- Yes   3. Do you have a diagnosis of head and neck cancer? 0- No                                                                                    TOTAL 1- Dietician saw prior to RN         Score of 0-1: No action  Score 2 or greater:   For Non-Diabetic Patient: Recommend adding Ensure Enlive 2 x daily and provide patient with Ensure wellness bag with coupons  For Diabetic Patient: Recommend adding Glucerna Shake 2 x daily and provide patient with Glucerna Wellness bag with coupons  Route to the dietitian via [address]    Are you having  difficulty performing daily routine tasks  due to fatigue or weakness (ie: bathing/showering, dressing, housework, meal prep, work, child Soraida Quivers): Yes     Do you have any arm flexibility/ROM restrictions, swelling or pain that limit activity: Yes     Any changes in memory, attention/focus that impact daily activities: No     Do you avoid participation in leisure/social activity due weakness, fatigue or pain: Yes     ARE ANY OF THE ABOVE ARE ANSWERED YES: Yes - but NO OT referral request sent due to patient refusal.          PT ASSESSMENT FOR REFERRAL    Have you had any recent falls in past 2 months: No     Do you have difficulty  going up/down stairs: Yes     Are you having difficulty walking: Yes     Do you often hold onto furniture/environmental supports or feel off balance when you are walking: Yes     Do you need to take rest breaks when you are walking: Yes     Any pain on scale of 1-10 that limits your mobility: No 0/10    ARE ANY OF THE ABOVE ARE ANSWERED YES: Yes - but NO PT referral request sent due to patient already being seen by PT. PELVIC FLOOR DYSFUNCTION SCREENING ASSESSMENT    - Do you have any pelvic pain? No     - Do you have any fecal constipation or fecal incontinence? No     - Do you have any urinary incontinence or difficulty starting to urinate? No     ARE ANY OF THE ABOVE ARE ANSWERED YES: No           PREHAB AUDIOLOGY REFERRAL    - Is patient planned to receive Cisplatin? No. This patient is not planned to start Cisplatin. - Is patient complaining of new onset hearing loss? No. Patient is not complaining of new onset hearing loss.         Akbar Metcalf RN

## 2022-08-29 NOTE — TELEPHONE ENCOUNTER
Spoke to patient's wife, Bob Landon, regarding prior authorization signature for Desmond treatment. Informed her that I received the order form from Nuclear Medicine at [facility] and need patient or patient representative signature. She states that she has POA and can come to [facility] tomorrow afternoon after an appointment. Upon inquiring, she states that the patient has his own teeth and goes to the dentist every 3 months for cleaning to assist her in his ADL care. She is agreeable to take the dental clearance form to their dentist with our return fax number. Reviewed our policy for dental clearance with Xgeva treatments. Verbalizes understanding. Instructed her to contact me once she arrives at the center tomorrow. Provided contact number. Appreciative of call and assistance. Aleena Cramer, MAGDAW, RN, OCN  Oncology Nurse Navigator

## 2022-08-29 NOTE — PROGRESS NOTES
[id number] [facility]  [place] [address]  Dept: [place]: [phone]  Attending Consult Note      Reason for Visit:   Metastatic prostate cancer. Referring Physician:  Mony Dove MD    PCP:  Elysia Torres MD    History of Present Illness:    Mr. Keyon Gibbs is a 79-year-old gentleman with a past history significant for metastatic prostate cancer:    TREATMENT HISTORY: 125 seed implantation 1/17/2014  Bicalutamide 50 mg daily. ADT, and 5 cycles docetaxel 8/2/2015-11/11/2015  Restart of bicalutamide and leuprolide 7/2016    Leuprolide 22.5 mg IM every 12 weeks  Denosumab 120 mg subcutaneously every 12 weeks (started 9/2016)  Abiraterone and prednisone 2/15/2020 - 3/2021  Apalutamide 240 mg daily 3/29/2021 - current      History of Present Illness: Naveed Koehler is a 78year old male with prostate cancer metastatic to bone. He is s/p CVA resulting in expressive aphasia and right hemiparesis. Vincent Tello is in wheelchair and accompanied by his daughter, Gardenia Sandoval. Wife present on phone. He presents for ongoing evaluation and management of prostate cancer. HEMATOLOGY/ONCOLOGY HISTORY:  Screening PSA of 4.18 ng/mL in 2013 (not seen in [address] [place] [address] Rd). 12/12/2013 - transrectal US-guided biopsy of prostate demonstrating [place] 4+3 adenocarcinoma. 1/17/2014 - s/p I-125 seed implantation  PSA continued to rise as follows reaching 68.96 ng/ml on 8/5/2015. Additional evaluation with bone scan revealed diffuse progressive metastatic disease involving spine (cervical/lumbar spine, b/l scapulae, other bones. Started ADT & Casodex, had docetaxel x 5 cycles from 8/2/2015 - 11/11/2015    S/P massive ischemic CVA in 11/2015 3 days after cycle 5 chemotherapy. He is S/P rehab post CVA. He has expressive aphasia and right hemiparesis. He is dependent on family for ADL and care.  Received Botox injections to help with right arm - loosen contractures. -------------------------------------------------------------------------------------------------  Restarted on lupron/casodex in 7/2016. Started denosumab 120 mg subcutaneously every 4 weeks on 9/26/2016. Changed to every 12 weeks beginning September 2017. Lupron continued every 12 weeks   He remained on bicalutamide 50 mg daily. PSA was rising and regimen change recommended. Bone scan showed new bone mets 2/2020.  ---------------------------------------------------------------------------------  Lupron and denosumab on 1/28/2020. Abiraterone/prednisone started 2/15/2020. Went to ER on 3/5/2020 with difficulty breathing, increased weakness. Had pneumonia per wife. He was on ventilator and had to start dialysis. From hospital, he went to SNF. Home 6/5/2020. Off dialysis. Prostate treatment interruption between 3/5/2020 and 6/10/2020. PSA increased up to 9.47 in early June 2020 while off treatment. PSA 5.1 on 9/3/2020. PSA 0.16 on 11/30/2020    Mark admitted to some increased intermittent lower back pain. 2/5/2021 lab showed significant increase in PSA to 8. Repeat done 2/24/2021 and was 10. No other new specific symptoms. Restaging scans revealed increased bone metastases in multiple areas. Started apalutamide on 3/29/2021. Continues on prednisone 5 mg daily.   --------------------------------------------------------------------------------------------  Interval history:    Continues on apalutamide. Changed anticoagulation to warfarin which has less interaction with apalutamide compared to prior anticoagulation medication. Monitored at [place]    The patient was under the care of Dr. Bienvenido Estrada at [facility], restaging scans on 6/16/2022, had revealed disease progression in the bones, the patient was recommended Xofigo. The patient received radiation therapy to L1.     He is accompanied by his spouse, he has right hemiparesis, he has decreased appetite, still with back pain, he follows with palliative medicine. Review of Systems;  CONSTITUTIONAL: No fever, chills. Decreased appetite. ENMT: Eyes: No diplopia; Nose: No epistaxis. Mouth: No sore throat. RESPIRATORY: No hemoptysis, shortness of breath, cough. CARDIOVASCULAR: No chest pain, palpitations. GASTROINTESTINAL: No nausea/vomiting, abdominal pain, diarrhea/constipation. GENITOURINARY: No dysuria, urinary frequency, hematuria. NEURO: Positive for expressive aphasia and hemiparesis. Remainder:  ROS NEGATIVE    Past Medical History:      Diagnosis Date    A-fib [facility])     Arthritis     CVA (cerebral vascular accident) ([address] 11/15/2015    left MCA    Hyperlipemia     Prostate cancer ([address]     mets to spine, currently on chemo     Patient Active Problem List   Diagnosis    Cerebrovascular accident (CVA) due to occlusion of left carotid artery ([address]    Dysphagia due to recent stroke    Paroxysmal atrial fibrillation (HCC)    Prostate cancer ([address]    Cerebral contusion ([address]    Subarachnoid hemorrhage ([address]    HCAP (healthcare-associated pneumonia)    Respiratory arrest ([address]    Acute respiratory failure with hypoxia ([address]    Encephalopathy acute    Palliative care encounter    Goals of care, counseling/discussion    DNR (do not resuscitate) discussion    Bone metastases ([address]    End stage renal disease ([address]    ARMINDA (obstructive sleep apnea)        Past Surgical History:      Procedure Laterality Date    CATARACT REMOVAL Right     CATHETER REMOVAL N/A 4/19/2020    CATHETER REMOVAL will do on patient bed performed by Aundrea Mckeon MD at [address] [place] [address] N/A 3/9/2020    CYSTOSCOPY, BILATERAL RETROGRADE PYELOGRAMS, URETHRAL DILATATION, REYEZ CATHETER INSERTION performed by Romi Rubalcava MD at [address] [place] [address]  11/20/15    sonal       Family History:  Family History   Problem Relation Age of Onset    Cancer Mother     Heart Disease Father        Medications:  Reviewed and reconciled.     Social History:  Social History     Socioeconomic History    Marital status:      Spouse name: Not on file    Number of children: 3    Years of education: Not on file    Highest education level: Not on file   Occupational History    Not on file   Tobacco Use    Smoking status: Former     Types: Cigarettes     Quit date: 2013     Years since quittin.0    Smokeless tobacco: Never   Vaping Use    Vaping Use: Never used   Substance and Sexual Activity    Alcohol use: No     Alcohol/week: 0.0 standard drinks    Drug use: No    Sexual activity: Not Currently     Partners: Female   Other Topics Concern    Not on file   Social History Narrative    Not on file     Social Determinants of Health     Financial Resource Strain: Not on file   Food Insecurity: Not on file   Transportation Needs: Not on file   Physical Activity: Not on file   Stress: Not on file   Social Connections: Not on file   Intimate Partner Violence: Not on file   Housing Stability: Not on file       Allergies:  No Known Allergies    Physical Exam:  /71   Pulse (!) 143   Temp (!) 96.4 °F (35.8 °C)   Ht 5' 11\" (1.803 m)   SpO2 92%   BMI 22.51 kg/m² HR repeated and was 55  GENERAL: Alert, oriented x 3, looks chronically ill. HEENT: PERRLA; EOMI. Oropharynx clear. NECK: Supple. No palpable cervical or supraclavicular lymphadenopathy. LUNGS: Good air entry bilaterally. No wheezing, crackles or rhonchi. CARDIOVASCULAR: Irregular regular rhythm, normal rate. ABDOMEN: Soft. Non-tender, non-distended. Positive bowel sounds. EXTREMITIES: Without clubbing, cyanosis, or edema. NEUROLOGIC: Right hemiparesis and expressive aphasia. Poor PS due to stroke      Impression/Plan:    Mr. Tremaine Dove is a 79-year-old gentleman with a past history significant for metastatic prostate cancer:    TREATMENT HISTORY: 125 seed implantation 2014  Bicalutamide 50 mg daily.  ADT, and 5 cycles docetaxel 2015-2015  Restart of bicalutamide and on 3/5/2020 with difficulty breathing, increased weakness. Had pneumonia per wife. He was on ventilator and had to start dialysis. From hospital, he went to SNF. Home 6/5/2020. Off dialysis. Prostate treatment interruption between 3/5/2020 and 6/10/2020. PSA increased up to 9.47 in early June 2020 while off treatment. PSA 5.1 on 9/3/2020. PSA 0.16 on 11/30/2020    Mark admitted to some increased intermittent lower back pain. 2/5/2021 lab showed significant increase in PSA to 8. Repeat done 2/24/2021 and was 10. No other new specific symptoms. Restaging scans revealed increased bone metastases in multiple areas. Started apalutamide on 3/29/2021. The patient was under the care of Dr. Jennifer Basilio at [facility], restaging scans on 6/16/2022, had revealed disease progression in the bones, the patient was recommended Xofigo. The patient completed radiation therapy to L1. The patient will be due for Lupron and Xgeva 9/12/2022, will request authorization, he has been receiving Xgeva every 3 months. Referral was made to [address] [place] [address] for [facility] treatment, the side effects and the schedule of the [facility] were reviewed with the patient and his spouse. Continue follow-up with palliative medicine for symptoms management. RTC in 2 weeks with labs (will be done at the [place]), Lupron and Xgeva. Thank you for allowing us to participate in the care of Mr. Sindhu Galvez.     Juan Francisco Colby MD   HEMATOLOGY/MEDICAL [address] [id number]  [address] [address]  Dept: [facility]: [phone]

## 2022-08-29 NOTE — TELEPHONE ENCOUNTER
Met with patient and his wife, Eda Pro, during initial consultation appointment with Dr. Hoang Martines at [facility] for his metastatic prostate cancer diagnosis per Dr. Uday Zimmerman. Introduced myself and explained my role with patients receiving treatment at our cancer center. Patient was friendly and receptive. Familiar with patient due to his recent palliative radiation therapy treatments to L1 spine with completion date 8/16/2022 (3000 cGy). Completed nursing assessment for the center including medication review and medical, social, and surgical histories. His wife is supportive and assists with needs. She states that the patient receives home PT twice a week and Palliative care in home. Reports increased fatigue after finishing radiation therapy treatments. Informed her that many patient's experience fatigue at the end of radiation therapy treatments and can continue for several weeks following. Dietician was present to assist with recommendations due to swallowing issues (CVA history) and decreased appetite (on Remeron). Also, discussed additional ancillary staff available at the center and described their roles. Patient/patient's wife decline any other referral needs at this time. Provided with written literature of NCCN Patient Resource: Prostate Cancer, [place] pamphlet, and online support group information. Provided patient with my contact information, office hours, and encouragement to call me with questions or concerns. Patient verbalizes understanding and appreciative of nurse navigator visit. Nurse navigator will continue to follow. Contacted Jessie Griffith at [facility] regarding Desmond treatment per Dr. Hoang Martines. She states that she will fax the order form to me at [facility] and that once it's completed with patient signature for benefits investigation to fax back to them for scheduling. Provided [facility] fax number.   Will contact patient's wife once received to facilitate signature. Aleena Garcia, BSW, RN, OCN  Oncology Nurse Navigator

## 2022-08-29 NOTE — TELEPHONE ENCOUNTER
Visited with Karey Clay and his wife Magdalene Easley while in office for new patient visit with Dr. Edris Osgood. Unable to obtain weight. Wife reports Karey Clay continues to have a poor appetite, many different foods tried and he will only take a few bites. Items that Magdalene Easley mentioned that she gives him are nutrient dense items to maximize each bite (such a peanut butter on biscuit, yogurt). Premier Protein being utilized but does not drink much of that. Continues on Remeron. Wife reports it takes multiple cues and encouragement to get him to eat and then he become irritated. Provided sample of Ensure Clear and recipes to make into gelatin. Will continue to provide support.

## 2022-08-30 ENCOUNTER — TELEPHONE (OUTPATIENT)
Dept: CASE MANAGEMENT | Age: 77
End: 2022-08-30

## 2022-08-30 NOTE — TELEPHONE ENCOUNTER
Patient's wife, Marleny Arce, arrived to center as discussed to sign Xofigo patient authorization form and to receive the dental clearance form. Reviewed that the dentist can complete the form stating that the patient is clear for treatment and doesn't have any recommended dental procedures at this time, and then the dentist office can fax back to our center. The fax number is written big at the bottom of the form with instructions to fax back. Verbalizes understanding. Made a copy of the patient's healthcare POA and will have scanned to patient's chart for reference if needed. Fax Susie Ruth forms and patient's insurance card to [facility] department with confirmation. Placed order and confirmation sheet in patient's chart. JACOB Mock, RN, OCN  Oncology Nurse Navigator

## 2022-09-06 ENCOUNTER — CLINICAL DOCUMENTATION (OUTPATIENT)
Dept: INFUSION THERAPY | Age: 77
End: 2022-09-06

## 2022-09-13 ENCOUNTER — OFFICE VISIT (OUTPATIENT)
Dept: PHYSICAL MEDICINE AND REHAB | Age: 77
End: 2022-09-13
Payer: COMMERCIAL

## 2022-09-13 VITALS
HEIGHT: 71 IN | HEART RATE: 94 BPM | BODY MASS INDEX: 21.28 KG/M2 | DIASTOLIC BLOOD PRESSURE: 70 MMHG | SYSTOLIC BLOOD PRESSURE: 103 MMHG | WEIGHT: 152 LBS

## 2022-09-13 DIAGNOSIS — I69.30 CHRONIC ISCHEMIC LEFT MCA STROKE: ICD-10-CM

## 2022-09-13 DIAGNOSIS — Z74.09 IMPAIRED MOBILITY AND ADLS: ICD-10-CM

## 2022-09-13 DIAGNOSIS — I63.232 CEREBROVASCULAR ACCIDENT (CVA) DUE TO OCCLUSION OF LEFT CAROTID ARTERY (HCC): ICD-10-CM

## 2022-09-13 DIAGNOSIS — Z78.9 IMPAIRED MOBILITY AND ADLS: ICD-10-CM

## 2022-09-13 DIAGNOSIS — R25.2 SPASTICITY: Primary | ICD-10-CM

## 2022-09-13 PROCEDURE — 99214 OFFICE O/P EST MOD 30 MIN: CPT | Performed by: PHYSICAL MEDICINE & REHABILITATION

## 2022-09-13 PROCEDURE — 1123F ACP DISCUSS/DSCN MKR DOCD: CPT | Performed by: PHYSICAL MEDICINE & REHABILITATION

## 2022-09-13 NOTE — PROGRESS NOTES
Beverly Freeman D.O. [facility]  [address]  Phone: [phone]  Fax: [phone]        9/13/22    Chief Complaint   Patient presents with    Arm Pain    Leg Pain     6 week follow up after botox       HPI:  Janae Hollingsworth is a 78y.o. year old man seen today in follow up regarding Botox injection for spasticity. Interval history: Since the last visit the patient had Botox injection in the right upper and lower extremities 8/3/21. Right Upper extremity  Pectoralis major 100 units  Latissimus dorsi 75 units  Triceps brachii 25 units  Pronator teres 30 units  FCR 70 units  FDS 60 units  FDP 60 units  FCU 30 units  Intrinsic hand 80 units at 4 sites    Right lower extremity  Rectus femoris 30 units  Vastus medialis 30 units  Vastus lateralis 30 units  Vastus intermedius 25 units    Total units 650  Total waste 50    There were no complications from the injection. Functional improvements have included ability to perform hand hygiene. He is receiving home PT and palliative care. He ambulates with assistance and a quad cane. Today, the pain is rated Pain Score:   3 where 0 is no pain and 10 is pain as bad as it can be.     Past Medical History:   Diagnosis Date    A-fib [facility])     Arthritis     CVA (cerebral vascular accident) [address] 11/15/2015    left MCA    Hyperlipemia     Prostate cancer ([facility]     mets to spine, currently on chemo     Past Surgical History:   Procedure Laterality Date    CATARACT REMOVAL Right     CATHETER REMOVAL N/A 4/19/2020    CATHETER REMOVAL will do on patient bed performed by Tamanna Shepherd MD at [address] [facility] N/A 3/9/2020    CYSTOSCOPY, BILATERAL RETROGRADE PYELOGRAMS, URETHRAL DILATATION, REYEZ CATHETER INSERTION performed by Debbie Rosenthal MD at [address] [facility]  11/20/15    sonal     Social History     Tobacco Use    Smoking status: Former     Types: Cigarettes     Quit date: 2013     Years since quittin.0    Smokeless tobacco: Never   Vaping Use    Vaping Use: Never used   Substance Use Topics    Alcohol use: No     Alcohol/week: 0.0 standard drinks    Drug use: No     Family History   Problem Relation Age of Onset    Cancer Mother     Heart Disease Father        Current Outpatient Medications   Medication Sig Dispense Refill    HYDROcodone-acetaminophen (NORCO) 5-325 MG per tablet       mirtazapine (REMERON) 15 MG tablet take 1/2 tablet by mouth at bedtime      IPRATROPIUM BROMIDE IN Inhale into the lungs 2 times daily      loperamide (IMODIUM) 2 MG capsule TAKE ONE CAPSULE BY MOUTH FOUR TIMES A DAY AS NEEDED LOOSE STOOL/DIARHEA      atorvastatin (LIPITOR) 40 MG tablet 40 mg       warfarin (COUMADIN) 5 MG tablet Take 7 mg by mouth 5 mg on Wed      ERLEADA 60 MG TABS Take 60 mg by mouth daily      ferrous sulfate (IRON 325) 325 (65 Fe) MG tablet TAKE 1 TABLET BY MOUTH TWICE A DAY      miconazole nitrate 2 % OINT Apply topically 2 times daily 1 Tube 0    Calcium Carb-Cholecalciferol 600-800 MG-UNIT TABS Take 1 tablet by mouth daily 40 mcg(1600IU)      vitamin D (CHOLECALCIFEROL) 25 MCG (1000 UT) TABS tablet Take 5,000 Units by mouth Daily with lunch       metoprolol succinate (TOPROL XL) 100 MG extended release tablet Take 100 mg by mouth Daily with supper      Ascorbic Acid (VITAMIN C PLUS WILD CAL HIPS PO) Take 1,000 mg by mouth Daily with lunch      predniSONE (DELTASONE) 5 MG tablet Take 5 mg by mouth every morning       acetaminophen (TYLENOL) 325 MG tablet Take 325 mg by mouth every 6 hours as needed for Pain or Fever       Multiple Vitamins-Minerals (THERAPEUTIC MULTIVITAMIN-MINERALS) tablet Take 1 tablet by mouth every morning        No current facility-administered medications for this visit.        No Known Allergies    Review of Systems:  No new weakness, paresthesia, incontinence of bowel or bladder, saddle anesthesia, falls or gait dysfunction. Otherwise, per HPI. Physical Exam:   Blood pressure 103/70, pulse 94, height 5' 11\" (1.803 m), weight 152 lb (68.9 kg). GENERAL: The patient is in no apparent distress. Body habitus is obese. MSK: PROM in shoulder abduction is 90*,MCP extension is to neutral, IP extension is to neutral .   There is no joint effusion, deformity, instability, swelling, erythema or warmth.   Spinal curvatures are normal.      NEURO:  Right hemiparesis     Upper Limb Resting Posture:   [ X ]  Adducted/Internally rotated shoulder  [  ] Pronated Flexed Elbow  [  ]  Supinated Flexed Elbow  [ X ]  Flexed wrist  [ X ]  Flexed Fingers  [  ]  Intrinsic Plus  [  ]  Thumb in Palm    Lower Limb Posture:   [  ]  Flexed hip   [  ]  Adducted thigh  [  ] Flexed knee  [  ]  Extended knee  [  ]  Flexed ankle  [  ]  Flexed toes  [  ]  Inverted/supinated Foot  [  ]  Striatal toe      Modified Cherelle Scale:   0: No increase in muscle tone  1: Slight increase in muscle tone, manifested by a catch and release or by minimal resistance at the end of the range of motion when the affected part(s) is moved in flexion or extension 1+: Slight increase in muscle tone, manifested by a catch, followed by minimal resistance throughout the remainder (less than half) of the ROM  2: More marked increase in muscle tone through most of the ROM, but affected part(s) easily moved   3: Considerable increase in muscle tone, passive movement difficult   4: Affected part(s) rigid in flexion or extension    Upper extremity:   Shoulder adduction MAS 1   Elbow flexion  MAS 0  Forearm pronation MAS 0  Wrist flexion MAS 0  Wrist extension MAS 0  Radial deviation MAS 0  Ulnar deviation MAS 0  Finger flexors @ MCP  MAS 1  Finger flexors @PIP MAS 1  Finger flexors @ DIP MAS 1  Thumb flexors MAS 0    Lower extremity:   Hip flexors MAS 0  Hip Adductors MAS 0   Knee flexors MAS 0  Knee extensors MAS 0  Ankle plantar flexors MAS 0   Ankle inverters MAS 0   Toe flexors MAS 0   Toe extensors MAS 0    Impression:   No diagnosis found. Plan:   The patient has tried and failed:  therapy, splinting, anti-spasticity medications including: Baclofen. The patient's treatment goals are to be able to perform hand hygiene and increase safety and efficiency for gait and stairs. PA for Botox spasticity using EMG guidance to maximize toxin effect, minimize risk to nearby neurovascular structures and minimize distant spread of toxin. The following muscles will be injected:   Right Upper extremity  Pectoralis major 100 units  Latissimus dorsi 75 units  Triceps brachii 50 units  Pronator teres 30 units  FCR 70 units  FDS 60 units  FDP 60 units  FCU 30 units  Intrinsic hand 80 units at 4 sites    Right lower extremity  Rectus femoris 30 units  Vastus medialis 30 units  Vastus lateralis 30 units  Vastus intermedius 25 units    Total units 675  Total waste 25    Informed Consent:  The indications, risks, benefits, and  alternatives of onabotulinum toxin A were discussed with the patient. I explained to the patient the potential side effects including but not limited to: distant spread of toxin effect causing droopy eyelids, facial drooping, neck pain, headache, double vision, muscle pain/spasm/weakness/stiffness,  bronchitis,  injection site pain, increased blood pressure, hypersensitivity, anaphylaxis, difficulty swallowing, difficulty speaking, urinary incontinence and breathing difficulty. The patient is aware that swallowing and breathing difficulties can be life threatening and there have been reports of death in some cases where onabotulinum toxin was injected. The patient was advised of the expected benefit to include less headache days per month, and the anticipated duration of effect of 3 months. Continue current medications. Resting splints at h.s. And 2 hours on 2 hours off during waking hours. PROM daily. Continue home exercises.   The patient was educated about the diagnosis, prognosis, indications, risks and benefits of treatment. An opportunity to ask questions was given to the patient and questions were answered. The patient agreed to proceed with the recommended treatment as described above. Follow up 6 weeks     Thank you for the consultation and for allowing me to participate in the care of this patient. Sincerely,         Janusz Perkins D.O., P.T.   Board Certified Physical Medicine and Rehabilitation  Board Certified Electrodiagnostic Medicine

## 2022-09-15 ENCOUNTER — HOSPITAL ENCOUNTER (OUTPATIENT)
Dept: INFUSION THERAPY | Age: 77
Discharge: HOME | End: 2022-09-15
Payer: COMMERCIAL

## 2022-09-15 ENCOUNTER — TELEPHONE (OUTPATIENT)
Dept: CASE MANAGEMENT | Age: 77
End: 2022-09-15

## 2022-09-15 ENCOUNTER — OFFICE VISIT (OUTPATIENT)
Dept: ONCOLOGY | Age: 77
End: 2022-09-15
Payer: COMMERCIAL

## 2022-09-15 VITALS
OXYGEN SATURATION: 94 % | TEMPERATURE: 97.4 F | RESPIRATION RATE: 18 BRPM | SYSTOLIC BLOOD PRESSURE: 93 MMHG | DIASTOLIC BLOOD PRESSURE: 53 MMHG | HEART RATE: 74 BPM

## 2022-09-15 VITALS
BODY MASS INDEX: 21.67 KG/M2 | SYSTOLIC BLOOD PRESSURE: 98 MMHG | HEART RATE: 80 BPM | DIASTOLIC BLOOD PRESSURE: 66 MMHG | OXYGEN SATURATION: 94 % | TEMPERATURE: 97 F | HEIGHT: 71 IN | WEIGHT: 154.8 LBS

## 2022-09-15 DIAGNOSIS — C61 PROSTATE CANCER (HCC): Primary | ICD-10-CM

## 2022-09-15 DIAGNOSIS — C79.51 BONE METASTASES (HCC): ICD-10-CM

## 2022-09-15 PROCEDURE — 96372 THER/PROPH/DIAG INJ SC/IM: CPT

## 2022-09-15 PROCEDURE — 1123F ACP DISCUSS/DSCN MKR DOCD: CPT | Performed by: INTERNAL MEDICINE

## 2022-09-15 PROCEDURE — 99214 OFFICE O/P EST MOD 30 MIN: CPT | Performed by: INTERNAL MEDICINE

## 2022-09-15 PROCEDURE — 6360000002 HC RX W HCPCS: Performed by: FAMILY MEDICINE

## 2022-09-15 PROCEDURE — 96402 CHEMO HORMON ANTINEOPL SQ/IM: CPT

## 2022-09-15 PROCEDURE — 99212 OFFICE O/P EST SF 10 MIN: CPT

## 2022-09-15 RX ORDER — ALBUTEROL SULFATE 90 UG/1
4 AEROSOL, METERED RESPIRATORY (INHALATION) PRN
OUTPATIENT
Start: 2022-12-08

## 2022-09-15 RX ORDER — ONDANSETRON 2 MG/ML
8 INJECTION INTRAMUSCULAR; INTRAVENOUS
OUTPATIENT
Start: 2022-12-08

## 2022-09-15 RX ORDER — DIPHENHYDRAMINE HYDROCHLORIDE 50 MG/ML
50 INJECTION INTRAMUSCULAR; INTRAVENOUS
OUTPATIENT
Start: 2022-12-08

## 2022-09-15 RX ORDER — SODIUM CHLORIDE 9 MG/ML
INJECTION, SOLUTION INTRAVENOUS CONTINUOUS
OUTPATIENT
Start: 2022-12-08

## 2022-09-15 RX ORDER — ACETAMINOPHEN 325 MG/1
650 TABLET ORAL
OUTPATIENT
Start: 2022-12-08

## 2022-09-15 RX ORDER — EPINEPHRINE 1 MG/ML
0.3 INJECTION, SOLUTION, CONCENTRATE INTRAVENOUS PRN
OUTPATIENT
Start: 2022-12-08

## 2022-09-15 RX ORDER — FAMOTIDINE 10 MG/ML
20 INJECTION, SOLUTION INTRAVENOUS
OUTPATIENT
Start: 2022-12-08

## 2022-09-15 RX ADMIN — DENOSUMAB 120 MG: 120 INJECTION SUBCUTANEOUS at 11:20

## 2022-09-15 RX ADMIN — LEUPROLIDE ACETATE 22.5 MG: KIT at 11:12

## 2022-09-15 NOTE — PROGRESS NOTES
Patient presents to clinic today for Xgeva injection. Patient's labs monitored, specifically, Calcium level, to ensure no increased risk of hypocalcemia with administration of the medication. Patient's calcium level is 9.3. Patient received therapy and will continue to be monitored prior to each dose.   Rosaura Womack [facility]  9/15/2022, 11:04 AM

## 2022-09-15 NOTE — TELEPHONE ENCOUNTER
Spoke with [facility], regarding patient's [facility] treatment scheduling. States that Dr. Lavell Ware is out of the office next week and once she receives the prior authorization (from [employer]) that she will look to schedule it the week of 9/26/2022. Faxed order form to [facility] with confirmation. She states that the patient needs monthly CBC w/ dif for his 6 treatments. Randy Hernandez states that she will contact the patient's wife, Mervin White, with appointment soon. Updated Sharonda of above. Wrote down monthly CBC w/ dif for 6 months with treatment due to the patient going to the [place] for his PT/INR blood work and limiting her trips out with him. Upon inquiring, states that the patient appears to be improving some in respect to his energy after radiation therapy completion. She met with the dietician, Dragan Sultana, again for additional support. Answered questions to her apparent satisfaction. Provided support. She states that she feels much better after today's appointment with Dr. Vanda Massey and that she reviewed his blood work and the treatment plan in detail. Instructed her to contact if any assistance is needed prior to their next appointment at [facility]. Verbally agreed. Dr. Vanda Massey updated. Will continue to follow. Aleena Sanchez, MAGDAW, RN, OCN  Oncology Nurse Navigator

## 2022-09-15 NOTE — PROGRESS NOTES
[id number] [facility]  [place] [address]  Dept: [phone]  Loc: [phone]  Attending progress note      Reason for Visit:   Metastatic prostate cancer. Referring Physician:  Yazan Stevenson MD    PCP:  Delio Oliveira MD    History of Present Illness:    Mr. Brent Seymour is a 82-year-old gentleman with a past history significant for metastatic prostate cancer:    TREATMENT HISTORY: 125 seed implantation 1/17/2014  Bicalutamide 50 mg daily. ADT, and 5 cycles docetaxel 8/2/2015-11/11/2015  Restart of bicalutamide and leuprolide 7/2016    Leuprolide 22.5 mg IM every 12 weeks  Denosumab 120 mg subcutaneously every 12 weeks (started 9/2016)  Abiraterone and prednisone 2/15/2020 - 3/2021  Apalutamide 240 mg daily 3/29/2021 - current      History of Present Illness: Dakota Pepper is a 78year old male with prostate cancer metastatic to bone. He is s/p CVA resulting in expressive aphasia and right hemiparesis. Negar Burdick is in wheelchair and accompanied by his daughter, Laura Krause. Wife present on phone. He presents for ongoing evaluation and management of prostate cancer. HEMATOLOGY/ONCOLOGY HISTORY:  Screening PSA of 4.18 ng/mL in 2013 (not seen in [address] [place] [address] Rd). 12/12/2013 - transrectal US-guided biopsy of prostate demonstrating [place] 4+3 adenocarcinoma. 1/17/2014 - s/p I-125 seed implantation  PSA continued to rise as follows reaching 68.96 ng/ml on 8/5/2015. Additional evaluation with bone scan revealed diffuse progressive metastatic disease involving spine (cervical/lumbar spine, b/l scapulae, other bones. Started ADT & Casodex, had docetaxel x 5 cycles from 8/2/2015 - 11/11/2015    S/P massive ischemic CVA in 11/2015 3 days after cycle 5 chemotherapy. He is S/P rehab post CVA. He has expressive aphasia and right hemiparesis. He is dependent on family for ADL and care.  Received Botox injections to help with right arm - loosen palliative medicine. He is more tired. They have not heard from scheduling yet regarding the start of Xofigo    Review of Systems;  CONSTITUTIONAL: No fever, chills. Decreased appetite. ENMT: Eyes: No diplopia; Nose: No epistaxis. Mouth: No sore throat. RESPIRATORY: No hemoptysis, shortness of breath, cough. CARDIOVASCULAR: No chest pain, palpitations. GASTROINTESTINAL: No nausea/vomiting, abdominal pain, diarrhea/constipation. GENITOURINARY: No dysuria, urinary frequency, hematuria. NEURO: Positive for expressive aphasia and hemiparesis.   Remainder:  ROS NEGATIVE    Past Medical History:      Diagnosis Date    A-fib [facility])     Arthritis     CVA (cerebral vascular accident) ([address] 11/15/2015    left MCA    Hyperlipemia     Prostate cancer ([address]     mets to spine, currently on chemo     Patient Active Problem List   Diagnosis    Cerebrovascular accident (CVA) due to occlusion of left carotid artery ([address]    Dysphagia due to recent stroke    Paroxysmal atrial fibrillation (HCC)    Prostate cancer ([address]    Cerebral contusion ([address]    Subarachnoid hemorrhage ([address]    HCAP (healthcare-associated pneumonia)    Respiratory arrest ([address]    Acute respiratory failure with hypoxia ([address]    Encephalopathy acute    Palliative care encounter    Goals of care, counseling/discussion    DNR (do not resuscitate) discussion    Bone metastases ([address]    End stage renal disease ([address]    ARMINDA (obstructive sleep apnea)        Past Surgical History:      Procedure Laterality Date    CATARACT REMOVAL Right     CATHETER REMOVAL N/A 4/19/2020    CATHETER REMOVAL will do on patient bed performed by Gayla Alcantara MD at [address] 3/9/2020    CYSTOSCOPY, BILATERAL RETROGRADE PYELOGRAMS, URETHRAL DILATATION, REYEZ CATHETER INSERTION performed by Opal Valentine MD at [facility]  11/20/15    sonal       Family History:  Family History   Problem Relation Age of Onset Cancer Mother     Heart Disease Father        Medications:  Reviewed and reconciled. Social History:  Social History     Socioeconomic History    Marital status:      Spouse name: Not on file    Number of children: 3    Years of education: Not on file    Highest education level: Not on file   Occupational History    Not on file   Tobacco Use    Smoking status: Former     Types: Cigarettes     Quit date: 2013     Years since quittin.0    Smokeless tobacco: Never   Vaping Use    Vaping Use: Never used   Substance and Sexual Activity    Alcohol use: No     Alcohol/week: 0.0 standard drinks    Drug use: No    Sexual activity: Not Currently     Partners: Female   Other Topics Concern    Not on file   Social History Narrative    Not on file     Social Determinants of Health     Financial Resource Strain: Not on file   Food Insecurity: Not on file   Transportation Needs: Not on file   Physical Activity: Not on file   Stress: Not on file   Social Connections: Not on file   Intimate Partner Violence: Not on file   Housing Stability: Not on file       Allergies:  No Known Allergies    Physical Exam:  BP 98/66   Pulse 80   Temp 97 °F (36.1 °C)   Ht 5' 11\" (1.803 m)   Wt 154 lb 12.8 oz (70.2 kg)   SpO2 94%   BMI 21.59 kg/m² HR repeated and was 55  GENERAL: Alert, oriented x 3, looks chronically ill. HEENT: PERRLA; EOMI. Oropharynx clear. NECK: Supple. No palpable cervical or supraclavicular lymphadenopathy. LUNGS: Good air entry bilaterally. No wheezing, crackles or rhonchi. CARDIOVASCULAR: Irregular regular rhythm, normal rate. ABDOMEN: Soft. Non-tender, non-distended. Positive bowel sounds. EXTREMITIES: Without clubbing, cyanosis, or edema. NEUROLOGIC: Right hemiparesis and expressive aphasia.   Poor PS due to stroke      Impression/Plan:    Mr. Monty Howard is a 72-year-old gentleman with a past history significant for metastatic prostate cancer:    TREATMENT HISTORY: 125 seed implantation 1/17/2014  Bicalutamide 50 mg daily. ADT, and 5 cycles docetaxel 8/2/2015-11/11/2015  Restart of bicalutamide and leuprolide 7/2016    Leuprolide 22.5 mg IM every 12 weeks  Denosumab 120 mg subcutaneously every 12 weeks (started 9/2016)  Abiraterone and prednisone 2/15/2020 - 3/2021  Apalutamide 240 mg daily 3/29/2021 - current      History of Present Illness: Buddy Warner is a 78year old male with prostate cancer metastatic to bone. He is s/p CVA resulting in expressive aphasia and right hemiparesis. Brian Beebe is in wheelchair and accompanied by his daughter, Estevan Delacruz. Wife present on phone. He presents for ongoing evaluation and management of prostate cancer. HEMATOLOGY/ONCOLOGY HISTORY:  Screening PSA of 4.18 ng/mL in 2013 (not seen in [address] [place] [address] Rd). 12/12/2013 - transrectal US-guided biopsy of prostate demonstrating [place] 4+3 adenocarcinoma. 1/17/2014 - s/p I-125 seed implantation  PSA continued to rise as follows reaching 68.96 ng/ml on 8/5/2015. Additional evaluation with bone scan revealed diffuse progressive metastatic disease involving spine (cervical/lumbar spine, b/l scapulae, other bones. Started ADT & Casodex, had docetaxel x 5 cycles from 8/2/2015 - 11/11/2015    S/P massive ischemic CVA in 11/2015 3 days after cycle 5 chemotherapy. He is S/P rehab post CVA. He has expressive aphasia and right hemiparesis. He is dependent on family for ADL and care. Received Botox injections to help with right arm - loosen contractures. -------------------------------------------------------------------------------------------------  Restarted on lupron/casodex in 7/2016. Started denosumab 120 mg subcutaneously every 4 weeks on 9/26/2016. Changed to every 12 weeks beginning September 2017. Lupron continued every 12 weeks   He remained on bicalutamide 50 mg daily. PSA was rising and regimen change recommended.   Bone scan showed new bone mets 2/2020.  ---------------------------------------------------------------------------------  Lupron and denosumab on 1/28/2020. Abiraterone/prednisone started 2/15/2020. Went to ER on 3/5/2020 with difficulty breathing, increased weakness. Had pneumonia per wife. He was on ventilator and had to start dialysis. From hospital, he went to SNF. Home 6/5/2020. Off dialysis. Prostate treatment interruption between 3/5/2020 and 6/10/2020. PSA increased up to 9.47 in early June 2020 while off treatment. PSA 5.1 on 9/3/2020. PSA 0.16 on 11/30/2020    Mark admitted to some increased intermittent lower back pain. 2/5/2021 lab showed significant increase in PSA to 8. Repeat done 2/24/2021 and was 10. No other new specific symptoms. Restaging scans revealed increased bone metastases in multiple areas. Started apalutamide on 3/29/2021. The patient was under the care of Dr. Johnathan Bermudez at [facility], restaging scans on 6/16/2022, had revealed disease progression in the bones, the patient was recommended Xofigo. The patient completed radiation therapy to L1. Today 9/15/2022, labs reviewed, PSA had increased to 4.27, he will receive Lupron and Xgeva. He has been receiving Xgeva every 3 months. We will continue with the same schedule. Referral was made to [address] [place] [address] for [facility] treatment, the side effects and the schedule of the [facility] were reviewed with the patient and his spouse. He has not been scheduled yet, will contact NM department at [facility] ([facility] for an update. Continue follow-up with palliative medicine for symptoms management. RTC in 1 month. Thank you for allowing us to participate in the care of Mr. Romie Awad.     Jayson Hubbard MD   HEMATOLOGY/MEDICAL [id number]  [address] [id number]  Dept: [place]: [phone]

## 2022-09-19 ENCOUNTER — HOSPITAL ENCOUNTER (OUTPATIENT)
Dept: RADIATION ONCOLOGY | Age: 77
Discharge: HOME | End: 2022-09-19

## 2022-09-19 VITALS
RESPIRATION RATE: 18 BRPM | WEIGHT: 155.4 LBS | OXYGEN SATURATION: 97 % | DIASTOLIC BLOOD PRESSURE: 59 MMHG | TEMPERATURE: 97.1 F | HEART RATE: 85 BPM | BODY MASS INDEX: 21.67 KG/M2 | SYSTOLIC BLOOD PRESSURE: 103 MMHG

## 2022-09-19 DIAGNOSIS — C79.51 SECONDARY CANCER OF BONE (HCC): Primary | ICD-10-CM

## 2022-09-19 PROCEDURE — 99999 PR OFFICE/OUTPT VISIT,PROCEDURE ONLY: CPT | Performed by: NURSE PRACTITIONER

## 2022-09-19 NOTE — PROGRESS NOTES
Alyse Arango  9/19/2022  1:52 PM      Vitals:    09/19/22 1349   BP: (!) 103/59   Pulse: 85   Resp: 18   Temp: 97.1 °F (36.2 °C)   SpO2: 97%    :     Wt Readings from Last 3 Encounters:   09/19/22 155 lb 6.4 oz (70.5 kg)   09/15/22 154 lb 12.8 oz (70.2 kg)   09/13/22 152 lb (68.9 kg)                Current Outpatient Medications:     HYDROcodone-acetaminophen (NORCO) 5-325 MG per tablet, , Disp: , Rfl:     mirtazapine (REMERON) 15 MG tablet, take 1/2 tablet by mouth at bedtime, Disp: , Rfl:     IPRATROPIUM BROMIDE IN, Inhale into the lungs 2 times daily, Disp: , Rfl:     loperamide (IMODIUM) 2 MG capsule, TAKE ONE CAPSULE BY MOUTH FOUR TIMES A DAY AS NEEDED LOOSE STOOL/DIARHEA, Disp: , Rfl:     atorvastatin (LIPITOR) 40 MG tablet, 40 mg , Disp: , Rfl:     warfarin (COUMADIN) 5 MG tablet, Take 7 mg by mouth 5 mg on Wed, Disp: , Rfl:     ERLEADA 60 MG TABS, Take 60 mg by mouth daily, Disp: , Rfl:     ferrous sulfate (IRON 325) 325 (65 Fe) MG tablet, TAKE 1 TABLET BY MOUTH TWICE A DAY, Disp: , Rfl:     miconazole nitrate 2 % OINT, Apply topically 2 times daily, Disp: 1 Tube, Rfl: 0    Calcium Carb-Cholecalciferol 600-800 MG-UNIT TABS, Take 1 tablet by mouth daily 40 mcg(1600IU), Disp: , Rfl:     vitamin D (CHOLECALCIFEROL) 25 MCG (1000 UT) TABS tablet, Take 5,000 Units by mouth Daily with lunch , Disp: , Rfl:     metoprolol succinate (TOPROL XL) 100 MG extended release tablet, Take 100 mg by mouth Daily with supper, Disp: , Rfl:     Ascorbic Acid (VITAMIN C PLUS WILD CAL HIPS PO), Take 1,000 mg by mouth Daily with lunch, Disp: , Rfl:     predniSONE (DELTASONE) 5 MG tablet, Take 5 mg by mouth every morning , Disp: , Rfl:     acetaminophen (TYLENOL) 325 MG tablet, Take 325 mg by mouth every 6 hours as needed for Pain or Fever , Disp: , Rfl:     Multiple Vitamins-Minerals (THERAPEUTIC MULTIVITAMIN-MINERALS) tablet, Take 1 tablet by mouth every morning , Disp: , Rfl:       Patient is seen today in follow up for RT for his Prostate cancer with bone metastases. Patient received RT from 08/03/22-08/16/22, CTV L1, completing 3000cGy in 10 fractions. Patient denies any skin issues following his RT treatments and denies any pain currently, however his wife states he did take his pain meds earlier today. Patient's last PSA level from 09/15/22 was 4.27, up from 05/11/22-PSA-1.70. Patient is following with Dr. Alton Guzman for Med Onc, last seen on 09/15/22. And continuing on Luprons and Xgeva, last received on 09/15/22. No other questions or concerns for nursing at this time. FALLS RISK SCREENING ASSESSMENT    Instructions:  Assess the patient and enter the appropriate indicators that are present for fall risk identification. Total the numbers entered and assign a fall risk score from Table 2.  Reassess patient at a minimum every 12 weeks or with status change. Assessment   Date  9/19/2022     1. Mental Ability: confusion/cognitively impaired Yes - 3       2. Elimination Issues: incontinence, frequency No - 0       3. Ambulatory: use of assistive devices (walker, cane, off-loading devices), attached to equipment (IV pole, oxygen) Yes - 2     4. Sensory Limitations: dizziness, vertigo, impaired vision No - 0       5. Age 72 years or greater - 1       10. Medication: diuretics, strong analgesics, hypnotics, sedatives, antihypertensive agents   Yes - 3   7. Falls:  recent history of falls within the last 3 months (not to include slipping or tripping)   No - 0   TOTAL 9    If score of 4 or greater was education given? Yes       TABLE 2   Risk Score Risk Level Plan of Care   0-3 Little or  No Risk 1. Provide assistance as indicated for ambulation activities  2. Reorient confused/cognitively impaired patient  3. Call-light/bell within patient's reach  4. Chair/bed in low position, stretcher/bed with siderails up except when performing patient care activities  5.   Educate patient/family/caregiver on falls prevention  6.  Reassess in 12 weeks or with any noted change in patient condition which places them at a risk for a fall   4-6 Moderate Risk 1. Provide assistance as indicated for ambulation activities  2. Reorient confused/cognitively impaired patient  3. Call-light/bell within patient's reach  4. Chair/bed in low position, stretcher/bed with siderails up except when performing patient care activities  5. Educate patient/family/caregiver on falls prevention  6. Falls risk precaution (Yellow sticker Level II) placed on patient chart   7 or   Higher High Risk 1. Place patient in easily observable treatment room  2. Patient attended at all times by family member or staff  3. Provide assistance as indicated for ambulation activities  4. Reorient confused/cognitively impaired patient  5. Call-light/bell within patient's reach  6. Chair/bed in low position, stretcher/bed with siderails up except when performing patient care activities  7. Educate patient/family/caregiver on falls prevention  8. Falls risk precaution (Yellow sticker Level III) placed on patient chart           MALNUTRITION RISK SCREENING ASSESSMENT    9/19/2022   Patient:  Janae Hollingsworth  Sex:  male    Instructions:  Assess the patient and enter the appropriate indicators that are present for nutrition risk identification. Total the numbers entered and assign a risk score. Follow the appropriate action for total score listed below. Assessment   Date  9/19/2022     Have you lost weight without trying? 0- No     Have you been eating poorly because of a decreased appetite? 1- Yes   3. Do you have a diagnosis of head and neck cancer? 0- No                                                                                    TOTAL 1          Score of 0-1: No action  Score 2 or greater:   For Non-Diabetic Patient: Recommend adding Ensure Complete 2 x daily and provide patient with Ensure wellness bag with coupons  For Diabetic Patient: Recommend adding Glucerna Shake 2 x daily and provide patient with Glucerna Wellness bag with coupons  Route to the dietitian via Nicolas Gaspar RN

## 2022-09-19 NOTE — PROGRESS NOTES
RADIATION ONCOLOGY  6 week follow up       09/19/2022       NAME:  Fuentes Gagnon    YOB: 1945    Diagnosis:  Secondary cancer of the bone [facility])     Subjective: On 08/16/2022, Fuentes Gagnon completed a  course of palliative XRT to L1 bone met, total dose 3000 cGy/ 10 fractions. The patient is seen today in post palliative XRT completion symptom management check. The patient is accompanied by his wife Lacie Desir into the exam room. The patient with history of CVA and Gar Jovany is the primary resource person for today's visit. Per Lacie Desir the patient has not achieved pain control post XRT. Continues on pain medication 4  x per day. During day patient appears comfortable and pain under control per Sharonda's report. She reports patient pain worse during night and upon waking in AM. The patient is following with palliative care at home ([facility]). Sharonda plans to discuss patient nighttime pain medication with Palliative Care at their next home appointment. Lacie Desir reports the patient with decreased appetite. Lacie Desir denies the patient having fevers, chills, nausea, vomiting or diarrhea. Patient is following with    Medical Oncology- Dr. Jodi Parr. Next  visit  10/17/2022. The patient is being scheduled for NM Xofigo. PM&R- Dr. Margie Bull. Home Palliative Care Service. Primary care- Maurisio Jane MD    Pain: Lumbar back pain. Past medical, surgical, social and family histories reviewed and updated as indicated. ALLERGIES:  Patient has no known allergies.          Current Outpatient Medications   Medication Sig Dispense Refill    HYDROcodone-acetaminophen (NORCO) 5-325 MG per tablet       mirtazapine (REMERON) 15 MG tablet take 1/2 tablet by mouth at bedtime      IPRATROPIUM BROMIDE IN Inhale into the lungs 2 times daily      loperamide (IMODIUM) 2 MG capsule TAKE ONE CAPSULE BY MOUTH FOUR TIMES A DAY AS NEEDED LOOSE STOOL/DIARHEA      atorvastatin (LIPITOR) 40 MG tablet 40 mg       warfarin (COUMADIN) 5 MG tablet Take 7 mg by mouth 5 mg on Wed      ERLEADA 60 MG TABS Take 60 mg by mouth daily      ferrous sulfate (IRON 325) 325 (65 Fe) MG tablet TAKE 1 TABLET BY MOUTH TWICE A DAY      miconazole nitrate 2 % OINT Apply topically 2 times daily 1 Tube 0    Calcium Carb-Cholecalciferol 600-800 MG-UNIT TABS Take 1 tablet by mouth daily 40 mcg(1600IU)      vitamin D (CHOLECALCIFEROL) 25 MCG (1000 UT) TABS tablet Take 5,000 Units by mouth Daily with lunch       metoprolol succinate (TOPROL XL) 100 MG extended release tablet Take 100 mg by mouth Daily with supper      Ascorbic Acid (VITAMIN C PLUS WILD CAL HIPS PO) Take 1,000 mg by mouth Daily with lunch      predniSONE (DELTASONE) 5 MG tablet Take 5 mg by mouth every morning       acetaminophen (TYLENOL) 325 MG tablet Take 325 mg by mouth every 6 hours as needed for Pain or Fever       Multiple Vitamins-Minerals (THERAPEUTIC MULTIVITAMIN-MINERALS) tablet Take 1 tablet by mouth every morning        No current facility-administered medications for this encounter. Physical Examination:   Vitals:    09/19/22 1349   BP: (!) 103/59   Pulse: 85   Resp: 18   Temp: 97.1 °F (36.2 °C)   SpO2: 97%       Wt Readings from Last 3 Encounters:   09/19/22 155 lb 6.4 oz (70.5 kg)   09/15/22 154 lb 12.8 oz (70.2 kg)   09/13/22 152 lb (68.9 kg)       Alert, aphasic. Sitting upright in wheelchair. Irradiated skin shows no erythema. ASSESSMENT/PLAN:     Metastatic prostate cancer. Bone met to lumbar spine. S/p XRT to L1 completed 08/16/2022. Pain control not improved much per wife's report. The patient is in process of being scheduled for NM Xofigo. Skin care discussed. I discussed follow up plans with Henry Pendleton. Continue Oncology follow-up with primary Medical Oncologist. See Radiation Oncology on as needed/ re-referral basis.       Henry Pendleton is to follow up with other physicians/ APPs involved in their care as directed

## 2022-09-23 ENCOUNTER — TELEPHONE (OUTPATIENT)
Dept: CASE MANAGEMENT | Age: 77
End: 2022-09-23

## 2022-09-23 NOTE — TELEPHONE ENCOUNTER
LM with Harsh Pastor, [facility], regarding an update for patient's Xofigo treatment. Provided contact information. JACOB Morgan, RN, OCN  Oncology Nurse Navigator

## 2022-09-29 DIAGNOSIS — C61 PROSTATE CANCER (HCC): Primary | ICD-10-CM

## 2022-09-30 ENCOUNTER — HOSPITAL ENCOUNTER (OUTPATIENT)
Dept: NUCLEAR MEDICINE | Age: 77
Discharge: HOME | End: 2022-10-02
Payer: COMMERCIAL

## 2022-09-30 DIAGNOSIS — C61 PROSTATE CANCER (HCC): ICD-10-CM

## 2022-09-30 PROCEDURE — 79101 NUCLEAR RX IV ADMIN: CPT | Performed by: RADIOLOGY

## 2022-09-30 PROCEDURE — 3440000000 HC RX THERAPEUTIC RADIOPHARMACEUTICAL: Performed by: RADIOLOGY

## 2022-09-30 PROCEDURE — A9606 RADIUM RA223 DICHLORIDE THER: HCPCS | Performed by: RADIOLOGY

## 2022-09-30 PROCEDURE — 79101 NUCLEAR RX IV ADMIN: CPT

## 2022-09-30 RX ADMIN — RADIUM RA 223 DICHLORIDE 106 MICRO CURIE: 30 INJECTION INTRAVENOUS at 11:03

## 2022-10-17 ENCOUNTER — OFFICE VISIT (OUTPATIENT)
Dept: ONCOLOGY | Age: 77
End: 2022-10-17
Payer: COMMERCIAL

## 2022-10-17 ENCOUNTER — TELEPHONE (OUTPATIENT)
Dept: CASE MANAGEMENT | Age: 77
End: 2022-10-17

## 2022-10-17 VITALS
DIASTOLIC BLOOD PRESSURE: 68 MMHG | OXYGEN SATURATION: 99 % | TEMPERATURE: 97 F | HEIGHT: 71 IN | SYSTOLIC BLOOD PRESSURE: 100 MMHG | BODY MASS INDEX: 22.5 KG/M2 | WEIGHT: 160.7 LBS | HEART RATE: 84 BPM

## 2022-10-17 DIAGNOSIS — C61 PROSTATE CANCER (HCC): Primary | ICD-10-CM

## 2022-10-17 DIAGNOSIS — C79.51 BONE METASTASES (HCC): ICD-10-CM

## 2022-10-17 PROCEDURE — 99212 OFFICE O/P EST SF 10 MIN: CPT

## 2022-10-17 PROCEDURE — 99214 OFFICE O/P EST MOD 30 MIN: CPT | Performed by: INTERNAL MEDICINE

## 2022-10-17 PROCEDURE — 1123F ACP DISCUSS/DSCN MKR DOCD: CPT | Performed by: INTERNAL MEDICINE

## 2022-10-17 NOTE — TELEPHONE ENCOUNTER
Spoke with Kaitlin Gilliland at Nuclear Medicine regarding patient's next [place] treatment scheduling. States that they usually contact the patient a week to week and half prior to the anticipated treatment date for lab work. His first treatment was 9/30/2022 and next treatment due 10/28/2022. Reports that 28 days must be between treatments pending lab work. Contacted patient's wife, Oliver Fuller, and updated her on above. States that she is taking the patient to the [place] for blood work tomorrow and will fax results to Kaitlin Gilliland at [place] and Dr. Sammi Morgan at [facility]. Reviewed all fax numbers and provided Willean Severe contact number if needed. Oliver Fuller is appreciative of call and assistance. States that David Ross did very well with the first treatment and they were pleased. Denies any additional needs from NN at this time. Aleena Lewis, MAGDAW, RN, OCN  Oncology Nurse Navigator

## 2022-10-17 NOTE — PROGRESS NOTES
[id number] [facility]  [place] [address]  Dept: [phone]  Loc: [phone]  Attending progress note      Reason for Visit:   Metastatic prostate cancer. Referring Physician:  Josie Samayoa MD    PCP:  Sheila Ward MD    History of Present Illness:    Mr. Jem Rossi is a 53-year-old gentleman with a past history significant for metastatic prostate cancer:    TREATMENT HISTORY: 125 seed implantation 1/17/2014  Bicalutamide 50 mg daily. ADT, and 5 cycles docetaxel 8/2/2015-11/11/2015  Restart of bicalutamide and leuprolide 7/2016    Leuprolide 22.5 mg IM every 12 weeks  Denosumab 120 mg subcutaneously every 12 weeks (started 9/2016)  Abiraterone and prednisone 2/15/2020 - 3/2021  Apalutamide 240 mg daily 3/29/2021 - current      History of Present Illness: Killian Torres is a 78year old male with prostate cancer metastatic to bone. He is s/p CVA resulting in expressive aphasia and right hemiparesis. Keegan Nance is in wheelchair and accompanied by his daughter, Crys Whitten. Wife present on phone. He presents for ongoing evaluation and management of prostate cancer. HEMATOLOGY/ONCOLOGY HISTORY:  Screening PSA of 4.18 ng/mL in 2013 (not seen in [address] [place] [address] Rd). 12/12/2013 - transrectal US-guided biopsy of prostate demonstrating [place] 4+3 adenocarcinoma. 1/17/2014 - s/p I-125 seed implantation  PSA continued to rise as follows reaching 68.96 ng/ml on 8/5/2015. Additional evaluation with bone scan revealed diffuse progressive metastatic disease involving spine (cervical/lumbar spine, b/l scapulae, other bones. Started ADT & Casodex, had docetaxel x 5 cycles from 8/2/2015 - 11/11/2015    S/P massive ischemic CVA in 11/2015 3 days after cycle 5 chemotherapy. He is S/P rehab post CVA. He has expressive aphasia and right hemiparesis. He is dependent on family for ADL and care.  Received Botox injections to help with right arm - loosen contractures. -------------------------------------------------------------------------------------------------  Restarted on lupron/casodex in 7/2016. Started denosumab 120 mg subcutaneously every 4 weeks on 9/26/2016. Changed to every 12 weeks beginning September 2017. Lupron continued every 12 weeks   He remained on bicalutamide 50 mg daily. PSA was rising and regimen change recommended. Bone scan showed new bone mets 2/2020.  ---------------------------------------------------------------------------------  Lupron and denosumab on 1/28/2020. Abiraterone/prednisone started 2/15/2020. Went to ER on 3/5/2020 with difficulty breathing, increased weakness. Had pneumonia per wife. He was on ventilator and had to start dialysis. From hospital, he went to SNF. Home 6/5/2020. Off dialysis. Prostate treatment interruption between 3/5/2020 and 6/10/2020. PSA increased up to 9.47 in early June 2020 while off treatment. PSA 5.1 on 9/3/2020. PSA 0.16 on 11/30/2020    Mark admitted to some increased intermittent lower back pain. 2/5/2021 lab showed significant increase in PSA to 8. Repeat done 2/24/2021 and was 10. No other new specific symptoms. Restaging scans revealed increased bone metastases in multiple areas. Started apalutamide on 3/29/2021. Continues on prednisone 5 mg daily.   --------------------------------------------------------------------------------------------  Interval history:    Continues on apalutamide. Changed anticoagulation to warfarin which has less interaction with apalutamide compared to prior anticoagulation medication. Monitored at [facility]    The patient was under the care of Dr. Alberto Infante at [facility], restaging scans on 6/16/2022, had revealed disease progression in the bones, the patient was recommended Xofigo. The patient received radiation therapy to L1.     He is accompanied by his spouse, he has right hemiparesis, the patient is doing much better, appetite had improved, he gained 5 to 6 pounds. Back pain had improved. He has his first Xofigo treatment on 9/30/2022, no side effects. Review of Systems;  CONSTITUTIONAL: No fever, chills. Improved appetite. ENMT: Eyes: No diplopia; Nose: No epistaxis. Mouth: No sore throat. RESPIRATORY: No hemoptysis, shortness of breath, cough. CARDIOVASCULAR: No chest pain, palpitations. GASTROINTESTINAL: No nausea/vomiting, abdominal pain, diarrhea/constipation. GENITOURINARY: No dysuria, urinary frequency, hematuria. NEURO: Positive for expressive aphasia and hemiparesis. Remainder:  ROS NEGATIVE    Past Medical History:      Diagnosis Date    A-fib [facility])     Arthritis     CVA (cerebral vascular accident) ([address] 11/15/2015    left MCA    Hyperlipemia     Prostate cancer ([address]     mets to spine, currently on chemo    Prostate cancer metastatic to bone ([address] 06/2022    S/p palliative XRT to L1 spine completed 08/16/2022, dose 3000 cGy/ 10  fractions.      Patient Active Problem List   Diagnosis    Cerebrovascular accident (CVA) due to occlusion of left carotid artery (HCC)    Dysphagia due to recent stroke    Paroxysmal atrial fibrillation (HCC)    Prostate cancer ([address]    Cerebral contusion    Subarachnoid hemorrhage ([address]    HCAP (healthcare-associated pneumonia)    Respiratory arrest ([address]    Acute respiratory failure with hypoxia ([address]    Encephalopathy acute    Palliative care encounter    Goals of care, counseling/discussion    DNR (do not resuscitate) discussion    Bone metastases ([address]    End stage renal disease ([address]    ARMINDA (obstructive sleep apnea)        Past Surgical History:      Procedure Laterality Date    CATARACT REMOVAL Right     CATHETER REMOVAL N/A 4/19/2020    CATHETER REMOVAL will do on patient bed performed by Nathaniel Ga MD at [address] N/A 3/9/2020    CYSTOSCOPY, BILATERAL RETROGRADE PYELOGRAMS, URETHRAL DILATATION, REYEZ CATHETER INSERTION performed by Enriqueta Stockton MD at [address] GASTROSTOMY TUBE PLACEMENT  11/20/15    sonal       Family History:  Family History   Problem Relation Age of Onset    Cancer Mother     Heart Disease Father        Medications:  Reviewed and reconciled. Social History:  Social History     Socioeconomic History    Marital status:      Spouse name: Not on file    Number of children: 3    Years of education: Not on file    Highest education level: Not on file   Occupational History    Not on file   Tobacco Use    Smoking status: Former     Types: Cigarettes     Quit date: 2013     Years since quittin.1    Smokeless tobacco: Never   Vaping Use    Vaping Use: Never used   Substance and Sexual Activity    Alcohol use: No     Alcohol/week: 0.0 standard drinks    Drug use: No    Sexual activity: Not Currently     Partners: Female   Other Topics Concern    Not on file   Social History Narrative    Not on file     Social Determinants of Health     Financial Resource Strain: Not on file   Food Insecurity: Not on file   Transportation Needs: Not on file   Physical Activity: Not on file   Stress: Not on file   Social Connections: Not on file   Intimate Partner Violence: Not on file   Housing Stability: Not on file       Allergies:  No Known Allergies    Physical Exam:  /68   Pulse 84   Temp 97 °F (36.1 °C)   Ht 5' 11\" (1.803 m)   Wt 160 lb 11.2 oz (72.9 kg)   SpO2 99%   BMI 22.41 kg/m² HR repeated and was 55  GENERAL: Alert, oriented x 3, looks chronically ill. HEENT: PERRLA; EOMI. Oropharynx clear. NECK: Supple. No palpable cervical or supraclavicular lymphadenopathy. LUNGS: Good air entry bilaterally. No wheezing, crackles or rhonchi. CARDIOVASCULAR: Irregular regular rhythm, normal rate. ABDOMEN: Soft. Non-tender, non-distended. Positive bowel sounds. EXTREMITIES: Without clubbing, cyanosis, or edema. NEUROLOGIC: Right hemiparesis and expressive aphasia.   Poor PS due to stroke      Impression/Plan:    Mr. David Cao is a 51-year-old gentleman with a past history significant for metastatic prostate cancer:    TREATMENT HISTORY: 125 seed implantation 1/17/2014  Bicalutamide 50 mg daily. ADT, and 5 cycles docetaxel 8/2/2015-11/11/2015  Restart of bicalutamide and leuprolide 7/2016    Leuprolide 22.5 mg IM every 12 weeks  Denosumab 120 mg subcutaneously every 12 weeks (started 9/2016)  Abiraterone and prednisone 2/15/2020 - 3/2021  Apalutamide 240 mg daily 3/29/2021 - current      History of Present Illness: Zonia Cabral is a 78year old male with prostate cancer metastatic to bone. He is s/p CVA resulting in expressive aphasia and right hemiparesis. Gilda Bender is in wheelchair and accompanied by his daughter, Vilma Croft. Wife present on phone. He presents for ongoing evaluation and management of prostate cancer. HEMATOLOGY/ONCOLOGY HISTORY:  Screening PSA of 4.18 ng/mL in 2013 (not seen in [address] [place] [address] Rd). 12/12/2013 - transrectal US-guided biopsy of prostate demonstrating [place] 4+3 adenocarcinoma. 1/17/2014 - s/p I-125 seed implantation  PSA continued to rise as follows reaching 68.96 ng/ml on 8/5/2015. Additional evaluation with bone scan revealed diffuse progressive metastatic disease involving spine (cervical/lumbar spine, b/l scapulae, other bones. Started ADT & Casodex, had docetaxel x 5 cycles from 8/2/2015 - 11/11/2015    S/P massive ischemic CVA in 11/2015 3 days after cycle 5 chemotherapy. He is S/P rehab post CVA. He has expressive aphasia and right hemiparesis. He is dependent on family for ADL and care. Received Botox injections to help with right arm - loosen contractures. -------------------------------------------------------------------------------------------------  Restarted on lupron/casodex in 7/2016. Started denosumab 120 mg subcutaneously every 4 weeks on 9/26/2016. Changed to every 12 weeks beginning September 2017. Lupron continued every 12 weeks   He remained on bicalutamide 50 mg daily.     PSA was rising and regimen change recommended. Bone scan showed new bone mets 2/2020.  ---------------------------------------------------------------------------------  Lupron and denosumab on 1/28/2020. Abiraterone/prednisone started 2/15/2020. Went to ER on 3/5/2020 with difficulty breathing, increased weakness. Had pneumonia per wife. He was on ventilator and had to start dialysis. From hospital, he went to SNF. Home 6/5/2020. Off dialysis. Prostate treatment interruption between 3/5/2020 and 6/10/2020. PSA increased up to 9.47 in early June 2020 while off treatment. PSA 5.1 on 9/3/2020. PSA 0.16 on 11/30/2020    Mark admitted to some increased intermittent lower back pain. 2/5/2021 lab showed significant increase in PSA to 8. Repeat done 2/24/2021 and was 10. No other new specific symptoms. Restaging scans revealed increased bone metastases in multiple areas. Started apalutamide on 3/29/2021. The patient was under the care of Dr. Fern Field at [facility], restaging scans on 6/16/2022, had revealed disease progression in the bones, the patient was recommended Xofigo. The patient completed radiation therapy to L1. On 9/15/2022, PSA had increased to 4.27, he received Lupron and Xgeva. Referral was made to [address] [place] [address] for [facility] treatment, he received the first treatment on 9/30/2022, no side effects, he will continue the treatment monthly for a total of 6 months. He will have blood work including blood counts and PSA done prior to his treatment. Continue follow-up with palliative medicine for symptoms management. RTC in 8 weeks. Thank you for allowing us to participate in the care of Mr. Liliya Tobin.     Bettye Evans MD   HEMATOLOGY/MEDICAL [address] [address] [address]  [address] [address]  Dept: [place]: [phone]

## 2022-10-24 DIAGNOSIS — C61 PROSTATE CANCER (HCC): Primary | ICD-10-CM

## 2022-10-25 ENCOUNTER — TELEPHONE (OUTPATIENT)
Dept: CASE MANAGEMENT | Age: 77
End: 2022-10-25

## 2022-10-25 RX ORDER — APALUTAMIDE 60 MG/1
60 TABLET, FILM COATED ORAL DAILY
Qty: 90 TABLET | Refills: 3 | Status: CANCELLED | OUTPATIENT
Start: 2022-10-25

## 2022-10-25 NOTE — TELEPHONE ENCOUNTER
Answered patient's wife, Kaia Krishna, questions regarding blood work following Xofigo treatment. States that [place] called and informed her that the [facility] number was low but did not provide a number. Instructed her to fax the [place] blood work results to [facility] for Dr. Salvador Siddiqui to review. Reviewed that Xofigo treatments may cause a decrease in blood counts including WBCs and that Dr. Shaina Delatorre will review the lab results as well prior to scheduling the next treatment. Verbalizes understanding. Provided [facility] fax number. Kaia Krishna then states that she received notification from [facility] pharmacy stating that a refill is needed for his Erleada. States that the [facility] physician originally ordered and they don't follow with him anymore. Dr. Salvador Siddiqui has not ordered the medication yet and his next follow up with Dr. Salvador Siddiqui is 12/12/2022.  1 more week of mediation left. Informed her that I will update Dr. Marianne Burroughs nurse to assist and someone will be in touch. Verbalizes understanding. Aleena Velazquez, BSW, RN, OCN  Oncology Nurse Navigator

## 2022-10-27 ENCOUNTER — TELEPHONE (OUTPATIENT)
Dept: CASE MANAGEMENT | Age: 77
End: 2022-10-27

## 2022-10-27 NOTE — TELEPHONE ENCOUNTER
Contacted patient's wife, [facility], per Dr. Melvin Pandya request.  Informed her that Dr. Matias Cortes does not recommend the patient continuing the Erleada medication due to his disease progression. Therefore, she would not renew the medication previously prescribed by [facility] physician. Verbalizes understanding. Patient is scheduled for his second Xofigo treatment tomorrow, 10/28/2022. Informed her that we received the 10/19/2022 blood work results from the [place] and are scanned to his [facility] chart. Questions answered to my capabilities. Reviewed that if they need to see Dr. Matias Cortes prior to the 12/12/2022 appointment for his Kyle Shaw then she can contact the office to schedule. Verbalizes understanding. Appreciative of call. Denies any additional needs at this time from NN. Aleena Angeles, MAGDAW, RN, OCN  Oncology Nurse Navigator

## 2022-10-28 ENCOUNTER — HOSPITAL ENCOUNTER (OUTPATIENT)
Dept: NUCLEAR MEDICINE | Age: 77
Discharge: HOME | End: 2022-10-30
Payer: COMMERCIAL

## 2022-10-28 DIAGNOSIS — C61 PROSTATE CANCER (HCC): ICD-10-CM

## 2022-10-28 PROCEDURE — 3440000000 HC RX THERAPEUTIC RADIOPHARMACEUTICAL: Performed by: RADIOLOGY

## 2022-10-28 PROCEDURE — A9606 RADIUM RA223 DICHLORIDE THER: HCPCS | Performed by: RADIOLOGY

## 2022-10-28 PROCEDURE — 79101 NUCLEAR RX IV ADMIN: CPT

## 2022-10-28 PROCEDURE — 79101 NUCLEAR RX IV ADMIN: CPT | Performed by: RADIOLOGY

## 2022-10-28 RX ADMIN — RADIUM RA 223 DICHLORIDE 113.1 MICRO CURIE: 30 INJECTION INTRAVENOUS at 12:52

## 2022-10-31 RX ORDER — APALUTAMIDE 60 MG/1
60 TABLET, FILM COATED ORAL DAILY
Qty: 90 TABLET | Refills: 3 | Status: CANCELLED | OUTPATIENT
Start: 2022-10-31

## 2022-11-01 ENCOUNTER — OFFICE VISIT (OUTPATIENT)
Dept: CARDIOLOGY CLINIC | Age: 77
End: 2022-11-01
Payer: COMMERCIAL

## 2022-11-01 VITALS
WEIGHT: 160 LBS | SYSTOLIC BLOOD PRESSURE: 98 MMHG | DIASTOLIC BLOOD PRESSURE: 56 MMHG | BODY MASS INDEX: 22.4 KG/M2 | HEIGHT: 71 IN | HEART RATE: 84 BPM | RESPIRATION RATE: 16 BRPM

## 2022-11-01 DIAGNOSIS — I69.359 HEMIPLEGIA AFFECTING DOMINANT SIDE, POST-STROKE (HCC): ICD-10-CM

## 2022-11-01 DIAGNOSIS — I95.89 OTHER SPECIFIED HYPOTENSION: ICD-10-CM

## 2022-11-01 DIAGNOSIS — C61 PROSTATE CANCER METASTATIC TO BONE (HCC): ICD-10-CM

## 2022-11-01 DIAGNOSIS — Z87.448 H/O RENAL FAILURE: ICD-10-CM

## 2022-11-01 DIAGNOSIS — C79.51 PROSTATE CANCER METASTATIC TO BONE (HCC): ICD-10-CM

## 2022-11-01 DIAGNOSIS — Z87.09 H/O RESPIRATORY FAILURE: ICD-10-CM

## 2022-11-01 DIAGNOSIS — I48.21 PERMANENT ATRIAL FIBRILLATION (HCC): ICD-10-CM

## 2022-11-01 DIAGNOSIS — Z87.01 H/O: PNEUMONIA: ICD-10-CM

## 2022-11-01 DIAGNOSIS — Z87.898 H/O URINARY RETENTION: ICD-10-CM

## 2022-11-01 DIAGNOSIS — Z79.01 ANTICOAGULATED ON COUMADIN: ICD-10-CM

## 2022-11-01 DIAGNOSIS — Z86.73 H/O: CVA (CEREBROVASCULAR ACCIDENT): ICD-10-CM

## 2022-11-01 DIAGNOSIS — I51.9 CHRONIC LEFT VENTRICULAR SYSTOLIC DYSFUNCTION (LVSD): Primary | ICD-10-CM

## 2022-11-01 DIAGNOSIS — Z98.890 H/O DILATION OF URETHRA: ICD-10-CM

## 2022-11-01 DIAGNOSIS — R47.01 APHASIA: ICD-10-CM

## 2022-11-01 DIAGNOSIS — R94.31 ABNORMAL EKG: ICD-10-CM

## 2022-11-01 DIAGNOSIS — R26.9 GAIT DIFFICULTY: ICD-10-CM

## 2022-11-01 PROCEDURE — 99205 OFFICE O/P NEW HI 60 MIN: CPT | Performed by: INTERNAL MEDICINE

## 2022-11-01 PROCEDURE — 93000 ELECTROCARDIOGRAM COMPLETE: CPT | Performed by: INTERNAL MEDICINE

## 2022-11-01 PROCEDURE — 1123F ACP DISCUSS/DSCN MKR DOCD: CPT | Performed by: INTERNAL MEDICINE

## 2022-11-01 RX ORDER — METOPROLOL SUCCINATE 100 MG/1
100 TABLET, EXTENDED RELEASE ORAL
Qty: 90 TABLET | Refills: 3 | Status: SHIPPED | OUTPATIENT
Start: 2022-11-01

## 2022-11-02 NOTE — PROGRESS NOTES
OFFICE VISIT        PRIMARY CARE PHYSICIAN:    Vijaya Driscoll MD       ALLERGIES / SENSITIVITIES:    No Known Allergies       REVIEWED MEDICATIONS:      Current Outpatient Medications:     Radium Ra 223 Dichloride (Dann Darrell IV), Infuse intravenously, Disp: , Rfl:     metoprolol succinate (TOPROL XL) 100 MG extended release tablet, Take 1 tablet by mouth Daily with supper, Disp: 90 tablet, Rfl: 3    HYDROcodone-acetaminophen (NORCO) 5-325 MG per tablet, , Disp: , Rfl:     mirtazapine (REMERON) 15 MG tablet, , Disp: , Rfl:     IPRATROPIUM BROMIDE IN, Inhale into the lungs 2 times daily, Disp: , Rfl:     loperamide (IMODIUM) 2 MG capsule, TAKE ONE CAPSULE BY MOUTH FOUR TIMES A DAY AS NEEDED LOOSE STOOL/DIARHEA, Disp: , Rfl:     atorvastatin (LIPITOR) 40 MG tablet, 40 mg , Disp: , Rfl:     warfarin (COUMADIN) 5 MG tablet, Take 7 mg by mouth 5 mg on Wed, Disp: , Rfl:     ERLEADA 60 MG TABS, Take 60 mg by mouth daily, Disp: , Rfl:     ferrous sulfate (IRON 325) 325 (65 Fe) MG tablet, TAKE 1 TABLET BY MOUTH TWICE A DAY, Disp: , Rfl:     Calcium Carb-Cholecalciferol 600-800 MG-UNIT TABS, Take 1 tablet by mouth daily 40 mcg(1600IU), Disp: , Rfl:     vitamin D (CHOLECALCIFEROL) 25 MCG (1000 UT) TABS tablet, Take 5,000 Units by mouth Daily with lunch , Disp: , Rfl:     Ascorbic Acid (VITAMIN C PLUS WILD CAL HIPS PO), Take 1,000 mg by mouth Daily with lunch, Disp: , Rfl:     predniSONE (DELTASONE) 5 MG tablet, Take 5 mg by mouth every morning , Disp: , Rfl:     acetaminophen (TYLENOL) 325 MG tablet, Take 325 mg by mouth every 6 hours as needed for Pain or Fever , Disp: , Rfl:     Multiple Vitamins-Minerals (THERAPEUTIC MULTIVITAMIN-MINERALS) tablet, Take 1 tablet by mouth every morning , Disp: , Rfl:       S: REASON FOR VISIT:    Atrial fibrillation. This is a new patient referred by his PCP for atrial fibrillation upon the request of his daughter.   He used to see Dr. Malik Culver (Cardiologist in [facility]) in the past. Denisse Dobbs is 59-year-old male who is status post a cerebrovascular accident in 11/2015 that resulted in him becoming aphasic with right-sided hemiparesis/hemiplegia. In 12/2015, he fell from his wheelchair and sustained left-sided hemorrhagic contusions. Denisse Dobbs, as mentioned above, is mostly wheelchair bound and ambulates minimally with a quad cane. He, at the time of his stroke in 2015, had problems with dysphagia and had a Peg tube placed, which was later removed. He has chronic atrial fibrillation. He was on Eliquis in the past.  He was receiving chemotherapy for his metastatic prostate cancer to bones and he was shifted to Coumadin. According to his daughter, the plan is to put him back on Eliquis after he finishes his last chemotherapy around 3 weeks from now stating that chemotherapy has not been effective anymore. In 3/2020, Denisse Dobbs was hospitalized with pneumonia with respiratory failure necessitating intubation at that time. He also suffered from acute kidney injury and was on temporary dialysis with a Tesio catheter, which was removed in April. He also had to undergo cystoscopy with placement of a Tobin catheter at that time. Denisse Dobbs denies chest pain. He denies shortness of breath. He denies orthopnea, PND's or significant lower extremity swelling. He denies the subjective feeling of palpitations. He denies dizziness, presyncope or syncope. According to his daughter, he has had repeated echocardiograms on a yearly basis by his previous cardiology. These are not available at present. An echocardiogram from 2015 that is available in the records was reported as showing an enlarged left ventricle with mildly to moderately reduced left ventricular systolic function with an estimated ejection fraction of 40 +/- 5% with mild concentric left ventricular hypertrophy and with mild mitral regurgitation. A bulb study showed no evidence of shunt.          REVIEW OF SYSTEMS:    CONSTITUTIONAL:  Denies fevers, chills, night sweats or fatigue. HEENT:  Denies any unusual headaches. Denies changes in hearing or vision. He occasionally chokes on solid foods if he doesn't chew well. He denies hemoptysis, hematemesis or epistaxis. ENDOCRINE:  Denies polyphagia, polydipsia or polyuria. Denies heat or cold intolerance. MUSCULOSKELETAL:  He is paralyzed on the right side. He has right hand contractures. He wears right lower extremity braces. SKIN:  Denies rashes, ulcers or itching. HEME/LYMPH:  Denies any palpable lymph nodes, bleeding or easy bruisability. HEART:  As above. LUNGS:  Denies cough or sputum production. GI: Denies abdominal pain, nausea, vomiting, diarrhea, constipation, rectal bleeding or tarry stools. :  Denies hematuria or dysuria. PSYCHIATRIC:  Denies mood changes, anxiety or depression. NEUROLOGIC:  He is somewhat aphasic. He has right sided weakness. PAST MEDICAL/SURGICAL HISTORY:     History of CVA in 11/2015 with aphasia and right sided hemiplegia/hemiparesis. History of left-sided cerebral hemorrhagic contusions, S/P fall from wheelchair. History of dysphagia and placement of a PEG tube in 11/2015. History of pneumonia and respiratory failure in 3/2020. History of cystoscopy, urethral dilatation and placement of a Tobin catheter in 3/2020. History of acute kidney injury necessitating temporary dialysis through a Tesio catheter in 3/2020: Tesio catheter was later removed in 4/2020. History of colonoscopy. History of hyperlipidemia. Arthritis. Metastatic prostate cancer to bone. Chronic atrial fibrillation. , previously anticoagulated with Eliquis and more recently with Coumadin. Echocardiogram in 11/2015 reportedly showed a dilated left ventricle with mildly to moderately reduced left ventricular systolic function with an estimated ejection fraction of 40 +/- 5% with mild concentric left ventricular hypertrophy, normal right ventricular size and function. Normal size atria. Mild tricuspid regurgitation. Mild pulmonary hypertension. Negative above study for shunt. FAMILY HISTORY:  Significant for cancer in his mother and heart disease in his father. SOCIAL HISTORY:   Patient does not smoke. He does not drink alcohol. He denies illicit drug use. O:  COMPLETE PHYSICAL EXAM:      BP (!) 98/56   Pulse 84   Resp 16   Ht 5' 11\" (1.803 m)   Wt 160 lb (72.6 kg)   BMI 22.32 kg/m²      General:  Well-developed, well-nourished male, sitting in wheelchair in no acute distress. HEENT: Normocephalic and atraumatic head. No JVD. No carotid bruits. Carotid upstrokes normal bilaterally. No thyromegaly. Sclerae not icteric. No xanthelasmas. Mucous membranes moist.  Chest:  Symmetrical and nontender. No deformities. Lungs:  Clear to auscultation bilaterally. Heart:  Irregularly irregular. Normal S1 and S2. No S3 or S4. No murmurs or rubs. Abdomen: Soft, nontender without organomegaly or masses. No bruits. Normal bowel sounds. Extremities: No significant lower extremity edema. Wearing brace, right lower extremity. Contracture, right hand. Skin:  Normal turgor. No rashes or ulcers noted. Neurologic: Oriented x3. Somewhat aphasic. Right sided hemiplegia. REVIEW OF DIAGNOSTIC TESTS:     EKG done today showed atrial fibrillation/flutter with a ventricular response rate of 84 bpm with diffuse low QRS voltages with left anterior fascicular block, old inferior wall myocardial infarction, old anterior wall myocardial infarction and nonspecific T wave abnormality. ASSESSMENT / DIAGNOSIS:    Permanent atrial fibrillation with controlled ventricular response. Anticoagulated on Coumadin. Abnormal EKG. History of LV dysfunction, appears compensated with no signs or symptoms of congestive heart failure/fluid overload. HS/P cerebrovascular accident with aphasia and right-sided hemiplegia. Metastatic prostate cancer to bone. TREATMENT PLAN:   Obtain cardiology records from his previous cardiologist.    Continue Toprol XL as given. No room for ace inhibitor therapy/ARB/Entresto. Does not need diuretics. Would not pursue a myocardial perfusion imaging study (in view of the abnormal EKG) in view of the fact that he is very limited with his activity and is asymptomatic in that regard. Change Coumadin to Eliquis in the near future. Tentative follow up with Cardiology in 3 months or on a prn basis. Ling Morrow.  [facility] [id number] [phone]

## 2022-11-10 ENCOUNTER — OFFICE VISIT (OUTPATIENT)
Dept: PHYSICAL MEDICINE AND REHAB | Age: 77
End: 2022-11-10
Payer: COMMERCIAL

## 2022-11-10 VITALS — BODY MASS INDEX: 21.28 KG/M2 | HEIGHT: 71 IN | WEIGHT: 152 LBS | TEMPERATURE: 97 F

## 2022-11-10 DIAGNOSIS — R25.2 SPASTICITY: Primary | ICD-10-CM

## 2022-11-10 PROCEDURE — 64643 CHEMODENERV 1 EXTREM 1-4 EA: CPT | Performed by: PHYSICAL MEDICINE & REHABILITATION

## 2022-11-10 PROCEDURE — 64644 CHEMODENERV 1 EXTREM 5/> MUS: CPT | Performed by: PHYSICAL MEDICINE & REHABILITATION

## 2022-11-10 PROCEDURE — 95874 GUIDE NERV DESTR NEEDLE EMG: CPT | Performed by: PHYSICAL MEDICINE & REHABILITATION

## 2022-11-10 PROCEDURE — 64646 CHEMODENERV TRUNK MUSC 1-5: CPT | Performed by: PHYSICAL MEDICINE & REHABILITATION

## 2022-11-10 RX ORDER — MORPHINE SULFATE 15 MG/1
TABLET, FILM COATED, EXTENDED RELEASE ORAL
COMMUNITY
Start: 2022-09-23

## 2022-11-10 NOTE — PROGRESS NOTES
Pleas Epley, D.O. [facility]  [address] [place]  Phone: [phone]  Fax: [phone]  11/10/2022    Chief Complaint   Patient presents with    Arm Pain     Botox 700 units    Leg Pain       Informed Consent:  The indications, risks, benefits, and  alternatives of onabotulinum toxin A were discussed with the patient. I explained to the patient the potential side effects including but not limited to: distant spread of toxin effect causing droopy eyelids, facial drooping, neck pain, headache, double vision, muscle pain/spasm/weakness/stiffness,  bronchitis,  injection site pain, increased blood pressure, hypersensitivity, anaphylaxis, difficulty swallowing, difficulty speaking, urinary incontinence and breathing difficulty. The patient is aware that swallowing and breathing difficulties can be life threatening and there have been reports of death in some cases where onabotulinum toxin was injected. The patient was advised of the expected benefit to include decreased spasticity in the injected muscles, and the anticipated duration of effect of 3 months. A permit was signed and scanned into the media. Last injection: 8/3/22  Taking anticoagulants/antiplatelets: Yes  Diabetic: No  Febrile/active infection: No    Ambulatory Procedure Time Out  Correct Patient: Yes  Correct Procedure: Yes  Correct Site/Side: Yes  Correct Site(s) Marked: Yes  Informed Consent Signed: Yes  Allergies Verified: Yes  Staff Present & Credential[de-identified] Dr. Villa Beck LPN DO    Procedure note: After obtaining consent,  EMG guided onabotulinum toxin A injection was performed of the right upper and lower extremity, trunk at the locations and doses listed below. 700 units of onabotulinum toxin A was reconstituted using 4 cc of preservative free normal saline ( 5 units per 0.1 ml).  EMG guidance was necessary to adequately localize muscle due to nearby neurovascular structures to maximize the response to the toxin. At each injection site, the skin was prepared with alcohol and using a no touch technique the Myoject needle was directed to the desired muscle. Negative aspiration was performed at each site prior to injection. Right Upper extremity  Pectoralis major 100 units  Latissimus dorsi 75 units  Triceps brachii 25 units  Pronator teres 30 units  FCR 70 units  FDS 60 units  FDP 60 units  FCU 30 units  Intrinsic hand 80 units at 4 sites    Right lower extremity  Rectus femoris 30 units  Vastus medialis 30 units  Vastus lateralis 30 units  Vastus intermedius 25 units    Total units 650  Total waste 50    Adequate hemostasis was obtained and bandages were applied to the injection sites. The patient was monitored clinically in the office after the injection and left the office without incident. The patient was educated in post-procedure care including ice 20 minutes on/20 minutes off prn pain/bruising, call if any fevers chills, night sweats, drainage, increased pain, weakness, difficulty breathing or swallowing. The patient was advised to anticipate the Botox to start working in about 2 weeks. follow up 6 weeks      Tiara Godoy D.O., P.T.   Board Certified Physical Medicine and Rehabilitation  Board Certified Electrodiagnostic Medicine    Administrations This Visit       onabotulinumtoxin A (BOTOX) injection 100 Units       Admin Date  11/10/2022  12:42 Action  Given Dose  100 Units Route  IntraMUSCular Site  Other Administered By  Larisas Vasquez MA    Ordering Provider: Stanford Farrar DO    NDC: 0658-6816-09    Lot#: [id number]    : [facility]    Patient Supplied?: No              Onabotulinumtoxin A (BOTOX) injection 600 Units       Admin Date  11/10/2022  12:42 Action  Given Dose  600 Units Route  IntraMUSCular Site  Other Administered By  Larissa Vasquez MA    Ordering Provider: Stanford Farrar DO    NDC: 4479-1557-07    Lot#: [id number]    : [facility]    Patient Supplied?: No

## 2022-11-22 DIAGNOSIS — C79.51 BONE METASTASES (HCC): ICD-10-CM

## 2022-11-22 DIAGNOSIS — C61 PROSTATE CANCER (HCC): Primary | ICD-10-CM

## 2022-11-29 ENCOUNTER — TELEPHONE (OUTPATIENT)
Dept: CARDIOLOGY CLINIC | Age: 77
End: 2022-11-29

## 2022-11-30 ENCOUNTER — HOSPITAL ENCOUNTER (OUTPATIENT)
Dept: NUCLEAR MEDICINE | Age: 77
Discharge: HOME | End: 2022-12-02
Payer: COMMERCIAL

## 2022-11-30 DIAGNOSIS — C61 PROSTATE CANCER (HCC): ICD-10-CM

## 2022-11-30 DIAGNOSIS — C79.51 BONE METASTASES (HCC): ICD-10-CM

## 2022-11-30 PROCEDURE — 79101 NUCLEAR RX IV ADMIN: CPT | Performed by: RADIOLOGY

## 2022-11-30 PROCEDURE — A9606 RADIUM RA223 DICHLORIDE THER: HCPCS | Performed by: RADIOLOGY

## 2022-11-30 PROCEDURE — 79101 NUCLEAR RX IV ADMIN: CPT

## 2022-11-30 PROCEDURE — 3440000000 HC RX THERAPEUTIC RADIOPHARMACEUTICAL: Performed by: RADIOLOGY

## 2022-11-30 RX ADMIN — RADIUM RA 223 DICHLORIDE 114 MICRO CURIE: 30 INJECTION INTRAVENOUS at 12:55

## 2022-12-11 ENCOUNTER — HOSPITAL ENCOUNTER (EMERGENCY)
Age: 77
Discharge: HOME | End: 2022-12-11
Attending: EMERGENCY MEDICINE
Payer: COMMERCIAL

## 2022-12-11 ENCOUNTER — APPOINTMENT (OUTPATIENT)
Dept: GENERAL RADIOLOGY | Age: 77
End: 2022-12-11
Payer: COMMERCIAL

## 2022-12-11 ENCOUNTER — APPOINTMENT (OUTPATIENT)
Dept: CT IMAGING | Age: 77
End: 2022-12-11
Payer: COMMERCIAL

## 2022-12-11 VITALS
OXYGEN SATURATION: 97 % | WEIGHT: 160 LBS | DIASTOLIC BLOOD PRESSURE: 73 MMHG | BODY MASS INDEX: 22.32 KG/M2 | RESPIRATION RATE: 20 BRPM | HEART RATE: 98 BPM | TEMPERATURE: 97.3 F | SYSTOLIC BLOOD PRESSURE: 166 MMHG

## 2022-12-11 DIAGNOSIS — S01.81XA FACIAL LACERATION, INITIAL ENCOUNTER: ICD-10-CM

## 2022-12-11 DIAGNOSIS — R79.1 SUBTHERAPEUTIC INTERNATIONAL NORMALIZED RATIO (INR): ICD-10-CM

## 2022-12-11 DIAGNOSIS — S09.90XA INJURY OF HEAD, INITIAL ENCOUNTER: Primary | ICD-10-CM

## 2022-12-11 LAB
INR BLD: 1.5
PROTHROMBIN TIME: 17 SEC (ref 9.3–12.4)

## 2022-12-11 PROCEDURE — 90471 IMMUNIZATION ADMIN: CPT | Performed by: STUDENT IN AN ORGANIZED HEALTH CARE EDUCATION/TRAINING PROGRAM

## 2022-12-11 PROCEDURE — 70450 CT HEAD/BRAIN W/O DYE: CPT | Performed by: RADIOLOGY

## 2022-12-11 PROCEDURE — 70486 CT MAXILLOFACIAL W/O DYE: CPT

## 2022-12-11 PROCEDURE — 90715 TDAP VACCINE 7 YRS/> IM: CPT | Performed by: STUDENT IN AN ORGANIZED HEALTH CARE EDUCATION/TRAINING PROGRAM

## 2022-12-11 PROCEDURE — 99284 EMERGENCY DEPT VISIT MOD MDM: CPT

## 2022-12-11 PROCEDURE — 72125 CT NECK SPINE W/O DYE: CPT

## 2022-12-11 PROCEDURE — 72125 CT NECK SPINE W/O DYE: CPT | Performed by: RADIOLOGY

## 2022-12-11 PROCEDURE — 70450 CT HEAD/BRAIN W/O DYE: CPT

## 2022-12-11 PROCEDURE — 2500000003 HC RX 250 WO HCPCS: Performed by: STUDENT IN AN ORGANIZED HEALTH CARE EDUCATION/TRAINING PROGRAM

## 2022-12-11 PROCEDURE — 6360000002 HC RX W HCPCS: Performed by: STUDENT IN AN ORGANIZED HEALTH CARE EDUCATION/TRAINING PROGRAM

## 2022-12-11 PROCEDURE — 36415 COLL VENOUS BLD VENIPUNCTURE: CPT

## 2022-12-11 PROCEDURE — 73080 X-RAY EXAM OF ELBOW: CPT

## 2022-12-11 PROCEDURE — 70486 CT MAXILLOFACIAL W/O DYE: CPT | Performed by: RADIOLOGY

## 2022-12-11 PROCEDURE — 85610 PROTHROMBIN TIME: CPT

## 2022-12-11 RX ORDER — LIDOCAINE HYDROCHLORIDE 10 MG/ML
5 INJECTION, SOLUTION INFILTRATION; PERINEURAL ONCE
Status: COMPLETED | OUTPATIENT
Start: 2022-12-11 | End: 2022-12-11

## 2022-12-11 RX ADMIN — TETANUS TOXOID, REDUCED DIPHTHERIA TOXOID AND ACELLULAR PERTUSSIS VACCINE, ADSORBED 0.5 ML: 5; 2.5; 8; 8; 2.5 SUSPENSION INTRAMUSCULAR at 22:15

## 2022-12-11 RX ADMIN — LIDOCAINE HYDROCHLORIDE 5 ML: 10 INJECTION, SOLUTION INFILTRATION; PERINEURAL at 22:18

## 2022-12-11 ASSESSMENT — ENCOUNTER SYMPTOMS
ABDOMINAL PAIN: 0
BACK PAIN: 0
WHEEZING: 0
EYE REDNESS: 0
DIARRHEA: 0
VOMITING: 0
EYE PAIN: 0
SORE THROAT: 0
SHORTNESS OF BREATH: 0
SINUS PRESSURE: 0
NAUSEA: 0
EYE DISCHARGE: 0
COUGH: 0

## 2022-12-12 ENCOUNTER — OFFICE VISIT (OUTPATIENT)
Dept: ONCOLOGY | Age: 77
End: 2022-12-12
Payer: COMMERCIAL

## 2022-12-12 ENCOUNTER — HOSPITAL ENCOUNTER (OUTPATIENT)
Dept: INFUSION THERAPY | Age: 77
Discharge: HOME | End: 2022-12-12
Payer: COMMERCIAL

## 2022-12-12 VITALS
WEIGHT: 162 LBS | BODY MASS INDEX: 22.59 KG/M2 | TEMPERATURE: 95.4 F | SYSTOLIC BLOOD PRESSURE: 87 MMHG | DIASTOLIC BLOOD PRESSURE: 58 MMHG | OXYGEN SATURATION: 99 % | HEART RATE: 79 BPM

## 2022-12-12 DIAGNOSIS — C61 PROSTATE CANCER (HCC): Primary | ICD-10-CM

## 2022-12-12 DIAGNOSIS — C79.51 BONE METASTASES (HCC): ICD-10-CM

## 2022-12-12 LAB
ALBUMIN SERPL-MCNC: 3.6 G/DL (ref 3.5–5.2)
ALP BLD-CCNC: 128 U/L (ref 40–129)
ALT SERPL-CCNC: 18 U/L (ref 0–40)
ANION GAP SERPL CALCULATED.3IONS-SCNC: 10 MMOL/L (ref 7–16)
AST SERPL-CCNC: 26 U/L (ref 0–39)
BILIRUB SERPL-MCNC: <0.2 MG/DL (ref 0–1.2)
BUN BLDV-MCNC: 19 MG/DL (ref 6–23)
CALCIUM SERPL-MCNC: 9.5 MG/DL (ref 8.6–10.2)
CHLORIDE BLD-SCNC: 103 MMOL/L (ref 98–107)
CO2: 26 MMOL/L (ref 22–29)
CREAT SERPL-MCNC: 0.8 MG/DL (ref 0.7–1.2)
GFR SERPL CREATININE-BSD FRML MDRD: >60 ML/MIN/1.73
GLUCOSE BLD-MCNC: 85 MG/DL (ref 74–99)
POTASSIUM SERPL-SCNC: 4.1 MMOL/L (ref 3.5–5)
SODIUM BLD-SCNC: 139 MMOL/L (ref 132–146)
TOTAL PROTEIN: 6.9 G/DL (ref 6.4–8.3)

## 2022-12-12 PROCEDURE — 99214 OFFICE O/P EST MOD 30 MIN: CPT | Performed by: INTERNAL MEDICINE

## 2022-12-12 PROCEDURE — 1123F ACP DISCUSS/DSCN MKR DOCD: CPT | Performed by: INTERNAL MEDICINE

## 2022-12-12 PROCEDURE — 99212 OFFICE O/P EST SF 10 MIN: CPT

## 2022-12-12 PROCEDURE — 96402 CHEMO HORMON ANTINEOPL SQ/IM: CPT

## 2022-12-12 PROCEDURE — 80053 COMPREHEN METABOLIC PANEL: CPT

## 2022-12-12 PROCEDURE — 6360000002 HC RX W HCPCS: Performed by: FAMILY MEDICINE

## 2022-12-12 PROCEDURE — 36415 COLL VENOUS BLD VENIPUNCTURE: CPT

## 2022-12-12 RX ORDER — SODIUM CHLORIDE 9 MG/ML
INJECTION, SOLUTION INTRAVENOUS CONTINUOUS
OUTPATIENT
Start: 2023-02-27

## 2022-12-12 RX ORDER — DIPHENHYDRAMINE HYDROCHLORIDE 50 MG/ML
50 INJECTION INTRAMUSCULAR; INTRAVENOUS
OUTPATIENT
Start: 2023-02-27

## 2022-12-12 RX ORDER — EPINEPHRINE 1 MG/ML
0.3 INJECTION, SOLUTION, CONCENTRATE INTRAVENOUS PRN
OUTPATIENT
Start: 2023-02-27

## 2022-12-12 RX ORDER — FAMOTIDINE 10 MG/ML
20 INJECTION, SOLUTION INTRAVENOUS
OUTPATIENT
Start: 2023-02-27

## 2022-12-12 RX ORDER — ACETAMINOPHEN 325 MG/1
650 TABLET ORAL
OUTPATIENT
Start: 2023-02-27

## 2022-12-12 RX ORDER — ONDANSETRON 2 MG/ML
8 INJECTION INTRAMUSCULAR; INTRAVENOUS
OUTPATIENT
Start: 2023-02-27

## 2022-12-12 RX ORDER — ALBUTEROL SULFATE 90 UG/1
4 AEROSOL, METERED RESPIRATORY (INHALATION) PRN
OUTPATIENT
Start: 2023-02-27

## 2022-12-12 RX ADMIN — LEUPROLIDE ACETATE 22.5 MG: KIT at 12:11

## 2022-12-12 NOTE — ED PROVIDER NOTES
HPI   62-year-old male patient present to emergency department for evaluation of fall. Patient uses a walker at baseline status post stroke. His walker leg had bent while he was using it and he had fell onto the ground hit the left side of his face and left elbow. Incident occurred just prior to arrival.  He is on Coumadin, Coumadin level was subtherapeutic as per his daughter on Friday. He is complaining of associated facial pain. No loss of consciousness. Denies any neck or back pain. Symptoms sudden in onset, severe in severity. No associated chest pain, shortness of breath or vomiting. No dizziness. Review of Systems   Constitutional:  Negative for chills and fever. HENT:  Negative for ear pain, sinus pressure and sore throat. Left facial laceration and bruising   Eyes:  Negative for pain, discharge and redness. Respiratory:  Negative for cough, shortness of breath and wheezing. Cardiovascular:  Negative for chest pain. Gastrointestinal:  Negative for abdominal pain, diarrhea, nausea and vomiting. Genitourinary:  Negative for dysuria and frequency. Musculoskeletal:  Negative for arthralgias and back pain. Skin:  Negative for rash and wound. Left elbow pain and abrasions   Neurological:  Negative for weakness and headaches. Hematological:  Negative for adenopathy. All other systems reviewed and are negative. Physical Exam  Vitals and nursing note reviewed. Constitutional:       Appearance: He is well-developed. HENT:      Head: Normocephalic and atraumatic. Comments: There is associated left periorbital ecchymosis. Over the left eyebrow region there is a 2 cm lateral laceration and also infraorbital laceration present. Nose:      Comments: No nasal bone tenderness, no septal hematoma. Mouth/Throat:      Mouth: Mucous membranes are moist.      Pharynx: Oropharynx is clear.    Eyes:      Conjunctiva/sclera: Conjunctivae normal.      Comments: Extraocular movements intact. No hyphema. Neck:      Comments: No cervical spine tenderness to palpation. Cardiovascular:      Rate and Rhythm: Normal rate and regular rhythm. Heart sounds: Normal heart sounds. No murmur heard. Pulmonary:      Effort: Pulmonary effort is normal. No respiratory distress. Breath sounds: Normal breath sounds. No wheezing or rales. Comments: No chest wall tenderness to palpation  Abdominal:      General: Bowel sounds are normal.      Palpations: Abdomen is soft. Tenderness: There is no abdominal tenderness. There is no guarding or rebound. Comments: No abdominal ecchymosis. Musculoskeletal:         General: No tenderness or deformity. Cervical back: Normal range of motion and neck supple. Comments: Full range of motion of the left elbow. There is superficial skin tears present. No tenderness to palpation of the thoracic spine, lumbar spine. No step-offs or crepitus. Skin:     General: Skin is warm and dry. Neurological:      Mental Status: He is alert and oriented to person, place, and time. Cranial Nerves: No cranial nerve deficit. Coordination: Coordination normal.        Procedures     MDM  68-year-old male patient present to emergency status post fall. Patient with multiple lacerations, some ecchymosis. He is on Coumadin. INR here 1.5. This was a mechanical fall. Lacerations were repaired. CT head neck facial bones all negative for any new acute findings. No hyphema, extraocular movements intact. No septal hematomas. At this time patient stable for discharge his tetanus shot was updated. Patient was notified that he is to have suture removal in 5 to 7 days. At this time stable for discharge home. Laceration Repair Procedure Note    Indication: Laceration    Procedure:  The patient was placed in the appropriate position and anesthesia around the laceration was obtained by infiltration using 1% Lidocaine without epinephrine. The area was then cleansed using alcohol. The laceration was closed with 6-0 Ethilon using interrupted sutures. A second laceration was closed with 6-0 Ethilon using interrupted sutures. The wound area was then dressed with bacitracin. Minimal debridement was preformed, flaps were aligned. No foreign body was identified. Total repaired wound length: Left eyebrow laceration 2 cm infraorbital left laceration 1 cm. Other Items: Suture count: Upper laceration 3 sutures, lower laceration 2 sutures    The patient tolerated the procedure well. Complications: None     ED Course as of 12/11/22 2242   Sun Dec 11, 2022   2147 ATTENDING PROVIDER ATTESTATION:     I have personally performed and/or participated in the history, exam, medical decision making, and procedures and agree with all pertinent clinical information unless otherwise noted. I have also reviewed and agree with the past medical, family and social history unless otherwise noted. I have discussed this patient in detail with the resident, and provided the instruction and education regarding patient here after head injury, has a history of stroke and uses a walker and is on Coumadin, apparently witnessed by family his walker leg bent today causing it to slip and he fell over it striking left side of his head on the ground, causing laceration to head and scraped up his left elbow. No loss of consciousness and no seizure-like activity. No confusion. No neck or back pain. No other arm or leg pain or injuries. .  My findings/plan: Patient with mild hematoma left eyebrow with a 3 cm left eyebrow laceration with a 2 cm superficial laceration to the left upper cheek, lower eyelid region. No nasal tenderness, no septal hematomas, no other sign of acute head or face injuries. Extract muscles are intact and equal.  No hyphemas. Skin tears to left elbow with no swelling or effusion or actual bony tenderness to the left elbow.   Moving the left arm well. Arms and legs are well perfused. No other signs of acute arm or leg or bony injury. No cervical, thoracic or lumbar spine tenderness. Heart rate currently regular, lungs are clear and equal.  Abdomen soft and nontender. No chest wall, sternal or clavicular tenderness. [NC]      ED Course User Index  [NC] Roldan Regalado DO       ED Course as of 12/11/22 2242   Sun Dec 11, 2022   3639 ATTENDING PROVIDER ATTESTATION:     I have personally performed and/or participated in the history, exam, medical decision making, and procedures and agree with all pertinent clinical information unless otherwise noted. I have also reviewed and agree with the past medical, family and social history unless otherwise noted. I have discussed this patient in detail with the resident, and provided the instruction and education regarding patient here after head injury, has a history of stroke and uses a walker and is on Coumadin, apparently witnessed by family his walker leg bent today causing it to slip and he fell over it striking left side of his head on the ground, causing laceration to head and scraped up his left elbow. No loss of consciousness and no seizure-like activity. No confusion. No neck or back pain. No other arm or leg pain or injuries. .  My findings/plan: Patient with mild hematoma left eyebrow with a 3 cm left eyebrow laceration with a 2 cm superficial laceration to the left upper cheek, lower eyelid region. No nasal tenderness, no septal hematomas, no other sign of acute head or face injuries. Extract muscles are intact and equal.  No hyphemas. Skin tears to left elbow with no swelling or effusion or actual bony tenderness to the left elbow. Moving the left arm well. Arms and legs are well perfused. No other signs of acute arm or leg or bony injury. No cervical, thoracic or lumbar spine tenderness.   Heart rate currently regular, lungs are clear and equal.  Abdomen soft and nontender. No chest wall, sternal or clavicular tenderness. [NC]      ED Course User Index  [NC] Chris Arellano DO       --------------------------------------------- PAST HISTORY ---------------------------------------------  Past Medical History:  has a past medical history of A-fib ([facility] [address] Arthritis, CVA (cerebral vascular accident) ([facility] [address] Hyperlipemia, Prostate cancer ([facility] [address] and [facility] Past Surgical History:  has a past surgical history that includes Gastrostomy tube placement (11/20/15); Colonoscopy; Cystoscopy (N/A, 3/9/2020); Catheter Removal (N/A, 4/19/2020); and Cataract removal (Right). Social History:  reports that he quit smoking about 9 years ago. His smoking use included cigarettes. He has never used smokeless tobacco. He reports that he does not drink alcohol and does not use drugs. Family History: family history includes Cancer in his mother; Heart Disease in his father. The patients home medications have been reviewed. Allergies: Patient has no known allergies. -------------------------------------------------- RESULTS -------------------------------------------------  Labs:  Results for orders placed or performed during the hospital encounter of 12/11/22   Protime-INR   Result Value Ref Range    Protime 17.0 (H) 9.3 - 12.4 sec    INR 1.5        Radiology:  XR ELBOW LEFT (MIN 3 VIEWS)   Final Result   No acute abnormality. CT HEAD WO CONTRAST   Final Result   No acute intracranial abnormality. Old large geographic left middle cerebral artery distribution infarction, as   detailed above. Chronic parenchymal change including atrophy and white matter ischemia         CT FACIAL BONES WO CONTRAST   Final Result   No acute facial bone trauma. Mild left-sided scalp and facial swelling, as detailed above. Old fracture deformity of right mandibular ramus.          CT CERVICAL SPINE WO CONTRAST   Final Result   No acute abnormality of the cervical spine. Straightened alignment with multilevel bony and discogenic degenerative   changes, as detailed above.             ------------------------- NURSING NOTES AND VITALS REVIEWED ---------------------------  Date / Time Roomed:  12/11/2022  9:41 PM  ED Bed Assignment:  [id number]    The nursing notes within the ED encounter and vital signs as below have been reviewed. BP (!) 166/73   Pulse 98   Temp 97.3 °F (36.3 °C)   Resp 20   Wt 160 lb (72.6 kg)   SpO2 97%   BMI 22.32 kg/m²   Oxygen Saturation Interpretation: Normal      ------------------------------------------ PROGRESS NOTES ------------------------------------------  10:42 PM EST  I have spoken with the patient and discussed todays results, in addition to providing specific details for the plan of care and counseling regarding the diagnosis and prognosis. Their questions are answered at this time and they are agreeable with the plan. I discussed at length with them reasons for immediate return here for re evaluation. They will followup with their primary care physician by calling their office on Monday.      --------------------------------- ADDITIONAL PROVIDER NOTES ---------------------------------  At this time the patient is without objective evidence of an acute process requiring hospitalization or inpatient management. They have remained hemodynamically stable throughout their entire ED visit and are stable for discharge with outpatient follow-up. The plan has been discussed in detail and they are aware of the specific conditions for emergent return, as well as the importance of follow-up. New Prescriptions    No medications on file       Diagnosis:  1. Injury of head, initial encounter    2. Facial laceration, initial encounter    3. Subtherapeutic international normalized ratio (INR)        Disposition:  Patient's disposition: Discharge to home  Patient's condition is stable. Edouard Lima,   Resident  12/11/22 2369

## 2022-12-12 NOTE — PROGRESS NOTES
Received lupron  Injection. Provided with verbal information regarding expected actions and side effects. Encouraged to call [facility] with questions/concerns.

## 2022-12-12 NOTE — PROGRESS NOTES
[id number] [facility]  [place] [address]  Dept: [phone]  Loc: [phone]  Attending progress note      Reason for Visit:   Metastatic prostate cancer. Referring Physician:  Ish Warner MD    PCP:  Chuck Macedo MD    History of Present Illness:    Mr. Barbara Strickland is a 72-year-old gentleman with a past history significant for metastatic prostate cancer:    TREATMENT HISTORY: 125 seed implantation 1/17/2014  Bicalutamide 50 mg daily. ADT, and 5 cycles docetaxel 8/2/2015-11/11/2015  Restart of bicalutamide and leuprolide 7/2016    Leuprolide 22.5 mg IM every 12 weeks  Denosumab 120 mg subcutaneously every 12 weeks (started 9/2016)  Abiraterone and prednisone 2/15/2020 - 3/2021  Apalutamide 240 mg daily 3/29/2021 - current      History of Present Illness: Maia Ochoa is a 78year old male with prostate cancer metastatic to bone. He is s/p CVA resulting in expressive aphasia and right hemiparesis. Tobi Esparza is in wheelchair and accompanied by his daughter, Austin Wolfe. Wife present on phone. He presents for ongoing evaluation and management of prostate cancer. HEMATOLOGY/ONCOLOGY HISTORY:  Screening PSA of 4.18 ng/mL in 2013 (not seen in [address] [place] [address] Rd). 12/12/2013 - transrectal US-guided biopsy of prostate demonstrating [place] 4+3 adenocarcinoma. 1/17/2014 - s/p I-125 seed implantation  PSA continued to rise as follows reaching 68.96 ng/ml on 8/5/2015. Additional evaluation with bone scan revealed diffuse progressive metastatic disease involving spine (cervical/lumbar spine, b/l scapulae, other bones. Started ADT & Casodex, had docetaxel x 5 cycles from 8/2/2015 - 11/11/2015    S/P massive ischemic CVA in 11/2015 3 days after cycle 5 chemotherapy. He is S/P rehab post CVA. He has expressive aphasia and right hemiparesis. He is dependent on family for ADL and care.  Received Botox injections to help with right arm - loosen contractures. -------------------------------------------------------------------------------------------------  Restarted on lupron/casodex in 7/2016. Started denosumab 120 mg subcutaneously every 4 weeks on 9/26/2016. Changed to every 12 weeks beginning September 2017. Lupron continued every 12 weeks   He remained on bicalutamide 50 mg daily. PSA was rising and regimen change recommended. Bone scan showed new bone mets 2/2020.  ---------------------------------------------------------------------------------  Lupron and denosumab on 1/28/2020. Abiraterone/prednisone started 2/15/2020. Went to ER on 3/5/2020 with difficulty breathing, increased weakness. Had pneumonia per wife. He was on ventilator and had to start dialysis. From hospital, he went to SNF. Home 6/5/2020. Off dialysis. Prostate treatment interruption between 3/5/2020 and 6/10/2020. PSA increased up to 9.47 in early June 2020 while off treatment. PSA 5.1 on 9/3/2020. PSA 0.16 on 11/30/2020    Mark admitted to some increased intermittent lower back pain. 2/5/2021 lab showed significant increase in PSA to 8. Repeat done 2/24/2021 and was 10. No other new specific symptoms. Restaging scans revealed increased bone metastases in multiple areas. Started apalutamide on 3/29/2021. Continues on prednisone 5 mg daily.   --------------------------------------------------------------------------------------------  Interval history:    Continues on apalutamide. Changed anticoagulation to warfarin which has less interaction with apalutamide compared to prior anticoagulation medication. Monitored at [place]    The patient was under the care of Dr. Primitivo Saleh at [facility], restaging scans on 6/16/2022, had revealed disease progression in the bones, the patient was recommended Xofigo. The patient received radiation therapy to L1.     He is accompanied by his spouse, he has right hemiparesis, the patient is doing much better, appetite had improved, he gained 5 to 6 pounds. Back pain had improved. He has his first [place] treatment on 9/30/2022, second treatment was on 10/28/2022, third treatment was on 11/30/2022. He is overall doing better, he was seen in the ED on 12/11/2022 status post fall, he had a left facial laceration. Review of Systems;  CONSTITUTIONAL: No fever, chills. Improved appetite. ENMT: Eyes: No diplopia; Nose: No epistaxis. Mouth: No sore throat. RESPIRATORY: No hemoptysis, shortness of breath, cough. CARDIOVASCULAR: No chest pain, palpitations. GASTROINTESTINAL: No nausea/vomiting, abdominal pain, diarrhea/constipation. GENITOURINARY: No dysuria, urinary frequency, hematuria. NEURO: Positive for expressive aphasia and hemiparesis. Remainder:  ROS NEGATIVE    Past Medical History:      Diagnosis Date    A-fib [facility])     Arthritis     CVA (cerebral vascular accident) ([address] 11/15/2015    left MCA    Hyperlipemia     Prostate cancer ([address]     mets to spine, currently on chemo    Prostate cancer metastatic to bone ([address] 06/2022    S/p palliative XRT to L1 spine completed 08/16/2022, dose 3000 cGy/ 10  fractions.      Patient Active Problem List   Diagnosis    Cerebrovascular accident (CVA) due to occlusion of left carotid artery (HCC)    Dysphagia due to recent stroke    Paroxysmal atrial fibrillation (HCC)    Prostate cancer ([address]    Cerebral contusion    Subarachnoid hemorrhage ([address]    HCAP (healthcare-associated pneumonia)    Respiratory arrest ([address]    Acute respiratory failure with hypoxia ([address]    Encephalopathy acute    Palliative care encounter    Goals of care, counseling/discussion    DNR (do not resuscitate) discussion    Bone metastases ([address]    End stage renal disease ([address]    ARMINDA (obstructive sleep apnea)        Past Surgical History:      Procedure Laterality Date    CATARACT REMOVAL Right     CATHETER REMOVAL N/A 4/19/2020    CATHETER REMOVAL will do on patient bed performed by Anne Packer MD [address] [address] CYSTOSCOPY N/A 3/9/2020    CYSTOSCOPY, BILATERAL RETROGRADE PYELOGRAMS, URETHRAL DILATATION, REYEZ CATHETER INSERTION performed by Roxanne Villavicencio MD at [address] [place] [address]  11/20/15    sonal       Family History:  Family History   Problem Relation Age of Onset    Cancer Mother     Heart Disease Father        Medications:  Reviewed and reconciled. Social History:  Social History     Socioeconomic History    Marital status:      Spouse name: Not on file    Number of children: 3    Years of education: Not on file    Highest education level: Not on file   Occupational History    Not on file   Tobacco Use    Smoking status: Former     Types: Cigarettes     Quit date: 2013     Years since quittin.3    Smokeless tobacco: Never   Vaping Use    Vaping Use: Never used   Substance and Sexual Activity    Alcohol use: No     Alcohol/week: 0.0 standard drinks    Drug use: No    Sexual activity: Not Currently     Partners: Female   Other Topics Concern    Not on file   Social History Narrative    Not on file     Social Determinants of Health     Financial Resource Strain: Not on file   Food Insecurity: Not on file   Transportation Needs: Not on file   Physical Activity: Not on file   Stress: Not on file   Social Connections: Not on file   Intimate Partner Violence: Not on file   Housing Stability: Not on file       Allergies:  No Known Allergies    Physical Exam:  BP (!) 87/58   Pulse 79   Temp (!) 95.4 °F (35.2 °C)   Wt 162 lb (73.5 kg) Comment: Unable to get wt, gave verbally  SpO2 99%   BMI 22.59 kg/m² HR repeated and was 55  GENERAL: Alert, oriented x 3, looks chronically ill. HEENT: PERRLA; EOMI. Oropharynx clear. NECK: Supple. No palpable cervical or supraclavicular lymphadenopathy. LUNGS: Good air entry bilaterally. No wheezing, crackles or rhonchi. CARDIOVASCULAR: Irregular regular rhythm, normal rate. ABDOMEN: Soft. Non-tender, non-distended.  Positive bowel sounds. EXTREMITIES: Without clubbing, cyanosis, or edema. NEUROLOGIC: Right hemiparesis and expressive aphasia. Poor PS due to stroke      Impression/Plan:    Mr. Isabela Sebastian is a 72-year-old gentleman with a past history significant for metastatic prostate cancer:    TREATMENT HISTORY: 125 seed implantation 1/17/2014  Bicalutamide 50 mg daily. ADT, and 5 cycles docetaxel 8/2/2015-11/11/2015  Restart of bicalutamide and leuprolide 7/2016    Leuprolide 22.5 mg IM every 12 weeks  Denosumab 120 mg subcutaneously every 12 weeks (started 9/2016)  Abiraterone and prednisone 2/15/2020 - 3/2021  Apalutamide 240 mg daily 3/29/2021 - current      History of Present Illness: Paige Quinn is a 78year old male with prostate cancer metastatic to bone. He is s/p CVA resulting in expressive aphasia and right hemiparesis. Osiel King is in wheelchair and accompanied by his daughter, Amy Barrett. Wife present on phone. He presents for ongoing evaluation and management of prostate cancer. HEMATOLOGY/ONCOLOGY HISTORY:  Screening PSA of 4.18 ng/mL in 2013 (not seen in [address] [place] [address] Rd). 12/12/2013 - transrectal US-guided biopsy of prostate demonstrating [place] 4+3 adenocarcinoma. 1/17/2014 - s/p I-125 seed implantation  PSA continued to rise as follows reaching 68.96 ng/ml on 8/5/2015. Additional evaluation with bone scan revealed diffuse progressive metastatic disease involving spine (cervical/lumbar spine, b/l scapulae, other bones. Started ADT & Casodex, had docetaxel x 5 cycles from 8/2/2015 - 11/11/2015    S/P massive ischemic CVA in 11/2015 3 days after cycle 5 chemotherapy. He is S/P rehab post CVA. He has expressive aphasia and right hemiparesis. He is dependent on family for ADL and care. Received Botox injections to help with right arm - loosen contractures. -------------------------------------------------------------------------------------------------  Restarted on lupron/casodex in 7/2016.   Started denosumab 120 mg subcutaneously every 4 weeks on 9/26/2016. Changed to every 12 weeks beginning September 2017. Lupron continued every 12 weeks   He remained on bicalutamide 50 mg daily. PSA was rising and regimen change recommended. Bone scan showed new bone mets 2/2020.  ---------------------------------------------------------------------------------  Lupron and denosumab on 1/28/2020. Abiraterone/prednisone started 2/15/2020. Went to ER on 3/5/2020 with difficulty breathing, increased weakness. Had pneumonia per wife. He was on ventilator and had to start dialysis. From hospital, he went to SNF. Home 6/5/2020. Off dialysis. Prostate treatment interruption between 3/5/2020 and 6/10/2020. PSA increased up to 9.47 in early June 2020 while off treatment. PSA 5.1 on 9/3/2020. PSA 0.16 on 11/30/2020    Mark admitted to some increased intermittent lower back pain. 2/5/2021 lab showed significant increase in PSA to 8. Repeat done 2/24/2021 and was 10. No other new specific symptoms. Restaging scans revealed increased bone metastases in multiple areas. Started apalutamide on 3/29/2021. The patient was under the care of Dr. Cydney Perdomo at [facility], restaging scans on 6/16/2022, had revealed disease progression in the bones, the patient was recommended Xofigo. The patient completed radiation therapy to L1. On 9/15/2022, PSA had increased to 4.27, he received Lupron and Xgeva. Referral was made to [address] [place] [address] for [facility] treatment, he received the first treatment on 9/30/2022, second treatment was on 10/28/2022, third treatment was on 11/30/2022. No side effects, overall doing better clinically. Labs reviewed, he had a PSA done at the [place], PSA 6.86, has slightly increased, recommended to continue to monitor for now, did not recommend to have the bone scan repeated while receiving Xofigo. Proceed with Lupron, Xgeva to be administered on 12/15/2022. Continue follow-up with palliative medicine for symptoms management.     RTC in 6 weeks.    Thank you for allowing us to participate in the care of Mr. Liseth Boogie.     Urban Landrum MD   HEMATOLOGY/MEDICAL [address] [address] [address]  [address] [place] [address]  Dept: [facility]: [phone]

## 2022-12-12 NOTE — DISCHARGE INSTRUCTIONS
Follow-up with your primary care physician regarding your Coumadin level. You have 3 sutures in the upper eyebrow on the left side and over the cheek you have 2 sutures. Have sutures removed in 5 to 7 days.

## 2022-12-15 ENCOUNTER — HOSPITAL ENCOUNTER (OUTPATIENT)
Dept: CARDIOLOGY | Age: 77
Discharge: HOME | End: 2022-12-15
Payer: COMMERCIAL

## 2022-12-15 ENCOUNTER — HOSPITAL ENCOUNTER (OUTPATIENT)
Dept: INFUSION THERAPY | Age: 77
Discharge: HOME | End: 2022-12-15
Payer: COMMERCIAL

## 2022-12-15 DIAGNOSIS — I51.9 CHRONIC LEFT VENTRICULAR SYSTOLIC DYSFUNCTION (LVSD): ICD-10-CM

## 2022-12-15 DIAGNOSIS — C61 PROSTATE CANCER (HCC): ICD-10-CM

## 2022-12-15 DIAGNOSIS — C79.51 BONE METASTASES (HCC): Primary | ICD-10-CM

## 2022-12-15 PROCEDURE — 6360000002 HC RX W HCPCS: Performed by: FAMILY MEDICINE

## 2022-12-15 PROCEDURE — 96372 THER/PROPH/DIAG INJ SC/IM: CPT

## 2022-12-15 PROCEDURE — 93306 TTE W/DOPPLER COMPLETE: CPT

## 2022-12-15 RX ORDER — DIPHENHYDRAMINE HYDROCHLORIDE 50 MG/ML
50 INJECTION INTRAMUSCULAR; INTRAVENOUS
OUTPATIENT
Start: 2023-03-02

## 2022-12-15 RX ORDER — ONDANSETRON 2 MG/ML
8 INJECTION INTRAMUSCULAR; INTRAVENOUS
OUTPATIENT
Start: 2023-03-02

## 2022-12-15 RX ORDER — SODIUM CHLORIDE 9 MG/ML
INJECTION, SOLUTION INTRAVENOUS CONTINUOUS
OUTPATIENT
Start: 2023-03-02

## 2022-12-15 RX ORDER — ALBUTEROL SULFATE 90 UG/1
4 AEROSOL, METERED RESPIRATORY (INHALATION) PRN
OUTPATIENT
Start: 2023-03-02

## 2022-12-15 RX ORDER — EPINEPHRINE 1 MG/ML
0.3 INJECTION, SOLUTION, CONCENTRATE INTRAVENOUS PRN
OUTPATIENT
Start: 2023-03-02

## 2022-12-15 RX ORDER — FAMOTIDINE 10 MG/ML
20 INJECTION, SOLUTION INTRAVENOUS
OUTPATIENT
Start: 2023-03-02

## 2022-12-15 RX ORDER — ACETAMINOPHEN 325 MG/1
650 TABLET ORAL
OUTPATIENT
Start: 2023-03-02

## 2022-12-15 RX ADMIN — DENOSUMAB 120 MG: 120 INJECTION SUBCUTANEOUS at 12:52

## 2022-12-15 NOTE — PROGRESS NOTES
Patient presents to clinic today for Xgeva injection. Patient's labs monitored, specifically, Calcium level, to ensure no increased risk of hypocalcemia with administration of the medication. Patient's calcium level is 9.5 mg/dL and CrCl is greater than or = to 30 mL/minute. Patient received therapy and will continue to be monitored prior to each dose.     Aaron Arellano, [address] 12/15/2022 12:49 PM

## 2022-12-23 ENCOUNTER — TELEMEDICINE (OUTPATIENT)
Dept: PHYSICAL MEDICINE AND REHAB | Age: 77
End: 2022-12-23
Payer: COMMERCIAL

## 2022-12-23 DIAGNOSIS — R25.2 SPASTICITY: Primary | ICD-10-CM

## 2022-12-23 DIAGNOSIS — Z74.09 IMPAIRED MOBILITY AND ADLS: ICD-10-CM

## 2022-12-23 DIAGNOSIS — Z78.9 IMPAIRED MOBILITY AND ADLS: ICD-10-CM

## 2022-12-23 DIAGNOSIS — I69.30 CHRONIC ISCHEMIC LEFT MCA STROKE: ICD-10-CM

## 2022-12-23 PROCEDURE — 1123F ACP DISCUSS/DSCN MKR DOCD: CPT | Performed by: PHYSICAL MEDICINE & REHABILITATION

## 2022-12-23 PROCEDURE — 99214 OFFICE O/P EST MOD 30 MIN: CPT | Performed by: PHYSICAL MEDICINE & REHABILITATION

## 2022-12-23 RX ORDER — FENTANYL 12 UG/H
PATCH TRANSDERMAL
COMMUNITY
Start: 2022-11-30

## 2022-12-23 NOTE — PROGRESS NOTES
Shaka Andrea D.O. [facility]  [address]  Phone: [phone]  Fax: [phone]        12/23/22    Chief Complaint   Patient presents with    Arm Pain    Leg Pain     Follow up after Botox pain level 5     Start time: 12:02  End time: 12:15   Benitez Luke, was evaluated through a synchronous (real-time) audio-video encounter to substitute for in-person clinic visit through [place]. The benefits and alternatives to telemedicine and agreed to proceed with this type of visit. The patient (or guardian if applicable) is aware that this is a billable service, which includes applicable co-pays. This Virtual Visit was conducted with patient's (and/or legal guardian's) consent. The visit was conducted to reduce the patient's risk of exposure to COVID-19 and provide continuity of care for an established patient. The patient was also advised the privacy standards of a standard visit continue to apply to telemedicine visits. The visit was conducted pursuant to the emergency declaration under the [address] [place] [address], [address] [place] [address] authority and the [employer] Act. Patient identification was verified, and a caregiver was present when appropriate. The patient was located in a state where the provider was licensed to provide care. HPI:  Benitez Luke is a 78y.o. year old man seen today in follow up regarding spasticity due to stroke     Interval history: Since the last visit the patient had Botox on 11/10/22.    Right Upper extremity  Pectoralis major 100 units  Latissimus dorsi 75 units  Triceps brachii 25 units  Pronator teres 30 units  FCR 70 units  FDS 60 units  FDP 60 units  FCU 30 units  Intrinsic hand 80 units at 4 sites    Right lower extremity  Rectus femoris 30 units  Vastus medialis 30 units  Vastus lateralis 30 units  Vastus intermedius 25 units    Total units 650 Total waste 50     There were no complications from the Botox injection. Caregiver/wife reports that it has helped with hygiene of the hand, dressing upper body and with walking. Since last visit he had one fall when the kane walker leg bent. He had ecchymosis to the face and three supra-orbital stitches were placed at ER. Today, the pain is rated 5/10 where 0 is no pain and 10 is pain as bad as it can be. Palliative care switched his pain medication from Morphine to Fentanyl patch and his pain has been better controlled on that. The pain is located in the low back and right hemibody,  does not radiate, and is described as aching. This pain occurs all day. The symptoms have been better since onset. Symptoms are exacerbated by movement. Factors which relieve the pain include rest. Other associated symptoms include paresthesias and stiffness. Otherwise, the pain assessment has not changed since the last visit. Past Medical History:   Diagnosis Date    A-fib [facility])     Arthritis     CVA (cerebral vascular accident) ([facility] 11/15/2015    left MCA    Hyperlipemia     Prostate cancer ([facility]     mets to spine, currently on chemo    Prostate cancer metastatic to bone ([facility] 2022    S/p palliative XRT to L1 spine completed 2022, dose 3000 cGy/ 10  fractions.        Past Surgical History:   Procedure Laterality Date    CATARACT REMOVAL Right     CATHETER REMOVAL N/A 2020    CATHETER REMOVAL will do on patient bed performed by Michelle Wood MD at [address] [place] [address] N/A 3/9/2020    CYSTOSCOPY, BILATERAL RETROGRADE PYELOGRAMS, URETHRAL DILATATION, REYEZ CATHETER INSERTION performed by Isatu Hollingsworth MD at [address] [facility]  11/20/15    [facility]       Social History     Tobacco Use    Smoking status: Former     Types: Cigarettes     Quit date: 2013     Years since quittin.3    Smokeless tobacco: Never   Vaping Use    Vaping Use: Never used   Substance Use Topics    Alcohol use: No     Alcohol/week: 0.0 standard drinks    Drug use: No       Family History   Problem Relation Age of Onset    Cancer Mother     Heart Disease Father        Current Outpatient Medications   Medication Sig Dispense Refill    fentaNYL (DURAGESIC) 12 MCG/HR       apixaban (ELIQUIS) 5 MG TABS tablet Take 1 tablet by mouth 2 times daily 180 tablet 1    Radium Ra 223 Dichloride (XOFIGO IV) Infuse intravenously      metoprolol succinate (TOPROL XL) 100 MG extended release tablet Take 1 tablet by mouth Daily with supper 90 tablet 3    HYDROcodone-acetaminophen (NORCO) 5-325 MG per tablet       mirtazapine (REMERON) 15 MG tablet       IPRATROPIUM BROMIDE IN Inhale into the lungs 2 times daily      loperamide (IMODIUM) 2 MG capsule TAKE ONE CAPSULE BY MOUTH FOUR TIMES A DAY AS NEEDED LOOSE STOOL/DIARHEA      atorvastatin (LIPITOR) 40 MG tablet 80 mg      ferrous sulfate (IRON 325) 325 (65 Fe) MG tablet TAKE 1 TABLET BY MOUTH TWICE A DAY      Calcium Carb-Cholecalciferol 600-800 MG-UNIT TABS Take 1 tablet by mouth daily 40 mcg(1600IU)      vitamin D (CHOLECALCIFEROL) 25 MCG (1000 UT) TABS tablet Take 5,000 Units by mouth Daily with lunch       Ascorbic Acid (VITAMIN C PLUS WILD CAL HIPS PO) Take 1,000 mg by mouth Daily with lunch      predniSONE (DELTASONE) 5 MG tablet Take 5 mg by mouth every morning       acetaminophen (TYLENOL) 325 MG tablet Take 325 mg by mouth every 6 hours as needed for Pain or Fever       Multiple Vitamins-Minerals (THERAPEUTIC MULTIVITAMIN-MINERALS) tablet Take 1 tablet by mouth every morning       warfarin (COUMADIN) 5 MG tablet Take 7 mg by mouth 5 mg on Wed (Patient not taking: Reported on 12/12/2022)      ERLEADA 60 MG TABS Take 60 mg by mouth daily (Patient not taking: Reported on 12/12/2022)       No current facility-administered medications for this visit.        No Known Allergies    Review of Systems:  No new weakness, paresthesia, incontinence of bowel or bladder, saddle anesthesia, falls or gait dysfunction. Otherwise, per HPI. Physical Exam:   Respiratory rate is 20. GENERAL: The patient is in no apparent distress. Body habitus is non- obese. HEENT: No rhinorrhea, sneezing, yawning, or lacrimation. No scleral icterus or conjunctival injection. Ecchymosis left maxillary region. Laceration left supra-orbital.   MSK: Spinal curvatures were normal in standing. Pelvis was level in standing. Active range of motion was 90* abduction on right, -30* elbow extension on right, otherwise full. Resting UE posture is Shoulder Adduction, elbow flexion, wrist and finger flexion. Resting LE posture is knee extension and ankle plantar flexion. There was no obvious joint effusion, deformity. Neurologic: Awake, alert and oriented in three planes. Speech is fluent. No facial weakness. Tongue is midline. Hearing is intact for conversation. Pupils are equal and round. Extraocular muscles appear intact during visual tracking. Shoulder shrug symmetric. Sensation: Intact for light touch (patient elicited) in all upper and lower extremity dermatomes. Strength: Functional upper extremity strength testing was observed to be at least antigravity and lower extremity strength testing including heel and toe walking as well as duck walking were intact, unable to manual muscle test given environment. There was no observable atrophy or side to size difference in muscle bulk. Muscle Tendon Reflexes: unable to test given environment. Gait is not tested. There is no tremor. Due to this being a TeleHealth encounter, evaluation of the following organ systems is limited: Vitals/Constitutional/EENT/Resp/CV/GI//MS/Neuro/Skin/Heme-Lymph-Imm. Impression:   1. Spasticity    2. Chronic ischemic left MCA stroke    3.  Impaired mobility and ADLs        Plan:    Medications Discontinued During This Encounter   Medication Reason    morphine (MS CONTIN) 15 MG extended release tablet LIST CLEANUP The patient has tried and failed:  therapy, splinting, anti-spasticity medications including: Baclofen. PA for Botox spasticity using EMG guidance to maximize toxin effect, minimize risk to nearby neurovascular structures and minimize distant spread of toxin. Informed Consent:  The indications, risks, benefits, and  alternatives of onabotulinum toxin A were discussed with the patient. I explained to the patient the potential side effects including but not limited to: distant spread of toxin effect causing droopy eyelids, facial drooping, neck pain, headache, double vision, muscle pain/spasm/weakness/stiffness,  bronchitis,  injection site pain, increased blood pressure, hypersensitivity, anaphylaxis, difficulty swallowing, difficulty speaking, urinary incontinence and breathing difficulty. The patient is aware that swallowing and breathing difficulties can be life threatening and there have been reports of death in some cases where onabotulinum toxin was injected. The patient was advised of the expected benefit to include less headache days per month, and the anticipated duration of effect of 3 months. The following muscles will be injected:   Right Upper extremity  Pectoralis major 100 units  Latissimus dorsi 75 units  Triceps brachii 25 units  Biceps brachii 50 units  Pronator teres 30 units  FCR 70 units  FDS 60 units  FDP 60 units  FCU 30 units  Intrinsic hand 80 units at 4 sites    Right lower extremity  Rectus femoris 30 units  Vastus medialis 30 units  Vastus lateralis 30 units  Vastus intermedius 25 units    Total units 700  Total waste 0    The patient was educated about the diagnosis, prognosis, indications, risks and benefits of treatment. An opportunity to ask questions was given to the patient and questions were answered. The patient agreed to proceed with the recommended treatment as described above. Return in about 6 weeks (around 2/3/2023) for Botox injection.        Thank you for allowing me to participate in the care of your patient. Preethi Morelos D.O., P.T.   Board Certified Physical Medicine and Rehabilitation  Board Certified Electrodiagnostic Medicine

## 2022-12-28 ENCOUNTER — HOSPITAL ENCOUNTER (OUTPATIENT)
Dept: NUCLEAR MEDICINE | Age: 77
Discharge: HOME | End: 2022-12-30
Payer: COMMERCIAL

## 2022-12-28 DIAGNOSIS — C61 PROSTATE CA (HCC): ICD-10-CM

## 2022-12-28 PROCEDURE — 79101 NUCLEAR RX IV ADMIN: CPT

## 2022-12-28 PROCEDURE — 3440000000 HC RX THERAPEUTIC RADIOPHARMACEUTICAL: Performed by: RADIOLOGY

## 2022-12-28 PROCEDURE — A9606 RADIUM RA223 DICHLORIDE THER: HCPCS | Performed by: RADIOLOGY

## 2022-12-28 PROCEDURE — 79101 NUCLEAR RX IV ADMIN: CPT | Performed by: RADIOLOGY

## 2022-12-28 RX ADMIN — RADIUM RA 223 DICHLORIDE 109.3 MICRO CURIE: 30 INJECTION INTRAVENOUS at 11:55

## 2023-01-01 DIAGNOSIS — C79.51 PROSTATE CANCER METASTATIC TO BONE (HCC): Primary | ICD-10-CM

## 2023-01-01 DIAGNOSIS — C61 PROSTATE CANCER METASTATIC TO BONE (HCC): Primary | ICD-10-CM

## 2023-01-24 DIAGNOSIS — C79.51 BONE METASTASES (HCC): Primary | ICD-10-CM

## 2023-01-24 DIAGNOSIS — C61 PROSTATE CANCER (HCC): Primary | ICD-10-CM

## 2023-01-24 DIAGNOSIS — C79.51 BONE METASTASES (HCC): ICD-10-CM

## 2023-01-27 ENCOUNTER — HOSPITAL ENCOUNTER (OUTPATIENT)
Dept: NUCLEAR MEDICINE | Age: 78
Discharge: HOME | End: 2023-01-29
Payer: COMMERCIAL

## 2023-01-27 DIAGNOSIS — C79.51 BONE METASTASES (HCC): ICD-10-CM

## 2023-01-27 DIAGNOSIS — C61 PROSTATE CANCER (HCC): ICD-10-CM

## 2023-01-27 PROCEDURE — 3440000000 HC RX THERAPEUTIC RADIOPHARMACEUTICAL: Performed by: RADIOLOGY

## 2023-01-27 PROCEDURE — 79101 NUCLEAR RX IV ADMIN: CPT | Performed by: RADIOLOGY

## 2023-01-27 PROCEDURE — 79101 NUCLEAR RX IV ADMIN: CPT

## 2023-01-27 PROCEDURE — A9606 RADIUM RA223 DICHLORIDE THER: HCPCS | Performed by: RADIOLOGY

## 2023-01-27 RX ADMIN — RADIUM RA 223 DICHLORIDE 108.2 MICRO CURIE: 30 INJECTION INTRAVENOUS at 11:52

## 2023-01-30 ENCOUNTER — TELEPHONE (OUTPATIENT)
Dept: PALLATIVE CARE | Age: 78
End: 2023-01-30

## 2023-02-14 ENCOUNTER — OFFICE VISIT (OUTPATIENT)
Dept: PHYSICAL MEDICINE AND REHAB | Age: 78
End: 2023-02-14

## 2023-02-14 VITALS — HEIGHT: 71 IN | WEIGHT: 150 LBS | BODY MASS INDEX: 21 KG/M2 | TEMPERATURE: 97 F

## 2023-02-14 DIAGNOSIS — R25.2 SPASTICITY: ICD-10-CM

## 2023-02-14 DIAGNOSIS — I69.30 CHRONIC ISCHEMIC LEFT MCA STROKE: Primary | ICD-10-CM

## 2023-02-14 NOTE — PROGRESS NOTES
Truong Hutchison D.O. [facility]  [address] [facility]  Phone: [phone]  Fax: [phone]  2/15/2023    Chief Complaint   Patient presents with    Arm Pain     EMG guided Botox 700 units       Informed Consent:  The indications, risks, benefits, and  alternatives of onabotulinum toxin A were discussed with the patient. I explained to the patient the potential side effects including but not limited to: distant spread of toxin effect causing droopy eyelids, facial drooping, neck pain, headache, double vision, muscle pain/spasm/weakness/stiffness,  bronchitis,  injection site pain, increased blood pressure, hypersensitivity, anaphylaxis, difficulty swallowing, difficulty speaking, urinary incontinence and breathing difficulty. The patient is aware that swallowing and breathing difficulties can be life threatening and there have been reports of death in some cases where onabotulinum toxin was injected. The patient was advised of the expected benefit to include decreased spasticity in the injected muscles, and the anticipated duration of effect of 3 months. A permit was signed and scanned into the media. Last injection: 11/10/22  Taking anticoagulants/antiplatelets: Yes  Diabetic: No  Febrile/active infection: No    Ambulatory Procedure Time Out  Correct Patient: Yes  Correct Procedure: Yes  Correct Site/Side: Yes  Correct Site(s) Marked: Yes  Informed Consent Signed: Yes  Allergies Verified: Yes  Staff Present & Credential[de-identified] Ritesh Loyd, [address] [place] [address],     Procedure note: After obtaining consent,  EMG guided onabotulinum toxin A injection was performed of the right trunk, upper and lower extremities at the locations and doses listed below. 700 units of onabotulinum toxin A was reconstituted using 4 cc of preservative free normal saline ( 5 units per 0.1 ml).  EMG guidance was necessary to adequately localize muscle due to nearby neurovascular to maximize the response to the toxin. At each injection site, the skin was prepared with alcohol and using a no touch technique the Myoject needle was directed to the desired muscle. Negative aspiration was performed at each site prior to injection. Right trunk:   Pectoralis major 100 units  Latissimus dorsi 75 units    Right Upper extremity:  Triceps brachii 25 units  Biceps brachii 50 units  Pronator teres 30 units  FCR 70 units  FDS 60 units  FDP 60 units  FCU 30 units  Intrinsic hand 80 units at 4 sites    Right lower extremity  Rectus femoris 30 units  Vastus medialis 30 units  Vastus lateralis 30 units  Vastus intermedius 25 units  Total units 700  Total waste 0    Adequate hemostasis was obtained and bandages were applied to the injection sites. The patient was monitored clinically in the office after the injection and left the office without incident. The patient was educated in post-procedure care including ice 20 minutes on/20 minutes off prn pain/bruising, call if any fevers chills, night sweats, drainage, increased pain, weakness, difficulty breathing or swallowing. The patient was advised to anticipate the Botox to start working in about 2 weeks. follow up 6 weeks      Miguel Wolfe D.O., P.T.   Board Certified Physical Medicine and Rehabilitation  Board Certified Electrodiagnostic Medicine    Administrations This Visit       onabotulinumtoxin A (BOTOX) injection 100 Units       Admin Date  02/15/2023  07:57 Action  Given Dose  100 Units Route  IntraMUSCular Site  Other Administered By  Chuy Bolden RN    Ordering Provider: Caryn Garcia DO    NDC: [id number]    Lot#: [id number]    : Vanessa Hoang    Patient Supplied?: No              Onabotulinumtoxin A (BOTOX) injection 600 Units       Admin Date  02/15/2023  07:56 Action  Given Dose  600 Units Route  IntraMUSCular Site  Other Administered By  Chuy Bolden RN    Ordering Provider: Caryn Garcia DO [address]: [id number]    Lot#: [id number]    : [facility]    Patient Supplied?: No

## 2023-02-16 ENCOUNTER — OFFICE VISIT (OUTPATIENT)
Dept: PALLATIVE CARE | Age: 78
End: 2023-02-16
Payer: COMMERCIAL

## 2023-02-16 VITALS
DIASTOLIC BLOOD PRESSURE: 64 MMHG | OXYGEN SATURATION: 97 % | RESPIRATION RATE: 12 BRPM | HEART RATE: 78 BPM | SYSTOLIC BLOOD PRESSURE: 122 MMHG

## 2023-02-16 DIAGNOSIS — C79.51 BONE METASTASES (HCC): ICD-10-CM

## 2023-02-16 DIAGNOSIS — C61 PROSTATE CANCER (HCC): ICD-10-CM

## 2023-02-16 DIAGNOSIS — Z51.5 PALLIATIVE CARE BY SPECIALIST: ICD-10-CM

## 2023-02-16 DIAGNOSIS — G89.3 PAIN DUE TO NEOPLASM: Primary | ICD-10-CM

## 2023-02-16 PROCEDURE — 99344 HOME/RES VST NEW MOD MDM 60: CPT | Performed by: NURSE PRACTITIONER

## 2023-02-16 PROCEDURE — 1123F ACP DISCUSS/DSCN MKR DOCD: CPT | Performed by: NURSE PRACTITIONER

## 2023-02-16 NOTE — PROGRESS NOTES
Palliative Care Department  Provider: OLIVA Oglesby - CNP    Location of Service:   The patient's home    Chief Complaint: Mark Miller is a 78 y.o. male with chief complaint of pain    Assessment/Plan      Metastatic Prostate Cancer:   -  Diagnosed in 2013   -  S/p numerous lines of treatment   -  Following with Dr. Eliezer Mao   -  Disease progression in 2022   -  Has completed palliative radiation therapy   -  Now on Xofigo, Lupron, Xgeva    Pain related to Neoplasm:   -  Fentanyl patch 12 mcg Q 72 hours   -  Norco 5-325 mg Q 6 PRN    Bowel Regimen:   -  OTC stool softener daily    Follow Up:  6 weeks.  They were encouraged to call with any questions, concerns, needs, or changes in symptoms.    Subjective:     HPI:  Mark Miller is a 78 y.o. male with significant medical history of prostate cancer, diagnosed in 2013, s/p numerous lines of therapy, treatment has been delayed on several occasion due to CVA, and hospitalization for sepsis.  Disease progression was noted in 2022, and he is now following with Dr. Eliezer Mao.  He has completed palliative radiation therapy.  He was referred to [facility] Medicine symptoms management.    Subjective/Events/Discussions:  Mr. Miller was seen in his home today with his wife. He is alert, oriented, and able to communicate, but he does have significant aphasia.  He is pleasant, smiling, and in no acute distress.  He has been following with another palliative service, but they were not in network for his insurance, and thus is establishing with our service.  The only complain is that of pain related to his bone mets, as well as left arm and leg pain related to the muscle atrophy, which is secondary to his remote CVA.  He does go to PM&R and has been given Botox injections for these pains.  He is currently on fentanyl 12 mcg patch and uses norco 4 times daily, and his pain is very well controlled.  He has had some constipation, but this is controlled with daily OTC stool  softener. They have no other complains at this time. Past Medical History:   Diagnosis Date    A-fib [facility])     Arthritis     CVA (cerebral vascular accident) ([facility] 11/15/2015    left MCA    Hyperlipemia     Prostate cancer ([facility]     mets to spine, currently on chemo    Prostate cancer metastatic to bone ([facility] 06/2022    S/p palliative XRT to L1 spine completed 08/16/2022, dose 3000 cGy/ 10  fractions. Past Surgical History:   Procedure Laterality Date    CATARACT REMOVAL Right     CATHETER REMOVAL N/A 4/19/2020    CATHETER REMOVAL will do on patient bed performed by Kurtis Neff MD at [address] [place] [address] N/A 3/9/2020    CYSTOSCOPY, BILATERAL RETROGRADE PYELOGRAMS, URETHRAL DILATATION, REYEZ CATHETER INSERTION performed by Adeola Murphy MD at [address] [place] [address]  11/20/15    sonal       Family History   Problem Relation Age of Onset    Cancer Mother     Heart Disease Father        ROS: UNLESS STATED ABOVE PATIENT DENIES:  CONSTITUTIONAL:  fever, chill, rigors, nausea, vomiting, fatigue. HEENT: blurry vision, double vision, hearing problem, tinnitus, hoarseness, dysphagia, odynophagia  RESPIRATORY: cough, shortness of breath, sputum expectoration. CARDIOVASCULAR:  Chest pain/pressure, palpitation, syncope, irregular beats  GASTROINTESTINAL:  abdominal or rectal pain, diarrhea, constipation, . GENITOURINARY:  Burning, frequency, urgency, incontinence, discharge  INTEGUMENTARY: rash, wound, pruritis  HEMATOLOGIC/LYMPHATIC:  Swelling, sores, gum bleeding, easy bruising, pica.   MUSCULOSKELETAL:  pain, edema, joint swelling or redness  NEUROLOGICAL:  light headed, dizziness, loss of consciousness, weakness, change in memory, seizures, tremors    Objective:     Physical Exam  /64   Pulse 78   Resp 12   SpO2 97%     Gen:  Alert, appears stated age, well nourished, in no acute distress  HEENT:  Normocephalic, conjunctiva pink, no drainage, mucosa moist  Neck:  Supple  Lungs:  CTA bilaterally, no audible rhonchi or wheezes noted  Heart[de-identified]  RRR, no murmur, rub, or gallop noted during exam  Abd:  Soft, non tender, non distended, BS+  M/S/Ext:  right arm very weak to flaccid, left leg weakness present, no edema, pulses present  Skin:  Warm and dry  Neuro:  PERRL, Alert, oriented x 3; following commands, right hemiparesis present, expressive aphasia present    Current Medications:  Medications reviewed: yes    Controlled Substances Monitoring: [place]AR[place] reviewed 2/16/23. RX Monitoring 2/16/2023   Attestation -   Periodic Controlled Substance Monitoring No signs of potential drug abuse or diversion identified.;Obtaining appropriate analgesic effect of treatment. ;Assessed functional status. OLIVA Walter - AFSHIN  Palliative Medicine    Time/Communication  Greater than 50% of time spent, total 45 minutes in face-to-face counseling and coordination of care regarding goals of care, symptom management, diagnosis and prognosis, and see above. Discussed the plan of care with the other IDT members of the Palliative Care Team, and with consultants, Primary Attending, patient, family, and facility staff. Thank you for allowing Palliative Medicine to participate in the care of Pepper Marrero. Note: This report was completed using computerTrover voiced recognition software. Every effort has been made to ensure accuracy; however, inadvertent computerized transcription errors may be present.

## 2023-02-22 DIAGNOSIS — C79.51 BONE METASTASES: Primary | ICD-10-CM

## 2023-02-22 DIAGNOSIS — C61 PROSTATE CANCER (HCC): ICD-10-CM

## 2023-02-24 ENCOUNTER — TELEPHONE (OUTPATIENT)
Dept: PALLATIVE CARE | Age: 78
End: 2023-02-24

## 2023-02-24 DIAGNOSIS — Z51.5 PALLIATIVE CARE BY SPECIALIST: Primary | ICD-10-CM

## 2023-02-24 DIAGNOSIS — C61 PROSTATE CANCER (HCC): ICD-10-CM

## 2023-02-24 DIAGNOSIS — C79.51 BONE METASTASES (HCC): ICD-10-CM

## 2023-02-24 RX ORDER — FENTANYL 12 UG/H
1 PATCH TRANSDERMAL
Qty: 10 PATCH | Refills: 0 | Status: SHIPPED | OUTPATIENT
Start: 2023-02-24 | End: 2023-03-26

## 2023-02-24 RX ORDER — HYDROCODONE BITARTRATE AND ACETAMINOPHEN 5; 325 MG/1; MG/1
1 TABLET ORAL EVERY 6 HOURS PRN
Qty: 120 TABLET | Refills: 0 | Status: SHIPPED | OUTPATIENT
Start: 2023-02-24 | End: 2023-03-26

## 2023-02-24 NOTE — TELEPHONE ENCOUNTER
Call from [facility] requesting refill for Mark's pain medications. Fentanyl patch 12 mcg and Norco 5/325 mg every 6 hours prn. Pharmacy is [facility] in [place]. Next vanessa to be scheduled with [facility].

## 2023-03-03 ENCOUNTER — HOSPITAL ENCOUNTER (OUTPATIENT)
Age: 78
Discharge: HOME | End: 2023-03-03
Payer: COMMERCIAL

## 2023-03-03 ENCOUNTER — HOSPITAL ENCOUNTER (OUTPATIENT)
Dept: NUCLEAR MEDICINE | Age: 78
Discharge: HOME | End: 2023-03-03
Payer: COMMERCIAL

## 2023-03-03 DIAGNOSIS — C79.51 BONE METASTASES (HCC): ICD-10-CM

## 2023-03-03 DIAGNOSIS — C61 PROSTATE CANCER (HCC): ICD-10-CM

## 2023-03-03 LAB
ALBUMIN SERPL-MCNC: 3.7 G/DL (ref 3.5–5.2)
ALP BLD-CCNC: 153 U/L (ref 40–129)
ALT SERPL-CCNC: 17 U/L (ref 0–40)
ANION GAP SERPL CALCULATED.3IONS-SCNC: 9 MMOL/L (ref 7–16)
AST SERPL-CCNC: 18 U/L (ref 0–39)
BASOPHILS ABSOLUTE: 0.04 E9/L (ref 0–0.2)
BASOPHILS RELATIVE PERCENT: 0.5 % (ref 0–2)
BILIRUB SERPL-MCNC: <0.2 MG/DL (ref 0–1.2)
BUN BLDV-MCNC: 26 MG/DL (ref 6–23)
CALCIUM SERPL-MCNC: 9.1 MG/DL (ref 8.6–10.2)
CHLORIDE BLD-SCNC: 101 MMOL/L (ref 98–107)
CO2: 31 MMOL/L (ref 22–29)
CREAT SERPL-MCNC: 0.9 MG/DL (ref 0.7–1.2)
EOSINOPHILS ABSOLUTE: 0.16 E9/L (ref 0.05–0.5)
EOSINOPHILS RELATIVE PERCENT: 2.2 % (ref 0–6)
GFR SERPL CREATININE-BSD FRML MDRD: >60 ML/MIN/1.73
GLUCOSE BLD-MCNC: 66 MG/DL (ref 74–99)
HCT VFR BLD CALC: 34.9 % (ref 37–54)
HEMOGLOBIN: 10.5 G/DL (ref 12.5–16.5)
IMMATURE GRANULOCYTES #: 0.04 E9/L
IMMATURE GRANULOCYTES %: 0.5 % (ref 0–5)
LYMPHOCYTES ABSOLUTE: 0.26 E9/L (ref 1.5–4)
LYMPHOCYTES RELATIVE PERCENT: 3.6 % (ref 20–42)
MCH RBC QN AUTO: 30.8 PG (ref 26–35)
MCHC RBC AUTO-ENTMCNC: 30.1 % (ref 32–34.5)
MCV RBC AUTO: 102.3 FL (ref 80–99.9)
MONOCYTES ABSOLUTE: 0.42 E9/L (ref 0.1–0.95)
MONOCYTES RELATIVE PERCENT: 5.7 % (ref 2–12)
NEUTROPHILS ABSOLUTE: 6.39 E9/L (ref 1.8–7.3)
NEUTROPHILS RELATIVE PERCENT: 87.5 % (ref 43–80)
PDW BLD-RTO: 14.2 FL (ref 11.5–15)
PLATELET # BLD: 269 E9/L (ref 130–450)
PMV BLD AUTO: 8.5 FL (ref 7–12)
POLYCHROMASIA: ABNORMAL
POTASSIUM SERPL-SCNC: 3.7 MMOL/L (ref 3.5–5)
PROSTATE SPECIFIC ANTIGEN: 18.51 NG/ML (ref 0–4)
RBC # BLD: 3.41 E12/L (ref 3.8–5.8)
SODIUM BLD-SCNC: 141 MMOL/L (ref 132–146)
TOTAL PROTEIN: 6.8 G/DL (ref 6.4–8.3)
WBC # BLD: 7.3 E9/L (ref 4.5–11.5)

## 2023-03-03 PROCEDURE — A9606 RADIUM RA223 DICHLORIDE THER: HCPCS | Performed by: RADIOLOGY

## 2023-03-03 PROCEDURE — 79101 NUCLEAR RX IV ADMIN: CPT

## 2023-03-03 PROCEDURE — 80053 COMPREHEN METABOLIC PANEL: CPT

## 2023-03-03 PROCEDURE — 85025 COMPLETE CBC W/AUTO DIFF WBC: CPT

## 2023-03-03 PROCEDURE — 36415 COLL VENOUS BLD VENIPUNCTURE: CPT

## 2023-03-03 PROCEDURE — 84153 ASSAY OF PSA TOTAL: CPT

## 2023-03-03 PROCEDURE — 3440000000 HC RX THERAPEUTIC RADIOPHARMACEUTICAL: Performed by: RADIOLOGY

## 2023-03-03 RX ADMIN — RADIUM RA 223 DICHLORIDE 111.5 MICRO CURIE: 30 INJECTION INTRAVENOUS at 10:33

## 2023-03-09 ENCOUNTER — OFFICE VISIT (OUTPATIENT)
Dept: ONCOLOGY | Age: 78
End: 2023-03-09

## 2023-03-09 ENCOUNTER — HOSPITAL ENCOUNTER (OUTPATIENT)
Dept: INFUSION THERAPY | Age: 78
Discharge: HOME | End: 2023-03-09
Payer: COMMERCIAL

## 2023-03-09 VITALS
DIASTOLIC BLOOD PRESSURE: 61 MMHG | WEIGHT: 163.5 LBS | HEIGHT: 71 IN | SYSTOLIC BLOOD PRESSURE: 112 MMHG | TEMPERATURE: 97.9 F | OXYGEN SATURATION: 99 % | HEART RATE: 80 BPM | BODY MASS INDEX: 22.89 KG/M2

## 2023-03-09 DIAGNOSIS — C61 PROSTATE CANCER (HCC): Primary | ICD-10-CM

## 2023-03-09 DIAGNOSIS — C79.51 BONE METASTASES (HCC): Primary | ICD-10-CM

## 2023-03-09 DIAGNOSIS — C61 PROSTATE CANCER (HCC): ICD-10-CM

## 2023-03-09 PROCEDURE — 6360000002 HC RX W HCPCS: Performed by: FAMILY MEDICINE

## 2023-03-09 PROCEDURE — 96402 CHEMO HORMON ANTINEOPL SQ/IM: CPT

## 2023-03-09 PROCEDURE — 96372 THER/PROPH/DIAG INJ SC/IM: CPT

## 2023-03-09 RX ORDER — DIPHENHYDRAMINE HYDROCHLORIDE 50 MG/ML
50 INJECTION INTRAMUSCULAR; INTRAVENOUS
OUTPATIENT
Start: 2023-06-01

## 2023-03-09 RX ORDER — SODIUM CHLORIDE 9 MG/ML
INJECTION, SOLUTION INTRAVENOUS CONTINUOUS
OUTPATIENT
Start: 2023-06-01

## 2023-03-09 RX ORDER — FAMOTIDINE 10 MG/ML
20 INJECTION, SOLUTION INTRAVENOUS
OUTPATIENT
Start: 2023-06-01

## 2023-03-09 RX ORDER — EPINEPHRINE 1 MG/ML
0.3 INJECTION, SOLUTION, CONCENTRATE INTRAVENOUS PRN
OUTPATIENT
Start: 2023-06-01

## 2023-03-09 RX ORDER — ACETAMINOPHEN 325 MG/1
650 TABLET ORAL
OUTPATIENT
Start: 2023-06-01

## 2023-03-09 RX ORDER — ALBUTEROL SULFATE 90 UG/1
4 AEROSOL, METERED RESPIRATORY (INHALATION) PRN
OUTPATIENT
Start: 2023-06-01

## 2023-03-09 RX ORDER — ONDANSETRON 2 MG/ML
8 INJECTION INTRAMUSCULAR; INTRAVENOUS
OUTPATIENT
Start: 2023-06-01

## 2023-03-09 RX ADMIN — DENOSUMAB 120 MG: 120 INJECTION SUBCUTANEOUS at 16:13

## 2023-03-09 RX ADMIN — LEUPROLIDE ACETATE 22.5 MG: KIT at 16:17

## 2023-03-09 NOTE — PROGRESS NOTES
[id number] [facility]  [place] [address]  Dept: [phone]  Loc: [phone]  Attending progress note      Reason for Visit:   Metastatic prostate cancer. Referring Physician:  Giorgi Mooney MD    PCP:  Jonathan Carreon MD    History of Present Illness:    Mr. Kojo Samayoa is a 70-year-old gentleman with a past history significant for metastatic prostate cancer:    TREATMENT HISTORY: 125 seed implantation 1/17/2014  Bicalutamide 50 mg daily. ADT, and 5 cycles docetaxel 8/2/2015-11/11/2015  Restart of bicalutamide and leuprolide 7/2016    Leuprolide 22.5 mg IM every 12 weeks  Denosumab 120 mg subcutaneously every 12 weeks (started 9/2016)  Abiraterone and prednisone 2/15/2020 - 3/2021  Apalutamide 240 mg daily 3/29/2021 - current      History of Present Illness: Neva Hu is a 78year old male with prostate cancer metastatic to bone. He is s/p CVA resulting in expressive aphasia and right hemiparesis. Brandy Mesa is in wheelchair and accompanied by his daughter, Maria Del Rosario Bishop. Wife present on phone. He presents for ongoing evaluation and management of prostate cancer. HEMATOLOGY/ONCOLOGY HISTORY:  Screening PSA of 4.18 ng/mL in 2013 (not seen in [address] [place] [address] Rd). 12/12/2013 - transrectal US-guided biopsy of prostate demonstrating [place] 4+3 adenocarcinoma. 1/17/2014 - s/p I-125 seed implantation  PSA continued to rise as follows reaching 68.96 ng/ml on 8/5/2015. Additional evaluation with bone scan revealed diffuse progressive metastatic disease involving spine (cervical/lumbar spine, b/l scapulae, other bones. Started ADT & Casodex, had docetaxel x 5 cycles from 8/2/2015 - 11/11/2015    S/P massive ischemic CVA in 11/2015 3 days after cycle 5 chemotherapy. He is S/P rehab post CVA. He has expressive aphasia and right hemiparesis. He is dependent on family for ADL and care.  Received Botox injections to help with right arm - loosen contractures. -------------------------------------------------------------------------------------------------  Restarted on lupron/casodex in 7/2016. Started denosumab 120 mg subcutaneously every 4 weeks on 9/26/2016. Changed to every 12 weeks beginning September 2017. Lupron continued every 12 weeks   He remained on bicalutamide 50 mg daily. PSA was rising and regimen change recommended. Bone scan showed new bone mets 2/2020.  ---------------------------------------------------------------------------------  Lupron and denosumab on 1/28/2020. Abiraterone/prednisone started 2/15/2020. Went to ER on 3/5/2020 with difficulty breathing, increased weakness. Had pneumonia per wife. He was on ventilator and had to start dialysis. From hospital, he went to SNF. Home 6/5/2020. Off dialysis. Prostate treatment interruption between 3/5/2020 and 6/10/2020. PSA increased up to 9.47 in early June 2020 while off treatment. PSA 5.1 on 9/3/2020. PSA 0.16 on 11/30/2020    Mark admitted to some increased intermittent lower back pain. 2/5/2021 lab showed significant increase in PSA to 8. Repeat done 2/24/2021 and was 10. No other new specific symptoms. Restaging scans revealed increased bone metastases in multiple areas. Started apalutamide on 3/29/2021. Continues on prednisone 5 mg daily.   --------------------------------------------------------------------------------------------  Interval history:    Continues on apalutamide. Changed anticoagulation to warfarin which has less interaction with apalutamide compared to prior anticoagulation medication. Monitored at [place]    The patient was under the care of Dr. Josafat Peraza at [facility], restaging scans on 6/16/2022, had revealed disease progression in the bones, the patient was recommended Xofigo. The patient received radiation therapy to L1. He had his first Xofigo treatment on 9/30/2022, second treatment was on 10/28/2022, third treatment was on 11/30/2022. Fourth treatment was on 12/28/2022, fifth treatment was on 1/30/2023, last treatment was on 3/3/2023. Clinically the patient is doing better, pain is well controlled, he continues to follow with palliative medicine. He has right hemiparesis, he is accompanied by his spouse and daughter. Review of Systems;  CONSTITUTIONAL: No fever, chills. Improved appetite. ENMT: Eyes: No diplopia; Nose: No epistaxis. Mouth: No sore throat. RESPIRATORY: No hemoptysis, shortness of breath, cough. CARDIOVASCULAR: No chest pain, palpitations. GASTROINTESTINAL: No nausea/vomiting, abdominal pain, diarrhea/constipation. GENITOURINARY: No dysuria, urinary frequency, hematuria. NEURO: Positive for expressive aphasia and hemiparesis. Remainder:  ROS NEGATIVE    Past Medical History:      Diagnosis Date    A-fib [facility])     Arthritis     CVA (cerebral vascular accident) ([address] 11/15/2015    left MCA    Hyperlipemia     Prostate cancer ([address]     mets to spine, currently on chemo    Prostate cancer metastatic to bone ([address] 06/2022    S/p palliative XRT to L1 spine completed 08/16/2022, dose 3000 cGy/ 10  fractions.      Patient Active Problem List   Diagnosis    Cerebrovascular accident (CVA) due to occlusion of left carotid artery (HCC)    Dysphagia due to recent stroke    Paroxysmal atrial fibrillation (HCC)    Prostate cancer ([address]    Cerebral contusion    Subarachnoid hemorrhage ([address]    HCAP (healthcare-associated pneumonia)    Respiratory arrest ([address]    Acute respiratory failure with hypoxia ([address]    Encephalopathy acute    Palliative care encounter    Goals of care, counseling/discussion    DNR (do not resuscitate) discussion    Bone metastases ([address]    End stage renal disease ([address]    ARMINDA (obstructive sleep apnea)        Past Surgical History:      Procedure Laterality Date    CATARACT REMOVAL Right     CATHETER REMOVAL N/A 4/19/2020    CATHETER REMOVAL will do on patient bed performed by Yesi Mcgrath MD at [address] COLONOSCOPY      CYSTOSCOPY N/A 3/9/2020    CYSTOSCOPY, BILATERAL RETROGRADE PYELOGRAMS, URETHRAL DILATATION, REYEZ CATHETER INSERTION performed by Christina Azar MD at [address] [place] [address]  11/20/15    sonal       Family History:  Family History   Problem Relation Age of Onset    Cancer Mother     Heart Disease Father        Medications:  Reviewed and reconciled. Social History:  Social History     Socioeconomic History    Marital status:      Spouse name: Not on file    Number of children: 3    Years of education: Not on file    Highest education level: Not on file   Occupational History    Not on file   Tobacco Use    Smoking status: Former     Types: Cigarettes     Quit date: 2013     Years since quittin.5    Smokeless tobacco: Never   Vaping Use    Vaping Use: Never used   Substance and Sexual Activity    Alcohol use: No     Alcohol/week: 0.0 standard drinks    Drug use: No    Sexual activity: Not Currently     Partners: Female   Other Topics Concern    Not on file   Social History Narrative    Not on file     Social Determinants of Health     Financial Resource Strain: Not on file   Food Insecurity: Not on file   Transportation Needs: Not on file   Physical Activity: Not on file   Stress: Not on file   Social Connections: Not on file   Intimate Partner Violence: Not on file   Housing Stability: Not on file       Allergies:  No Known Allergies    Physical Exam:  /61   Pulse 80   Temp 97.9 °F (36.6 °C)   Ht 5' 11\" (1.803 m)   Wt 163 lb 8 oz (74.2 kg)   SpO2 99%   BMI 22.80 kg/m² HR repeated and was 55  GENERAL: Alert, oriented x 3, looking better. HEENT: PERRLA; EOMI. Oropharynx clear. NECK: Supple. No palpable cervical or supraclavicular lymphadenopathy. LUNGS: Good air entry bilaterally. No wheezing, crackles or rhonchi. CARDIOVASCULAR: Irregular regular rhythm, normal rate. ABDOMEN: Soft. Non-tender, non-distended.  Positive bowel sounds. EXTREMITIES: Without clubbing, cyanosis, or edema. NEUROLOGIC: Right hemiparesis and expressive aphasia. Poor PS due to stroke      Impression/Plan:    Mr. Nicola Cm is a 68-year-old gentleman with a past history significant for metastatic prostate cancer:    TREATMENT HISTORY: 125 seed implantation 1/17/2014  Bicalutamide 50 mg daily. ADT, and 5 cycles docetaxel 8/2/2015-11/11/2015  Restart of bicalutamide and leuprolide 7/2016    Leuprolide 22.5 mg IM every 12 weeks  Denosumab 120 mg subcutaneously every 12 weeks (started 9/2016)  Abiraterone and prednisone 2/15/2020 - 3/2021  Apalutamide 240 mg daily 3/29/2021 - current      History of Present Illness: Felipe Gonzalez is a 78year old male with prostate cancer metastatic to bone. He is s/p CVA resulting in expressive aphasia and right hemiparesis. Karey Clay is in wheelchair and accompanied by his daughter, Ayden Stein. Wife present on phone. He presents for ongoing evaluation and management of prostate cancer. HEMATOLOGY/ONCOLOGY HISTORY:  Screening PSA of 4.18 ng/mL in 2013 (not seen in [address] [place] [address] Rd). 12/12/2013 - transrectal US-guided biopsy of prostate demonstrating [place] 4+3 adenocarcinoma. 1/17/2014 - s/p I-125 seed implantation  PSA continued to rise as follows reaching 68.96 ng/ml on 8/5/2015. Additional evaluation with bone scan revealed diffuse progressive metastatic disease involving spine (cervical/lumbar spine, b/l scapulae, other bones. Started ADT & Casodex, had docetaxel x 5 cycles from 8/2/2015 - 11/11/2015    S/P massive ischemic CVA in 11/2015 3 days after cycle 5 chemotherapy. He is S/P rehab post CVA. He has expressive aphasia and right hemiparesis. He is dependent on family for ADL and care. Received Botox injections to help with right arm - loosen contractures. -------------------------------------------------------------------------------------------------  Restarted on lupron/casodex in 7/2016.   Started denosumab 120 mg subcutaneously every 4 weeks on 9/26/2016. Changed to every 12 weeks beginning September 2017. Lupron continued every 12 weeks   He remained on bicalutamide 50 mg daily. PSA was rising and regimen change recommended. Bone scan showed new bone mets 2/2020.  ---------------------------------------------------------------------------------  Lupron and denosumab on 1/28/2020. Abiraterone/prednisone started 2/15/2020. Went to ER on 3/5/2020 with difficulty breathing, increased weakness. Had pneumonia per wife. He was on ventilator and had to start dialysis. From hospital, he went to SNF. Home 6/5/2020. Off dialysis. Prostate treatment interruption between 3/5/2020 and 6/10/2020. PSA increased up to 9.47 in early June 2020 while off treatment. PSA 5.1 on 9/3/2020. PSA 0.16 on 11/30/2020    Mark admitted to some increased intermittent lower back pain. 2/5/2021 lab showed significant increase in PSA to 8. Repeat done 2/24/2021 and was 10. No other new specific symptoms. Restaging scans revealed increased bone metastases in multiple areas. Started apalutamide on 3/29/2021. The patient was under the care of Dr. Yessy Rodríguez at [facility], restaging scans on 6/16/2022, had revealed disease progression in the bones, the patient was recommended Xofigo. The patient completed radiation therapy to L1. On 9/15/2022, PSA had increased to 4.27, he received Lupron and Xgeva. Referral was made to [address] [place] [address] treatment, he received the first treatment on 9/30/2022, second treatment was on 10/28/2022, third treatment was on 11/30/2022. Fourth treatment was on 12/28/2022, fifth treatment was on 1/30/2023, last treatment was on 3/3/2023. Labs reviewed, at his last visit, PSA was 6.86, it had increased on 3/3/2020 23-18.51, recommended restaging scans of the chest, abdomen and pelvis to evaluate for disease progression, the patient just had Xofigo treatment, which could lead to take on the bone scan.   We will discuss his case with Dr. Ashley Steen once the scans are available. Labs reviewed, proceed with Maria Esther Ndiaye today 3/9/2023. Continue follow-up with palliative medicine for symptoms management. RTC in 2 weeks. Thank you for allowing us to participate in the care of Mr. Miguel Ángel Sevilla.     Diamante Clarke MD   HEMATOLOGY/MEDICAL [address] [address] [address]  [address] [address]  Dept: [place]: [phone]

## 2023-03-27 ENCOUNTER — OFFICE VISIT (OUTPATIENT)
Dept: ONCOLOGY | Age: 78
End: 2023-03-27
Payer: COMMERCIAL

## 2023-03-27 VITALS
OXYGEN SATURATION: 100 % | DIASTOLIC BLOOD PRESSURE: 70 MMHG | HEIGHT: 71 IN | SYSTOLIC BLOOD PRESSURE: 117 MMHG | WEIGHT: 165 LBS | HEART RATE: 87 BPM | TEMPERATURE: 97.2 F | BODY MASS INDEX: 23.1 KG/M2

## 2023-03-27 DIAGNOSIS — C79.51 BONE METASTASES: ICD-10-CM

## 2023-03-27 DIAGNOSIS — C61 PROSTATE CANCER (HCC): Primary | ICD-10-CM

## 2023-03-27 PROCEDURE — 1123F ACP DISCUSS/DSCN MKR DOCD: CPT | Performed by: INTERNAL MEDICINE

## 2023-03-27 PROCEDURE — 99214 OFFICE O/P EST MOD 30 MIN: CPT | Performed by: INTERNAL MEDICINE

## 2023-03-27 PROCEDURE — 99212 OFFICE O/P EST SF 10 MIN: CPT

## 2023-03-27 NOTE — PROGRESS NOTES
William Zurita MD at [facility] N/A 3/9/2020    CYSTOSCOPY, BILATERAL RETROGRADE PYELOGRAMS, URETHRAL DILATATION, REYEZ CATHETER INSERTION performed by Thor Gibbs MD at [address] [place] [address]  11/20/15    sonal       Family History:  Family History   Problem Relation Age of Onset    Cancer Mother     Heart Disease Father        Medications:  Reviewed and reconciled. Social History:  Social History     Socioeconomic History    Marital status:      Spouse name: Not on file    Number of children: 3    Years of education: Not on file    Highest education level: Not on file   Occupational History    Not on file   Tobacco Use    Smoking status: Former     Types: Cigarettes     Quit date: 2013     Years since quittin.6    Smokeless tobacco: Never   Vaping Use    Vaping Use: Never used   Substance and Sexual Activity    Alcohol use: No     Alcohol/week: 0.0 standard drinks    Drug use: No    Sexual activity: Not Currently     Partners: Female   Other Topics Concern    Not on file   Social History Narrative    Not on file     Social Determinants of Health     Financial Resource Strain: Not on file   Food Insecurity: Not on file   Transportation Needs: Not on file   Physical Activity: Not on file   Stress: Not on file   Social Connections: Not on file   Intimate Partner Violence: Not on file   Housing Stability: Not on file       Allergies:  No Known Allergies    Physical Exam:  /70   Pulse 87   Temp 97.2 °F (36.2 °C)   Ht 5' 11\" (1.803 m)   Wt 165 lb (74.8 kg)   SpO2 100%   BMI 23.01 kg/m²   GENERAL: Alert, oriented x 3, looking better. HEENT: PERRLA; EOMI. Oropharynx clear. NECK: Supple. No palpable cervical or supraclavicular lymphadenopathy. LUNGS: Good air entry bilaterally. No wheezing, crackles or rhonchi. CARDIOVASCULAR: Irregular regular rhythm, normal rate. ABDOMEN: Soft. Non-tender, non-distended.  Positive bowel

## 2023-03-28 ENCOUNTER — OFFICE VISIT (OUTPATIENT)
Dept: PHYSICAL MEDICINE AND REHAB | Age: 78
End: 2023-03-28
Payer: COMMERCIAL

## 2023-03-28 VITALS
SYSTOLIC BLOOD PRESSURE: 96 MMHG | DIASTOLIC BLOOD PRESSURE: 69 MMHG | HEART RATE: 82 BPM | HEIGHT: 71 IN | BODY MASS INDEX: 23.1 KG/M2 | WEIGHT: 165 LBS

## 2023-03-28 DIAGNOSIS — C61 PROSTATE CANCER (HCC): ICD-10-CM

## 2023-03-28 DIAGNOSIS — C79.51 BONE METASTASES: ICD-10-CM

## 2023-03-28 DIAGNOSIS — I69.30 CHRONIC ISCHEMIC LEFT MCA STROKE: Primary | ICD-10-CM

## 2023-03-28 DIAGNOSIS — Z51.5 PALLIATIVE CARE BY SPECIALIST: ICD-10-CM

## 2023-03-28 DIAGNOSIS — R25.2 SPASTICITY: ICD-10-CM

## 2023-03-28 DIAGNOSIS — Z78.9 IMPAIRED MOBILITY AND ADLS: ICD-10-CM

## 2023-03-28 DIAGNOSIS — Z74.09 IMPAIRED MOBILITY AND ADLS: ICD-10-CM

## 2023-03-28 PROCEDURE — 99214 OFFICE O/P EST MOD 30 MIN: CPT | Performed by: PHYSICAL MEDICINE & REHABILITATION

## 2023-03-28 PROCEDURE — 1123F ACP DISCUSS/DSCN MKR DOCD: CPT | Performed by: PHYSICAL MEDICINE & REHABILITATION

## 2023-03-28 RX ORDER — HYDROCODONE BITARTRATE AND ACETAMINOPHEN 5; 325 MG/1; MG/1
1 TABLET ORAL EVERY 6 HOURS PRN
Qty: 120 TABLET | Refills: 0 | Status: SHIPPED | OUTPATIENT
Start: 2023-03-28 | End: 2023-04-27

## 2023-03-28 RX ORDER — HYDROCODONE BITARTRATE AND ACETAMINOPHEN 5; 325 MG/1; MG/1
1 TABLET ORAL EVERY 6 HOURS PRN
Qty: 120 TABLET | Refills: 0 | Status: SHIPPED
Start: 2023-03-28 | End: 2023-03-28 | Stop reason: SDUPTHER

## 2023-03-28 RX ORDER — FENTANYL 12 UG/H
1 PATCH TRANSDERMAL
Qty: 10 PATCH | Refills: 0 | Status: SHIPPED | OUTPATIENT
Start: 2023-03-28 | End: 2023-04-27

## 2023-03-28 RX ORDER — FENTANYL 12 UG/H
1 PATCH TRANSDERMAL
Qty: 10 PATCH | Refills: 0 | Status: SHIPPED
Start: 2023-03-28 | End: 2023-03-28 | Stop reason: SDUPTHER

## 2023-03-28 NOTE — TELEPHONE ENCOUNTER
Call from Sharla Bingham. Mark's meds were sent to wrong pharmacy. Please resend Fentanyl and [place] to [facility] in [place].

## 2023-03-28 NOTE — PROGRESS NOTES
on file     [  ] affect on work ability: patient is on disability due to this problem.      Follow up 6 weeks for Botox

## 2023-03-30 ENCOUNTER — OFFICE VISIT (OUTPATIENT)
Dept: PALLATIVE CARE | Age: 78
End: 2023-03-30
Payer: COMMERCIAL

## 2023-03-30 VITALS
SYSTOLIC BLOOD PRESSURE: 98 MMHG | HEART RATE: 68 BPM | RESPIRATION RATE: 12 BRPM | OXYGEN SATURATION: 98 % | DIASTOLIC BLOOD PRESSURE: 60 MMHG

## 2023-03-30 DIAGNOSIS — Z51.5 PALLIATIVE CARE BY SPECIALIST: Primary | ICD-10-CM

## 2023-03-30 DIAGNOSIS — C79.51 BONE METASTASES: ICD-10-CM

## 2023-03-30 DIAGNOSIS — G89.3 PAIN DUE TO NEOPLASM: ICD-10-CM

## 2023-03-30 DIAGNOSIS — C61 PROSTATE CANCER (HCC): ICD-10-CM

## 2023-03-30 PROCEDURE — 99347 HOME/RES VST EST SF MDM 20: CPT | Performed by: NURSE PRACTITIONER

## 2023-03-30 PROCEDURE — 1123F ACP DISCUSS/DSCN MKR DOCD: CPT | Performed by: NURSE PRACTITIONER

## 2023-03-30 NOTE — PROGRESS NOTES
no edema, pulses present  Skin:  Warm and dry  Neuro:  PERRL, Alert, following commands, right hemiparesis present, expressive aphasia present    Current Medications:  Medications reviewed: yes    Controlled Substances Monitoring: [place] reviewed 3/30/23. RX Monitoring 3/30/2023   Attestation -   Periodic Controlled Substance Monitoring Possible medication side effects, risk of tolerance/dependence & alternative treatments discussed. ;No signs of potential drug abuse or diversion identified. ;Assessed functional status. ;Obtaining appropriate analgesic effect of treatment. OLIVA Lam - AFSHIN  Palliative Medicine    Time/Communication  Greater than 50% of time spent, total 15 minutes in face-to-face counseling and coordination of care regarding goals of care, symptom management, diagnosis and prognosis, and see above. Discussed the plan of care with the other IDT members of the Palliative Care Team, and with consultants, Primary Attending, patient, family, and facility staff. Thank you for allowing Palliative Medicine to participate in the care of Ashish Quezada. Note: This report was completed using computerize voiced recognition software. Every effort has been made to ensure accuracy; however, inadvertent computerized transcription errors may be present.

## 2023-04-20 ENCOUNTER — OFFICE VISIT (OUTPATIENT)
Dept: ONCOLOGY | Age: 78
End: 2023-04-20
Payer: COMMERCIAL

## 2023-04-20 VITALS
OXYGEN SATURATION: 100 % | BODY MASS INDEX: 23.01 KG/M2 | DIASTOLIC BLOOD PRESSURE: 64 MMHG | HEART RATE: 85 BPM | SYSTOLIC BLOOD PRESSURE: 90 MMHG | HEIGHT: 71 IN | TEMPERATURE: 97.5 F

## 2023-04-20 DIAGNOSIS — C61 PROSTATE CANCER (HCC): Primary | ICD-10-CM

## 2023-04-20 PROCEDURE — 99214 OFFICE O/P EST MOD 30 MIN: CPT | Performed by: INTERNAL MEDICINE

## 2023-04-20 PROCEDURE — 1123F ACP DISCUSS/DSCN MKR DOCD: CPT | Performed by: INTERNAL MEDICINE

## 2023-04-20 NOTE — PROGRESS NOTES
[id number] [facility]  [place]  Dept: [phone]  Loc: [phone]  Attending progress note      Reason for Visit:   Metastatic prostate cancer. Referring Physician:  Amanda Smallwood MD    PCP:  Bennie Garcia MD    History of Present Illness:    Mr. Bhumika Sloan is a 80-year-old gentleman with a past history significant for metastatic prostate cancer:    TREATMENT HISTORY: 125 seed implantation 1/17/2014  Bicalutamide 50 mg daily. ADT, and 5 cycles docetaxel 8/2/2015-11/11/2015  Restart of bicalutamide and leuprolide 7/2016    Leuprolide 22.5 mg IM every 12 weeks  Denosumab 120 mg subcutaneously every 12 weeks (started 9/2016)  Abiraterone and prednisone 2/15/2020 - 3/2021  Apalutamide 240 mg daily 3/29/2021 - current      History of Present Illness: Perla Duff is a 78year old male with prostate cancer metastatic to bone. He is s/p CVA resulting in expressive aphasia and right hemiparesis. Marjorie Azul is in wheelchair and accompanied by his daughter, Amirah Lopez. Wife present on phone. He presents for ongoing evaluation and management of prostate cancer. HEMATOLOGY/ONCOLOGY HISTORY:  Screening PSA of 4.18 ng/mL in 2013 (not seen in [address] [place] [address] Rd). 12/12/2013 - transrectal US-guided biopsy of prostate demonstrating Chang 4+3 adenocarcinoma. 1/17/2014 - s/p I-125 seed implantation  PSA continued to rise as follows reaching 68.96 ng/ml on 8/5/2015. Additional evaluation with bone scan revealed diffuse progressive metastatic disease involving spine (cervical/lumbar spine, b/l scapulae, other bones. Started ADT & Casodex, had docetaxel x 5 cycles from 8/2/2015 - 11/11/2015    S/P massive ischemic CVA in 11/2015 3 days after cycle 5 chemotherapy. He is S/P rehab post CVA. He has expressive aphasia and right hemiparesis. He is dependent on family for ADL and care.  Received Botox injections to help with right arm - loosen

## 2023-04-27 DIAGNOSIS — C79.51 MALIGNANT NEOPLASM METASTATIC TO BONE (HCC): ICD-10-CM

## 2023-04-27 DIAGNOSIS — C61 PROSTATE CANCER (HCC): ICD-10-CM

## 2023-04-27 DIAGNOSIS — Z51.5 PALLIATIVE CARE BY SPECIALIST: ICD-10-CM

## 2023-04-27 RX ORDER — HYDROCODONE BITARTRATE AND ACETAMINOPHEN 5; 325 MG/1; MG/1
1 TABLET ORAL EVERY 6 HOURS PRN
Qty: 120 TABLET | Refills: 0 | Status: SHIPPED | OUTPATIENT
Start: 2023-04-27 | End: 2023-05-27

## 2023-04-27 RX ORDER — FENTANYL 12 UG/H
1 PATCH TRANSDERMAL
Qty: 10 PATCH | Refills: 0 | Status: SHIPPED | OUTPATIENT
Start: 2023-04-27 | End: 2023-05-27

## 2023-04-27 NOTE — TELEPHONE ENCOUNTER
Call from Lencho Mace requesting refills for Fentanyl patch and [place] is [facility] in [place]. Next vanessa to be scheduled with [facility].

## 2023-05-01 ENCOUNTER — TELEPHONE (OUTPATIENT)
Dept: PALLATIVE CARE | Age: 78
End: 2023-05-01

## 2023-05-01 DIAGNOSIS — Z51.5 PALLIATIVE CARE BY SPECIALIST: Primary | ICD-10-CM

## 2023-05-01 DIAGNOSIS — C61 PROSTATE CA (HCC): ICD-10-CM

## 2023-05-01 DIAGNOSIS — G89.3 PAIN, NEOPLASM-RELATED: ICD-10-CM

## 2023-05-01 RX ORDER — FENTANYL 25 UG/H
1 PATCH TRANSDERMAL
Qty: 10 PATCH | Refills: 0 | Status: SHIPPED | OUTPATIENT
Start: 2023-05-01 | End: 2023-05-31

## 2023-05-01 NOTE — TELEPHONE ENCOUNTER
After discussion with Adal Rossi CNP , new orders received. Called back and instructed Sharonda to start Fentanyl patch 25 mcg , place the old 12 mcg patch out of the way, Earlie Karime can continue with [facility] for BTP. Sharonda verbalizes understanding.

## 2023-05-01 NOTE — TELEPHONE ENCOUNTER
Call from Carlin stating Lewis Regan had a bad weekend. Pain seems to be increasing in lower back, causing him to be less active and not eating much. Carlin does share that Mark's PSA has started going up, and he has some disease progression. They are now just waiting for approval for a new treatment. Lewis Regan has Fentanyl 12 mcg and Norco 5/325 mg 4 times a day. Carlin would like to know if they could increase Fentanyl patch? Pharmacy is [facility] in [place]. Next vanessa with [facility].

## 2023-05-01 NOTE — PROGRESS NOTES
Palliative Medicine  Progress Note    Mark's wife called reporting increased pain. Also reported that PSA has elevated and there is concern for disease progression. Will increase fentanyl patch to 25 mcg Q 72 hours. Yunior Almanzar APRN-CNP  Palliative Medicine    Note: This report was completed using [employer] voiced recognition software. Every effort has been made to ensure accuracy; however, inadvertent computerized transcription errors may be present.

## 2023-05-10 LAB
ALBUMIN SERPL-MCNC: NORMAL G/DL
ALP BLD-CCNC: NORMAL U/L
ALT SERPL-CCNC: NORMAL U/L
ANION GAP SERPL CALCULATED.3IONS-SCNC: NORMAL MMOL/L
AST SERPL-CCNC: NORMAL U/L
BASOPHILS ABSOLUTE: NORMAL
BASOPHILS RELATIVE PERCENT: NORMAL
BILIRUB SERPL-MCNC: NORMAL MG/DL
BUN BLDV-MCNC: NORMAL MG/DL
CALCIUM SERPL-MCNC: NORMAL MG/DL
CHLORIDE BLD-SCNC: NORMAL MMOL/L
CO2: NORMAL
CREAT SERPL-MCNC: NORMAL MG/DL
EGFR: NORMAL
EOSINOPHILS ABSOLUTE: NORMAL
EOSINOPHILS RELATIVE PERCENT: NORMAL
GLUCOSE BLD-MCNC: NORMAL MG/DL
HCT VFR BLD CALC: NORMAL %
HEMOGLOBIN: NORMAL
LYMPHOCYTES ABSOLUTE: NORMAL
LYMPHOCYTES RELATIVE PERCENT: NORMAL
MCH RBC QN AUTO: NORMAL PG
MCHC RBC AUTO-ENTMCNC: NORMAL G/DL
MCV RBC AUTO: NORMAL FL
MONOCYTES ABSOLUTE: NORMAL
MONOCYTES RELATIVE PERCENT: NORMAL
NEUTROPHILS ABSOLUTE: NORMAL
NEUTROPHILS RELATIVE PERCENT: NORMAL
PDW BLD-RTO: NORMAL %
PLATELET # BLD: NORMAL 10*3/UL
PMV BLD AUTO: NORMAL FL
POTASSIUM SERPL-SCNC: NORMAL MMOL/L
PROSTATE SPECIFIC ANTIGEN: NORMAL
RBC # BLD: NORMAL 10*6/UL
SODIUM BLD-SCNC: NORMAL MMOL/L
TOTAL PROTEIN: NORMAL
WBC # BLD: NORMAL 10*3/UL

## 2023-05-11 DIAGNOSIS — C61 PROSTATE CANCER (HCC): ICD-10-CM

## 2023-05-18 ENCOUNTER — OFFICE VISIT (OUTPATIENT)
Dept: CARDIOLOGY CLINIC | Age: 78
End: 2023-05-18

## 2023-05-18 VITALS
SYSTOLIC BLOOD PRESSURE: 96 MMHG | RESPIRATION RATE: 18 BRPM | WEIGHT: 163 LBS | HEIGHT: 71 IN | DIASTOLIC BLOOD PRESSURE: 52 MMHG | HEART RATE: 74 BPM | BODY MASS INDEX: 22.82 KG/M2

## 2023-05-18 DIAGNOSIS — I48.21 PERMANENT ATRIAL FIBRILLATION (HCC): Primary | ICD-10-CM

## 2023-05-18 DIAGNOSIS — I95.89 OTHER SPECIFIED HYPOTENSION: ICD-10-CM

## 2023-05-18 DIAGNOSIS — R26.9 GAIT DIFFICULTY: ICD-10-CM

## 2023-05-18 DIAGNOSIS — C79.51 PROSTATE CANCER METASTATIC TO BONE (HCC): ICD-10-CM

## 2023-05-18 DIAGNOSIS — R94.31 ABNORMAL EKG: ICD-10-CM

## 2023-05-18 DIAGNOSIS — Z86.73 H/O: CVA (CEREBROVASCULAR ACCIDENT): ICD-10-CM

## 2023-05-18 DIAGNOSIS — C61 PROSTATE CANCER METASTATIC TO BONE (HCC): ICD-10-CM

## 2023-05-18 DIAGNOSIS — Z79.01 ANTICOAGULATED BY ANTICOAGULATION TREATMENT: ICD-10-CM

## 2023-05-18 DIAGNOSIS — I69.359 HEMIPLEGIA AFFECTING DOMINANT SIDE, POST-STROKE (HCC): ICD-10-CM

## 2023-05-18 DIAGNOSIS — R47.01 APHASIA: ICD-10-CM

## 2023-05-19 NOTE — PROGRESS NOTES
OFFICE VISIT        PRIMARY CARE PHYSICIAN:    Denisha Hall MD         ALLERGIES / SENSITIVITIES:    No Known Allergies       REVIEWED MEDICATIONS:      Current Outpatient Medications:     Injection Device KIT, by Does not apply route Indications: Hettie Kayla , every 3 months, Disp: , Rfl:     Leuprolide Acetate (LUPRON IJ), Inject as directed Indications: every 3 months, Disp: , Rfl:     fentaNYL (DURAGESIC) 25 MCG/HR, Place 1 patch onto the skin every 72 hours for 30 days. Max Daily Amount: 1 patch, Disp: 10 patch, Rfl: 0    HYDROcodone-acetaminophen (NORCO) 5-325 MG per tablet, Take 1 tablet by mouth every 6 hours as needed for Pain for up to 30 days. Max Daily Amount: 4 tablets, Disp: 120 tablet, Rfl: 0    apixaban (ELIQUIS) 5 MG TABS tablet, Take 1 tablet by mouth 2 times daily, Disp: 180 tablet, Rfl: 1    metoprolol succinate (TOPROL XL) 100 MG extended release tablet, Take 1 tablet by mouth Daily with supper, Disp: 90 tablet, Rfl: 3    mirtazapine (REMERON) 15 MG tablet, , Disp: , Rfl:     atorvastatin (LIPITOR) 40 MG tablet, 1 tablet, Disp: , Rfl:     ferrous sulfate (IRON 325) 325 (65 Fe) MG tablet, TAKE 1 TABLET BY MOUTH TWICE A DAY, Disp: , Rfl:     Calcium Carb-Cholecalciferol 600-800 MG-UNIT TABS, Take 1 tablet by mouth daily 40 mcg(1600IU), Disp: , Rfl:     Cholecalciferol 125 MCG (5000 UT) TBDP, Take 5,000 Units by mouth Daily with lunch, Disp: , Rfl:     Ascorbic Acid (VITAMIN C PLUS WILD CAL HIPS PO), Take 1,000 mg by mouth Daily with lunch, Disp: , Rfl:     predniSONE (DELTASONE) 5 MG tablet, Take 1 tablet by mouth every morning, Disp: , Rfl:     acetaminophen (TYLENOL) 325 MG tablet, Take 1 tablet by mouth every 6 hours as needed for Pain or Fever, Disp: , Rfl:     Multiple Vitamins-Minerals (THERAPEUTIC MULTIVITAMIN-MINERALS) tablet, Take 1 tablet by mouth every morning, Disp: , Rfl:       S: REASON FOR VISIT:    Atrial fibrillation.   Marilee Laser is a pleasant, unfortunate, 70-year-old

## 2023-05-23 ENCOUNTER — OFFICE VISIT (OUTPATIENT)
Dept: PHYSICAL MEDICINE AND REHAB | Age: 78
End: 2023-05-23

## 2023-05-23 VITALS — BODY MASS INDEX: 22.82 KG/M2 | TEMPERATURE: 96.8 F | WEIGHT: 163 LBS | HEIGHT: 71 IN

## 2023-05-23 DIAGNOSIS — R25.2 SPASTICITY: ICD-10-CM

## 2023-05-23 DIAGNOSIS — I69.30 CHRONIC ISCHEMIC LEFT MCA STROKE: Primary | ICD-10-CM

## 2023-05-23 NOTE — PROGRESS NOTES
Lindsey Vasquez D.O. [facility]  [address]  Phone: [phone]  Fax: [phone]  5/23/2023    Chief Complaint   Patient presents with    Arm Pain    Leg Pain     Emg guided botox 700 units       Informed Consent:  The indications, risks, benefits, and  alternatives of onabotulinum toxin A were discussed with the patient. I explained to the patient the potential side effects including but not limited to: distant spread of toxin effect causing droopy eyelids, facial drooping, neck pain, headache, double vision, muscle pain/spasm/weakness/stiffness,  bronchitis,  injection site pain, increased blood pressure, hypersensitivity, anaphylaxis, difficulty swallowing, difficulty speaking, urinary incontinence and breathing difficulty. The patient is aware that swallowing and breathing difficulties can be life threatening and there have been reports of death in some cases where onabotulinum toxin was injected. The patient was advised of the expected benefit to include decreased spasticity in the injected muscles, and the anticipated duration of effect of 3 months. A permit was signed and scanned into the media. Last injection: 11/10/22  Taking anticoagulants/antiplatelets: Yes  Diabetic: No  Febrile/active infection: No    Ambulatory Procedure Time Out  Correct Patient: Yes  Correct Procedure: Yes  Correct Site/Side: Yes  Correct Site(s) Marked: Yes  Informed Consent Signed: Yes  Allergies Verified: Yes  Staff Present & Credential[de-identified] Dimple ACOSTA/Dr Nunez    Procedure note: After obtaining consent,  EMG guided onabotulinum toxin A injection was performed of the right trunk, upper and lower extremities at the locations and doses listed below. 700 units of onabotulinum toxin A was reconstituted using 4 cc of preservative free normal saline ( 5 units per 0.1 ml).  EMG guidance was necessary to adequately localize muscle due to nearby

## 2023-05-25 DIAGNOSIS — C79.51 MALIGNANT NEOPLASM METASTATIC TO BONE (HCC): ICD-10-CM

## 2023-05-25 DIAGNOSIS — C61 PROSTATE CA (HCC): ICD-10-CM

## 2023-05-25 DIAGNOSIS — Z51.5 PALLIATIVE CARE BY SPECIALIST: ICD-10-CM

## 2023-05-25 DIAGNOSIS — G89.3 PAIN, NEOPLASM-RELATED: ICD-10-CM

## 2023-05-25 DIAGNOSIS — C61 PROSTATE CANCER (HCC): ICD-10-CM

## 2023-05-25 RX ORDER — HYDROCODONE BITARTRATE AND ACETAMINOPHEN 5; 325 MG/1; MG/1
1 TABLET ORAL EVERY 6 HOURS PRN
Qty: 120 TABLET | Refills: 0 | Status: SHIPPED | OUTPATIENT
Start: 2023-05-25 | End: 2023-06-24

## 2023-05-25 RX ORDER — FENTANYL 25 UG/H
1 PATCH TRANSDERMAL
Qty: 10 PATCH | Refills: 0 | Status: SHIPPED | OUTPATIENT
Start: 2023-05-25 | End: 2023-06-24

## 2023-05-25 NOTE — TELEPHONE ENCOUNTER
Call from [facility] requesting refill for Fentanyl patch and for [place]. Pharmacy is [facility] in [place]. Next vanessa to be scheduled with [facility].

## 2023-06-01 ENCOUNTER — HOSPITAL ENCOUNTER (OUTPATIENT)
Dept: INFUSION THERAPY | Age: 78
Discharge: HOME | End: 2023-06-01
Payer: COMMERCIAL

## 2023-06-01 ENCOUNTER — OFFICE VISIT (OUTPATIENT)
Dept: ONCOLOGY | Age: 78
End: 2023-06-01
Payer: COMMERCIAL

## 2023-06-01 ENCOUNTER — TELEPHONE (OUTPATIENT)
Dept: PALLATIVE CARE | Age: 78
End: 2023-06-01

## 2023-06-01 VITALS
HEART RATE: 68 BPM | DIASTOLIC BLOOD PRESSURE: 69 MMHG | TEMPERATURE: 96 F | OXYGEN SATURATION: 98 % | BODY MASS INDEX: 22.73 KG/M2 | HEIGHT: 71 IN | SYSTOLIC BLOOD PRESSURE: 110 MMHG

## 2023-06-01 DIAGNOSIS — C61 PROSTATE CANCER (HCC): Primary | ICD-10-CM

## 2023-06-01 DIAGNOSIS — C79.51 MALIGNANT NEOPLASM METASTATIC TO BONE (HCC): ICD-10-CM

## 2023-06-01 LAB
ALBUMIN SERPL-MCNC: 3.6 G/DL (ref 3.5–5.2)
ALP SERPL-CCNC: 300 U/L (ref 40–129)
ALT SERPL-CCNC: 17 U/L (ref 0–40)
ANION GAP SERPL CALCULATED.3IONS-SCNC: 10 MMOL/L (ref 7–16)
AST SERPL-CCNC: 23 U/L (ref 0–39)
BILIRUB SERPL-MCNC: <0.2 MG/DL (ref 0–1.2)
BUN SERPL-MCNC: 28 MG/DL (ref 6–23)
CALCIUM SERPL-MCNC: 8.9 MG/DL (ref 8.6–10.2)
CHLORIDE SERPL-SCNC: 102 MMOL/L (ref 98–107)
CO2 SERPL-SCNC: 29 MMOL/L (ref 22–29)
CREAT SERPL-MCNC: 0.8 MG/DL (ref 0.7–1.2)
GLUCOSE SERPL-MCNC: 90 MG/DL (ref 74–99)
POTASSIUM SERPL-SCNC: 4.6 MMOL/L (ref 3.5–5)
PROT SERPL-MCNC: 7.2 G/DL (ref 6.4–8.3)
SODIUM SERPL-SCNC: 141 MMOL/L (ref 132–146)

## 2023-06-01 PROCEDURE — 80053 COMPREHEN METABOLIC PANEL: CPT

## 2023-06-01 PROCEDURE — 36415 COLL VENOUS BLD VENIPUNCTURE: CPT

## 2023-06-01 PROCEDURE — 6360000002 HC RX W HCPCS: Performed by: FAMILY MEDICINE

## 2023-06-01 PROCEDURE — 96402 CHEMO HORMON ANTINEOPL SQ/IM: CPT

## 2023-06-01 PROCEDURE — 99214 OFFICE O/P EST MOD 30 MIN: CPT | Performed by: INTERNAL MEDICINE

## 2023-06-01 PROCEDURE — 1123F ACP DISCUSS/DSCN MKR DOCD: CPT | Performed by: INTERNAL MEDICINE

## 2023-06-01 PROCEDURE — 96372 THER/PROPH/DIAG INJ SC/IM: CPT

## 2023-06-01 PROCEDURE — 99212 OFFICE O/P EST SF 10 MIN: CPT

## 2023-06-01 RX ORDER — ONDANSETRON 2 MG/ML
8 INJECTION INTRAMUSCULAR; INTRAVENOUS
OUTPATIENT
Start: 2023-08-24

## 2023-06-01 RX ORDER — DIPHENHYDRAMINE HYDROCHLORIDE 50 MG/ML
50 INJECTION INTRAMUSCULAR; INTRAVENOUS
OUTPATIENT
Start: 2023-08-24

## 2023-06-01 RX ORDER — FAMOTIDINE 10 MG/ML
20 INJECTION, SOLUTION INTRAVENOUS
OUTPATIENT
Start: 2023-08-24

## 2023-06-01 RX ORDER — ALBUTEROL SULFATE 90 UG/1
4 AEROSOL, METERED RESPIRATORY (INHALATION) PRN
OUTPATIENT
Start: 2023-08-24

## 2023-06-01 RX ORDER — ACETAMINOPHEN 325 MG/1
650 TABLET ORAL
OUTPATIENT
Start: 2023-08-24

## 2023-06-01 RX ORDER — EPINEPHRINE 1 MG/ML
0.3 INJECTION, SOLUTION, CONCENTRATE INTRAVENOUS PRN
OUTPATIENT
Start: 2023-08-24

## 2023-06-01 RX ORDER — SODIUM CHLORIDE 9 MG/ML
INJECTION, SOLUTION INTRAVENOUS CONTINUOUS
OUTPATIENT
Start: 2023-08-24

## 2023-06-01 RX ADMIN — LEUPROLIDE ACETATE 22.5 MG: KIT at 13:35

## 2023-06-01 RX ADMIN — DENOSUMAB 120 MG: 120 INJECTION SUBCUTANEOUS at 13:34

## 2023-06-01 NOTE — TELEPHONE ENCOUNTER
placed call to pt spouse Bren Navarroricardo to schedule pt for Palliative medicine follow up home visit with AFSHIN Tang. Pt scheduled for home visit on 6-15 at 3:30pm, Bren Mesa agreed to appt time and date. Advised to call for any needs or issues.        Angeles Vera MSW,LSW  Palliative Medicine

## 2023-06-01 NOTE — PROGRESS NOTES
[id number] [facility]  [place] [address]  Dept: [phone]  Loc: [phone]  Attending progress note      Reason for Visit:   Metastatic prostate cancer. Referring Physician:  Jaqui Miller MD    PCP:  Darrin Carr MD    History of Present Illness:    Mr. Derik Mooney is a 81-year-old gentleman with a past history significant for metastatic prostate cancer:    TREATMENT HISTORY: 125 seed implantation 1/17/2014  Bicalutamide 50 mg daily. ADT, and 5 cycles docetaxel 8/2/2015-11/11/2015  Restart of bicalutamide and leuprolide 7/2016    Leuprolide 22.5 mg IM every 12 weeks  Denosumab 120 mg subcutaneously every 12 weeks (started 9/2016)  Abiraterone and prednisone 2/15/2020 - 3/2021  Apalutamide 240 mg daily 3/29/2021 - current      History of Present Illness: Lester Hidalgo is a 78year old male with prostate cancer metastatic to bone. He is s/p CVA resulting in expressive aphasia and right hemiparesis. Alfred Lange is in wheelchair and accompanied by his daughter, Luis Miguel Lyn. Wife present on phone. He presents for ongoing evaluation and management of prostate cancer. HEMATOLOGY/ONCOLOGY HISTORY:  Screening PSA of 4.18 ng/mL in 2013 (not seen in [address] [place] [address] Rd). 12/12/2013 - transrectal US-guided biopsy of prostate demonstrating Chang 4+3 adenocarcinoma. 1/17/2014 - s/p I-125 seed implantation  PSA continued to rise as follows reaching 68.96 ng/ml on 8/5/2015. Additional evaluation with bone scan revealed diffuse progressive metastatic disease involving spine (cervical/lumbar spine, b/l scapulae, other bones. Started ADT & Casodex, had docetaxel x 5 cycles from 8/2/2015 - 11/11/2015    S/P massive ischemic CVA in 11/2015 3 days after cycle 5 chemotherapy. He is S/P rehab post CVA. He has expressive aphasia and right hemiparesis. He is dependent on family for ADL and care.  Received Botox injections to help with right arm - loosen

## 2023-06-13 ENCOUNTER — APPOINTMENT (OUTPATIENT)
Dept: CT IMAGING | Age: 78
DRG: 178 | End: 2023-06-13
Payer: OTHER GOVERNMENT

## 2023-06-13 ENCOUNTER — HOSPITAL ENCOUNTER (INPATIENT)
Age: 78
LOS: 7 days | Discharge: HOME HEALTH | DRG: 178 | End: 2023-06-20
Attending: EMERGENCY MEDICINE | Admitting: INTERNAL MEDICINE
Payer: OTHER GOVERNMENT

## 2023-06-13 ENCOUNTER — APPOINTMENT (OUTPATIENT)
Dept: GENERAL RADIOLOGY | Age: 78
DRG: 178 | End: 2023-06-13
Payer: OTHER GOVERNMENT

## 2023-06-13 DIAGNOSIS — J18.9 PNEUMONIA DUE TO INFECTIOUS ORGANISM, UNSPECIFIED LATERALITY, UNSPECIFIED PART OF LUNG: Primary | ICD-10-CM

## 2023-06-13 DIAGNOSIS — C79.51 PROSTATE CANCER METASTATIC TO BONE (HCC): ICD-10-CM

## 2023-06-13 DIAGNOSIS — K59.00 CONSTIPATION, UNSPECIFIED CONSTIPATION TYPE: ICD-10-CM

## 2023-06-13 DIAGNOSIS — C61 PROSTATE CANCER METASTATIC TO BONE (HCC): ICD-10-CM

## 2023-06-13 DIAGNOSIS — D64.9 ANEMIA, UNSPECIFIED TYPE: ICD-10-CM

## 2023-06-13 PROBLEM — J82.89 PNEUMONIA ALLERGIC (HCC): Status: ACTIVE | Noted: 2023-06-13

## 2023-06-13 LAB
ALBUMIN SERPL-MCNC: 3 G/DL (ref 3.5–5.2)
ALP SERPL-CCNC: 498 U/L (ref 40–129)
ALT SERPL-CCNC: 20 U/L (ref 0–40)
ANION GAP SERPL CALCULATED.3IONS-SCNC: 10 MMOL/L (ref 7–16)
AST SERPL-CCNC: 41 U/L (ref 0–39)
BACTERIA URNS QL MICRO: ABNORMAL /HPF
BASOPHILS # BLD: 0 E9/L (ref 0–0.2)
BASOPHILS NFR BLD: 0.2 % (ref 0–2)
BILIRUB SERPL-MCNC: 0.3 MG/DL (ref 0–1.2)
BILIRUB UR QL STRIP: NEGATIVE
BNP BLD-MCNC: 5269 PG/ML (ref 0–450)
BUN SERPL-MCNC: 26 MG/DL (ref 6–23)
CALCIUM SERPL-MCNC: 8 MG/DL (ref 8.6–10.2)
CHLORIDE SERPL-SCNC: 99 MMOL/L (ref 98–107)
CLARITY UR: CLEAR
CO2 SERPL-SCNC: 25 MMOL/L (ref 22–29)
COLOR UR: YELLOW
CREAT SERPL-MCNC: 1 MG/DL (ref 0.7–1.2)
EKG ATRIAL RATE: 163 BPM
EKG Q-T INTERVAL: 396 MS
EKG QRS DURATION: 92 MS
EKG QTC CALCULATION (BAZETT): 487 MS
EKG R AXIS: -66 DEGREES
EKG T AXIS: -6 DEGREES
EKG VENTRICULAR RATE: 91 BPM
EOSINOPHIL # BLD: 0 E9/L (ref 0.05–0.5)
EOSINOPHIL NFR BLD: 0.2 % (ref 0–6)
ERYTHROCYTE [DISTWIDTH] IN BLOOD BY AUTOMATED COUNT: 14.9 FL (ref 11.5–15)
GLUCOSE SERPL-MCNC: 126 MG/DL (ref 74–99)
GLUCOSE UR STRIP-MCNC: NEGATIVE MG/DL
HCT VFR BLD AUTO: 28.2 % (ref 37–54)
HGB BLD-MCNC: 8.9 G/DL (ref 12.5–16.5)
HGB UR QL STRIP: NEGATIVE
KETONES UR STRIP-MCNC: NEGATIVE MG/DL
LACTATE BLDV-SCNC: 0.8 MMOL/L (ref 0.5–2.2)
LEUKOCYTE ESTERASE UR QL STRIP: NEGATIVE
LIPASE: 13 U/L (ref 13–60)
LYMPHOCYTES # BLD: 0.58 E9/L (ref 1.5–4)
LYMPHOCYTES NFR BLD: 9.6 % (ref 20–42)
MCH RBC QN AUTO: 30.9 PG (ref 26–35)
MCHC RBC AUTO-ENTMCNC: 31.6 % (ref 32–34.5)
MCV RBC AUTO: 97.9 FL (ref 80–99.9)
MONOCYTES # BLD: 0.23 E9/L (ref 0.1–0.95)
MONOCYTES NFR BLD: 4.3 % (ref 2–12)
NEUTROPHILS # BLD: 4.99 E9/L (ref 1.8–7.3)
NEUTS SEG NFR BLD: 86.1 % (ref 43–80)
NITRITE UR QL STRIP: NEGATIVE
PH UR STRIP: 6 [PH] (ref 5–9)
PLATELET # BLD AUTO: 209 E9/L (ref 130–450)
PMV BLD AUTO: 9 FL (ref 7–12)
POLYCHROMASIA: ABNORMAL
POTASSIUM SERPL-SCNC: 4.5 MMOL/L (ref 3.5–5)
PROT SERPL-MCNC: 6.8 G/DL (ref 6.4–8.3)
PROT UR STRIP-MCNC: 100 MG/DL
RBC # BLD AUTO: 2.88 E12/L (ref 3.8–5.8)
RBC #/AREA URNS HPF: ABNORMAL /HPF (ref 0–2)
SODIUM SERPL-SCNC: 134 MMOL/L (ref 132–146)
SP GR UR STRIP: 1.01 (ref 1–1.03)
TROPONIN, HIGH SENSITIVITY: 46 NG/L (ref 0–11)
TROPONIN, HIGH SENSITIVITY: 46 NG/L (ref 0–11)
UROBILINOGEN UR STRIP-ACNC: 0.2 E.U./DL
WBC # BLD: 5.8 E9/L (ref 4.5–11.5)
WBC #/AREA URNS HPF: ABNORMAL /HPF (ref 0–5)

## 2023-06-13 PROCEDURE — 6370000000 HC RX 637 (ALT 250 FOR IP): Performed by: INTERNAL MEDICINE

## 2023-06-13 PROCEDURE — 2500000003 HC RX 250 WO HCPCS

## 2023-06-13 PROCEDURE — 2060000000 HC ICU INTERMEDIATE R&B

## 2023-06-13 PROCEDURE — 99285 EMERGENCY DEPT VISIT HI MDM: CPT

## 2023-06-13 PROCEDURE — 83690 ASSAY OF LIPASE: CPT

## 2023-06-13 PROCEDURE — 80053 COMPREHEN METABOLIC PANEL: CPT

## 2023-06-13 PROCEDURE — 81001 URINALYSIS AUTO W/SCOPE: CPT

## 2023-06-13 PROCEDURE — 2580000003 HC RX 258

## 2023-06-13 PROCEDURE — 93010 ELECTROCARDIOGRAM REPORT: CPT | Performed by: INTERNAL MEDICINE

## 2023-06-13 PROCEDURE — 6360000002 HC RX W HCPCS

## 2023-06-13 PROCEDURE — 6360000004 HC RX CONTRAST MEDICATION: Performed by: RADIOLOGY

## 2023-06-13 PROCEDURE — 96361 HYDRATE IV INFUSION ADD-ON: CPT

## 2023-06-13 PROCEDURE — 83880 ASSAY OF NATRIURETIC PEPTIDE: CPT

## 2023-06-13 PROCEDURE — 96375 TX/PRO/DX INJ NEW DRUG ADDON: CPT

## 2023-06-13 PROCEDURE — 83605 ASSAY OF LACTIC ACID: CPT

## 2023-06-13 PROCEDURE — 71046 X-RAY EXAM CHEST 2 VIEWS: CPT

## 2023-06-13 PROCEDURE — 74177 CT ABD & PELVIS W/CONTRAST: CPT

## 2023-06-13 PROCEDURE — 93005 ELECTROCARDIOGRAM TRACING: CPT

## 2023-06-13 PROCEDURE — 84145 PROCALCITONIN (PCT): CPT

## 2023-06-13 PROCEDURE — 2580000003 HC RX 258: Performed by: INTERNAL MEDICINE

## 2023-06-13 PROCEDURE — 85025 COMPLETE CBC W/AUTO DIFF WBC: CPT

## 2023-06-13 PROCEDURE — 96365 THER/PROPH/DIAG IV INF INIT: CPT

## 2023-06-13 PROCEDURE — 84484 ASSAY OF TROPONIN QUANT: CPT

## 2023-06-13 PROCEDURE — 36415 COLL VENOUS BLD VENIPUNCTURE: CPT

## 2023-06-13 RX ORDER — FERROUS SULFATE 325(65) MG
325 TABLET ORAL 2 TIMES DAILY
Status: DISCONTINUED | OUTPATIENT
Start: 2023-06-13 | End: 2023-06-14

## 2023-06-13 RX ORDER — POLYETHYLENE GLYCOL 3350 17 G/17G
17 POWDER, FOR SOLUTION ORAL DAILY PRN
Status: DISCONTINUED | OUTPATIENT
Start: 2023-06-13 | End: 2023-06-20 | Stop reason: HOSPADM

## 2023-06-13 RX ORDER — DOXYCYCLINE 100 MG/10ML
INJECTION, POWDER, LYOPHILIZED, FOR SOLUTION INTRAVENOUS
Status: DISCONTINUED
Start: 2023-06-13 | End: 2023-06-14

## 2023-06-13 RX ORDER — DOXYCYCLINE HYCLATE 100 MG/1
100 CAPSULE ORAL EVERY 12 HOURS SCHEDULED
Status: DISCONTINUED | OUTPATIENT
Start: 2023-06-13 | End: 2023-06-13

## 2023-06-13 RX ORDER — WATER 1000 ML/1000ML
INJECTION, SOLUTION INTRAVENOUS
Status: DISCONTINUED
Start: 2023-06-13 | End: 2023-06-14

## 2023-06-13 RX ORDER — ATORVASTATIN CALCIUM 40 MG/1
40 TABLET, FILM COATED ORAL NIGHTLY
Status: DISCONTINUED | OUTPATIENT
Start: 2023-06-13 | End: 2023-06-20 | Stop reason: HOSPADM

## 2023-06-13 RX ORDER — FENTANYL 25 UG/H
1 PATCH TRANSDERMAL
Status: DISCONTINUED | OUTPATIENT
Start: 2023-06-15 | End: 2023-06-20 | Stop reason: HOSPADM

## 2023-06-13 RX ORDER — HYDROCODONE BITARTRATE AND ACETAMINOPHEN 5; 325 MG/1; MG/1
1 TABLET ORAL 4 TIMES DAILY
COMMUNITY

## 2023-06-13 RX ORDER — SODIUM CHLORIDE 9 MG/ML
INJECTION, SOLUTION INTRAVENOUS PRN
Status: DISCONTINUED | OUTPATIENT
Start: 2023-06-13 | End: 2023-06-20 | Stop reason: HOSPADM

## 2023-06-13 RX ORDER — ACETAMINOPHEN 325 MG/1
325 TABLET ORAL EVERY 6 HOURS PRN
Status: DISCONTINUED | OUTPATIENT
Start: 2023-06-13 | End: 2023-06-20 | Stop reason: HOSPADM

## 2023-06-13 RX ORDER — 0.9 % SODIUM CHLORIDE 0.9 %
1000 INTRAVENOUS SOLUTION INTRAVENOUS ONCE
Status: COMPLETED | OUTPATIENT
Start: 2023-06-13 | End: 2023-06-13

## 2023-06-13 RX ORDER — SODIUM CHLORIDE 9 MG/ML
INJECTION INTRAVENOUS
Status: DISCONTINUED
Start: 2023-06-13 | End: 2023-06-13 | Stop reason: WASHOUT

## 2023-06-13 RX ORDER — ASCORBIC ACID 500 MG
1000 TABLET ORAL
Status: DISCONTINUED | OUTPATIENT
Start: 2023-06-14 | End: 2023-06-20 | Stop reason: HOSPADM

## 2023-06-13 RX ORDER — OMEGA-3S/DHA/EPA/FISH OIL/D3 300MG-1000
800 CAPSULE ORAL DAILY
Status: DISCONTINUED | OUTPATIENT
Start: 2023-06-14 | End: 2023-06-20 | Stop reason: HOSPADM

## 2023-06-13 RX ORDER — CALCIUM CARBONATE 500 MG/1
500 TABLET, CHEWABLE ORAL DAILY
Status: DISCONTINUED | OUTPATIENT
Start: 2023-06-14 | End: 2023-06-20 | Stop reason: HOSPADM

## 2023-06-13 RX ORDER — SODIUM CHLORIDE 0.9 % (FLUSH) 0.9 %
5-40 SYRINGE (ML) INJECTION PRN
Status: DISCONTINUED | OUTPATIENT
Start: 2023-06-13 | End: 2023-06-20 | Stop reason: HOSPADM

## 2023-06-13 RX ORDER — MIRTAZAPINE 7.5 MG/1
7.5 TABLET, FILM COATED ORAL NIGHTLY
COMMUNITY

## 2023-06-13 RX ORDER — SODIUM CHLORIDE 9 MG/ML
INJECTION, SOLUTION INTRAVENOUS CONTINUOUS
Status: DISCONTINUED | OUTPATIENT
Start: 2023-06-13 | End: 2023-06-20 | Stop reason: HOSPADM

## 2023-06-13 RX ORDER — SODIUM CHLORIDE 9 MG/ML
INJECTION, SOLUTION INTRAVENOUS
Status: DISCONTINUED
Start: 2023-06-13 | End: 2023-06-14

## 2023-06-13 RX ORDER — PREDNISONE 5 MG/1
5 TABLET ORAL EVERY MORNING
Status: DISCONTINUED | OUTPATIENT
Start: 2023-06-14 | End: 2023-06-20 | Stop reason: HOSPADM

## 2023-06-13 RX ORDER — HYDROCODONE BITARTRATE AND ACETAMINOPHEN 5; 325 MG/1; MG/1
1 TABLET ORAL 4 TIMES DAILY
Status: DISCONTINUED | OUTPATIENT
Start: 2023-06-13 | End: 2023-06-20 | Stop reason: HOSPADM

## 2023-06-13 RX ORDER — MIRTAZAPINE 15 MG/1
7.5 TABLET, FILM COATED ORAL NIGHTLY
Status: DISCONTINUED | OUTPATIENT
Start: 2023-06-13 | End: 2023-06-20 | Stop reason: HOSPADM

## 2023-06-13 RX ORDER — METOPROLOL SUCCINATE 50 MG/1
100 TABLET, EXTENDED RELEASE ORAL
Status: DISCONTINUED | OUTPATIENT
Start: 2023-06-13 | End: 2023-06-20 | Stop reason: HOSPADM

## 2023-06-13 RX ORDER — M-VIT,TX,IRON,MINS/CALC/FOLIC 27MG-0.4MG
1 TABLET ORAL EVERY MORNING
Status: DISCONTINUED | OUTPATIENT
Start: 2023-06-14 | End: 2023-06-20 | Stop reason: HOSPADM

## 2023-06-13 RX ORDER — DOXYCYCLINE HYCLATE 100 MG
100 TABLET ORAL 2 TIMES DAILY
Qty: 14 TABLET | Refills: 0 | Status: SHIPPED | OUTPATIENT
Start: 2023-06-13 | End: 2023-06-13 | Stop reason: CLARIF

## 2023-06-13 RX ORDER — SODIUM CHLORIDE 0.9 % (FLUSH) 0.9 %
5-40 SYRINGE (ML) INJECTION EVERY 12 HOURS SCHEDULED
Status: DISCONTINUED | OUTPATIENT
Start: 2023-06-13 | End: 2023-06-20 | Stop reason: HOSPADM

## 2023-06-13 RX ORDER — CEFTRIAXONE 2 G/1
INJECTION, POWDER, FOR SOLUTION INTRAMUSCULAR; INTRAVENOUS
Status: DISCONTINUED
Start: 2023-06-13 | End: 2023-06-14

## 2023-06-13 RX ORDER — CEFDINIR 300 MG/1
300 CAPSULE ORAL 2 TIMES DAILY
Qty: 14 CAPSULE | Refills: 0 | Status: SHIPPED | OUTPATIENT
Start: 2023-06-13 | End: 2023-06-13 | Stop reason: CLARIF

## 2023-06-13 RX ADMIN — Medication 10 ML: at 21:44

## 2023-06-13 RX ADMIN — CEFTRIAXONE SODIUM 2000 MG: 2 INJECTION, POWDER, FOR SOLUTION INTRAMUSCULAR; INTRAVENOUS at 18:21

## 2023-06-13 RX ADMIN — DOXYCYCLINE 100 MG: 100 INJECTION, POWDER, LYOPHILIZED, FOR SOLUTION INTRAVENOUS at 18:22

## 2023-06-13 RX ADMIN — SODIUM CHLORIDE: 9 INJECTION, SOLUTION INTRAVENOUS at 22:06

## 2023-06-13 RX ADMIN — IOPAMIDOL 75 ML: 755 INJECTION, SOLUTION INTRAVENOUS at 16:19

## 2023-06-13 RX ADMIN — SODIUM CHLORIDE 1000 ML: 9 INJECTION, SOLUTION INTRAVENOUS at 15:12

## 2023-06-14 ENCOUNTER — APPOINTMENT (OUTPATIENT)
Dept: GENERAL RADIOLOGY | Age: 78
DRG: 178 | End: 2023-06-14
Payer: OTHER GOVERNMENT

## 2023-06-14 PROBLEM — D64.9 ANEMIA: Status: ACTIVE | Noted: 2023-06-14

## 2023-06-14 LAB
BASOPHILS # BLD: 0.05 E9/L (ref 0–0.2)
BASOPHILS NFR BLD: 0.9 % (ref 0–2)
EOSINOPHIL # BLD: 0 E9/L (ref 0.05–0.5)
EOSINOPHIL NFR BLD: 0.2 % (ref 0–6)
ERYTHROCYTE [DISTWIDTH] IN BLOOD BY AUTOMATED COUNT: 15 FL (ref 11.5–15)
HCT VFR BLD AUTO: 26.9 % (ref 37–54)
HGB BLD-MCNC: 8.4 G/DL (ref 12.5–16.5)
LYMPHOCYTES # BLD: 0.16 E9/L (ref 1.5–4)
LYMPHOCYTES NFR BLD: 2.6 % (ref 20–42)
MCH RBC QN AUTO: 31.1 PG (ref 26–35)
MCHC RBC AUTO-ENTMCNC: 31.2 % (ref 32–34.5)
MCV RBC AUTO: 99.6 FL (ref 80–99.9)
MONOCYTES # BLD: 0.22 E9/L (ref 0.1–0.95)
MONOCYTES NFR BLD: 3.5 % (ref 2–12)
NEUTROPHILS # BLD: 5.02 E9/L (ref 1.8–7.3)
NEUTS SEG NFR BLD: 93 % (ref 43–80)
PLATELET # BLD AUTO: 208 E9/L (ref 130–450)
PMV BLD AUTO: 8.9 FL (ref 7–12)
POLYCHROMASIA: ABNORMAL
PROCALCITONIN: 1.78 NG/ML (ref 0–0.08)
PROCALCITONIN: 3.12 NG/ML (ref 0–0.08)
RBC # BLD AUTO: 2.7 E12/L (ref 3.8–5.8)
WBC # BLD: 5.4 E9/L (ref 4.5–11.5)

## 2023-06-14 PROCEDURE — 97530 THERAPEUTIC ACTIVITIES: CPT | Performed by: PHYSICAL THERAPIST

## 2023-06-14 PROCEDURE — 6360000002 HC RX W HCPCS: Performed by: INTERNAL MEDICINE

## 2023-06-14 PROCEDURE — 2500000003 HC RX 250 WO HCPCS: Performed by: INTERNAL MEDICINE

## 2023-06-14 PROCEDURE — 92526 ORAL FUNCTION THERAPY: CPT

## 2023-06-14 PROCEDURE — 94640 AIRWAY INHALATION TREATMENT: CPT

## 2023-06-14 PROCEDURE — 94669 MECHANICAL CHEST WALL OSCILL: CPT

## 2023-06-14 PROCEDURE — 97110 THERAPEUTIC EXERCISES: CPT | Performed by: PHYSICAL THERAPIST

## 2023-06-14 PROCEDURE — 99223 1ST HOSP IP/OBS HIGH 75: CPT | Performed by: STUDENT IN AN ORGANIZED HEALTH CARE EDUCATION/TRAINING PROGRAM

## 2023-06-14 PROCEDURE — 92610 EVALUATE SWALLOWING FUNCTION: CPT

## 2023-06-14 PROCEDURE — 97535 SELF CARE MNGMENT TRAINING: CPT

## 2023-06-14 PROCEDURE — 99255 IP/OBS CONSLTJ NEW/EST HI 80: CPT | Performed by: INTERNAL MEDICINE

## 2023-06-14 PROCEDURE — 2060000000 HC ICU INTERMEDIATE R&B

## 2023-06-14 PROCEDURE — 2580000003 HC RX 258: Performed by: INTERNAL MEDICINE

## 2023-06-14 PROCEDURE — 97161 PT EVAL LOW COMPLEX 20 MIN: CPT | Performed by: PHYSICAL THERAPIST

## 2023-06-14 PROCEDURE — 6370000000 HC RX 637 (ALT 250 FOR IP): Performed by: INTERNAL MEDICINE

## 2023-06-14 PROCEDURE — 71046 X-RAY EXAM CHEST 2 VIEWS: CPT

## 2023-06-14 PROCEDURE — 85025 COMPLETE CBC W/AUTO DIFF WBC: CPT

## 2023-06-14 PROCEDURE — 94664 DEMO&/EVAL PT USE INHALER: CPT

## 2023-06-14 PROCEDURE — 36415 COLL VENOUS BLD VENIPUNCTURE: CPT

## 2023-06-14 PROCEDURE — 97165 OT EVAL LOW COMPLEX 30 MIN: CPT

## 2023-06-14 PROCEDURE — 84145 PROCALCITONIN (PCT): CPT

## 2023-06-14 RX ORDER — PANTOPRAZOLE SODIUM 40 MG/1
40 TABLET, DELAYED RELEASE ORAL
Status: DISCONTINUED | OUTPATIENT
Start: 2023-06-15 | End: 2023-06-20 | Stop reason: HOSPADM

## 2023-06-14 RX ORDER — IPRATROPIUM BROMIDE AND ALBUTEROL SULFATE 2.5; .5 MG/3ML; MG/3ML
1 SOLUTION RESPIRATORY (INHALATION)
Status: DISCONTINUED | OUTPATIENT
Start: 2023-06-14 | End: 2023-06-20 | Stop reason: HOSPADM

## 2023-06-14 RX ORDER — FERROUS SULFATE 325(65) MG
325 TABLET ORAL
Status: DISCONTINUED | OUTPATIENT
Start: 2023-06-15 | End: 2023-06-20 | Stop reason: HOSPADM

## 2023-06-14 RX ORDER — ACETAMINOPHEN 650 MG/1
650 SUPPOSITORY RECTAL EVERY 4 HOURS PRN
Status: DISCONTINUED | OUTPATIENT
Start: 2023-06-14 | End: 2023-06-20 | Stop reason: HOSPADM

## 2023-06-14 RX ORDER — ACETYLCYSTEINE 100 MG/ML
4 SOLUTION ORAL; RESPIRATORY (INHALATION)
Status: DISCONTINUED | OUTPATIENT
Start: 2023-06-14 | End: 2023-06-20 | Stop reason: HOSPADM

## 2023-06-14 RX ORDER — ACETYLCYSTEINE 100 MG/ML
4 SOLUTION ORAL; RESPIRATORY (INHALATION) EVERY 4 HOURS
Status: DISCONTINUED | OUTPATIENT
Start: 2023-06-14 | End: 2023-06-14

## 2023-06-14 RX ADMIN — MIRTAZAPINE 7.5 MG: 15 TABLET, FILM COATED ORAL at 20:28

## 2023-06-14 RX ADMIN — DOXYCYCLINE 100 MG: 100 INJECTION, POWDER, LYOPHILIZED, FOR SOLUTION INTRAVENOUS at 06:11

## 2023-06-14 RX ADMIN — ACETAMINOPHEN 650 MG: 650 SUPPOSITORY RECTAL at 07:08

## 2023-06-14 RX ADMIN — HYDROCODONE BITARTRATE AND ACETAMINOPHEN 1 TABLET: 5; 325 TABLET ORAL at 17:02

## 2023-06-14 RX ADMIN — Medication 10 ML: at 20:33

## 2023-06-14 RX ADMIN — IPRATROPIUM BROMIDE AND ALBUTEROL SULFATE 1 DOSE: .5; 2.5 SOLUTION RESPIRATORY (INHALATION) at 18:04

## 2023-06-14 RX ADMIN — HYDROCODONE BITARTRATE AND ACETAMINOPHEN 1 TABLET: 5; 325 TABLET ORAL at 12:05

## 2023-06-14 RX ADMIN — PIPERACILLIN AND TAZOBACTAM 4500 MG: 4; .5 INJECTION, POWDER, LYOPHILIZED, FOR SOLUTION INTRAVENOUS at 15:30

## 2023-06-14 RX ADMIN — DOXYCYCLINE 100 MG: 100 INJECTION, POWDER, LYOPHILIZED, FOR SOLUTION INTRAVENOUS at 17:05

## 2023-06-14 RX ADMIN — ACETYLCYSTEINE 400 MG: 100 SOLUTION ORAL; RESPIRATORY (INHALATION) at 18:04

## 2023-06-14 RX ADMIN — HYDROCODONE BITARTRATE AND ACETAMINOPHEN 1 TABLET: 5; 325 TABLET ORAL at 20:28

## 2023-06-14 RX ADMIN — PIPERACILLIN AND TAZOBACTAM 4500 MG: 4; .5 INJECTION, POWDER, LYOPHILIZED, FOR SOLUTION INTRAVENOUS at 20:28

## 2023-06-14 RX ADMIN — APIXABAN 5 MG: 5 TABLET, FILM COATED ORAL at 20:28

## 2023-06-14 RX ADMIN — METOPROLOL SUCCINATE 100 MG: 50 TABLET, EXTENDED RELEASE ORAL at 17:02

## 2023-06-14 RX ADMIN — METHYLPREDNISOLONE SODIUM SUCCINATE 40 MG: 40 INJECTION, POWDER, FOR SOLUTION INTRAMUSCULAR; INTRAVENOUS at 12:05

## 2023-06-14 ASSESSMENT — ENCOUNTER SYMPTOMS
RESPIRATORY NEGATIVE: 1
GASTROINTESTINAL NEGATIVE: 1

## 2023-06-15 LAB
ANISOCYTOSIS: ABNORMAL
BASOPHILS # BLD: 0 E9/L (ref 0–0.2)
BASOPHILS NFR BLD: 0.2 % (ref 0–2)
EOSINOPHIL # BLD: 0 E9/L (ref 0.05–0.5)
EOSINOPHIL NFR BLD: 0 % (ref 0–6)
ERYTHROCYTE [DISTWIDTH] IN BLOOD BY AUTOMATED COUNT: 15.3 FL (ref 11.5–15)
FERRITIN SERPL-MCNC: ABNORMAL NG/ML
FOLATE SERPL-MCNC: >20 NG/ML (ref 4.8–24.2)
HCT VFR BLD AUTO: 25.9 % (ref 37–54)
HGB BLD-MCNC: 8 G/DL (ref 12.5–16.5)
IMMATURE RETIC FRACT: 23.8 % (ref 2.3–13.4)
IRON SATN MFR SERPL: 39 % (ref 20–55)
IRON SERPL-MCNC: 54 MCG/DL (ref 59–158)
LYMPHOCYTES # BLD: 0.21 E9/L (ref 1.5–4)
LYMPHOCYTES NFR BLD: 5.3 % (ref 20–42)
MCH RBC QN AUTO: 29.7 PG (ref 26–35)
MCHC RBC AUTO-ENTMCNC: 30.4 % (ref 32–34.5)
MCV RBC AUTO: 97.8 FL (ref 80–99.9)
MONOCYTES # BLD: 0.21 E9/L (ref 0.1–0.95)
MONOCYTES NFR BLD: 4.5 % (ref 2–12)
MYELOCYTES NFR BLD MANUAL: 0.9 % (ref 0–0)
NEUTROPHILS # BLD: 3.78 E9/L (ref 1.8–7.3)
NEUTS SEG NFR BLD: 89.3 % (ref 43–80)
NRBC BLD-RTO: 0.9 /100 WBC
PATHOLOGIST REVIEW: NORMAL
PLATELET # BLD AUTO: 224 E9/L (ref 130–450)
PMV BLD AUTO: 8.9 FL (ref 7–12)
POLYCHROMASIA: ABNORMAL
RBC # BLD AUTO: 2.69 E12/L (ref 3.8–5.8)
RETIC HGB EQUIVALENT: 25.2 PG (ref 28.2–36.6)
RETICS/RBC NFR: 1.3 % (ref 0.4–1.9)
RETICULOCYTE ABSOLUTE COUNT: 0.03 E12/L
TIBC SERPL-MCNC: 138 MCG/DL (ref 250–450)
VIT B12 SERPL-MCNC: 557 PG/ML (ref 211–946)
WBC # BLD: 4.2 E9/L (ref 4.5–11.5)

## 2023-06-15 PROCEDURE — 2580000003 HC RX 258: Performed by: INTERNAL MEDICINE

## 2023-06-15 PROCEDURE — 97110 THERAPEUTIC EXERCISES: CPT

## 2023-06-15 PROCEDURE — 84154 ASSAY OF PSA FREE: CPT

## 2023-06-15 PROCEDURE — 85025 COMPLETE CBC W/AUTO DIFF WBC: CPT

## 2023-06-15 PROCEDURE — 6360000002 HC RX W HCPCS: Performed by: INTERNAL MEDICINE

## 2023-06-15 PROCEDURE — 94640 AIRWAY INHALATION TREATMENT: CPT

## 2023-06-15 PROCEDURE — 36415 COLL VENOUS BLD VENIPUNCTURE: CPT

## 2023-06-15 PROCEDURE — 6370000000 HC RX 637 (ALT 250 FOR IP): Performed by: INTERNAL MEDICINE

## 2023-06-15 PROCEDURE — 83540 ASSAY OF IRON: CPT

## 2023-06-15 PROCEDURE — 2060000000 HC ICU INTERMEDIATE R&B

## 2023-06-15 PROCEDURE — 84153 ASSAY OF PSA TOTAL: CPT

## 2023-06-15 PROCEDURE — 82728 ASSAY OF FERRITIN: CPT

## 2023-06-15 PROCEDURE — 82607 VITAMIN B-12: CPT

## 2023-06-15 PROCEDURE — 97530 THERAPEUTIC ACTIVITIES: CPT

## 2023-06-15 PROCEDURE — 99233 SBSQ HOSP IP/OBS HIGH 50: CPT | Performed by: INTERNAL MEDICINE

## 2023-06-15 PROCEDURE — 94669 MECHANICAL CHEST WALL OSCILL: CPT

## 2023-06-15 PROCEDURE — 82746 ASSAY OF FOLIC ACID SERUM: CPT

## 2023-06-15 PROCEDURE — 2500000003 HC RX 250 WO HCPCS: Performed by: INTERNAL MEDICINE

## 2023-06-15 PROCEDURE — 92526 ORAL FUNCTION THERAPY: CPT | Performed by: SPEECH-LANGUAGE PATHOLOGIST

## 2023-06-15 PROCEDURE — 84270 ASSAY OF SEX HORMONE GLOBUL: CPT

## 2023-06-15 PROCEDURE — 99232 SBSQ HOSP IP/OBS MODERATE 35: CPT | Performed by: INTERNAL MEDICINE

## 2023-06-15 PROCEDURE — 83550 IRON BINDING TEST: CPT

## 2023-06-15 PROCEDURE — 84403 ASSAY OF TOTAL TESTOSTERONE: CPT

## 2023-06-15 PROCEDURE — 85045 AUTOMATED RETICULOCYTE COUNT: CPT

## 2023-06-15 RX ADMIN — IPRATROPIUM BROMIDE AND ALBUTEROL SULFATE 1 DOSE: .5; 2.5 SOLUTION RESPIRATORY (INHALATION) at 05:52

## 2023-06-15 RX ADMIN — APIXABAN 5 MG: 5 TABLET, FILM COATED ORAL at 09:20

## 2023-06-15 RX ADMIN — ACETYLCYSTEINE 400 MG: 100 SOLUTION ORAL; RESPIRATORY (INHALATION) at 13:22

## 2023-06-15 RX ADMIN — APIXABAN 5 MG: 5 TABLET, FILM COATED ORAL at 21:44

## 2023-06-15 RX ADMIN — PREDNISONE 5 MG: 5 TABLET ORAL at 09:20

## 2023-06-15 RX ADMIN — IPRATROPIUM BROMIDE AND ALBUTEROL SULFATE 1 DOSE: .5; 2.5 SOLUTION RESPIRATORY (INHALATION) at 13:22

## 2023-06-15 RX ADMIN — HYDROCODONE BITARTRATE AND ACETAMINOPHEN 1 TABLET: 5; 325 TABLET ORAL at 09:20

## 2023-06-15 RX ADMIN — ACETYLCYSTEINE 400 MG: 100 SOLUTION ORAL; RESPIRATORY (INHALATION) at 18:27

## 2023-06-15 RX ADMIN — DOXYCYCLINE 100 MG: 100 INJECTION, POWDER, LYOPHILIZED, FOR SOLUTION INTRAVENOUS at 05:46

## 2023-06-15 RX ADMIN — IPRATROPIUM BROMIDE AND ALBUTEROL SULFATE 1 DOSE: .5; 2.5 SOLUTION RESPIRATORY (INHALATION) at 18:27

## 2023-06-15 RX ADMIN — ACETYLCYSTEINE 400 MG: 100 SOLUTION ORAL; RESPIRATORY (INHALATION) at 09:42

## 2023-06-15 RX ADMIN — PIPERACILLIN AND TAZOBACTAM 4500 MG: 4; .5 INJECTION, POWDER, LYOPHILIZED, FOR SOLUTION INTRAVENOUS at 06:44

## 2023-06-15 RX ADMIN — FERROUS SULFATE TAB 325 MG (65 MG ELEMENTAL FE) 325 MG: 325 (65 FE) TAB at 09:20

## 2023-06-15 RX ADMIN — DOXYCYCLINE 100 MG: 100 INJECTION, POWDER, LYOPHILIZED, FOR SOLUTION INTRAVENOUS at 21:46

## 2023-06-15 RX ADMIN — IPRATROPIUM BROMIDE AND ALBUTEROL SULFATE 1 DOSE: .5; 2.5 SOLUTION RESPIRATORY (INHALATION) at 09:42

## 2023-06-15 RX ADMIN — HYDROCODONE BITARTRATE AND ACETAMINOPHEN 1 TABLET: 5; 325 TABLET ORAL at 12:57

## 2023-06-15 RX ADMIN — METOPROLOL SUCCINATE 100 MG: 50 TABLET, EXTENDED RELEASE ORAL at 17:41

## 2023-06-15 RX ADMIN — HYDROCODONE BITARTRATE AND ACETAMINOPHEN 1 TABLET: 5; 325 TABLET ORAL at 21:44

## 2023-06-15 RX ADMIN — PIPERACILLIN AND TAZOBACTAM 4500 MG: 4; .5 INJECTION, POWDER, LYOPHILIZED, FOR SOLUTION INTRAVENOUS at 15:22

## 2023-06-15 RX ADMIN — CALCIUM CARBONATE (ANTACID) CHEW TAB 500 MG 500 MG: 500 CHEW TAB at 09:23

## 2023-06-15 RX ADMIN — HYDROCODONE BITARTRATE AND ACETAMINOPHEN 1 TABLET: 5; 325 TABLET ORAL at 17:41

## 2023-06-15 RX ADMIN — MIRTAZAPINE 7.5 MG: 15 TABLET, FILM COATED ORAL at 21:44

## 2023-06-15 RX ADMIN — ACETYLCYSTEINE 400 MG: 100 SOLUTION ORAL; RESPIRATORY (INHALATION) at 05:52

## 2023-06-15 RX ADMIN — PANTOPRAZOLE SODIUM 40 MG: 40 TABLET, DELAYED RELEASE ORAL at 06:46

## 2023-06-15 ASSESSMENT — PAIN SCALES - GENERAL
PAINLEVEL_OUTOF10: 0

## 2023-06-15 ASSESSMENT — ENCOUNTER SYMPTOMS
COUGH: 1
BACK PAIN: 1

## 2023-06-16 LAB
ALBUMIN SERPL-MCNC: 2.7 G/DL (ref 3.5–5.2)
ALP SERPL-CCNC: 432 U/L (ref 40–129)
ALT SERPL-CCNC: 31 U/L (ref 0–40)
ANION GAP SERPL CALCULATED.3IONS-SCNC: 7 MMOL/L (ref 7–16)
ANISOCYTOSIS: ABNORMAL
AST SERPL-CCNC: 36 U/L (ref 0–39)
BASOPHILS # BLD: 0 E9/L (ref 0–0.2)
BASOPHILS NFR BLD: 0.3 % (ref 0–2)
BILIRUB SERPL-MCNC: 0.2 MG/DL (ref 0–1.2)
BUN SERPL-MCNC: 23 MG/DL (ref 6–23)
CALCIUM SERPL-MCNC: 7.4 MG/DL (ref 8.6–10.2)
CHLORIDE SERPL-SCNC: 108 MMOL/L (ref 98–107)
CO2 SERPL-SCNC: 25 MMOL/L (ref 22–29)
CREAT SERPL-MCNC: 0.8 MG/DL (ref 0.7–1.2)
EOSINOPHIL # BLD: 0 E9/L (ref 0.05–0.5)
EOSINOPHIL NFR BLD: 1 % (ref 0–6)
ERYTHROCYTE [DISTWIDTH] IN BLOOD BY AUTOMATED COUNT: 15.7 FL (ref 11.5–15)
FERRITIN SERPL-MCNC: 5046 NG/ML
GLUCOSE SERPL-MCNC: 92 MG/DL (ref 74–99)
HCT VFR BLD AUTO: 25.9 % (ref 37–54)
HGB BLD-MCNC: 7.8 G/DL (ref 12.5–16.5)
LYMPHOCYTES # BLD: 0.24 E9/L (ref 1.5–4)
LYMPHOCYTES NFR BLD: 6.1 % (ref 20–42)
MCH RBC QN AUTO: 30.2 PG (ref 26–35)
MCHC RBC AUTO-ENTMCNC: 30.1 % (ref 32–34.5)
MCV RBC AUTO: 100.4 FL (ref 80–99.9)
MONOCYTES # BLD: 0.16 E9/L (ref 0.1–0.95)
MONOCYTES NFR BLD: 4.3 % (ref 2–12)
NEUTROPHILS # BLD: 3.6 E9/L (ref 1.8–7.3)
NEUTS SEG NFR BLD: 89.6 % (ref 43–80)
NRBC BLD-RTO: 0.9 /100 WBC
PLATELET # BLD AUTO: 210 E9/L (ref 130–450)
PMV BLD AUTO: 8.7 FL (ref 7–12)
POLYCHROMASIA: ABNORMAL
POTASSIUM SERPL-SCNC: 4 MMOL/L (ref 3.5–5)
PROT SERPL-MCNC: 5.8 G/DL (ref 6.4–8.3)
RBC # BLD AUTO: 2.58 E12/L (ref 3.8–5.8)
SHBG SERPL-SCNC: 48 NMOL/L (ref 11–80)
SODIUM SERPL-SCNC: 140 MMOL/L (ref 132–146)
TESTOST FREE SERPL-MCNC: 2.4 PG/ML (ref 47–244)
TESTOST SERPL-MCNC: 17 NG/DL (ref 220–1000)
WBC # BLD: 4 E9/L (ref 4.5–11.5)

## 2023-06-16 PROCEDURE — 97530 THERAPEUTIC ACTIVITIES: CPT

## 2023-06-16 PROCEDURE — 2060000000 HC ICU INTERMEDIATE R&B

## 2023-06-16 PROCEDURE — 94640 AIRWAY INHALATION TREATMENT: CPT

## 2023-06-16 PROCEDURE — 2580000003 HC RX 258: Performed by: INTERNAL MEDICINE

## 2023-06-16 PROCEDURE — 6370000000 HC RX 637 (ALT 250 FOR IP): Performed by: INTERNAL MEDICINE

## 2023-06-16 PROCEDURE — 99232 SBSQ HOSP IP/OBS MODERATE 35: CPT | Performed by: STUDENT IN AN ORGANIZED HEALTH CARE EDUCATION/TRAINING PROGRAM

## 2023-06-16 PROCEDURE — 36415 COLL VENOUS BLD VENIPUNCTURE: CPT

## 2023-06-16 PROCEDURE — 97110 THERAPEUTIC EXERCISES: CPT

## 2023-06-16 PROCEDURE — 2500000003 HC RX 250 WO HCPCS: Performed by: INTERNAL MEDICINE

## 2023-06-16 PROCEDURE — 99233 SBSQ HOSP IP/OBS HIGH 50: CPT | Performed by: INTERNAL MEDICINE

## 2023-06-16 PROCEDURE — 82728 ASSAY OF FERRITIN: CPT

## 2023-06-16 PROCEDURE — 80053 COMPREHEN METABOLIC PANEL: CPT

## 2023-06-16 PROCEDURE — 94669 MECHANICAL CHEST WALL OSCILL: CPT

## 2023-06-16 PROCEDURE — 85025 COMPLETE CBC W/AUTO DIFF WBC: CPT

## 2023-06-16 PROCEDURE — 6360000002 HC RX W HCPCS: Performed by: INTERNAL MEDICINE

## 2023-06-16 RX ADMIN — ACETYLCYSTEINE 400 MG: 100 SOLUTION ORAL; RESPIRATORY (INHALATION) at 10:40

## 2023-06-16 RX ADMIN — PIPERACILLIN AND TAZOBACTAM 4500 MG: 4; .5 INJECTION, POWDER, LYOPHILIZED, FOR SOLUTION INTRAVENOUS at 00:14

## 2023-06-16 RX ADMIN — POLYETHYLENE GLYCOL 3350 17 G: 17 POWDER, FOR SOLUTION ORAL at 08:47

## 2023-06-16 RX ADMIN — FERROUS SULFATE TAB 325 MG (65 MG ELEMENTAL FE) 325 MG: 325 (65 FE) TAB at 08:47

## 2023-06-16 RX ADMIN — CALCIUM CARBONATE (ANTACID) CHEW TAB 500 MG 500 MG: 500 CHEW TAB at 08:47

## 2023-06-16 RX ADMIN — PIPERACILLIN AND TAZOBACTAM 4500 MG: 4; .5 INJECTION, POWDER, LYOPHILIZED, FOR SOLUTION INTRAVENOUS at 08:46

## 2023-06-16 RX ADMIN — METOPROLOL SUCCINATE 100 MG: 50 TABLET, EXTENDED RELEASE ORAL at 17:13

## 2023-06-16 RX ADMIN — ACETYLCYSTEINE 400 MG: 100 SOLUTION ORAL; RESPIRATORY (INHALATION) at 07:10

## 2023-06-16 RX ADMIN — APIXABAN 5 MG: 5 TABLET, FILM COATED ORAL at 21:12

## 2023-06-16 RX ADMIN — DOXYCYCLINE 100 MG: 100 INJECTION, POWDER, LYOPHILIZED, FOR SOLUTION INTRAVENOUS at 13:02

## 2023-06-16 RX ADMIN — ACETYLCYSTEINE 400 MG: 100 SOLUTION ORAL; RESPIRATORY (INHALATION) at 18:27

## 2023-06-16 RX ADMIN — HYDROCODONE BITARTRATE AND ACETAMINOPHEN 1 TABLET: 5; 325 TABLET ORAL at 08:47

## 2023-06-16 RX ADMIN — PIPERACILLIN AND TAZOBACTAM 4500 MG: 4; .5 INJECTION, POWDER, LYOPHILIZED, FOR SOLUTION INTRAVENOUS at 16:35

## 2023-06-16 RX ADMIN — PANTOPRAZOLE SODIUM 40 MG: 40 TABLET, DELAYED RELEASE ORAL at 05:55

## 2023-06-16 RX ADMIN — APIXABAN 5 MG: 5 TABLET, FILM COATED ORAL at 08:47

## 2023-06-16 RX ADMIN — HYDROCODONE BITARTRATE AND ACETAMINOPHEN 1 TABLET: 5; 325 TABLET ORAL at 21:12

## 2023-06-16 RX ADMIN — IPRATROPIUM BROMIDE AND ALBUTEROL SULFATE 1 DOSE: .5; 2.5 SOLUTION RESPIRATORY (INHALATION) at 07:10

## 2023-06-16 RX ADMIN — IPRATROPIUM BROMIDE AND ALBUTEROL SULFATE 1 DOSE: .5; 2.5 SOLUTION RESPIRATORY (INHALATION) at 18:27

## 2023-06-16 RX ADMIN — SODIUM CHLORIDE: 9 INJECTION, SOLUTION INTRAVENOUS at 00:13

## 2023-06-16 RX ADMIN — Medication 10 ML: at 08:47

## 2023-06-16 RX ADMIN — IPRATROPIUM BROMIDE AND ALBUTEROL SULFATE 1 DOSE: .5; 2.5 SOLUTION RESPIRATORY (INHALATION) at 10:40

## 2023-06-16 RX ADMIN — HYDROCODONE BITARTRATE AND ACETAMINOPHEN 1 TABLET: 5; 325 TABLET ORAL at 13:02

## 2023-06-16 RX ADMIN — PREDNISONE 5 MG: 5 TABLET ORAL at 08:47

## 2023-06-16 RX ADMIN — ACETYLCYSTEINE 400 MG: 100 SOLUTION ORAL; RESPIRATORY (INHALATION) at 14:39

## 2023-06-16 RX ADMIN — MIRTAZAPINE 7.5 MG: 15 TABLET, FILM COATED ORAL at 21:12

## 2023-06-16 RX ADMIN — HYDROCODONE BITARTRATE AND ACETAMINOPHEN 1 TABLET: 5; 325 TABLET ORAL at 17:13

## 2023-06-16 RX ADMIN — IPRATROPIUM BROMIDE AND ALBUTEROL SULFATE 1 DOSE: .5; 2.5 SOLUTION RESPIRATORY (INHALATION) at 14:39

## 2023-06-16 ASSESSMENT — ENCOUNTER SYMPTOMS
BACK PAIN: 1
COUGH: 1

## 2023-06-17 LAB
PSA FREE MFR SERPL: 29 %
PSA FREE SERPL-MCNC: 9.3 NG/ML
PSA SERPL-MCNC: 31.7 NG/ML (ref 0–4)

## 2023-06-17 PROCEDURE — 2500000003 HC RX 250 WO HCPCS: Performed by: INTERNAL MEDICINE

## 2023-06-17 PROCEDURE — 6360000002 HC RX W HCPCS: Performed by: INTERNAL MEDICINE

## 2023-06-17 PROCEDURE — 6370000000 HC RX 637 (ALT 250 FOR IP): Performed by: INTERNAL MEDICINE

## 2023-06-17 PROCEDURE — 99232 SBSQ HOSP IP/OBS MODERATE 35: CPT | Performed by: INTERNAL MEDICINE

## 2023-06-17 PROCEDURE — 2580000003 HC RX 258: Performed by: INTERNAL MEDICINE

## 2023-06-17 PROCEDURE — 94640 AIRWAY INHALATION TREATMENT: CPT

## 2023-06-17 PROCEDURE — 2060000000 HC ICU INTERMEDIATE R&B

## 2023-06-17 RX ADMIN — PIPERACILLIN AND TAZOBACTAM 4500 MG: 4; .5 INJECTION, POWDER, LYOPHILIZED, FOR SOLUTION INTRAVENOUS at 09:16

## 2023-06-17 RX ADMIN — ACETYLCYSTEINE 400 MG: 100 SOLUTION ORAL; RESPIRATORY (INHALATION) at 09:19

## 2023-06-17 RX ADMIN — PIPERACILLIN AND TAZOBACTAM 4500 MG: 4; .5 INJECTION, POWDER, LYOPHILIZED, FOR SOLUTION INTRAVENOUS at 01:40

## 2023-06-17 RX ADMIN — ACETYLCYSTEINE 400 MG: 100 SOLUTION ORAL; RESPIRATORY (INHALATION) at 18:35

## 2023-06-17 RX ADMIN — APIXABAN 5 MG: 5 TABLET, FILM COATED ORAL at 09:09

## 2023-06-17 RX ADMIN — HYDROCODONE BITARTRATE AND ACETAMINOPHEN 1 TABLET: 5; 325 TABLET ORAL at 09:10

## 2023-06-17 RX ADMIN — ACETYLCYSTEINE 400 MG: 100 SOLUTION ORAL; RESPIRATORY (INHALATION) at 13:19

## 2023-06-17 RX ADMIN — ACETYLCYSTEINE 400 MG: 100 SOLUTION ORAL; RESPIRATORY (INHALATION) at 05:57

## 2023-06-17 RX ADMIN — PREDNISONE 5 MG: 5 TABLET ORAL at 09:09

## 2023-06-17 RX ADMIN — SODIUM CHLORIDE: 9 INJECTION, SOLUTION INTRAVENOUS at 15:19

## 2023-06-17 RX ADMIN — MIRTAZAPINE 7.5 MG: 15 TABLET, FILM COATED ORAL at 21:07

## 2023-06-17 RX ADMIN — Medication 10 ML: at 21:06

## 2023-06-17 RX ADMIN — PANTOPRAZOLE SODIUM 40 MG: 40 TABLET, DELAYED RELEASE ORAL at 06:25

## 2023-06-17 RX ADMIN — IPRATROPIUM BROMIDE AND ALBUTEROL SULFATE 1 DOSE: .5; 2.5 SOLUTION RESPIRATORY (INHALATION) at 09:19

## 2023-06-17 RX ADMIN — HYDROCODONE BITARTRATE AND ACETAMINOPHEN 1 TABLET: 5; 325 TABLET ORAL at 21:07

## 2023-06-17 RX ADMIN — IPRATROPIUM BROMIDE AND ALBUTEROL SULFATE 1 DOSE: .5; 2.5 SOLUTION RESPIRATORY (INHALATION) at 05:57

## 2023-06-17 RX ADMIN — APIXABAN 5 MG: 5 TABLET, FILM COATED ORAL at 21:07

## 2023-06-17 RX ADMIN — HYDROCODONE BITARTRATE AND ACETAMINOPHEN 1 TABLET: 5; 325 TABLET ORAL at 16:47

## 2023-06-17 RX ADMIN — DOXYCYCLINE 100 MG: 100 INJECTION, POWDER, LYOPHILIZED, FOR SOLUTION INTRAVENOUS at 00:31

## 2023-06-17 RX ADMIN — PIPERACILLIN AND TAZOBACTAM 4500 MG: 4; .5 INJECTION, POWDER, LYOPHILIZED, FOR SOLUTION INTRAVENOUS at 16:51

## 2023-06-17 RX ADMIN — HYDROCODONE BITARTRATE AND ACETAMINOPHEN 1 TABLET: 5; 325 TABLET ORAL at 12:58

## 2023-06-17 RX ADMIN — CALCIUM CARBONATE (ANTACID) CHEW TAB 500 MG 500 MG: 500 CHEW TAB at 09:10

## 2023-06-17 RX ADMIN — DOXYCYCLINE 100 MG: 100 INJECTION, POWDER, LYOPHILIZED, FOR SOLUTION INTRAVENOUS at 12:58

## 2023-06-17 RX ADMIN — FERROUS SULFATE TAB 325 MG (65 MG ELEMENTAL FE) 325 MG: 325 (65 FE) TAB at 09:08

## 2023-06-17 RX ADMIN — IPRATROPIUM BROMIDE AND ALBUTEROL SULFATE 1 DOSE: .5; 2.5 SOLUTION RESPIRATORY (INHALATION) at 18:35

## 2023-06-17 RX ADMIN — DOXYCYCLINE 100 MG: 100 INJECTION, POWDER, LYOPHILIZED, FOR SOLUTION INTRAVENOUS at 23:46

## 2023-06-17 RX ADMIN — IPRATROPIUM BROMIDE AND ALBUTEROL SULFATE 1 DOSE: .5; 2.5 SOLUTION RESPIRATORY (INHALATION) at 13:20

## 2023-06-17 RX ADMIN — METOPROLOL SUCCINATE 100 MG: 50 TABLET, EXTENDED RELEASE ORAL at 16:47

## 2023-06-17 ASSESSMENT — ENCOUNTER SYMPTOMS
BACK PAIN: 1
COUGH: 1

## 2023-06-18 PROCEDURE — 2060000000 HC ICU INTERMEDIATE R&B

## 2023-06-18 PROCEDURE — 2580000003 HC RX 258: Performed by: INTERNAL MEDICINE

## 2023-06-18 PROCEDURE — 6370000000 HC RX 637 (ALT 250 FOR IP): Performed by: INTERNAL MEDICINE

## 2023-06-18 PROCEDURE — 99232 SBSQ HOSP IP/OBS MODERATE 35: CPT | Performed by: STUDENT IN AN ORGANIZED HEALTH CARE EDUCATION/TRAINING PROGRAM

## 2023-06-18 PROCEDURE — 6360000002 HC RX W HCPCS: Performed by: INTERNAL MEDICINE

## 2023-06-18 PROCEDURE — 2500000003 HC RX 250 WO HCPCS: Performed by: INTERNAL MEDICINE

## 2023-06-18 PROCEDURE — 94669 MECHANICAL CHEST WALL OSCILL: CPT

## 2023-06-18 PROCEDURE — 99233 SBSQ HOSP IP/OBS HIGH 50: CPT | Performed by: INTERNAL MEDICINE

## 2023-06-18 PROCEDURE — 94640 AIRWAY INHALATION TREATMENT: CPT

## 2023-06-18 RX ADMIN — IPRATROPIUM BROMIDE AND ALBUTEROL SULFATE 1 DOSE: .5; 2.5 SOLUTION RESPIRATORY (INHALATION) at 09:39

## 2023-06-18 RX ADMIN — DOXYCYCLINE 100 MG: 100 INJECTION, POWDER, LYOPHILIZED, FOR SOLUTION INTRAVENOUS at 23:41

## 2023-06-18 RX ADMIN — ACETYLCYSTEINE 400 MG: 100 SOLUTION ORAL; RESPIRATORY (INHALATION) at 14:20

## 2023-06-18 RX ADMIN — ACETYLCYSTEINE 400 MG: 100 SOLUTION ORAL; RESPIRATORY (INHALATION) at 09:39

## 2023-06-18 RX ADMIN — HYDROCODONE BITARTRATE AND ACETAMINOPHEN 1 TABLET: 5; 325 TABLET ORAL at 21:09

## 2023-06-18 RX ADMIN — MIRTAZAPINE 7.5 MG: 15 TABLET, FILM COATED ORAL at 21:09

## 2023-06-18 RX ADMIN — IPRATROPIUM BROMIDE AND ALBUTEROL SULFATE 1 DOSE: .5; 2.5 SOLUTION RESPIRATORY (INHALATION) at 04:52

## 2023-06-18 RX ADMIN — PANTOPRAZOLE SODIUM 40 MG: 40 TABLET, DELAYED RELEASE ORAL at 05:46

## 2023-06-18 RX ADMIN — METOPROLOL SUCCINATE 100 MG: 50 TABLET, EXTENDED RELEASE ORAL at 17:31

## 2023-06-18 RX ADMIN — HYDROCODONE BITARTRATE AND ACETAMINOPHEN 1 TABLET: 5; 325 TABLET ORAL at 13:42

## 2023-06-18 RX ADMIN — IPRATROPIUM BROMIDE AND ALBUTEROL SULFATE 1 DOSE: .5; 2.5 SOLUTION RESPIRATORY (INHALATION) at 14:19

## 2023-06-18 RX ADMIN — PIPERACILLIN AND TAZOBACTAM 4500 MG: 4; .5 INJECTION, POWDER, LYOPHILIZED, FOR SOLUTION INTRAVENOUS at 17:31

## 2023-06-18 RX ADMIN — HYDROCODONE BITARTRATE AND ACETAMINOPHEN 1 TABLET: 5; 325 TABLET ORAL at 17:31

## 2023-06-18 RX ADMIN — PREDNISONE 5 MG: 5 TABLET ORAL at 09:15

## 2023-06-18 RX ADMIN — Medication 10 ML: at 09:30

## 2023-06-18 RX ADMIN — DOXYCYCLINE 100 MG: 100 INJECTION, POWDER, LYOPHILIZED, FOR SOLUTION INTRAVENOUS at 13:41

## 2023-06-18 RX ADMIN — PIPERACILLIN AND TAZOBACTAM 4500 MG: 4; .5 INJECTION, POWDER, LYOPHILIZED, FOR SOLUTION INTRAVENOUS at 09:29

## 2023-06-18 RX ADMIN — APIXABAN 5 MG: 5 TABLET, FILM COATED ORAL at 21:09

## 2023-06-18 RX ADMIN — PIPERACILLIN AND TAZOBACTAM 4500 MG: 4; .5 INJECTION, POWDER, LYOPHILIZED, FOR SOLUTION INTRAVENOUS at 01:02

## 2023-06-18 RX ADMIN — HYDROCODONE BITARTRATE AND ACETAMINOPHEN 1 TABLET: 5; 325 TABLET ORAL at 09:15

## 2023-06-18 RX ADMIN — CALCIUM CARBONATE (ANTACID) CHEW TAB 500 MG 500 MG: 500 CHEW TAB at 09:15

## 2023-06-18 RX ADMIN — Medication 10 ML: at 21:09

## 2023-06-18 RX ADMIN — FERROUS SULFATE TAB 325 MG (65 MG ELEMENTAL FE) 325 MG: 325 (65 FE) TAB at 09:15

## 2023-06-18 RX ADMIN — APIXABAN 5 MG: 5 TABLET, FILM COATED ORAL at 09:15

## 2023-06-18 RX ADMIN — ACETYLCYSTEINE 400 MG: 100 SOLUTION ORAL; RESPIRATORY (INHALATION) at 04:52

## 2023-06-18 ASSESSMENT — ENCOUNTER SYMPTOMS
BACK PAIN: 1
COUGH: 1

## 2023-06-19 LAB
ANION GAP SERPL CALCULATED.3IONS-SCNC: 10 MMOL/L (ref 7–16)
ANISOCYTOSIS: ABNORMAL
BASOPHILS # BLD: 0.04 E9/L (ref 0–0.2)
BASOPHILS NFR BLD: 0.9 % (ref 0–2)
BUN SERPL-MCNC: 22 MG/DL (ref 6–23)
CALCIUM SERPL-MCNC: 8 MG/DL (ref 8.6–10.2)
CHLORIDE SERPL-SCNC: 108 MMOL/L (ref 98–107)
CO2 SERPL-SCNC: 26 MMOL/L (ref 22–29)
CREAT SERPL-MCNC: 0.8 MG/DL (ref 0.7–1.2)
EOSINOPHIL # BLD: 0.19 E9/L (ref 0.05–0.5)
EOSINOPHIL NFR BLD: 4.4 % (ref 0–6)
ERYTHROCYTE [DISTWIDTH] IN BLOOD BY AUTOMATED COUNT: 16.2 FL (ref 11.5–15)
GLUCOSE SERPL-MCNC: 84 MG/DL (ref 74–99)
HCT VFR BLD AUTO: 30.3 % (ref 37–54)
HGB BLD-MCNC: 9 G/DL (ref 12.5–16.5)
LYMPHOCYTES # BLD: 0.34 E9/L (ref 1.5–4)
LYMPHOCYTES NFR BLD: 7.9 % (ref 20–42)
MCH RBC QN AUTO: 30 PG (ref 26–35)
MCHC RBC AUTO-ENTMCNC: 29.7 % (ref 32–34.5)
MCV RBC AUTO: 101 FL (ref 80–99.9)
METAMYELOCYTES NFR BLD MANUAL: 2.6 % (ref 0–1)
MONOCYTES # BLD: 0.3 E9/L (ref 0.1–0.95)
MONOCYTES NFR BLD: 7 % (ref 2–12)
MYELOCYTES NFR BLD MANUAL: 1.8 % (ref 0–0)
NEUTROPHILS # BLD: 3.44 E9/L (ref 1.8–7.3)
NEUTS SEG NFR BLD: 75.4 % (ref 43–80)
OVALOCYTES: ABNORMAL
PLATELET # BLD AUTO: 218 E9/L (ref 130–450)
PMV BLD AUTO: 8.7 FL (ref 7–12)
POIKILOCYTES: ABNORMAL
POLYCHROMASIA: ABNORMAL
POTASSIUM SERPL-SCNC: 3.9 MMOL/L (ref 3.5–5)
RBC # BLD AUTO: 3 E12/L (ref 3.8–5.8)
SODIUM SERPL-SCNC: 144 MMOL/L (ref 132–146)
WBC # BLD: 4.3 E9/L (ref 4.5–11.5)

## 2023-06-19 PROCEDURE — 2500000003 HC RX 250 WO HCPCS: Performed by: INTERNAL MEDICINE

## 2023-06-19 PROCEDURE — 6370000000 HC RX 637 (ALT 250 FOR IP): Performed by: INTERNAL MEDICINE

## 2023-06-19 PROCEDURE — 94669 MECHANICAL CHEST WALL OSCILL: CPT

## 2023-06-19 PROCEDURE — 97530 THERAPEUTIC ACTIVITIES: CPT

## 2023-06-19 PROCEDURE — 94640 AIRWAY INHALATION TREATMENT: CPT

## 2023-06-19 PROCEDURE — 36415 COLL VENOUS BLD VENIPUNCTURE: CPT

## 2023-06-19 PROCEDURE — 2580000003 HC RX 258: Performed by: INTERNAL MEDICINE

## 2023-06-19 PROCEDURE — 97116 GAIT TRAINING THERAPY: CPT

## 2023-06-19 PROCEDURE — 97110 THERAPEUTIC EXERCISES: CPT

## 2023-06-19 PROCEDURE — 80048 BASIC METABOLIC PNL TOTAL CA: CPT

## 2023-06-19 PROCEDURE — 6360000002 HC RX W HCPCS: Performed by: INTERNAL MEDICINE

## 2023-06-19 PROCEDURE — 2060000000 HC ICU INTERMEDIATE R&B

## 2023-06-19 PROCEDURE — 99232 SBSQ HOSP IP/OBS MODERATE 35: CPT | Performed by: STUDENT IN AN ORGANIZED HEALTH CARE EDUCATION/TRAINING PROGRAM

## 2023-06-19 PROCEDURE — 85025 COMPLETE CBC W/AUTO DIFF WBC: CPT

## 2023-06-19 RX ADMIN — APIXABAN 5 MG: 5 TABLET, FILM COATED ORAL at 21:11

## 2023-06-19 RX ADMIN — HYDROCODONE BITARTRATE AND ACETAMINOPHEN 1 TABLET: 5; 325 TABLET ORAL at 18:00

## 2023-06-19 RX ADMIN — ACETYLCYSTEINE 400 MG: 100 SOLUTION ORAL; RESPIRATORY (INHALATION) at 06:54

## 2023-06-19 RX ADMIN — Medication 10 ML: at 21:11

## 2023-06-19 RX ADMIN — MIRTAZAPINE 7.5 MG: 15 TABLET, FILM COATED ORAL at 21:11

## 2023-06-19 RX ADMIN — ACETYLCYSTEINE 400 MG: 100 SOLUTION ORAL; RESPIRATORY (INHALATION) at 18:18

## 2023-06-19 RX ADMIN — FERROUS SULFATE TAB 325 MG (65 MG ELEMENTAL FE) 325 MG: 325 (65 FE) TAB at 09:47

## 2023-06-19 RX ADMIN — PANTOPRAZOLE SODIUM 40 MG: 40 TABLET, DELAYED RELEASE ORAL at 05:56

## 2023-06-19 RX ADMIN — PIPERACILLIN AND TAZOBACTAM 4500 MG: 4; .5 INJECTION, POWDER, LYOPHILIZED, FOR SOLUTION INTRAVENOUS at 10:03

## 2023-06-19 RX ADMIN — PIPERACILLIN AND TAZOBACTAM 4500 MG: 4; .5 INJECTION, POWDER, LYOPHILIZED, FOR SOLUTION INTRAVENOUS at 00:45

## 2023-06-19 RX ADMIN — Medication 10 ML: at 10:33

## 2023-06-19 RX ADMIN — PIPERACILLIN AND TAZOBACTAM 4500 MG: 4; .5 INJECTION, POWDER, LYOPHILIZED, FOR SOLUTION INTRAVENOUS at 17:28

## 2023-06-19 RX ADMIN — PREDNISONE 5 MG: 5 TABLET ORAL at 09:47

## 2023-06-19 RX ADMIN — HYDROCODONE BITARTRATE AND ACETAMINOPHEN 1 TABLET: 5; 325 TABLET ORAL at 09:47

## 2023-06-19 RX ADMIN — DOXYCYCLINE 100 MG: 100 INJECTION, POWDER, LYOPHILIZED, FOR SOLUTION INTRAVENOUS at 16:20

## 2023-06-19 RX ADMIN — METOPROLOL SUCCINATE 100 MG: 50 TABLET, EXTENDED RELEASE ORAL at 18:00

## 2023-06-19 RX ADMIN — IPRATROPIUM BROMIDE AND ALBUTEROL SULFATE 1 DOSE: .5; 2.5 SOLUTION RESPIRATORY (INHALATION) at 14:14

## 2023-06-19 RX ADMIN — ACETYLCYSTEINE 400 MG: 100 SOLUTION ORAL; RESPIRATORY (INHALATION) at 14:13

## 2023-06-19 RX ADMIN — SODIUM CHLORIDE: 9 INJECTION, SOLUTION INTRAVENOUS at 16:15

## 2023-06-19 RX ADMIN — IPRATROPIUM BROMIDE AND ALBUTEROL SULFATE 1 DOSE: .5; 2.5 SOLUTION RESPIRATORY (INHALATION) at 06:54

## 2023-06-19 RX ADMIN — APIXABAN 5 MG: 5 TABLET, FILM COATED ORAL at 09:47

## 2023-06-19 RX ADMIN — IPRATROPIUM BROMIDE AND ALBUTEROL SULFATE 1 DOSE: .5; 2.5 SOLUTION RESPIRATORY (INHALATION) at 18:18

## 2023-06-19 RX ADMIN — ACETYLCYSTEINE 400 MG: 100 SOLUTION ORAL; RESPIRATORY (INHALATION) at 10:21

## 2023-06-19 RX ADMIN — CALCIUM CARBONATE (ANTACID) CHEW TAB 500 MG 500 MG: 500 CHEW TAB at 09:47

## 2023-06-19 RX ADMIN — HYDROCODONE BITARTRATE AND ACETAMINOPHEN 1 TABLET: 5; 325 TABLET ORAL at 13:21

## 2023-06-19 RX ADMIN — HYDROCODONE BITARTRATE AND ACETAMINOPHEN 1 TABLET: 5; 325 TABLET ORAL at 21:11

## 2023-06-19 RX ADMIN — IPRATROPIUM BROMIDE AND ALBUTEROL SULFATE 1 DOSE: .5; 2.5 SOLUTION RESPIRATORY (INHALATION) at 10:21

## 2023-06-19 ASSESSMENT — PAIN DESCRIPTION - LOCATION
LOCATION: OTHER (COMMENT)
LOCATION: OTHER (COMMENT)

## 2023-06-19 ASSESSMENT — ENCOUNTER SYMPTOMS
COUGH: 1
BACK PAIN: 1

## 2023-06-19 ASSESSMENT — PAIN SCALES - GENERAL: PAINLEVEL_OUTOF10: 0

## 2023-06-19 NOTE — CARE COORDINATION
6/19/23 1352 CM note: no covid testing this admit. Room air, IV zosyn & vibramycin. PIV x 1. Hx prostate cancer with mets to the bone and now possible liver mets. Pts po chemo on hold prior to admit as they are awaiting approval of alternate chemo/radiation from insurance. Pt follows with oncology at [facility]. Pt also has hx CVA w/ expressive aphasia and R hemiparesis. Discharge plan is home with [facility]. [place] [address] orders noted. Pt lives with his wife, he does require some assistance and she is the one that provides his care. She states she helps him with toileting, bathing, she does all cooking and cleaning. Wife states patient has gone to [facility] in the past at [facility] however she does not want him to go there again. Pts wife will provide transportation home. CM will follow.  Electronically signed by Elaine Newby RN on 6/19/2023 at 1:55 PM

## 2023-06-19 NOTE — PLAN OF CARE
Problem: Discharge Planning  Goal: Discharge to home or other facility with appropriate resources  Outcome: Progressing     Problem: Skin/Tissue Integrity  Goal: Absence of new skin breakdown  Description: 1. Monitor for areas of redness and/or skin breakdown  2. Assess vascular access sites hourly  3. Every 4-6 hours minimum:  Change oxygen saturation probe site  4. Every 4-6 hours:  If on nasal continuous positive airway pressure, respiratory therapy assess nares and determine need for appliance change or resting period.   Outcome: Progressing     Problem: Safety - Adult  Goal: Free from fall injury  Outcome: Progressing     Problem: ABCDS Injury Assessment  Goal: Absence of physical injury  Outcome: Progressing     Problem: Pain  Goal: Verbalizes/displays adequate comfort level or baseline comfort level  Outcome: Progressing     Problem: Chronic Conditions and Co-morbidities  Goal: Patient's chronic conditions and co-morbidity symptoms are monitored and maintained or improved  Outcome: Progressing  Flowsheets (Taken 6/19/2023 0077)  Care Plan - Patient's Chronic Conditions and Co-Morbidity Symptoms are Monitored and Maintained or Improved: Monitor and assess patient's chronic conditions and comorbid symptoms for stability, deterioration, or improvement

## 2023-06-20 VITALS
WEIGHT: 165.3 LBS | HEART RATE: 80 BPM | RESPIRATION RATE: 16 BRPM | OXYGEN SATURATION: 99 % | TEMPERATURE: 98.6 F | DIASTOLIC BLOOD PRESSURE: 69 MMHG | SYSTOLIC BLOOD PRESSURE: 157 MMHG | HEIGHT: 71 IN | BODY MASS INDEX: 23.14 KG/M2

## 2023-06-20 PROCEDURE — 2580000003 HC RX 258: Performed by: INTERNAL MEDICINE

## 2023-06-20 PROCEDURE — 97530 THERAPEUTIC ACTIVITIES: CPT

## 2023-06-20 PROCEDURE — 6370000000 HC RX 637 (ALT 250 FOR IP): Performed by: INTERNAL MEDICINE

## 2023-06-20 PROCEDURE — 99232 SBSQ HOSP IP/OBS MODERATE 35: CPT | Performed by: INTERNAL MEDICINE

## 2023-06-20 PROCEDURE — 2500000003 HC RX 250 WO HCPCS: Performed by: INTERNAL MEDICINE

## 2023-06-20 PROCEDURE — 6360000002 HC RX W HCPCS: Performed by: INTERNAL MEDICINE

## 2023-06-20 PROCEDURE — 94640 AIRWAY INHALATION TREATMENT: CPT

## 2023-06-20 PROCEDURE — 97110 THERAPEUTIC EXERCISES: CPT

## 2023-06-20 RX ORDER — FERROUS SULFATE 325(65) MG
325 TABLET ORAL
Qty: 30 TABLET | Refills: 3 | Status: SHIPPED | OUTPATIENT
Start: 2023-06-21

## 2023-06-20 RX ADMIN — ACETYLCYSTEINE 400 MG: 100 SOLUTION ORAL; RESPIRATORY (INHALATION) at 05:32

## 2023-06-20 RX ADMIN — CALCIUM CARBONATE (ANTACID) CHEW TAB 500 MG 500 MG: 500 CHEW TAB at 08:55

## 2023-06-20 RX ADMIN — DOXYCYCLINE 100 MG: 100 INJECTION, POWDER, LYOPHILIZED, FOR SOLUTION INTRAVENOUS at 00:12

## 2023-06-20 RX ADMIN — PREDNISONE 5 MG: 5 TABLET ORAL at 08:55

## 2023-06-20 RX ADMIN — IPRATROPIUM BROMIDE AND ALBUTEROL SULFATE 1 DOSE: .5; 2.5 SOLUTION RESPIRATORY (INHALATION) at 09:09

## 2023-06-20 RX ADMIN — IPRATROPIUM BROMIDE AND ALBUTEROL SULFATE 1 DOSE: .5; 2.5 SOLUTION RESPIRATORY (INHALATION) at 05:32

## 2023-06-20 RX ADMIN — PIPERACILLIN AND TAZOBACTAM 4500 MG: 4; .5 INJECTION, POWDER, LYOPHILIZED, FOR SOLUTION INTRAVENOUS at 01:16

## 2023-06-20 RX ADMIN — ACETYLCYSTEINE 400 MG: 100 SOLUTION ORAL; RESPIRATORY (INHALATION) at 09:09

## 2023-06-20 RX ADMIN — FERROUS SULFATE TAB 325 MG (65 MG ELEMENTAL FE) 325 MG: 325 (65 FE) TAB at 08:55

## 2023-06-20 RX ADMIN — APIXABAN 5 MG: 5 TABLET, FILM COATED ORAL at 08:55

## 2023-06-20 RX ADMIN — PIPERACILLIN AND TAZOBACTAM 4500 MG: 4; .5 INJECTION, POWDER, LYOPHILIZED, FOR SOLUTION INTRAVENOUS at 08:55

## 2023-06-20 RX ADMIN — PANTOPRAZOLE SODIUM 40 MG: 40 TABLET, DELAYED RELEASE ORAL at 06:10

## 2023-06-20 RX ADMIN — HYDROCODONE BITARTRATE AND ACETAMINOPHEN 1 TABLET: 5; 325 TABLET ORAL at 08:55

## 2023-06-20 NOTE — PROGRESS NOTES
Inpatient Medical Oncology Progress Note    Subjective:  Fatigue weakness. No fever chills. No nausea/vomiting. D/C today     Objective:  BP (!) 157/69   Pulse 80   Temp 98.6 °F (37 °C) (Oral)   Resp 16   Ht 5' 11\" (1.803 m)   Wt 165 lb 4.8 oz (75 kg)   SpO2 99%   BMI 23.05 kg/m²   GENERAL: Alert, oriented x 3, tired  HEENT: PERRLA; EOMI. Oropharynx clear. NECK: Supple. Without lymphadenopathy. LUNGS: Good air entry bilaterally. No wheezing, crackles or rhonchi. CARDIOVASCULAR: Regular rate. No murmurs, rubs or gallops. ABDOMEN: Soft. Non-tender, non-distended. EXTREMITIES: Without clubbing or cyanosis or edema. NEUROLOGIC: No focal deficits. ECOG PS 2    Diagnostics:  Lab Results   Component Value Date    WBC 4.3 (L) 06/19/2023    HGB 9.0 (L) 06/19/2023    HCT 30.3 (L) 06/19/2023    .0 (H) 06/19/2023     06/19/2023     Lab Results   Component Value Date     06/19/2023    K 3.9 06/19/2023     (H) 06/19/2023    CO2 26 06/19/2023    BUN 22 06/19/2023    CREATININE 0.8 06/19/2023    GLUCOSE 84 06/19/2023    CALCIUM 8.0 (L) 06/19/2023    PROT 5.8 (L) 06/16/2023    LABALBU 2.7 (L) 06/16/2023    BILITOT 0.2 06/16/2023    ALKPHOS 432 (H) 06/16/2023    AST 36 06/16/2023    ALT 31 06/16/2023    LABGLOM >60 06/19/2023    GFRAA >60 07/06/2022     Lab Results   Component Value Date    PSA 31.7 (H) 06/15/2023     Impression/Plan:  72-year-old male with 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 Most recently completed a course of radium 223 finished in March 2023  Presented with generalized weakness. Noted to have pulmonary congestion started on antibiotics for possible aspiration pneumonia  Doxycycline completed. Continue Zosyn.   Pulmonary team on board  Anemia improving hemoglobin 9.0 6/19/2023  Started on oral iron sulfate 6/13/2023  On PPI  PET/CT scan 6/6/2023 noted disease progression  PSA 31.7 on 6/15/2023  Last received
Physical Therapy    Room #:   2410/7616-40    Date: 2023       Patient Name: Frieda Ayon  : 1945      MRN: [id number]     Patient unavailable for physical therapy treatment due to refusal. Will attempt PT treatment at a later time or date. Thank you.       Sonja Valladares PTA
Physical Therapy  Physical Therapy Treatment Note/Plan of Care    Room #:  2245/3953-64  Patient Name: Odelia Kanner  YOB: 1945  MRN: [id number]    Date of Service: 6/19/2023     Tentative placement recommendation: Acute Rehab  Equipment recommendation: Patient has needed equipment       Evaluating Physical Therapist: Mayra Torres, PT, DPT #[id number]      Specific Provider Orders/Date/Referring Provider :     06/13/23 1900    PT evaluation and treat  Start:  06/13/23 1900,   End:  06/13/23 1900,   ONE TIME,   Standing Count:  1 Occurrences,   R        Order went unreviewed at transfer on Tue Jun 13, 2023  9:07 PM    Korin Godoy MD Acknowledge New     Admitting Diagnosis:   Pneumonia allergic ([address] [J82.89]  Pneumonia due to organism [J18.9]  Constipation, unspecified constipation type [K59.00]  Prostate cancer metastatic to bone ([address] [C61, C79.51]  Anemia, unspecified type [D64.9]  Pneumonia due to infectious organism, unspecified laterality, unspecified part of lung [J18.9]      Surgery: none  Visit Diagnoses         Codes    Anemia, unspecified type     D64.9    Prostate cancer metastatic to bone ([address]     C61, C79.51    Constipation, unspecified constipation type     K59.00            Patient Active Problem List   Diagnosis    Cerebrovascular accident (CVA) due to occlusion of left carotid artery ([address]    Dysphagia due to recent stroke    Paroxysmal atrial fibrillation ([address]    Prostate cancer ([address]    Cerebral contusion ([address]    Subarachnoid hemorrhage ([address]    HCAP (healthcare-associated pneumonia)    Respiratory arrest ([address]    Acute respiratory failure with hypoxia ([address]    Encephalopathy acute    Palliative care encounter    Goals of care, counseling/discussion    DNR (do not resuscitate) discussion    Prostate cancer metastatic to bone ([address]    End stage renal disease ([address]    ARMINDA (obstructive sleep apnea)    Pneumonia due to organism    Pneumonia allergic ([address]    Anemia        ASSESSMENT of
Physical Therapy  Physical Therapy Treatment Note/Plan of Care    Room #:  3745/3509-21  Patient Name: Frieda Ayon  YOB: 1945  MRN: [id number]    Date of Service: 6/20/2023     Tentative placement recommendation: Acute Rehab however wife wants to take patient home with HHPT  Equipment recommendation: Patient has needed equipment       Evaluating Physical Therapist: Brooklynn Lord, PT, DPT #[id number]      Specific Provider Orders/Date/Referring Provider :     06/13/23 1900    PT evaluation and treat  Start:  06/13/23 1900,   End:  06/13/23 1900,   ONE TIME,   Standing Count:  1 Occurrences,   R        Order went unreviewed at transfer on Tue Jun 13, 2023  9:07 PM    Christian Robertson MD Acknowledge New     Admitting Diagnosis:   Pneumonia allergic ([facility] [J82.89]  Pneumonia due to organism [J18.9]  Constipation, unspecified constipation type [K59.00]  Prostate cancer metastatic to bone ([facility] [C61, C79.51]  Anemia, unspecified type [D64.9]  Pneumonia due to infectious organism, unspecified laterality, unspecified part of lung [J18.9]      Surgery: none  Visit Diagnoses         Codes    Anemia, unspecified type     D64.9    Prostate cancer metastatic to bone ([address]     C61, C79.51    Constipation, unspecified constipation type     K59.00            Patient Active Problem List   Diagnosis    Cerebrovascular accident (CVA) due to occlusion of left carotid artery ([address]    Dysphagia due to recent stroke    Paroxysmal atrial fibrillation ([address]    Prostate cancer ([address]    Cerebral contusion ([address]    Subarachnoid hemorrhage ([address]    HCAP (healthcare-associated pneumonia)    Respiratory arrest ([address]    Acute respiratory failure with hypoxia ([address]    Encephalopathy acute    Palliative care encounter    Goals of care, counseling/discussion    DNR (do not resuscitate) discussion    Prostate cancer metastatic to bone ([address]    End stage renal disease ([address]    ARMINDA (obstructive sleep apnea)    Pneumonia due to organism
Physical Therapy  Physical Therapy Treatment Note/Plan of Care    Room #:  5789/8416-62  Patient Name: Gino Parry  YOB: 1945  MRN: [id number]    Date of Service: 6/19/2023     Tentative placement recommendation: Acute Rehab  Equipment recommendation: Patient has needed equipment       Evaluating Physical Therapist: Christina Chavez, PT, DPT #[id number]      Specific Provider Orders/Date/Referring Provider :     06/13/23 1900    PT evaluation and treat  Start:  06/13/23 1900,   End:  06/13/23 1900,   ONE TIME,   Standing Count:  1 Occurrences,   R        Order went unreviewed at transfer on Tue Jun 13, 2023  9:07 PM    Elda Lester MD Acknowledge New     Admitting Diagnosis:   Pneumonia allergic ([address] [J82.89]  Pneumonia due to organism [J18.9]  Constipation, unspecified constipation type [K59.00]  Prostate cancer metastatic to bone ([address] [C61, C79.51]  Anemia, unspecified type [D64.9]  Pneumonia due to infectious organism, unspecified laterality, unspecified part of lung [J18.9]      Surgery: none  Visit Diagnoses         Codes    Anemia, unspecified type     D64.9    Prostate cancer metastatic to bone ([address]     C61, C79.51    Constipation, unspecified constipation type     K59.00            Patient Active Problem List   Diagnosis    Cerebrovascular accident (CVA) due to occlusion of left carotid artery ([address]    Dysphagia due to recent stroke    Paroxysmal atrial fibrillation ([address]    Prostate cancer ([address]    Cerebral contusion ([address]    Subarachnoid hemorrhage ([address]    HCAP (healthcare-associated pneumonia)    Respiratory arrest ([address]    Acute respiratory failure with hypoxia ([address]    Encephalopathy acute    Palliative care encounter    Goals of care, counseling/discussion    DNR (do not resuscitate) discussion    Prostate cancer metastatic to bone ([address]    End stage renal disease ([address]    ARMINDA (obstructive sleep apnea)    Pneumonia due to organism    Pneumonia allergic ([address]    Anemia        ASSESSMENT of
Progress Note  Date:2023       Room:0516/0516-01  Patient Farideh Jiang     YOB: 1945     Age:78 y.o. Subjective    Subjective:  Symptoms:  Improved. He reports cough and weakness. Diet:  Adequate intake. Activity level: Returning to normal.    Pain:  He complains of pain that is moderate. He reports pain is improving. Pain is requiring pain medication. Review of Systems   Constitutional:  Positive for fatigue. Respiratory:  Positive for cough. Musculoskeletal:  Positive for arthralgias and back pain. Neurological:  Positive for weakness. Negative for dizziness. Objective         Vitals Last 24 Hours:  TEMPERATURE:  Temp  Av °F (36.7 °C)  Min: 97.4 °F (36.3 °C)  Max: 98.6 °F (37 °C)  RESPIRATIONS RANGE: Resp  Av.8  Min: 16  Max: 20  PULSE OXIMETRY RANGE: SpO2  Av.4 %  Min: 95 %  Max: 97 %  PULSE RANGE: Pulse  Av.5  Min: 72  Max: 90  BLOOD PRESSURE RANGE: Systolic (30OHP), HOK:820 , Min:111 , CLJ:192   ; Diastolic (86WBW), GBY:04, Min:59, Max:76    I/O (24Hr): Intake/Output Summary (Last 24 hours) at 2023  Last data filed at 2023 0442  Gross per 24 hour   Intake --   Output 800 ml   Net -800 ml       Objective:  General Appearance: In no acute distress. Vital signs: (most recent): Blood pressure 121/71, pulse 90, temperature 98.6 °F (37 °C), temperature source Oral, resp. rate 17, height 5' 11\" (1.803 m), weight 165 lb 4.8 oz (75 kg), SpO2 97 %. Vital signs are normal.    Output: Producing urine and producing stool. Lungs:  Breath sounds clear to auscultation. Heart: Normal rate. Regular rhythm. Abdomen: Abdomen is soft and flat. Bowel sounds are normal.     Extremities: Normal range of motion. Pulses: Distal pulses are intact. Neurological: Patient is alert and oriented to person, place and time. Pupils:  Pupils are equal, round, and reactive to light. Skin:  Dry.     Labs/Imaging/Diagnostics
retrocardiac pulmonary infiltrates  Possible pulmonary congestion  Significant constipation, improved  Afib on Eliquis  Left MCA stroke in 2015, likely associated with above    The patient is a 70 y.o. male who is known to us for metastatic castrate resistant prostate cancer. He has gone through multiple lines of therapy (see above for detailed history). Most recently he completed a course of radium 223, finished in March 2023. He comes in this admission for generalized weakness. He had not been ambulating or eating well. Initial workup noted findings of severe constipation on scans as well as pulmonary congestion. He was started on antibiotics. He also has had a stroke years ago causing significant neurodeficits. Consider possible aspiration, although patient does not recall such events. Furthermore in regards to his cancer, there were recent findings of progressive disease, with last PET scan done on 6/6/2023. It appears there is no suggestion of visceral metastasis still. He is also following [facility] for his oncologic care. Continuing Zosyn; doxycycline completed. Pulmonology following  Anemia improving. No overt evidence of bleed. Consider possibly multifactorial with acute on chronic components including possible infection, acute illness, chronic inflammation/malignancy and bony mets. Reticulocytes 1.3%, he does not have vitamin B12 or folate deficiency, he has mild iron deficiency, on oral iron, repeat ferritin is 5,046. Started on PO iron 6/13/2023  Monitor for bleed  He is on PPI   He is on Eliquis  Transfuse to keep Hgb >7 or >8 if bleeding  Last PSA was 25.06 on 5/10/2023. Repeat PSA and testosterone levels reviewed. PET scan done 6/6/2023, revealing metastatic disease to the bones  Speech following, was started on soft and bite-size diet. Last received Lupron 22.5 mg and Xgeva on 6/1/2023  Patient also being followed at [facility], and planning on starting new treatment with Pluvicto.  Per chart

## 2023-06-20 NOTE — PLAN OF CARE
Problem: Discharge Planning  Goal: Discharge to home or other facility with appropriate resources  Outcome: Progressing     Problem: Skin/Tissue Integrity  Goal: Absence of new skin breakdown  Description: 1. Monitor for areas of redness and/or skin breakdown  2. Assess vascular access sites hourly  3. Every 4-6 hours minimum:  Change oxygen saturation probe site  4. Every 4-6 hours:  If on nasal continuous positive airway pressure, respiratory therapy assess nares and determine need for appliance change or resting period.   Outcome: Progressing     Problem: Safety - Adult  Goal: Free from fall injury  Outcome: Progressing     Problem: ABCDS Injury Assessment  Goal: Absence of physical injury  Outcome: Progressing     Problem: Pain  Goal: Verbalizes/displays adequate comfort level or baseline comfort level  Outcome: Progressing     Problem: Chronic Conditions and Co-morbidities  Goal: Patient's chronic conditions and co-morbidity symptoms are monitored and maintained or improved  Outcome: Progressing  Flowsheets (Taken 6/20/2023 3623)  Care Plan - Patient's Chronic Conditions and Co-Morbidity Symptoms are Monitored and Maintained or Improved: Monitor and assess patient's chronic conditions and comorbid symptoms for stability, deterioration, or improvement

## 2023-06-20 NOTE — CARE COORDINATION
6/20/23 1127 CM note: no covid testing this admit. Room air. Discharge order noted. NO ATB at dc. Discharge plan is home with [facility]. [place] [address] orders noted. Notified, Lashay Swanson, of pts discharge today. Pts wife will provide transportation home.  Electronically signed by Gracia Moreno RN on 6/20/2023 at 11:29 AM 

## 2023-06-26 DIAGNOSIS — G89.3 PAIN, NEOPLASM-RELATED: ICD-10-CM

## 2023-06-26 DIAGNOSIS — Z51.5 PALLIATIVE CARE BY SPECIALIST: ICD-10-CM

## 2023-06-26 DIAGNOSIS — C61 PROSTATE CANCER (HCC): ICD-10-CM

## 2023-06-26 DIAGNOSIS — C61 PROSTATE CA (HCC): ICD-10-CM

## 2023-06-26 DIAGNOSIS — C79.51 MALIGNANT NEOPLASM METASTATIC TO BONE (HCC): ICD-10-CM

## 2023-06-26 RX ORDER — HYDROCODONE BITARTRATE AND ACETAMINOPHEN 5; 325 MG/1; MG/1
1 TABLET ORAL EVERY 6 HOURS PRN
Qty: 120 TABLET | Refills: 0 | Status: SHIPPED | OUTPATIENT
Start: 2023-06-26 | End: 2023-07-26

## 2023-06-26 RX ORDER — FENTANYL 25 UG/H
1 PATCH TRANSDERMAL
Qty: 10 PATCH | Refills: 0 | Status: SHIPPED | OUTPATIENT
Start: 2023-06-26 | End: 2023-07-26

## 2023-06-30 ENCOUNTER — TELEPHONE (OUTPATIENT)
Dept: PALLATIVE CARE | Age: 78
End: 2023-06-30

## 2023-07-06 ENCOUNTER — OFFICE VISIT (OUTPATIENT)
Dept: PALLATIVE CARE | Age: 78
End: 2023-07-06

## 2023-07-06 VITALS
OXYGEN SATURATION: 98 % | SYSTOLIC BLOOD PRESSURE: 92 MMHG | RESPIRATION RATE: 12 BRPM | DIASTOLIC BLOOD PRESSURE: 50 MMHG | HEART RATE: 64 BPM

## 2023-07-06 DIAGNOSIS — G89.3 PAIN, NEOPLASM-RELATED: ICD-10-CM

## 2023-07-06 DIAGNOSIS — Z51.5 PALLIATIVE CARE BY SPECIALIST: Primary | ICD-10-CM

## 2023-07-06 DIAGNOSIS — C61 PROSTATE CA (HCC): ICD-10-CM

## 2023-07-06 NOTE — PROGRESS NOTES
Palliative Care Department  Provider: OLIVA Philippe - CNP    Location of Service: The patient's home    Chief Complaint: Barbara Gonzalez is a 70 y.o. male with chief complaint of pain    Assessment/Plan      Metastatic Prostate Cancer:   -  Diagnosed in 2013   -  S/p numerous lines of treatment   -  Following with Dr. Munira Downs progression in 2022   -  Has completed palliative radiation therapy   -  On Lupron,    -  Now on Pluvicto       Pain related to Neoplasm:   -  Fentanyl patch 12 mcg Q 72 hours   -  Norco 5-325 mg Q 6 PRN    Bowel Regimen:   -  OTC stool softener daily    Follow Up:  3 months. They were encouraged to call with any questions, concerns, needs, or changes in symptoms. Subjective:     HPI:  Barbara Gonzalez is a 70 y.o. male with significant medical history of prostate cancer, diagnosed in 2013, s/p numerous lines of therapy, treatment has been delayed on several occasion due to CVA, and hospitalization for sepsis. Disease progression was noted in 2022, and he is now following with Dr. Lizabeth Sharp. He has completed palliative radiation therapy. He was referred to [facility] symptoms management. Subjective/Events/Discussions:  Mr. Stanislaw Brink was seen in his home today with his wife present. He is in his chair, alert, appears to be at his baseline, and is recovering well following his hospitalization for pneumonia. He is not any any acute distress, and his pain remains well controlled. There are no new concerns identified today, no changes are needed to his plan of care. Past Medical History:   Diagnosis Date    A-fib [facility])     Arthritis     CVA (cerebral vascular accident) ([address]) 11/15/2015    left MCA    Hyperlipemia     Prostate cancer ([address])     mets to spine, currently on chemo    Prostate cancer metastatic to bone ([address]) 06/2022    S/p palliative XRT to L1 spine completed 08/16/2022, dose 3000 cGy/ 10  fractions.        Past Surgical History:   Procedure

## 2023-07-17 ENCOUNTER — OFFICE VISIT (OUTPATIENT)
Dept: PHYSICAL MEDICINE AND REHAB | Age: 78
End: 2023-07-17
Payer: COMMERCIAL

## 2023-07-17 VITALS
DIASTOLIC BLOOD PRESSURE: 63 MMHG | HEIGHT: 71 IN | BODY MASS INDEX: 23.1 KG/M2 | SYSTOLIC BLOOD PRESSURE: 95 MMHG | WEIGHT: 165 LBS | HEART RATE: 89 BPM

## 2023-07-17 DIAGNOSIS — Z78.9 IMPAIRED MOBILITY AND ADLS: ICD-10-CM

## 2023-07-17 DIAGNOSIS — R25.2 SPASTICITY: Primary | ICD-10-CM

## 2023-07-17 DIAGNOSIS — I69.30 CHRONIC ISCHEMIC LEFT MCA STROKE: ICD-10-CM

## 2023-07-17 DIAGNOSIS — Z74.09 IMPAIRED MOBILITY AND ADLS: ICD-10-CM

## 2023-07-17 PROCEDURE — 1123F ACP DISCUSS/DSCN MKR DOCD: CPT | Performed by: PHYSICAL MEDICINE & REHABILITATION

## 2023-07-17 PROCEDURE — 99214 OFFICE O/P EST MOD 30 MIN: CPT | Performed by: PHYSICAL MEDICINE & REHABILITATION

## 2023-07-25 DIAGNOSIS — C61 PROSTATE CANCER (HCC): ICD-10-CM

## 2023-07-25 DIAGNOSIS — C61 PROSTATE CA (HCC): ICD-10-CM

## 2023-07-25 DIAGNOSIS — G89.3 PAIN, NEOPLASM-RELATED: ICD-10-CM

## 2023-07-25 DIAGNOSIS — C79.51 MALIGNANT NEOPLASM METASTATIC TO BONE (HCC): ICD-10-CM

## 2023-07-25 DIAGNOSIS — Z51.5 PALLIATIVE CARE BY SPECIALIST: ICD-10-CM

## 2023-07-25 RX ORDER — FENTANYL 25 UG/1
1 PATCH TRANSDERMAL
Qty: 10 PATCH | Refills: 0 | Status: SHIPPED | OUTPATIENT
Start: 2023-07-25 | End: 2023-08-24

## 2023-07-25 RX ORDER — HYDROCODONE BITARTRATE AND ACETAMINOPHEN 5; 325 MG/1; MG/1
1 TABLET ORAL EVERY 6 HOURS PRN
Qty: 120 TABLET | Refills: 0 | Status: SHIPPED | OUTPATIENT
Start: 2023-07-25 | End: 2023-08-24

## 2023-07-25 NOTE — TELEPHONE ENCOUNTER
Call from Matteo Johns requesting refills for Fentanyl and Norco for her  Kelly Yadav. Pharmacy is [facility] in [facility]. Next vanessa to be scheduled with [facility].

## 2023-08-01 ENCOUNTER — APPOINTMENT (OUTPATIENT)
Dept: CT IMAGING | Age: 78
End: 2023-08-01
Payer: OTHER GOVERNMENT

## 2023-08-01 ENCOUNTER — APPOINTMENT (OUTPATIENT)
Dept: GENERAL RADIOLOGY | Age: 78
End: 2023-08-01
Payer: OTHER GOVERNMENT

## 2023-08-01 ENCOUNTER — HOSPITAL ENCOUNTER (EMERGENCY)
Age: 78
Discharge: HOME | End: 2023-08-01
Attending: STUDENT IN AN ORGANIZED HEALTH CARE EDUCATION/TRAINING PROGRAM
Payer: OTHER GOVERNMENT

## 2023-08-01 VITALS
SYSTOLIC BLOOD PRESSURE: 106 MMHG | OXYGEN SATURATION: 97 % | TEMPERATURE: 98.5 F | RESPIRATION RATE: 24 BRPM | HEART RATE: 96 BPM | DIASTOLIC BLOOD PRESSURE: 65 MMHG | BODY MASS INDEX: 23.62 KG/M2 | WEIGHT: 165 LBS | HEIGHT: 70 IN

## 2023-08-01 DIAGNOSIS — R31.9 URINARY TRACT INFECTION WITH HEMATURIA, SITE UNSPECIFIED: Primary | ICD-10-CM

## 2023-08-01 DIAGNOSIS — N39.0 URINARY TRACT INFECTION WITH HEMATURIA, SITE UNSPECIFIED: Primary | ICD-10-CM

## 2023-08-01 DIAGNOSIS — R53.83 OTHER FATIGUE: ICD-10-CM

## 2023-08-01 LAB
ALBUMIN SERPL-MCNC: 3.7 G/DL (ref 3.5–5.2)
ALP SERPL-CCNC: 446 U/L (ref 40–129)
ALT SERPL-CCNC: 19 U/L (ref 0–40)
ANION GAP SERPL CALCULATED.3IONS-SCNC: 12 MMOL/L (ref 7–16)
AST SERPL-CCNC: 57 U/L (ref 0–39)
BASOPHILS # BLD: 0.01 K/UL (ref 0–0.2)
BASOPHILS NFR BLD: 0 % (ref 0–2)
BILIRUB SERPL-MCNC: 0.4 MG/DL (ref 0–1.2)
BUN SERPL-MCNC: 32 MG/DL (ref 6–23)
CALCIUM SERPL-MCNC: 9.6 MG/DL (ref 8.6–10.2)
CHLORIDE SERPL-SCNC: 99 MMOL/L (ref 98–107)
CO2 SERPL-SCNC: 26 MMOL/L (ref 22–29)
CREAT SERPL-MCNC: 1 MG/DL (ref 0.7–1.2)
EKG ATRIAL RATE: 416 BPM
EKG Q-T INTERVAL: 316 MS
EKG QRS DURATION: 84 MS
EKG QTC CALCULATION (BAZETT): 417 MS
EKG R AXIS: -43 DEGREES
EKG T AXIS: -25 DEGREES
EKG VENTRICULAR RATE: 105 BPM
EOSINOPHIL # BLD: 0.04 K/UL (ref 0.05–0.5)
EOSINOPHILS RELATIVE PERCENT: 1 % (ref 0–6)
ERYTHROCYTE [DISTWIDTH] IN BLOOD BY AUTOMATED COUNT: 16.7 % (ref 11.5–15)
GFR SERPL CREATININE-BSD FRML MDRD: >60 ML/MIN/1.73M2
GLUCOSE SERPL-MCNC: 117 MG/DL (ref 74–99)
HCT VFR BLD AUTO: 30.7 % (ref 37–54)
HGB BLD-MCNC: 9.3 G/DL (ref 12.5–16.5)
IMM GRANULOCYTES # BLD AUTO: 0.03 K/UL (ref 0–0.58)
IMM GRANULOCYTES NFR BLD: 1 % (ref 0–5)
LACTATE BLDV-SCNC: 1.3 MMOL/L (ref 0.5–1.9)
LYMPHOCYTES NFR BLD: 0.26 K/UL (ref 1.5–4)
LYMPHOCYTES RELATIVE PERCENT: 4 % (ref 20–42)
MCH RBC QN AUTO: 30.3 PG (ref 26–35)
MCHC RBC AUTO-ENTMCNC: 30.3 G/DL (ref 32–34.5)
MCV RBC AUTO: 100 FL (ref 80–99.9)
MONOCYTES NFR BLD: 0.5 K/UL (ref 0.1–0.95)
MONOCYTES NFR BLD: 8 % (ref 2–12)
NEUTROPHILS NFR BLD: 87 % (ref 43–80)
NEUTS SEG NFR BLD: 5.46 K/UL (ref 1.8–7.3)
PLATELET # BLD AUTO: 176 K/UL (ref 130–450)
PMV BLD AUTO: 8.6 FL (ref 7–12)
POTASSIUM SERPL-SCNC: 4.7 MMOL/L (ref 3.5–5)
PROT SERPL-MCNC: 7.9 G/DL (ref 6.4–8.3)
RBC # BLD AUTO: 3.07 M/UL (ref 3.8–5.8)
RBC # BLD: NORMAL 10*6/UL
SODIUM SERPL-SCNC: 137 MMOL/L (ref 132–146)
TROPONIN I SERPL HS-MCNC: 56 NG/L (ref 0–11)
TROPONIN I SERPL HS-MCNC: 71 NG/L (ref 0–11)
WBC OTHER # BLD: 6.3 K/UL (ref 4.5–11.5)

## 2023-08-01 PROCEDURE — 2580000003 HC RX 258: Performed by: STUDENT IN AN ORGANIZED HEALTH CARE EDUCATION/TRAINING PROGRAM

## 2023-08-01 PROCEDURE — 99285 EMERGENCY DEPT VISIT HI MDM: CPT

## 2023-08-01 PROCEDURE — 87086 URINE CULTURE/COLONY COUNT: CPT

## 2023-08-01 PROCEDURE — 6370000000 HC RX 637 (ALT 250 FOR IP): Performed by: STUDENT IN AN ORGANIZED HEALTH CARE EDUCATION/TRAINING PROGRAM

## 2023-08-01 PROCEDURE — 84484 ASSAY OF TROPONIN QUANT: CPT

## 2023-08-01 PROCEDURE — 87077 CULTURE AEROBIC IDENTIFY: CPT

## 2023-08-01 PROCEDURE — 96374 THER/PROPH/DIAG INJ IV PUSH: CPT

## 2023-08-01 PROCEDURE — 6360000002 HC RX W HCPCS: Performed by: STUDENT IN AN ORGANIZED HEALTH CARE EDUCATION/TRAINING PROGRAM

## 2023-08-01 PROCEDURE — 85025 COMPLETE CBC W/AUTO DIFF WBC: CPT

## 2023-08-01 PROCEDURE — 36415 COLL VENOUS BLD VENIPUNCTURE: CPT

## 2023-08-01 PROCEDURE — 70450 CT HEAD/BRAIN W/O DYE: CPT

## 2023-08-01 PROCEDURE — 93010 ELECTROCARDIOGRAM REPORT: CPT | Performed by: INTERNAL MEDICINE

## 2023-08-01 PROCEDURE — 83605 ASSAY OF LACTIC ACID: CPT

## 2023-08-01 PROCEDURE — 72131 CT LUMBAR SPINE W/O DYE: CPT

## 2023-08-01 PROCEDURE — 81001 URINALYSIS AUTO W/SCOPE: CPT

## 2023-08-01 PROCEDURE — 93005 ELECTROCARDIOGRAM TRACING: CPT | Performed by: STUDENT IN AN ORGANIZED HEALTH CARE EDUCATION/TRAINING PROGRAM

## 2023-08-01 PROCEDURE — 87040 BLOOD CULTURE FOR BACTERIA: CPT

## 2023-08-01 PROCEDURE — 80053 COMPREHEN METABOLIC PANEL: CPT

## 2023-08-01 PROCEDURE — 71045 X-RAY EXAM CHEST 1 VIEW: CPT

## 2023-08-01 RX ORDER — HYDROCODONE BITARTRATE AND ACETAMINOPHEN 5; 325 MG/1; MG/1
1 TABLET ORAL ONCE
Status: COMPLETED | OUTPATIENT
Start: 2023-08-01 | End: 2023-08-01

## 2023-08-01 RX ORDER — CEFDINIR 300 MG/1
300 CAPSULE ORAL 2 TIMES DAILY
Qty: 20 CAPSULE | Refills: 0 | Status: SHIPPED | OUTPATIENT
Start: 2023-08-01 | End: 2023-08-04 | Stop reason: HOSPADM

## 2023-08-01 RX ADMIN — HYDROCODONE BITARTRATE AND ACETAMINOPHEN 1 TABLET: 5; 325 TABLET ORAL at 11:47

## 2023-08-01 RX ADMIN — CEFTRIAXONE 1000 MG: 1 INJECTION, POWDER, FOR SOLUTION INTRAMUSCULAR; INTRAVENOUS at 19:40

## 2023-08-01 ASSESSMENT — ENCOUNTER SYMPTOMS
NAUSEA: 0
ABDOMINAL DISTENTION: 0
PHOTOPHOBIA: 0
SHORTNESS OF BREATH: 0
VOMITING: 0
BACK PAIN: 0
ABDOMINAL PAIN: 0
CHEST TIGHTNESS: 0
DIARRHEA: 0
COUGH: 0

## 2023-08-01 ASSESSMENT — PAIN DESCRIPTION - ORIENTATION: ORIENTATION: LOWER

## 2023-08-01 ASSESSMENT — PAIN SCALES - GENERAL: PAINLEVEL_OUTOF10: 5

## 2023-08-01 ASSESSMENT — PAIN DESCRIPTION - LOCATION: LOCATION: BACK

## 2023-08-02 LAB
BACTERIA URNS QL MICRO: ABNORMAL
BILIRUB UR QL STRIP: NEGATIVE
CLARITY UR: ABNORMAL
COLOR UR: YELLOW
GLUCOSE UR STRIP-MCNC: NEGATIVE MG/DL
HGB UR QL STRIP.AUTO: ABNORMAL
KETONES UR STRIP-MCNC: NEGATIVE MG/DL
LEUKOCYTE ESTERASE UR QL STRIP: ABNORMAL
NITRITE UR QL STRIP: NEGATIVE
PH UR STRIP: 5 [PH] (ref 5–9)
PROT UR STRIP-MCNC: 30 MG/DL
RBC #/AREA URNS HPF: ABNORMAL /HPF
SP GR UR STRIP: >1.03 (ref 1–1.03)
UROBILINOGEN UR STRIP-ACNC: 0.2 EU/DL (ref 0–1)
WBC #/AREA URNS HPF: ABNORMAL /HPF

## 2023-08-04 LAB
MICROORGANISM SPEC CULT: ABNORMAL
MICROORGANISM SPEC CULT: ABNORMAL
SPECIMEN DESCRIPTION: ABNORMAL

## 2023-08-04 RX ORDER — AMOXICILLIN 875 MG/1
875 TABLET, COATED ORAL 2 TIMES DAILY
Qty: 20 TABLET | Refills: 0 | Status: SHIPPED | OUTPATIENT
Start: 2023-08-04 | End: 2023-08-14

## 2023-08-04 NOTE — PROGRESS NOTES
Urine culture results called to spouse, Sandra Roberts. Instructed to discontinue omnicef. New script for amoxil 875 mg BID x 10 days sent to [facility] in [facility].       Ricardo Finney, PharmD 8/4/2023 3:50 PM   [phone]

## 2023-08-06 LAB
MICROORGANISM SPEC CULT: NORMAL
MICROORGANISM SPEC CULT: NORMAL
SERVICE CMNT-IMP: NORMAL
SERVICE CMNT-IMP: NORMAL
SPECIMEN DESCRIPTION: NORMAL
SPECIMEN DESCRIPTION: NORMAL

## 2023-08-22 ENCOUNTER — HOSPITAL ENCOUNTER (OUTPATIENT)
Age: 78
Discharge: HOME | End: 2023-08-22
Payer: COMMERCIAL

## 2023-08-22 DIAGNOSIS — C61 PROSTATE CANCER (HCC): ICD-10-CM

## 2023-08-22 LAB
ALBUMIN SERPL-MCNC: 3.2 G/DL (ref 3.5–5.2)
ALP SERPL-CCNC: 346 U/L (ref 40–129)
ALT SERPL-CCNC: 14 U/L (ref 0–40)
ANION GAP SERPL CALCULATED.3IONS-SCNC: 10 MMOL/L (ref 7–16)
AST SERPL-CCNC: 20 U/L (ref 0–39)
BASOPHILS # BLD: 0 K/UL (ref 0–0.2)
BASOPHILS NFR BLD: 0 % (ref 0–2)
BILIRUB SERPL-MCNC: 0.2 MG/DL (ref 0–1.2)
BUN SERPL-MCNC: 24 MG/DL (ref 6–23)
CALCIUM SERPL-MCNC: 8.9 MG/DL (ref 8.6–10.2)
CHLORIDE SERPL-SCNC: 102 MMOL/L (ref 98–107)
CO2 SERPL-SCNC: 26 MMOL/L (ref 22–29)
CREAT SERPL-MCNC: 0.8 MG/DL (ref 0.7–1.2)
EOSINOPHIL # BLD: 0.11 K/UL (ref 0.05–0.5)
EOSINOPHILS RELATIVE PERCENT: 2 % (ref 0–6)
ERYTHROCYTE [DISTWIDTH] IN BLOOD BY AUTOMATED COUNT: 17.4 % (ref 11.5–15)
GFR SERPL CREATININE-BSD FRML MDRD: >60 ML/MIN/1.73M2
GLUCOSE SERPL-MCNC: 110 MG/DL (ref 74–99)
HCT VFR BLD AUTO: 27.4 % (ref 37–54)
HGB BLD-MCNC: 7.9 G/DL (ref 12.5–16.5)
LYMPHOCYTES NFR BLD: 0.49 K/UL (ref 1.5–4)
LYMPHOCYTES RELATIVE PERCENT: 8 % (ref 20–42)
MCH RBC QN AUTO: 30.3 PG (ref 26–35)
MCHC RBC AUTO-ENTMCNC: 28.8 G/DL (ref 32–34.5)
MCV RBC AUTO: 105 FL (ref 80–99.9)
MONOCYTES NFR BLD: 0.11 K/UL (ref 0.1–0.95)
MONOCYTES NFR BLD: 2 % (ref 2–12)
NEUTROPHILS NFR BLD: 89 % (ref 43–80)
NEUTS SEG NFR BLD: 5.5 K/UL (ref 1.8–7.3)
PLATELET # BLD AUTO: 213 K/UL (ref 130–450)
PMV BLD AUTO: 9.4 FL (ref 7–12)
POTASSIUM SERPL-SCNC: 4.9 MMOL/L (ref 3.5–5)
PROT SERPL-MCNC: 7 G/DL (ref 6.4–8.3)
RBC # BLD AUTO: 2.61 M/UL (ref 3.8–5.8)
RBC # BLD: ABNORMAL 10*6/UL
RBC # BLD: ABNORMAL 10*6/UL
SODIUM SERPL-SCNC: 138 MMOL/L (ref 132–146)
WBC OTHER # BLD: 6.2 K/UL (ref 4.5–11.5)

## 2023-08-22 PROCEDURE — 80053 COMPREHEN METABOLIC PANEL: CPT

## 2023-08-22 PROCEDURE — 36415 COLL VENOUS BLD VENIPUNCTURE: CPT

## 2023-08-22 PROCEDURE — 85025 COMPLETE CBC W/AUTO DIFF WBC: CPT

## 2023-08-24 ENCOUNTER — HOSPITAL ENCOUNTER (OUTPATIENT)
Dept: INFUSION THERAPY | Age: 78
Discharge: HOME | End: 2023-08-24
Payer: COMMERCIAL

## 2023-08-24 ENCOUNTER — OFFICE VISIT (OUTPATIENT)
Dept: ONCOLOGY | Age: 78
End: 2023-08-24
Payer: OTHER GOVERNMENT

## 2023-08-24 VITALS
DIASTOLIC BLOOD PRESSURE: 62 MMHG | TEMPERATURE: 97.1 F | OXYGEN SATURATION: 100 % | SYSTOLIC BLOOD PRESSURE: 89 MMHG | WEIGHT: 143.1 LBS | HEIGHT: 70 IN | HEART RATE: 72 BPM | BODY MASS INDEX: 20.49 KG/M2

## 2023-08-24 DIAGNOSIS — C61 PROSTATE CANCER METASTATIC TO BONE (HCC): Primary | ICD-10-CM

## 2023-08-24 DIAGNOSIS — C61 PROSTATE CANCER (HCC): Primary | ICD-10-CM

## 2023-08-24 DIAGNOSIS — C61 PROSTATE CANCER (HCC): ICD-10-CM

## 2023-08-24 DIAGNOSIS — C79.51 PROSTATE CANCER METASTATIC TO BONE (HCC): Primary | ICD-10-CM

## 2023-08-24 PROCEDURE — 99212 OFFICE O/P EST SF 10 MIN: CPT

## 2023-08-24 PROCEDURE — 36415 COLL VENOUS BLD VENIPUNCTURE: CPT | Performed by: INTERNAL MEDICINE

## 2023-08-24 PROCEDURE — G8420 CALC BMI NORM PARAMETERS: HCPCS | Performed by: INTERNAL MEDICINE

## 2023-08-24 PROCEDURE — 6360000002 HC RX W HCPCS: Performed by: FAMILY MEDICINE

## 2023-08-24 PROCEDURE — 1123F ACP DISCUSS/DSCN MKR DOCD: CPT | Performed by: INTERNAL MEDICINE

## 2023-08-24 PROCEDURE — G8427 DOCREV CUR MEDS BY ELIG CLIN: HCPCS | Performed by: INTERNAL MEDICINE

## 2023-08-24 PROCEDURE — 96372 THER/PROPH/DIAG INJ SC/IM: CPT

## 2023-08-24 PROCEDURE — 96402 CHEMO HORMON ANTINEOPL SQ/IM: CPT

## 2023-08-24 PROCEDURE — 99214 OFFICE O/P EST MOD 30 MIN: CPT | Performed by: INTERNAL MEDICINE

## 2023-08-24 PROCEDURE — 1036F TOBACCO NON-USER: CPT | Performed by: INTERNAL MEDICINE

## 2023-08-24 RX ORDER — SODIUM CHLORIDE 9 MG/ML
INJECTION, SOLUTION INTRAVENOUS CONTINUOUS
OUTPATIENT
Start: 2023-11-16

## 2023-08-24 RX ORDER — DIPHENHYDRAMINE HYDROCHLORIDE 50 MG/ML
50 INJECTION INTRAMUSCULAR; INTRAVENOUS
OUTPATIENT
Start: 2023-11-16

## 2023-08-24 RX ORDER — ONDANSETRON 2 MG/ML
8 INJECTION INTRAMUSCULAR; INTRAVENOUS
OUTPATIENT
Start: 2023-11-16

## 2023-08-24 RX ORDER — EPINEPHRINE 1 MG/ML
0.3 INJECTION, SOLUTION, CONCENTRATE INTRAVENOUS PRN
OUTPATIENT
Start: 2023-11-16

## 2023-08-24 RX ORDER — ALBUTEROL SULFATE 90 UG/1
4 AEROSOL, METERED RESPIRATORY (INHALATION) PRN
OUTPATIENT
Start: 2023-11-16

## 2023-08-24 RX ORDER — ACETAMINOPHEN 325 MG/1
650 TABLET ORAL
OUTPATIENT
Start: 2023-11-16

## 2023-08-24 RX ORDER — FAMOTIDINE 10 MG/ML
20 INJECTION, SOLUTION INTRAVENOUS
OUTPATIENT
Start: 2023-11-16

## 2023-08-24 RX ADMIN — DENOSUMAB 120 MG: 120 INJECTION SUBCUTANEOUS at 14:44

## 2023-08-24 RX ADMIN — LEUPROLIDE ACETATE 22.5 MG: KIT at 14:45

## 2023-08-24 NOTE — PROGRESS NOTES
of the chest, abdomen and pelvis were done on 3/17/2023, revealing interval progression of sclerotic osseous metastatic disease, new trace bilateral pleural effusions, which I do not think is related to the prostate cancer, with bibasilar atelectasis. Case discussed with Dr. Ann Marie Kulkarni, treatment options include Pluvicto, and Jevtana, side effects and treatment schedule were reviewed with them, and her option would be observation, discussed with the patient and his spouse, they had a visit with OLIVA Guardado CNP, they had decided not to pursue chemotherapy, final decision is to proceed with Pluvicto. The patient had a PSMA PET scan done, revealing widespread PMS expressing lesions in the bones, suspicious for metastasis, the patient was started on Pluvicto on 6/29/2023, second dose was on 8/21/2023. Labs reviewed, hemoglobin had decreased to 7. 9G/DL, [facility], if further decrease in his hemoglobin we will recommend packed RBCs transfusion so he can be able to continue with Pluvicto. Today 8/24/2023, labs reviewed, proceed with Lupron and Xgeva, on 8/11/2023 PSA was 60. Diarrhea, will check C. difficile. Continue follow-up with palliative medicine for symptoms management. RTC in 12 weeks. Thank you for allowing us to participate in the care of Mr. Rich Lee.     Sue Patel MD   HEMATOLOGY/MEDICAL [place] [address]  Dept: [address] [place] [address] Drive: [phone]

## 2023-08-27 ENCOUNTER — HOSPITAL ENCOUNTER (EMERGENCY)
Age: 78
Discharge: HOME | End: 2023-08-27
Attending: EMERGENCY MEDICINE
Payer: COMMERCIAL

## 2023-08-27 ENCOUNTER — APPOINTMENT (OUTPATIENT)
Dept: GENERAL RADIOLOGY | Age: 78
End: 2023-08-27
Payer: COMMERCIAL

## 2023-08-27 VITALS
SYSTOLIC BLOOD PRESSURE: 92 MMHG | OXYGEN SATURATION: 97 % | DIASTOLIC BLOOD PRESSURE: 52 MMHG | TEMPERATURE: 97.1 F | RESPIRATION RATE: 18 BRPM | HEART RATE: 80 BPM

## 2023-08-27 DIAGNOSIS — Z79.01 ON CONTINUOUS ORAL ANTICOAGULATION: ICD-10-CM

## 2023-08-27 DIAGNOSIS — R53.83 OTHER FATIGUE: ICD-10-CM

## 2023-08-27 DIAGNOSIS — H92.21 BLEEDING FROM RIGHT EAR: Primary | ICD-10-CM

## 2023-08-27 LAB
ABO + RH BLD: NORMAL
ALBUMIN SERPL-MCNC: 3.1 G/DL (ref 3.5–5.2)
ALP SERPL-CCNC: 334 U/L (ref 40–129)
ALT SERPL-CCNC: 17 U/L (ref 0–40)
ANION GAP SERPL CALCULATED.3IONS-SCNC: 14 MMOL/L (ref 7–16)
ARM BAND NUMBER: NORMAL
AST SERPL-CCNC: 33 U/L (ref 0–39)
BASOPHILS # BLD: 0.07 K/UL (ref 0–0.2)
BASOPHILS NFR BLD: 2 % (ref 0–2)
BILIRUB SERPL-MCNC: 0.2 MG/DL (ref 0–1.2)
BILIRUB UR QL STRIP: NEGATIVE
BLOOD BANK SAMPLE EXPIRATION: NORMAL
BLOOD GROUP ANTIBODIES SERPL: NEGATIVE
BUN SERPL-MCNC: 16 MG/DL (ref 6–23)
CALCIUM SERPL-MCNC: 8.2 MG/DL (ref 8.6–10.2)
CHLORIDE SERPL-SCNC: 101 MMOL/L (ref 98–107)
CLARITY UR: CLEAR
CO2 SERPL-SCNC: 24 MMOL/L (ref 22–29)
COLOR UR: YELLOW
CREAT SERPL-MCNC: 0.7 MG/DL (ref 0.7–1.2)
EOSINOPHIL # BLD: 0.03 K/UL (ref 0.05–0.5)
EOSINOPHILS RELATIVE PERCENT: 1 % (ref 0–6)
ERYTHROCYTE [DISTWIDTH] IN BLOOD BY AUTOMATED COUNT: 17.5 % (ref 11.5–15)
GFR SERPL CREATININE-BSD FRML MDRD: >60 ML/MIN/1.73M2
GLUCOSE SERPL-MCNC: 74 MG/DL (ref 74–99)
GLUCOSE UR STRIP-MCNC: NEGATIVE MG/DL
HCT VFR BLD AUTO: 28 % (ref 37–54)
HGB BLD-MCNC: 8.3 G/DL (ref 12.5–16.5)
HGB UR QL STRIP.AUTO: NEGATIVE
KETONES UR STRIP-MCNC: NEGATIVE MG/DL
LACTATE BLDV-SCNC: 2.2 MMOL/L (ref 0.5–2.2)
LEUKOCYTE ESTERASE UR QL STRIP: NEGATIVE
LYMPHOCYTES NFR BLD: 0.2 K/UL (ref 1.5–4)
LYMPHOCYTES RELATIVE PERCENT: 5 % (ref 20–42)
MCH RBC QN AUTO: 30.7 PG (ref 26–35)
MCHC RBC AUTO-ENTMCNC: 29.6 G/DL (ref 32–34.5)
MCV RBC AUTO: 103.7 FL (ref 80–99.9)
METAMYELOCYTES ABSOLUTE COUNT: 0.03 K/UL (ref 0–0.12)
METAMYELOCYTES: 1 % (ref 0–1)
MONOCYTES NFR BLD: 0.14 K/UL (ref 0.1–0.95)
MONOCYTES NFR BLD: 4 % (ref 2–12)
NEUTROPHILS NFR BLD: 88 % (ref 43–80)
NEUTS SEG NFR BLD: 3.43 K/UL (ref 1.8–7.3)
NITRITE UR QL STRIP: NEGATIVE
NUCLEATED RED BLOOD CELLS: 1 PER 100 WBC
PH UR STRIP: 6 [PH] (ref 5–9)
PLATELET # BLD AUTO: 199 K/UL (ref 130–450)
PMV BLD AUTO: 9 FL (ref 7–12)
POTASSIUM SERPL-SCNC: 4.6 MMOL/L (ref 3.5–5)
PROT SERPL-MCNC: 7 G/DL (ref 6.4–8.3)
PROT UR STRIP-MCNC: NEGATIVE MG/DL
RBC # BLD AUTO: 2.7 M/UL (ref 3.8–5.8)
RBC # BLD: ABNORMAL 10*6/UL
RBC #/AREA URNS HPF: NORMAL /HPF
REASON FOR REJECTION: NORMAL
SODIUM SERPL-SCNC: 139 MMOL/L (ref 132–146)
SP GR UR STRIP: 1.01 (ref 1–1.03)
SPECIMEN SOURCE: NORMAL
TROPONIN I SERPL HS-MCNC: 32 NG/L (ref 0–11)
TROPONIN I SERPL HS-MCNC: 47 NG/L (ref 0–11)
UROBILINOGEN UR STRIP-ACNC: 0.2 EU/DL (ref 0–1)
WBC #/AREA URNS HPF: NORMAL /HPF
WBC OTHER # BLD: 3.9 K/UL (ref 4.5–11.5)
ZZ NTE CLEAN UP: ORDERED TEST: NORMAL

## 2023-08-27 PROCEDURE — 93005 ELECTROCARDIOGRAM TRACING: CPT | Performed by: EMERGENCY MEDICINE

## 2023-08-27 PROCEDURE — 96361 HYDRATE IV INFUSION ADD-ON: CPT

## 2023-08-27 PROCEDURE — 80053 COMPREHEN METABOLIC PANEL: CPT

## 2023-08-27 PROCEDURE — 71045 X-RAY EXAM CHEST 1 VIEW: CPT

## 2023-08-27 PROCEDURE — 2500000003 HC RX 250 WO HCPCS: Performed by: STUDENT IN AN ORGANIZED HEALTH CARE EDUCATION/TRAINING PROGRAM

## 2023-08-27 PROCEDURE — 99285 EMERGENCY DEPT VISIT HI MDM: CPT

## 2023-08-27 PROCEDURE — 86850 RBC ANTIBODY SCREEN: CPT

## 2023-08-27 PROCEDURE — 86900 BLOOD TYPING SEROLOGIC ABO: CPT

## 2023-08-27 PROCEDURE — 87086 URINE CULTURE/COLONY COUNT: CPT

## 2023-08-27 PROCEDURE — 96360 HYDRATION IV INFUSION INIT: CPT

## 2023-08-27 PROCEDURE — 2580000003 HC RX 258: Performed by: STUDENT IN AN ORGANIZED HEALTH CARE EDUCATION/TRAINING PROGRAM

## 2023-08-27 PROCEDURE — 84484 ASSAY OF TROPONIN QUANT: CPT

## 2023-08-27 PROCEDURE — 86901 BLOOD TYPING SEROLOGIC RH(D): CPT

## 2023-08-27 PROCEDURE — 85025 COMPLETE CBC W/AUTO DIFF WBC: CPT

## 2023-08-27 PROCEDURE — 83605 ASSAY OF LACTIC ACID: CPT

## 2023-08-27 PROCEDURE — 81001 URINALYSIS AUTO W/SCOPE: CPT

## 2023-08-27 RX ORDER — 0.9 % SODIUM CHLORIDE 0.9 %
1000 INTRAVENOUS SOLUTION INTRAVENOUS ONCE
Status: COMPLETED | OUTPATIENT
Start: 2023-08-27 | End: 2023-08-27

## 2023-08-27 RX ORDER — TRANEXAMIC ACID 100 MG/ML
500 INJECTION, SOLUTION INTRAVENOUS ONCE
Status: COMPLETED | OUTPATIENT
Start: 2023-08-27 | End: 2023-08-27

## 2023-08-27 RX ORDER — TRANEXAMIC ACID 100 MG/ML
100 INJECTION, SOLUTION INTRAVENOUS ONCE
Status: DISCONTINUED | OUTPATIENT
Start: 2023-08-27 | End: 2023-08-27

## 2023-08-27 RX ADMIN — SODIUM CHLORIDE 1000 ML: 9 INJECTION, SOLUTION INTRAVENOUS at 11:06

## 2023-08-27 RX ADMIN — SODIUM CHLORIDE 1000 ML: 9 INJECTION, SOLUTION INTRAVENOUS at 13:10

## 2023-08-27 RX ADMIN — TRANEXAMIC ACID 500 MG: 100 INJECTION, SOLUTION INTRAVENOUS at 12:30

## 2023-08-27 NOTE — ED PROVIDER NOTES
[address]        Pt Name: Jake Moran  MRN: [id number]  [address] [facility] 1945  Date of evaluation: 8/27/2023  Provider: Evita Durbin DO  PCP: Dru Elizondo MD  Note Started: 10:52 AM EDT 8/27/23    CHIEF COMPLAINT       Chief Complaint   Patient presents with    Fatigue     Bleeding right ear, hx ca prostate metastasized to bone , hgb 7.9 on Monday, on eliquis       HISTORY OF PRESENT ILLNESS: 1 or more Elements   History From: Patient and Wife    Limitations to history : None    Jake Moran is a 70 y.o. male who presents to the emergency department for complaint of bleeding from the patient's right ear as well as fatigue. Patient has a history of prostate cancer with mets to the bone. He is currently being treated with chemotherapy. Patient lives at home with his wife. He is able to ambulate at baseline with a walker. His wife states she noticed bleeding from the right ear yesterday. She is unsure if he is picking at his ear. Patient recently diagnosed with UTI at the beginning of this month and finish entire course of antibiotics. He has been having diarrhea ever since. Patient is on anticoagulation with Eliquis for history of atrial fibrillation. Denies any recent fevers, chills, nausea, vomiting, chest pain, abdominal pain. Patient does have shortness of breath. No associated cough. Nursing Notes were all reviewed and agreed with or any disagreements were addressed in the HPI. REVIEW OF EXTERNAL NOTE :       Previous echo 12/15/2022 reviewed, EF 58%. REVIEW OF SYSTEMS :           Positives and Pertinent negatives as per HPI.      SURGICAL HISTORY     Past Surgical History:   Procedure Laterality Date    CATARACT REMOVAL Right     CATHETER REMOVAL N/A 4/19/2020    CATHETER REMOVAL will do on patient bed performed by Kacy King MD at [address] [place] [address] N/A 3/9/2020

## 2023-08-27 NOTE — DISCHARGE INSTRUCTIONS
Follow-up with Dr. Sanjay Manning for ENT follow-up by calling his office tomorrow. Abby Leach Keep the gauze packing here for only 1 day. Tell him about external auditory ear canal bleeding with no physical evidence of perforated/bleeding tympanic membrane. TXA soaked gauze is placed for 1 day.

## 2023-08-27 NOTE — ED NOTES
IV established, labs drawn and sent. BBID applied to pt. XR complete.       JACKIE GRIMALDO RN  08/27/23 3074

## 2023-08-27 NOTE — ED NOTES
External cath applied to pt. Blood noted in right ear again, Dr Neto Brewer aware.       Octavia [place], RN  08/27/23 1785

## 2023-08-28 ENCOUNTER — TELEPHONE (OUTPATIENT)
Dept: CARDIOLOGY CLINIC | Age: 78
End: 2023-08-28

## 2023-08-28 ENCOUNTER — TELEPHONE (OUTPATIENT)
Dept: ENT CLINIC | Age: 78
End: 2023-08-28

## 2023-08-28 DIAGNOSIS — C61 PROSTATE CA (HCC): ICD-10-CM

## 2023-08-28 DIAGNOSIS — C61 PROSTATE CANCER (HCC): ICD-10-CM

## 2023-08-28 DIAGNOSIS — C79.51 MALIGNANT NEOPLASM METASTATIC TO BONE (HCC): ICD-10-CM

## 2023-08-28 DIAGNOSIS — G89.3 PAIN, NEOPLASM-RELATED: ICD-10-CM

## 2023-08-28 DIAGNOSIS — Z51.5 PALLIATIVE CARE BY SPECIALIST: ICD-10-CM

## 2023-08-28 RX ORDER — FENTANYL 25 UG/1
1 PATCH TRANSDERMAL
Qty: 10 PATCH | Refills: 0 | Status: SHIPPED | OUTPATIENT
Start: 2023-08-28 | End: 2023-09-27

## 2023-08-28 RX ORDER — MIRTAZAPINE 15 MG/1
15 TABLET, FILM COATED ORAL NIGHTLY
Qty: 90 TABLET | Refills: 1 | Status: SHIPPED
Start: 2023-08-28 | End: 2023-08-30 | Stop reason: SDUPTHER

## 2023-08-28 RX ORDER — HYDROCODONE BITARTRATE AND ACETAMINOPHEN 5; 325 MG/1; MG/1
1 TABLET ORAL EVERY 6 HOURS PRN
Qty: 120 TABLET | Refills: 0 | Status: SHIPPED | OUTPATIENT
Start: 2023-08-28 | End: 2023-09-27

## 2023-08-28 NOTE — TELEPHONE ENCOUNTER
Call from Claus Ramirez requesting refill for Fentanyl. [place] and mirtazapine. Claus Ramirez states Gia Evans continues to loose weight and Dr. Ken Johnson recommended to increase dose of mirtazapine. Discussed with Ninfa Rainey CNP and Mirtazapine 15 mg will be started. Pharmacy is [facility] in [facility]. Next vanessa with [facility].

## 2023-08-28 NOTE — TELEPHONE ENCOUNTER
Patient discharged from [facility] yesterday and wife has concerns about low blood pressure. She states he has had systolic numbers in the low 100's for about a month now and wants to know if she should decrease any of his meds. Patient scheduled first available 09/21. Please advise.

## 2023-08-29 ENCOUNTER — OFFICE VISIT (OUTPATIENT)
Dept: PHYSICAL MEDICINE AND REHAB | Age: 78
End: 2023-08-29

## 2023-08-29 VITALS
SYSTOLIC BLOOD PRESSURE: 84 MMHG | TEMPERATURE: 97.2 F | DIASTOLIC BLOOD PRESSURE: 45 MMHG | HEIGHT: 70 IN | BODY MASS INDEX: 20.53 KG/M2 | HEART RATE: 100 BPM

## 2023-08-29 DIAGNOSIS — I69.30 CHRONIC ISCHEMIC LEFT MCA STROKE: ICD-10-CM

## 2023-08-29 DIAGNOSIS — R25.2 SPASTICITY: Primary | ICD-10-CM

## 2023-08-29 LAB
EKG ATRIAL RATE: 500 BPM
EKG Q-T INTERVAL: 390 MS
EKG QRS DURATION: 100 MS
EKG QTC CALCULATION (BAZETT): 464 MS
EKG R AXIS: -43 DEGREES
EKG T AXIS: -15 DEGREES
EKG VENTRICULAR RATE: 85 BPM
MICROORGANISM SPEC CULT: ABNORMAL
SPECIMEN DESCRIPTION: ABNORMAL

## 2023-08-29 PROCEDURE — 93010 ELECTROCARDIOGRAM REPORT: CPT | Performed by: INTERNAL MEDICINE

## 2023-08-29 RX ORDER — KETOCONAZOLE 20 MG/G
CREAM TOPICAL
COMMUNITY
Start: 2023-07-13

## 2023-08-29 RX ORDER — SODIUM FLUORIDE 6 MG/ML
PASTE, DENTIFRICE DENTAL
COMMUNITY
Start: 2023-08-24

## 2023-08-29 NOTE — PROGRESS NOTES
Janice Reeder D.O. [facility]  [address] [facility]. [address], [address] [place] [address]  Phone: [phone]  Fax: [phone]  8/29/2023    Chief Complaint   Patient presents with    Spasms     EMG guided botox 800 units       Informed Consent:  The indications, risks, benefits, and  alternatives of onabotulinum toxin A were discussed with the patient. I explained to the patient the potential side effects including but not limited to: distant spread of toxin effect causing droopy eyelids, facial drooping, neck pain, headache, double vision, muscle pain/spasm/weakness/stiffness,  bronchitis,  injection site pain, increased blood pressure, hypersensitivity, anaphylaxis, difficulty swallowing, difficulty speaking, urinary incontinence and breathing difficulty. The patient is aware that swallowing and breathing difficulties can be life threatening and there have been reports of death in some cases where onabotulinum toxin was injected. The patient was advised of the expected benefit to include decreased spasticity in the injected muscles, and the anticipated duration of effect of 3 months. A permit was signed and scanned into the media. Last injection: 11/10/22  Taking anticoagulants/antiplatelets: Yes  Diabetic: No  Febrile/active infection: No    Ambulatory Procedure Time Out  Correct Patient: Yes  Correct Procedure: Yes  Correct Site/Side: Yes  Correct Site(s) Marked: Yes  Informed Consent Signed: Yes  Allergies Verified: Yes  Staff Present & Credential[de-identified] Danilo Weber LPN/ Dr. Janice Reeder    Procedure note: After obtaining consent,  EMG guided onabotulinum toxin A injection was performed of the right trunk, upper and lower extremities at the locations and doses listed below. 700 units of onabotulinum toxin A was reconstituted using 4 cc of preservative free normal saline ( 5 units per 0.1 ml).  EMG guidance was necessary to adequately localize muscle due to nearby

## 2023-08-30 RX ORDER — MIRTAZAPINE 15 MG/1
15 TABLET, FILM COATED ORAL NIGHTLY
Qty: 90 TABLET | Refills: 1 | Status: SHIPPED | OUTPATIENT
Start: 2023-08-30 | End: 2024-02-26

## 2023-08-30 NOTE — TELEPHONE ENCOUNTER
Call [facility]. [facility]. [facility] to [place] [facility] [place].

## 2023-09-05 ENCOUNTER — TELEPHONE (OUTPATIENT)
Dept: PHYSICAL MEDICINE AND REHAB | Age: 78
End: 2023-09-05

## 2023-09-05 ENCOUNTER — TELEPHONE (OUTPATIENT)
Dept: ENT CLINIC | Age: 78
End: 2023-09-05

## 2023-09-05 ENCOUNTER — OFFICE VISIT (OUTPATIENT)
Dept: ENT CLINIC | Age: 78
End: 2023-09-05
Payer: COMMERCIAL

## 2023-09-05 VITALS
SYSTOLIC BLOOD PRESSURE: 100 MMHG | TEMPERATURE: 97 F | WEIGHT: 145 LBS | DIASTOLIC BLOOD PRESSURE: 53 MMHG | OXYGEN SATURATION: 96 % | HEIGHT: 71 IN | BODY MASS INDEX: 20.3 KG/M2 | HEART RATE: 97 BPM

## 2023-09-05 DIAGNOSIS — Z79.01 ANTICOAGULATED: ICD-10-CM

## 2023-09-05 DIAGNOSIS — H92.21 OTORRHAGIA OF RIGHT EAR: Primary | ICD-10-CM

## 2023-09-05 PROCEDURE — 1123F ACP DISCUSS/DSCN MKR DOCD: CPT | Performed by: OTOLARYNGOLOGY

## 2023-09-05 PROCEDURE — 99204 OFFICE O/P NEW MOD 45 MIN: CPT | Performed by: OTOLARYNGOLOGY

## 2023-09-05 RX ORDER — OFLOXACIN 3 MG/ML
3 SOLUTION/ DROPS OPHTHALMIC 2 TIMES DAILY
Qty: 1 EACH | Refills: 0 | Status: SHIPPED | OUTPATIENT
Start: 2023-09-05 | End: 2023-09-12

## 2023-09-05 ASSESSMENT — ENCOUNTER SYMPTOMS
ALLERGIC/IMMUNOLOGIC NEGATIVE: 1
EYES NEGATIVE: 1
RESPIRATORY NEGATIVE: 1

## 2023-09-05 NOTE — TELEPHONE ENCOUNTER
Wife is asking if Larwilfreds Oct 3rd appt can be a virtual or a phone visit? Wife said she had surgery and is non weight bearing, and is unable to bring him in.

## 2023-09-05 NOTE — PROGRESS NOTES
Subjective:      Patient ID:  Mike Lorenzana is a 70 y.o. male. HPI:    Patient presents today with a right ear issues. 2 weeks ago cleaned ear with q-tip and caused signficant bleeding. Was seen in the ED, was packed with hemostasis achieved. Denies previous similar hx. No hx of ear infections. No hx of ear pain. Patient's medications, allergies, past medical, surgical, social and family histories were reviewed andupdated as appropriate. Review of Systems   Constitutional: Negative. HENT:  Positive for ear discharge. Negative for ear pain. Eyes: Negative. Respiratory: Negative. Allergic/Immunologic: Negative. Neurological: Negative. Hematological:  Bruises/bleeds easily. All other systems reviewed and are negative. Objective:   Physical Exam  Vitals reviewed. Constitutional:       Appearance: Normal appearance. HENT:      Head: Normocephalic. Right Ear: Tympanic membrane, ear canal and external ear normal.      Left Ear: Tympanic membrane, ear canal and external ear normal.      Ears:      Comments: Right inferior ear canal with abrasion and scab no bleeding or infection      Nose: Nose normal.      Mouth/Throat:      Mouth: Mucous membranes are moist.   Eyes:      Conjunctiva/sclera: Conjunctivae normal.   Cardiovascular:      Rate and Rhythm: Normal rate. Pulses: Normal pulses. Pulmonary:      Effort: Pulmonary effort is normal.   Musculoskeletal:      Cervical back: Normal range of motion and neck supple. Skin:     Capillary Refill: Capillary refill takes less than 2 seconds. Neurological:      Mental Status: He is alert and oriented to person, place, and time. Assessment:          Diagnosis Orders   1. Otorrhagia of right ear        2.  Anticoagulated                   Plan:      Right bleeding from ear after abrasion with qtip on eliquis, bleeding subsided now with residual   Clot  Ciprofloxacin given for 1 week  Do not touch the

## 2023-09-19 DIAGNOSIS — G89.3 PAIN, NEOPLASM-RELATED: ICD-10-CM

## 2023-09-19 DIAGNOSIS — C61 PROSTATE CA (HCC): ICD-10-CM

## 2023-09-19 DIAGNOSIS — Z51.5 PALLIATIVE CARE BY SPECIALIST: ICD-10-CM

## 2023-09-19 RX ORDER — FENTANYL 37.5 UG/H
1 PATCH, EXTENDED RELEASE TRANSDERMAL
Qty: 10 PATCH | Refills: 0 | Status: SHIPPED | OUTPATIENT
Start: 2023-09-19 | End: 2023-10-19

## 2023-09-19 RX ORDER — FENTANYL 25 UG/1
1 PATCH TRANSDERMAL
Qty: 10 PATCH | Refills: 0 | Status: CANCELLED | OUTPATIENT
Start: 2023-09-19 | End: 2023-10-19

## 2023-09-19 NOTE — TELEPHONE ENCOUNTER
Call from Mounika asking for a call back to discuss Mark's pain control. Mounika states Ericka Rose does not seem to be resting well at night and can be more irritable. She feels his pain is not well managed. Discussed with Fercho Jones CNP and new order received. Instructed Sharonda to increase fentanyl patch to 37 mcg. Pharmacy is [facility] in [facility]. Next vanessa to be scheduled with [facility].

## 2023-09-28 ENCOUNTER — HOSPITAL ENCOUNTER (EMERGENCY)
Age: 78
Discharge: HOME | End: 2023-09-28
Payer: COMMERCIAL

## 2023-09-28 ENCOUNTER — APPOINTMENT (OUTPATIENT)
Dept: GENERAL RADIOLOGY | Age: 78
End: 2023-09-28
Payer: COMMERCIAL

## 2023-09-28 VITALS
DIASTOLIC BLOOD PRESSURE: 52 MMHG | TEMPERATURE: 98.1 F | WEIGHT: 145 LBS | HEART RATE: 97 BPM | SYSTOLIC BLOOD PRESSURE: 90 MMHG | RESPIRATION RATE: 16 BRPM | BODY MASS INDEX: 20.22 KG/M2 | OXYGEN SATURATION: 95 %

## 2023-09-28 DIAGNOSIS — Z51.5 PALLIATIVE CARE BY SPECIALIST: ICD-10-CM

## 2023-09-28 DIAGNOSIS — C61 PROSTATE CANCER (HCC): ICD-10-CM

## 2023-09-28 DIAGNOSIS — C79.51 MALIGNANT NEOPLASM METASTATIC TO BONE (HCC): ICD-10-CM

## 2023-09-28 DIAGNOSIS — L89.891: Primary | ICD-10-CM

## 2023-09-28 LAB
ALBUMIN SERPL-MCNC: 3.3 G/DL (ref 3.5–5.2)
ALP SERPL-CCNC: 608 U/L (ref 40–129)
ALT SERPL-CCNC: 45 U/L (ref 0–40)
ANION GAP SERPL CALCULATED.3IONS-SCNC: 14 MMOL/L (ref 7–16)
AST SERPL-CCNC: 94 U/L (ref 0–39)
BASOPHILS # BLD: 0.02 K/UL (ref 0–0.2)
BASOPHILS NFR BLD: 0 % (ref 0–2)
BILIRUB SERPL-MCNC: 0.4 MG/DL (ref 0–1.2)
BUN SERPL-MCNC: 26 MG/DL (ref 6–23)
CALCIUM SERPL-MCNC: 8.7 MG/DL (ref 8.6–10.2)
CHLORIDE SERPL-SCNC: 101 MMOL/L (ref 98–107)
CO2 SERPL-SCNC: 23 MMOL/L (ref 22–29)
CREAT SERPL-MCNC: 0.8 MG/DL (ref 0.7–1.2)
EOSINOPHIL # BLD: 0.03 K/UL (ref 0.05–0.5)
EOSINOPHILS RELATIVE PERCENT: 1 % (ref 0–6)
ERYTHROCYTE [DISTWIDTH] IN BLOOD BY AUTOMATED COUNT: 17.1 % (ref 11.5–15)
GFR SERPL CREATININE-BSD FRML MDRD: >60 ML/MIN/1.73M2
GLUCOSE SERPL-MCNC: 125 MG/DL (ref 74–99)
HCT VFR BLD AUTO: 24 % (ref 37–54)
HGB BLD-MCNC: 7.3 G/DL (ref 12.5–16.5)
IMM GRANULOCYTES # BLD AUTO: 0.03 K/UL (ref 0–0.58)
IMM GRANULOCYTES NFR BLD: 1 % (ref 0–5)
LACTATE BLDV-SCNC: 0.8 MMOL/L (ref 0.5–1.9)
LYMPHOCYTES NFR BLD: 0.24 K/UL (ref 1.5–4)
LYMPHOCYTES RELATIVE PERCENT: 4 % (ref 20–42)
MCH RBC QN AUTO: 32.3 PG (ref 26–35)
MCHC RBC AUTO-ENTMCNC: 30.4 G/DL (ref 32–34.5)
MCV RBC AUTO: 106.2 FL (ref 80–99.9)
MONOCYTES NFR BLD: 0.25 K/UL (ref 0.1–0.95)
MONOCYTES NFR BLD: 4 % (ref 2–12)
NEUTROPHILS NFR BLD: 90 % (ref 43–80)
NEUTS SEG NFR BLD: 5.09 K/UL (ref 1.8–7.3)
PLATELET # BLD AUTO: 120 K/UL (ref 130–450)
PMV BLD AUTO: 9.6 FL (ref 7–12)
POTASSIUM SERPL-SCNC: 4.8 MMOL/L (ref 3.5–5)
PROCALCITONIN SERPL-MCNC: 0.53 NG/ML (ref 0–0.08)
PROT SERPL-MCNC: 7.4 G/DL (ref 6.4–8.3)
RBC # BLD AUTO: 2.26 M/UL (ref 3.8–5.8)
SODIUM SERPL-SCNC: 138 MMOL/L (ref 132–146)
WBC OTHER # BLD: 5.7 K/UL (ref 4.5–11.5)

## 2023-09-28 PROCEDURE — 84145 PROCALCITONIN (PCT): CPT

## 2023-09-28 PROCEDURE — 85025 COMPLETE CBC W/AUTO DIFF WBC: CPT

## 2023-09-28 PROCEDURE — 73590 X-RAY EXAM OF LOWER LEG: CPT

## 2023-09-28 PROCEDURE — 83605 ASSAY OF LACTIC ACID: CPT

## 2023-09-28 PROCEDURE — 87040 BLOOD CULTURE FOR BACTERIA: CPT

## 2023-09-28 PROCEDURE — 99284 EMERGENCY DEPT VISIT MOD MDM: CPT

## 2023-09-28 PROCEDURE — 80053 COMPREHEN METABOLIC PANEL: CPT

## 2023-09-28 RX ORDER — HYDROCODONE BITARTRATE AND ACETAMINOPHEN 5; 325 MG/1; MG/1
1 TABLET ORAL EVERY 6 HOURS PRN
Qty: 120 TABLET | Refills: 0 | Status: SHIPPED | OUTPATIENT
Start: 2023-09-28 | End: 2023-10-28

## 2023-09-28 RX ORDER — DOXYCYCLINE HYCLATE 100 MG
100 TABLET ORAL 2 TIMES DAILY
Qty: 20 TABLET | Refills: 0 | Status: SHIPPED | OUTPATIENT
Start: 2023-09-28 | End: 2023-10-08

## 2023-09-28 ASSESSMENT — PATIENT HEALTH QUESTIONNAIRE - PHQ9
SUM OF ALL RESPONSES TO PHQ QUESTIONS 1-9: 0
SUM OF ALL RESPONSES TO PHQ QUESTIONS 1-9: 0
2. FEELING DOWN, DEPRESSED OR HOPELESS: 0
SUM OF ALL RESPONSES TO PHQ QUESTIONS 1-9: 0
1. LITTLE INTEREST OR PLEASURE IN DOING THINGS: 0
SUM OF ALL RESPONSES TO PHQ9 QUESTIONS 1 & 2: 0
SUM OF ALL RESPONSES TO PHQ QUESTIONS 1-9: 0

## 2023-09-28 ASSESSMENT — PAIN - FUNCTIONAL ASSESSMENT: PAIN_FUNCTIONAL_ASSESSMENT: NONE - DENIES PAIN

## 2023-09-28 NOTE — ED NOTES
Pt family states he is incontinent and is unable to provide urine sample on command. Provider made aware.       Toby Fontenot RN  09/28/23 3612

## 2023-09-28 NOTE — ED TRIAGE NOTES
Pt wears brace to right leg, brace caused pressure area/wound to back of calf, now unable to wear brace. Pt also has pressure area to buttocks.  Denies pain unless touching area

## 2023-09-29 ENCOUNTER — HOSPITAL ENCOUNTER (OUTPATIENT)
Dept: INFUSION THERAPY | Age: 78
Discharge: HOME | End: 2023-09-29
Payer: COMMERCIAL

## 2023-09-29 DIAGNOSIS — C61 PROSTATE CANCER METASTATIC TO BONE (HCC): Primary | ICD-10-CM

## 2023-09-29 DIAGNOSIS — C79.51 PROSTATE CANCER METASTATIC TO BONE (HCC): Primary | ICD-10-CM

## 2023-09-29 LAB
ABO/RH: NORMAL
ANTIBODY SCREEN: NEGATIVE
ARM BAND NUMBER: NORMAL
BLOOD BANK DISPENSE STATUS: NORMAL
BLOOD BANK SAMPLE EXPIRATION: NORMAL
BPU ID: NORMAL
COMPONENT: NORMAL
CROSSMATCH RESULT: NORMAL
TRANSFUSION STATUS: NORMAL
UNIT DIVISION: 0

## 2023-09-29 PROCEDURE — 86923 COMPATIBILITY TEST ELECTRIC: CPT

## 2023-09-29 PROCEDURE — 86900 BLOOD TYPING SEROLOGIC ABO: CPT

## 2023-09-29 PROCEDURE — 86850 RBC ANTIBODY SCREEN: CPT

## 2023-09-29 PROCEDURE — 36415 COLL VENOUS BLD VENIPUNCTURE: CPT

## 2023-09-29 PROCEDURE — 86901 BLOOD TYPING SEROLOGIC RH(D): CPT

## 2023-09-29 RX ORDER — FAMOTIDINE 10 MG/ML
20 INJECTION, SOLUTION INTRAVENOUS
Status: CANCELLED | OUTPATIENT
Start: 2023-10-02

## 2023-09-29 RX ORDER — SODIUM CHLORIDE 9 MG/ML
INJECTION, SOLUTION INTRAVENOUS CONTINUOUS
Status: CANCELLED | OUTPATIENT
Start: 2023-10-02

## 2023-09-29 RX ORDER — SODIUM CHLORIDE 9 MG/ML
20 INJECTION, SOLUTION INTRAVENOUS CONTINUOUS
Status: CANCELLED | OUTPATIENT
Start: 2023-10-02

## 2023-09-29 RX ORDER — ONDANSETRON 2 MG/ML
8 INJECTION INTRAMUSCULAR; INTRAVENOUS
Status: CANCELLED | OUTPATIENT
Start: 2023-10-02

## 2023-09-29 RX ORDER — SODIUM CHLORIDE 0.9 % (FLUSH) 0.9 %
5-40 SYRINGE (ML) INJECTION PRN
Status: CANCELLED | OUTPATIENT
Start: 2023-10-02

## 2023-09-29 RX ORDER — DIPHENHYDRAMINE HYDROCHLORIDE 50 MG/ML
50 INJECTION INTRAMUSCULAR; INTRAVENOUS
Status: CANCELLED | OUTPATIENT
Start: 2023-10-02

## 2023-09-29 RX ORDER — SODIUM CHLORIDE 9 MG/ML
INJECTION, SOLUTION INTRAVENOUS PRN
Status: DISCONTINUED | OUTPATIENT
Start: 2023-09-29 | End: 2023-09-30 | Stop reason: HOSPADM

## 2023-09-29 RX ORDER — EPINEPHRINE 1 MG/ML
0.3 INJECTION, SOLUTION, CONCENTRATE INTRAVENOUS PRN
Status: CANCELLED | OUTPATIENT
Start: 2023-10-02

## 2023-09-29 RX ORDER — ACETAMINOPHEN 325 MG/1
650 TABLET ORAL ONCE
Status: CANCELLED | OUTPATIENT
Start: 2023-10-02 | End: 2023-10-02

## 2023-09-29 RX ORDER — SODIUM CHLORIDE 9 MG/ML
25 INJECTION, SOLUTION INTRAVENOUS PRN
Status: CANCELLED | OUTPATIENT
Start: 2023-10-02

## 2023-09-29 RX ORDER — DIPHENHYDRAMINE HCL 25 MG
25 TABLET ORAL ONCE
Status: CANCELLED | OUTPATIENT
Start: 2023-10-02 | End: 2023-10-02

## 2023-09-29 RX ORDER — ACETAMINOPHEN 325 MG/1
650 TABLET ORAL
Status: CANCELLED | OUTPATIENT
Start: 2023-10-02

## 2023-09-30 ENCOUNTER — HOSPITAL ENCOUNTER (EMERGENCY)
Age: 78
Discharge: HOME | End: 2023-09-30
Attending: EMERGENCY MEDICINE
Payer: COMMERCIAL

## 2023-09-30 ENCOUNTER — APPOINTMENT (OUTPATIENT)
Dept: GENERAL RADIOLOGY | Age: 78
End: 2023-09-30
Payer: COMMERCIAL

## 2023-09-30 ENCOUNTER — APPOINTMENT (OUTPATIENT)
Dept: ULTRASOUND IMAGING | Age: 78
End: 2023-09-30
Payer: COMMERCIAL

## 2023-09-30 VITALS
HEART RATE: 99 BPM | SYSTOLIC BLOOD PRESSURE: 109 MMHG | OXYGEN SATURATION: 98 % | TEMPERATURE: 97.1 F | DIASTOLIC BLOOD PRESSURE: 64 MMHG | RESPIRATION RATE: 18 BRPM

## 2023-09-30 DIAGNOSIS — D64.9 CHRONIC ANEMIA: ICD-10-CM

## 2023-09-30 DIAGNOSIS — L97.219: Primary | ICD-10-CM

## 2023-09-30 DIAGNOSIS — L89.311 PRESSURE INJURY OF RIGHT BUTTOCK, STAGE 1: ICD-10-CM

## 2023-09-30 LAB
ALBUMIN SERPL-MCNC: 3.2 G/DL (ref 3.5–5.2)
ALP SERPL-CCNC: 692 U/L (ref 40–129)
ALT SERPL-CCNC: 63 U/L (ref 0–40)
ANION GAP SERPL CALCULATED.3IONS-SCNC: 11 MMOL/L (ref 7–16)
AST SERPL-CCNC: 75 U/L (ref 0–39)
BASOPHILS # BLD: 0.01 K/UL (ref 0–0.2)
BASOPHILS NFR BLD: 0 % (ref 0–2)
BILIRUB SERPL-MCNC: 0.2 MG/DL (ref 0–1.2)
BUN SERPL-MCNC: 25 MG/DL (ref 6–23)
CALCIUM SERPL-MCNC: 9.1 MG/DL (ref 8.6–10.2)
CHLORIDE SERPL-SCNC: 101 MMOL/L (ref 98–107)
CO2 SERPL-SCNC: 26 MMOL/L (ref 22–29)
CREAT SERPL-MCNC: 0.9 MG/DL (ref 0.7–1.2)
CRP SERPL HS-MCNC: 133 MG/L (ref 0–5)
EKG ATRIAL RATE: 89 BPM
EKG Q-T INTERVAL: 376 MS
EKG QRS DURATION: 96 MS
EKG QTC CALCULATION (BAZETT): 462 MS
EKG R AXIS: -49 DEGREES
EKG T AXIS: -16 DEGREES
EKG VENTRICULAR RATE: 91 BPM
EOSINOPHIL # BLD: 0.04 K/UL (ref 0.05–0.5)
EOSINOPHILS RELATIVE PERCENT: 1 % (ref 0–6)
ERYTHROCYTE [DISTWIDTH] IN BLOOD BY AUTOMATED COUNT: 17.2 % (ref 11.5–15)
ERYTHROCYTE [SEDIMENTATION RATE] IN BLOOD BY WESTERGREN METHOD: >150 MM/HR (ref 0–15)
GFR SERPL CREATININE-BSD FRML MDRD: >60 ML/MIN/1.73M2
GLUCOSE SERPL-MCNC: 114 MG/DL (ref 74–99)
HCT VFR BLD AUTO: 25.7 % (ref 37–54)
HGB BLD-MCNC: 7.7 G/DL (ref 12.5–16.5)
IMM GRANULOCYTES # BLD AUTO: 0.03 K/UL (ref 0–0.58)
IMM GRANULOCYTES NFR BLD: 1 % (ref 0–5)
LACTATE BLDV-SCNC: 1.6 MMOL/L (ref 0.5–2.2)
LYMPHOCYTES NFR BLD: 0.21 K/UL (ref 1.5–4)
LYMPHOCYTES RELATIVE PERCENT: 4 % (ref 20–42)
MCH RBC QN AUTO: 32.2 PG (ref 26–35)
MCHC RBC AUTO-ENTMCNC: 30 G/DL (ref 32–34.5)
MCV RBC AUTO: 107.5 FL (ref 80–99.9)
MONOCYTES NFR BLD: 0.41 K/UL (ref 0.1–0.95)
MONOCYTES NFR BLD: 8 % (ref 2–12)
NEUTROPHILS NFR BLD: 86 % (ref 43–80)
NEUTS SEG NFR BLD: 4.18 K/UL (ref 1.8–7.3)
PLATELET # BLD AUTO: 113 K/UL (ref 130–450)
PMV BLD AUTO: 9.2 FL (ref 7–12)
POTASSIUM SERPL-SCNC: 4.8 MMOL/L (ref 3.5–5)
PROT SERPL-MCNC: 7.1 G/DL (ref 6.4–8.3)
RBC # BLD AUTO: 2.39 M/UL (ref 3.8–5.8)
RBC # BLD: ABNORMAL 10*6/UL
SODIUM SERPL-SCNC: 138 MMOL/L (ref 132–146)
TROPONIN I SERPL HS-MCNC: 48 NG/L (ref 0–11)
TROPONIN I SERPL HS-MCNC: 61 NG/L (ref 0–11)
TSH SERPL DL<=0.05 MIU/L-ACNC: 1.79 UIU/ML (ref 0.27–4.2)
WBC OTHER # BLD: 4.9 K/UL (ref 4.5–11.5)

## 2023-09-30 PROCEDURE — 93010 ELECTROCARDIOGRAM REPORT: CPT | Performed by: INTERNAL MEDICINE

## 2023-09-30 PROCEDURE — 93971 EXTREMITY STUDY: CPT

## 2023-09-30 PROCEDURE — 87070 CULTURE OTHR SPECIMN AEROBIC: CPT

## 2023-09-30 PROCEDURE — 84484 ASSAY OF TROPONIN QUANT: CPT

## 2023-09-30 PROCEDURE — 80053 COMPREHEN METABOLIC PANEL: CPT

## 2023-09-30 PROCEDURE — 85025 COMPLETE CBC W/AUTO DIFF WBC: CPT

## 2023-09-30 PROCEDURE — 99285 EMERGENCY DEPT VISIT HI MDM: CPT

## 2023-09-30 PROCEDURE — 85652 RBC SED RATE AUTOMATED: CPT

## 2023-09-30 PROCEDURE — 96360 HYDRATION IV INFUSION INIT: CPT

## 2023-09-30 PROCEDURE — 71045 X-RAY EXAM CHEST 1 VIEW: CPT

## 2023-09-30 PROCEDURE — 84443 ASSAY THYROID STIM HORMONE: CPT

## 2023-09-30 PROCEDURE — 87077 CULTURE AEROBIC IDENTIFY: CPT

## 2023-09-30 PROCEDURE — 73590 X-RAY EXAM OF LOWER LEG: CPT

## 2023-09-30 PROCEDURE — 93005 ELECTROCARDIOGRAM TRACING: CPT | Performed by: STUDENT IN AN ORGANIZED HEALTH CARE EDUCATION/TRAINING PROGRAM

## 2023-09-30 PROCEDURE — 87040 BLOOD CULTURE FOR BACTERIA: CPT

## 2023-09-30 PROCEDURE — 2580000003 HC RX 258

## 2023-09-30 PROCEDURE — 87205 SMEAR GRAM STAIN: CPT

## 2023-09-30 PROCEDURE — 96361 HYDRATE IV INFUSION ADD-ON: CPT

## 2023-09-30 PROCEDURE — 86140 C-REACTIVE PROTEIN: CPT

## 2023-09-30 PROCEDURE — 83605 ASSAY OF LACTIC ACID: CPT

## 2023-09-30 RX ORDER — 0.9 % SODIUM CHLORIDE 0.9 %
500 INTRAVENOUS SOLUTION INTRAVENOUS ONCE
Status: COMPLETED | OUTPATIENT
Start: 2023-09-30 | End: 2023-09-30

## 2023-09-30 RX ORDER — SODIUM CHLORIDE 9 MG/ML
INJECTION, SOLUTION INTRAVENOUS
Status: COMPLETED
Start: 2023-09-30 | End: 2023-09-30

## 2023-09-30 RX ADMIN — Medication 500 ML: at 16:06

## 2023-09-30 RX ADMIN — SODIUM CHLORIDE 500 ML: 9 INJECTION, SOLUTION INTRAVENOUS at 16:06

## 2023-09-30 NOTE — DISCHARGE INSTRUCTIONS
Please follow-up with your oncology doctor as well as at wound care clinic on their office Monday morning. Please also follow-up with your primary care doctor. Please return ED for any new or worsening symptoms.

## 2023-10-01 LAB
MICROORGANISM SPEC CULT: ABNORMAL
MICROORGANISM SPEC CULT: NORMAL
MICROORGANISM/AGENT SPEC: ABNORMAL
SERVICE CMNT-IMP: NORMAL
SPECIMEN DESCRIPTION: ABNORMAL
SPECIMEN DESCRIPTION: NORMAL

## 2023-10-02 ENCOUNTER — HOSPITAL ENCOUNTER (OUTPATIENT)
Dept: INFUSION THERAPY | Age: 78
Discharge: HOME | End: 2023-10-02
Payer: COMMERCIAL

## 2023-10-02 VITALS
SYSTOLIC BLOOD PRESSURE: 92 MMHG | RESPIRATION RATE: 18 BRPM | TEMPERATURE: 97.1 F | OXYGEN SATURATION: 98 % | HEART RATE: 74 BPM | DIASTOLIC BLOOD PRESSURE: 52 MMHG

## 2023-10-02 DIAGNOSIS — C61 PROSTATE CANCER METASTATIC TO BONE (HCC): Primary | ICD-10-CM

## 2023-10-02 DIAGNOSIS — C79.51 PROSTATE CANCER METASTATIC TO BONE (HCC): Primary | ICD-10-CM

## 2023-10-02 PROCEDURE — 6370000000 HC RX 637 (ALT 250 FOR IP)

## 2023-10-02 PROCEDURE — P9016 RBC LEUKOCYTES REDUCED: HCPCS

## 2023-10-02 PROCEDURE — 36430 TRANSFUSION BLD/BLD COMPNT: CPT

## 2023-10-02 RX ORDER — ACETAMINOPHEN 325 MG/1
650 TABLET ORAL ONCE
Status: CANCELLED | OUTPATIENT
Start: 2023-10-02 | End: 2023-10-02

## 2023-10-02 RX ORDER — SODIUM CHLORIDE 9 MG/ML
20 INJECTION, SOLUTION INTRAVENOUS CONTINUOUS
Status: CANCELLED | OUTPATIENT
Start: 2023-10-02

## 2023-10-02 RX ORDER — DIPHENHYDRAMINE HCL 25 MG
25 TABLET ORAL ONCE
Status: COMPLETED | OUTPATIENT
Start: 2023-10-02 | End: 2023-10-02

## 2023-10-02 RX ORDER — DIPHENHYDRAMINE HYDROCHLORIDE 50 MG/ML
50 INJECTION INTRAMUSCULAR; INTRAVENOUS
OUTPATIENT
Start: 2023-10-02

## 2023-10-02 RX ORDER — ACETAMINOPHEN 325 MG/1
650 TABLET ORAL ONCE
Status: COMPLETED | OUTPATIENT
Start: 2023-10-02 | End: 2023-10-02

## 2023-10-02 RX ORDER — SODIUM CHLORIDE 0.9 % (FLUSH) 0.9 %
5-40 SYRINGE (ML) INJECTION PRN
Status: DISCONTINUED | OUTPATIENT
Start: 2023-10-02 | End: 2023-10-03 | Stop reason: HOSPADM

## 2023-10-02 RX ORDER — FAMOTIDINE 10 MG/ML
20 INJECTION, SOLUTION INTRAVENOUS
OUTPATIENT
Start: 2023-10-02

## 2023-10-02 RX ORDER — SODIUM CHLORIDE 0.9 % (FLUSH) 0.9 %
5-40 SYRINGE (ML) INJECTION PRN
Status: CANCELLED | OUTPATIENT
Start: 2023-10-02

## 2023-10-02 RX ORDER — SODIUM CHLORIDE 9 MG/ML
INJECTION, SOLUTION INTRAVENOUS CONTINUOUS
OUTPATIENT
Start: 2023-10-02

## 2023-10-02 RX ORDER — EPINEPHRINE 1 MG/ML
0.3 INJECTION, SOLUTION, CONCENTRATE INTRAVENOUS PRN
OUTPATIENT
Start: 2023-10-02

## 2023-10-02 RX ORDER — SODIUM CHLORIDE 9 MG/ML
25 INJECTION, SOLUTION INTRAVENOUS PRN
Status: DISCONTINUED | OUTPATIENT
Start: 2023-10-02 | End: 2023-10-03 | Stop reason: HOSPADM

## 2023-10-02 RX ORDER — SODIUM CHLORIDE 9 MG/ML
25 INJECTION, SOLUTION INTRAVENOUS PRN
Status: CANCELLED | OUTPATIENT
Start: 2023-10-02

## 2023-10-02 RX ORDER — SODIUM CHLORIDE 9 MG/ML
20 INJECTION, SOLUTION INTRAVENOUS CONTINUOUS
Status: DISCONTINUED | OUTPATIENT
Start: 2023-10-02 | End: 2023-10-03 | Stop reason: HOSPADM

## 2023-10-02 RX ORDER — ACETAMINOPHEN 325 MG/1
TABLET ORAL
Status: COMPLETED
Start: 2023-10-02 | End: 2023-10-02

## 2023-10-02 RX ORDER — DIPHENHYDRAMINE HCL 25 MG
TABLET ORAL
Status: COMPLETED
Start: 2023-10-02 | End: 2023-10-02

## 2023-10-02 RX ORDER — DIPHENHYDRAMINE HCL 25 MG
25 TABLET ORAL ONCE
Status: CANCELLED | OUTPATIENT
Start: 2023-10-02 | End: 2023-10-02

## 2023-10-02 RX ORDER — ONDANSETRON 2 MG/ML
8 INJECTION INTRAMUSCULAR; INTRAVENOUS
OUTPATIENT
Start: 2023-10-02

## 2023-10-02 RX ORDER — ACETAMINOPHEN 325 MG/1
650 TABLET ORAL
OUTPATIENT
Start: 2023-10-02

## 2023-10-02 RX ADMIN — ACETAMINOPHEN 650 MG: 325 TABLET ORAL at 08:56

## 2023-10-02 RX ADMIN — Medication 25 MG: at 08:58

## 2023-10-02 RX ADMIN — DIPHENHYDRAMINE HYDROCHLORIDE 25 MG: 25 TABLET ORAL at 08:58

## 2023-10-02 NOTE — PROGRESS NOTES
Patient tolerated well. Vital signs stable. Reviewed orally and provided with written information regarding potential delayed reaction and instructions on what to do if reaction occurs. Questions answered to apparent satisfaction. Patient observed for 30 minutes after transfusion.

## 2023-10-03 ENCOUNTER — TELEMEDICINE (OUTPATIENT)
Dept: PHYSICAL MEDICINE AND REHAB | Age: 78
End: 2023-10-03
Payer: COMMERCIAL

## 2023-10-03 ENCOUNTER — TELEPHONE (OUTPATIENT)
Dept: INFUSION THERAPY | Age: 78
End: 2023-10-03

## 2023-10-03 DIAGNOSIS — L89.109 PRESSURE INJURY OF SKIN OF BACK, UNSPECIFIED INJURY STAGE: Primary | ICD-10-CM

## 2023-10-03 DIAGNOSIS — I60.9 SUBARACHNOID HEMORRHAGE (HCC): ICD-10-CM

## 2023-10-03 DIAGNOSIS — G81.10 SPASTIC HEMIPARESIS AFFECTING DOMINANT SIDE (HCC): ICD-10-CM

## 2023-10-03 LAB
ABO/RH: NORMAL
ANTIBODY SCREEN: NEGATIVE
ARM BAND NUMBER: NORMAL
BLOOD BANK BLOOD PRODUCT EXPIRATION DATE: NORMAL
BLOOD BANK DISPENSE STATUS: NORMAL
BLOOD BANK ISBT PRODUCT BLOOD TYPE: 6200
BLOOD BANK PRODUCT CODE: NORMAL
BLOOD BANK SAMPLE EXPIRATION: NORMAL
BLOOD BANK UNIT TYPE AND RH: NORMAL
BPU ID: NORMAL
COMPONENT: NORMAL
CROSSMATCH RESULT: NORMAL
MICROORGANISM SPEC CULT: ABNORMAL
MICROORGANISM SPEC CULT: ABNORMAL
MICROORGANISM SPEC CULT: NORMAL
MICROORGANISM SPEC CULT: NORMAL
MICROORGANISM/AGENT SPEC: ABNORMAL
SERVICE CMNT-IMP: NORMAL
SERVICE CMNT-IMP: NORMAL
SPECIMEN DESCRIPTION: ABNORMAL
SPECIMEN DESCRIPTION: NORMAL
SPECIMEN DESCRIPTION: NORMAL
TRANSFUSION STATUS: NORMAL
UNIT DIVISION: 0
UNIT ISSUE DATE/TIME: NORMAL

## 2023-10-03 PROCEDURE — 1123F ACP DISCUSS/DSCN MKR DOCD: CPT | Performed by: PHYSICAL MEDICINE & REHABILITATION

## 2023-10-03 PROCEDURE — 99214 OFFICE O/P EST MOD 30 MIN: CPT | Performed by: PHYSICAL MEDICINE & REHABILITATION

## 2023-10-03 RX ORDER — CUSHION
EACH MISCELLANEOUS
Qty: 1 EACH | Refills: 0 | Status: SHIPPED | OUTPATIENT
Start: 2023-10-03

## 2023-10-03 RX ORDER — AMOXICILLIN AND CLAVULANATE POTASSIUM 875; 125 MG/1; MG/1
1 TABLET, FILM COATED ORAL 2 TIMES DAILY
Qty: 20 TABLET | Refills: 0 | Status: SHIPPED | OUTPATIENT
Start: 2023-10-03 | End: 2023-10-13

## 2023-10-03 NOTE — TELEPHONE ENCOUNTER
Patient's wife, Yuki Young, called with questions/concerns. Patient was seen at [facility] yesterday for pluvicto. Yuki Young states while there doctor recommended repeat CT scan for increasing PSA. Patient is also having bleeding with urination. Said RN discussed with Dr. Justin Karimi. Yuki Young will call Dr. Enrique Self regarding bleeding and will take patient to ER if bleeding increases. Said RN placed call to Dr. Austin Oquendo regarding the scans. Awaiting return call. Sharonda verbalized understanding.   Zeina Bowen RN 10/3/23 4353

## 2023-10-03 NOTE — ED NOTES
Reviewed patients after hours culture results with Dr. Pam Mcdowell. Wound culture growing VANCOMYCIN RESISTANT ENTEROCOCCUS FAECALIS and ESCHERICHIA COLI, patient discharged on doxycycline. I spoke with patient's wife, Trisha Holbrook, notified her of results. Patient is taking doxycycline, will add Augmentin to regimen, patient is following up with wound care next week.     Pamela Culver, PharmD, BCPS 10/3/2023 2:21 PM   [phone]

## 2023-10-03 NOTE — PROGRESS NOTES
[facility]ira Vo D.O. [facility]  [address] [facility]. [address], [address] [place] [address]  Phone: [phone]  Fax: [phone]        10/3/23    Chief Complaint   Patient presents with    Leg Pain    Arm Pain     6 week F/U botox  reports no pain per spouse     Start time: 3:15  End time: 3:30  Shaneka Drake, was evaluated through a synchronous (real-time) audio-video encounter to substitute for in-person clinic visit through [facility]. The benefits and alternatives to telemedicine and agreed to proceed with this type of visit. The patient (or guardian if applicable) is aware that this is a billable service, which includes applicable co-pays. This Virtual Visit was conducted with patient's (and/or legal guardian's) consent. The visit was conducted to reduce the patient's risk of exposure to COVID-19 and provide continuity of care for an established patient. The patient was also advised the privacy standards of a standard visit continue to apply to telemedicine visits. The visit was conducted pursuant to the emergency declaration under the [address] and the [employer] General Act. Patient identification was verified, and a caregiver was present when appropriate. The patient was located in a state where the provider was licensed to provide care. HPI:  Shaneka Drake is a 70y.o. year old man seen today in follow up regarding spasticity due to stroke     Interval history:  Developed pressure ulcers on the buttocks and was referred to wound care. No complications from the Botox injection. Became anemic had blood transfusion. Still receiving chemo.      Since the last visit the patient had Botox on 8/29/23  Right trunk:   Pectoralis major 125 units  Latissimus dorsi 75 units    Right Upper extremity:  Triceps brachii 50 units  Biceps brachii 50 units  Pronator teres 30 units  FCR 70

## 2023-10-06 ENCOUNTER — HOSPITAL ENCOUNTER (OUTPATIENT)
Dept: INFUSION THERAPY | Age: 78
Discharge: HOME | End: 2023-10-06
Payer: COMMERCIAL

## 2023-10-06 DIAGNOSIS — C79.51 PROSTATE CANCER METASTATIC TO BONE (HCC): ICD-10-CM

## 2023-10-06 DIAGNOSIS — C61 PROSTATE CANCER METASTATIC TO BONE (HCC): ICD-10-CM

## 2023-10-06 LAB
BASOPHILS # BLD: 0 K/UL (ref 0–0.2)
BASOPHILS NFR BLD: 0 % (ref 0–2)
EOSINOPHIL # BLD: 0.07 K/UL (ref 0.05–0.5)
EOSINOPHILS RELATIVE PERCENT: 2 % (ref 0–6)
ERYTHROCYTE [DISTWIDTH] IN BLOOD BY AUTOMATED COUNT: 17.2 % (ref 11.5–15)
HCT VFR BLD AUTO: 28.8 % (ref 37–54)
HGB BLD-MCNC: 8.7 G/DL (ref 12.5–16.5)
LYMPHOCYTES NFR BLD: 0.11 K/UL (ref 1.5–4)
LYMPHOCYTES RELATIVE PERCENT: 3 % (ref 20–42)
MCH RBC QN AUTO: 31.9 PG (ref 26–35)
MCHC RBC AUTO-ENTMCNC: 30.2 G/DL (ref 32–34.5)
MCV RBC AUTO: 105.5 FL (ref 80–99.9)
MONOCYTES NFR BLD: 0.11 K/UL (ref 0.1–0.95)
MONOCYTES NFR BLD: 3 % (ref 2–12)
NEUTROPHILS NFR BLD: 93 % (ref 43–80)
NEUTS SEG NFR BLD: 4 K/UL (ref 1.8–7.3)
PLATELET # BLD AUTO: 123 K/UL (ref 130–450)
PMV BLD AUTO: 9.1 FL (ref 7–12)
RBC # BLD AUTO: 2.73 M/UL (ref 3.8–5.8)
RBC # BLD: NORMAL 10*6/UL
WBC OTHER # BLD: 4.3 K/UL (ref 4.5–11.5)

## 2023-10-06 PROCEDURE — 85025 COMPLETE CBC W/AUTO DIFF WBC: CPT

## 2023-10-06 PROCEDURE — 36415 COLL VENOUS BLD VENIPUNCTURE: CPT

## 2023-10-12 NOTE — DISCHARGE INSTRUCTIONS
Visit Discharge/Physician Orders    Discharge condition: {STABLE/UNSTABLE:827647609}    Assessment of pain at discharge:    Anesthetic used:     Discharge to: {[facility] Wound Discharge To:54308}    Left via:{Left KYC:65024}    Accompanied by: accompanied by {:[id number]}    ECF/HHA:     Dressing Orders:    Treatment Orders:    [facility] followup visit _____________________________  (Please note your next appointment above and if you are unable to keep, kindly give a 24 hour notice. Thank you.)    Physician signature:__________________________      If you experience any of the following, please call the [facility] during business hours:    * Increase in Pain  * Temperature over 101  * Increase in drainage from your wound  * Drainage with a foul odor  * Bleeding  * Increase in swelling  * Need for compression bandage changes due to slippage, breakthrough drainage. If you need medical attention outside of the business hours of the [facility] please contact your PCP or go to the nearest emergency room.

## 2023-10-15 ENCOUNTER — APPOINTMENT (OUTPATIENT)
Dept: CT IMAGING | Age: 78
DRG: 177 | End: 2023-10-15
Attending: EMERGENCY MEDICINE
Payer: OTHER GOVERNMENT

## 2023-10-15 ENCOUNTER — HOSPITAL ENCOUNTER (INPATIENT)
Age: 78
LOS: 4 days | Discharge: HOSPICE | DRG: 177 | End: 2023-10-19
Attending: EMERGENCY MEDICINE | Admitting: INTERNAL MEDICINE
Payer: OTHER GOVERNMENT

## 2023-10-15 ENCOUNTER — APPOINTMENT (OUTPATIENT)
Dept: GENERAL RADIOLOGY | Age: 78
DRG: 177 | End: 2023-10-15
Payer: OTHER GOVERNMENT

## 2023-10-15 DIAGNOSIS — J18.9 PNEUMONIA OF LEFT LOWER LOBE DUE TO INFECTIOUS ORGANISM: Primary | ICD-10-CM

## 2023-10-15 DIAGNOSIS — E87.20 LACTIC ACIDOSIS: ICD-10-CM

## 2023-10-15 DIAGNOSIS — Z51.5 PALLIATIVE CARE ENCOUNTER: ICD-10-CM

## 2023-10-15 LAB
ALBUMIN SERPL-MCNC: 3.2 G/DL (ref 3.5–5.2)
ALP SERPL-CCNC: 946 U/L (ref 40–129)
ALT SERPL-CCNC: 49 U/L (ref 0–40)
ANION GAP SERPL CALCULATED.3IONS-SCNC: 17 MMOL/L (ref 7–16)
AST SERPL-CCNC: 92 U/L (ref 0–39)
BASOPHILS # BLD: 0 K/UL (ref 0–0.2)
BASOPHILS NFR BLD: 0 % (ref 0–2)
BILIRUB SERPL-MCNC: 0.4 MG/DL (ref 0–1.2)
BNP SERPL-MCNC: 3655 PG/ML (ref 0–450)
BUN SERPL-MCNC: 25 MG/DL (ref 6–23)
CALCIUM SERPL-MCNC: 8.3 MG/DL (ref 8.6–10.2)
CHLORIDE SERPL-SCNC: 103 MMOL/L (ref 98–107)
CO2 SERPL-SCNC: 21 MMOL/L (ref 22–29)
CREAT SERPL-MCNC: 0.8 MG/DL (ref 0.7–1.2)
EOSINOPHIL # BLD: 0 K/UL (ref 0.05–0.5)
EOSINOPHILS RELATIVE PERCENT: 0 % (ref 0–6)
ERYTHROCYTE [DISTWIDTH] IN BLOOD BY AUTOMATED COUNT: 17.3 % (ref 11.5–15)
GFR SERPL CREATININE-BSD FRML MDRD: >60 ML/MIN/1.73M2
GLUCOSE SERPL-MCNC: 159 MG/DL (ref 74–99)
HCT VFR BLD AUTO: 28.9 % (ref 37–54)
HGB BLD-MCNC: 8.5 G/DL (ref 12.5–16.5)
LACTATE BLDV-SCNC: 1.5 MMOL/L (ref 0.5–1.9)
LACTATE BLDV-SCNC: 3.6 MMOL/L (ref 0.5–1.9)
LYMPHOCYTES NFR BLD: 0.11 K/UL (ref 1.5–4)
LYMPHOCYTES RELATIVE PERCENT: 2 % (ref 20–42)
MCH RBC QN AUTO: 32 PG (ref 26–35)
MCHC RBC AUTO-ENTMCNC: 29.4 G/DL (ref 32–34.5)
MCV RBC AUTO: 108.6 FL (ref 80–99.9)
MONOCYTES NFR BLD: 0.27 K/UL (ref 0.1–0.95)
MONOCYTES NFR BLD: 4 % (ref 2–12)
NEUTROPHILS NFR BLD: 94 % (ref 43–80)
NEUTS SEG NFR BLD: 5.92 K/UL (ref 1.8–7.3)
NUCLEATED RED BLOOD CELLS: 1 PER 100 WBC
PLATELET # BLD AUTO: 142 K/UL (ref 130–450)
PMV BLD AUTO: 9.5 FL (ref 7–12)
POTASSIUM SERPL-SCNC: 5 MMOL/L (ref 3.5–5)
PROT SERPL-MCNC: 7 G/DL (ref 6.4–8.3)
RBC # BLD AUTO: 2.66 M/UL (ref 3.8–5.8)
RBC # BLD: ABNORMAL 10*6/UL
RBC # BLD: ABNORMAL 10*6/UL
SARS-COV-2 RDRP RESP QL NAA+PROBE: NOT DETECTED
SODIUM SERPL-SCNC: 141 MMOL/L (ref 132–146)
SPECIMEN DESCRIPTION: NORMAL
TROPONIN I SERPL HS-MCNC: 63 NG/L (ref 0–11)
TROPONIN I SERPL HS-MCNC: 68 NG/L (ref 0–11)
WBC OTHER # BLD: 6.3 K/UL (ref 4.5–11.5)

## 2023-10-15 PROCEDURE — 71045 X-RAY EXAM CHEST 1 VIEW: CPT

## 2023-10-15 PROCEDURE — 6360000002 HC RX W HCPCS: Performed by: INTERNAL MEDICINE

## 2023-10-15 PROCEDURE — 87040 BLOOD CULTURE FOR BACTERIA: CPT

## 2023-10-15 PROCEDURE — 87899 AGENT NOS ASSAY W/OPTIC: CPT

## 2023-10-15 PROCEDURE — 87077 CULTURE AEROBIC IDENTIFY: CPT

## 2023-10-15 PROCEDURE — 84484 ASSAY OF TROPONIN QUANT: CPT

## 2023-10-15 PROCEDURE — 87205 SMEAR GRAM STAIN: CPT

## 2023-10-15 PROCEDURE — 6360000002 HC RX W HCPCS

## 2023-10-15 PROCEDURE — 83880 ASSAY OF NATRIURETIC PEPTIDE: CPT

## 2023-10-15 PROCEDURE — 80053 COMPREHEN METABOLIC PANEL: CPT

## 2023-10-15 PROCEDURE — 87449 NOS EACH ORGANISM AG IA: CPT

## 2023-10-15 PROCEDURE — 87635 SARS-COV-2 COVID-19 AMP PRB: CPT

## 2023-10-15 PROCEDURE — 83605 ASSAY OF LACTIC ACID: CPT

## 2023-10-15 PROCEDURE — 93005 ELECTROCARDIOGRAM TRACING: CPT | Performed by: EMERGENCY MEDICINE

## 2023-10-15 PROCEDURE — 87070 CULTURE OTHR SPECIMN AEROBIC: CPT

## 2023-10-15 PROCEDURE — 6370000000 HC RX 637 (ALT 250 FOR IP)

## 2023-10-15 PROCEDURE — 71250 CT THORAX DX C-: CPT

## 2023-10-15 PROCEDURE — 94640 AIRWAY INHALATION TREATMENT: CPT

## 2023-10-15 PROCEDURE — 2500000003 HC RX 250 WO HCPCS: Performed by: EMERGENCY MEDICINE

## 2023-10-15 PROCEDURE — 2140000000 HC CCU INTERMEDIATE R&B

## 2023-10-15 PROCEDURE — 6360000002 HC RX W HCPCS: Performed by: EMERGENCY MEDICINE

## 2023-10-15 PROCEDURE — 85025 COMPLETE CBC W/AUTO DIFF WBC: CPT

## 2023-10-15 PROCEDURE — 70450 CT HEAD/BRAIN W/O DYE: CPT

## 2023-10-15 PROCEDURE — 96365 THER/PROPH/DIAG IV INF INIT: CPT

## 2023-10-15 PROCEDURE — 99285 EMERGENCY DEPT VISIT HI MDM: CPT

## 2023-10-15 PROCEDURE — 2580000003 HC RX 258: Performed by: EMERGENCY MEDICINE

## 2023-10-15 RX ORDER — ASCORBIC ACID 500 MG
1000 TABLET ORAL
Status: DISCONTINUED | OUTPATIENT
Start: 2023-10-15 | End: 2023-10-19 | Stop reason: HOSPADM

## 2023-10-15 RX ORDER — IPRATROPIUM BROMIDE AND ALBUTEROL SULFATE 2.5; .5 MG/3ML; MG/3ML
1 SOLUTION RESPIRATORY (INHALATION)
Status: DISCONTINUED | OUTPATIENT
Start: 2023-10-16 | End: 2023-10-16

## 2023-10-15 RX ORDER — DEXTROSE MONOHYDRATE 50 MG/ML
INJECTION, SOLUTION INTRAVENOUS CONTINUOUS
Status: DISCONTINUED | OUTPATIENT
Start: 2023-10-15 | End: 2023-10-16

## 2023-10-15 RX ORDER — SODIUM CHLORIDE 9 MG/ML
INJECTION, SOLUTION INTRAVENOUS CONTINUOUS
Status: DISCONTINUED | OUTPATIENT
Start: 2023-10-15 | End: 2023-10-15

## 2023-10-15 RX ORDER — ACETAMINOPHEN 650 MG/1
650 SUPPOSITORY RECTAL EVERY 6 HOURS PRN
Status: DISCONTINUED | OUTPATIENT
Start: 2023-10-15 | End: 2023-10-19 | Stop reason: HOSPADM

## 2023-10-15 RX ORDER — HEPARIN SODIUM 1000 [USP'U]/ML
30 INJECTION, SOLUTION INTRAVENOUS; SUBCUTANEOUS PRN
Status: DISCONTINUED | OUTPATIENT
Start: 2023-10-15 | End: 2023-10-16

## 2023-10-15 RX ORDER — HEPARIN SODIUM 10000 [USP'U]/100ML
5-30 INJECTION, SOLUTION INTRAVENOUS CONTINUOUS
Status: DISCONTINUED | OUTPATIENT
Start: 2023-10-15 | End: 2023-10-16

## 2023-10-15 RX ORDER — MORPHINE SULFATE 4 MG/ML
4 INJECTION, SOLUTION INTRAMUSCULAR; INTRAVENOUS ONCE
Status: DISCONTINUED | OUTPATIENT
Start: 2023-10-15 | End: 2023-10-18

## 2023-10-15 RX ORDER — HYDROCODONE BITARTRATE AND ACETAMINOPHEN 5; 325 MG/1; MG/1
1 TABLET ORAL EVERY 6 HOURS PRN
Status: DISCONTINUED | OUTPATIENT
Start: 2023-10-15 | End: 2023-10-16

## 2023-10-15 RX ORDER — SODIUM CHLORIDE 9 MG/ML
INJECTION, SOLUTION INTRAVENOUS PRN
Status: DISCONTINUED | OUTPATIENT
Start: 2023-10-15 | End: 2023-10-19 | Stop reason: HOSPADM

## 2023-10-15 RX ORDER — HEPARIN SODIUM 1000 [USP'U]/ML
60 INJECTION, SOLUTION INTRAVENOUS; SUBCUTANEOUS ONCE
Status: DISCONTINUED | OUTPATIENT
Start: 2023-10-15 | End: 2023-10-16

## 2023-10-15 RX ORDER — METOPROLOL TARTRATE 5 MG/5ML
2.5 INJECTION INTRAVENOUS
Status: DISCONTINUED | OUTPATIENT
Start: 2023-10-15 | End: 2023-10-16

## 2023-10-15 RX ORDER — DEXTROSE MONOHYDRATE 100 MG/ML
INJECTION, SOLUTION INTRAVENOUS CONTINUOUS PRN
Status: DISCONTINUED | OUTPATIENT
Start: 2023-10-15 | End: 2023-10-19 | Stop reason: HOSPADM

## 2023-10-15 RX ORDER — ONDANSETRON 4 MG/1
4 TABLET, ORALLY DISINTEGRATING ORAL EVERY 8 HOURS PRN
Status: DISCONTINUED | OUTPATIENT
Start: 2023-10-15 | End: 2023-10-19 | Stop reason: HOSPADM

## 2023-10-15 RX ORDER — ATORVASTATIN CALCIUM 40 MG/1
40 TABLET, FILM COATED ORAL NIGHTLY
Status: DISCONTINUED | OUTPATIENT
Start: 2023-10-15 | End: 2023-10-18

## 2023-10-15 RX ORDER — SODIUM CHLORIDE 0.9 % (FLUSH) 0.9 %
5-40 SYRINGE (ML) INJECTION EVERY 12 HOURS SCHEDULED
Status: DISCONTINUED | OUTPATIENT
Start: 2023-10-15 | End: 2023-10-19 | Stop reason: HOSPADM

## 2023-10-15 RX ORDER — HEPARIN SODIUM 1000 [USP'U]/ML
60 INJECTION, SOLUTION INTRAVENOUS; SUBCUTANEOUS PRN
Status: DISCONTINUED | OUTPATIENT
Start: 2023-10-15 | End: 2023-10-16

## 2023-10-15 RX ORDER — 0.9 % SODIUM CHLORIDE 0.9 %
300 INTRAVENOUS SOLUTION INTRAVENOUS ONCE
Status: COMPLETED | OUTPATIENT
Start: 2023-10-15 | End: 2023-10-15

## 2023-10-15 RX ORDER — METOPROLOL SUCCINATE 100 MG/1
100 TABLET, EXTENDED RELEASE ORAL
Status: DISCONTINUED | OUTPATIENT
Start: 2023-10-15 | End: 2023-10-16

## 2023-10-15 RX ORDER — POLYETHYLENE GLYCOL 3350 17 G/17G
17 POWDER, FOR SOLUTION ORAL DAILY
Status: DISCONTINUED | OUTPATIENT
Start: 2023-10-16 | End: 2023-10-19 | Stop reason: HOSPADM

## 2023-10-15 RX ORDER — SODIUM CHLORIDE 0.9 % (FLUSH) 0.9 %
5-40 SYRINGE (ML) INJECTION PRN
Status: DISCONTINUED | OUTPATIENT
Start: 2023-10-15 | End: 2023-10-19 | Stop reason: HOSPADM

## 2023-10-15 RX ORDER — ACETYLCYSTEINE 100 MG/ML
4 SOLUTION ORAL; RESPIRATORY (INHALATION) EVERY 4 HOURS
Status: DISCONTINUED | OUTPATIENT
Start: 2023-10-16 | End: 2023-10-16

## 2023-10-15 RX ORDER — ONDANSETRON 2 MG/ML
4 INJECTION INTRAMUSCULAR; INTRAVENOUS EVERY 6 HOURS PRN
Status: DISCONTINUED | OUTPATIENT
Start: 2023-10-15 | End: 2023-10-19 | Stop reason: HOSPADM

## 2023-10-15 RX ORDER — ACETAMINOPHEN 325 MG/1
650 TABLET ORAL EVERY 6 HOURS PRN
Status: DISCONTINUED | OUTPATIENT
Start: 2023-10-15 | End: 2023-10-19 | Stop reason: HOSPADM

## 2023-10-15 RX ORDER — ENOXAPARIN SODIUM 100 MG/ML
1 INJECTION SUBCUTANEOUS ONCE
Status: COMPLETED | OUTPATIENT
Start: 2023-10-15 | End: 2023-10-15

## 2023-10-15 RX ADMIN — IPRATROPIUM BROMIDE AND ALBUTEROL SULFATE 1 DOSE: 2.5; .5 SOLUTION RESPIRATORY (INHALATION) at 23:44

## 2023-10-15 RX ADMIN — SODIUM CHLORIDE 300 ML: 9 INJECTION, SOLUTION INTRAVENOUS at 20:20

## 2023-10-15 RX ADMIN — ENOXAPARIN SODIUM 70 MG: 100 INJECTION SUBCUTANEOUS at 20:35

## 2023-10-15 RX ADMIN — CEFEPIME 2000 MG: 2 INJECTION, POWDER, FOR SOLUTION INTRAVENOUS at 14:55

## 2023-10-15 RX ADMIN — DOXYCYCLINE 100 MG: 100 INJECTION, POWDER, LYOPHILIZED, FOR SOLUTION INTRAVENOUS at 20:28

## 2023-10-15 RX ADMIN — ACETYLCYSTEINE 400 MG: 100 INHALANT RESPIRATORY (INHALATION) at 23:44

## 2023-10-15 RX ADMIN — SODIUM CHLORIDE: 9 INJECTION, SOLUTION INTRAVENOUS at 20:27

## 2023-10-15 ASSESSMENT — PAIN - FUNCTIONAL ASSESSMENT: PAIN_FUNCTIONAL_ASSESSMENT: NONE - DENIES PAIN

## 2023-10-15 NOTE — H&P
[facility]  Internal Medicine Residency Program  History and Physical    Patient:  Jeremías Query 70 y.o. male   MRN: [id number]       Date of Service: 10/15/2023          Chief complaint: had concerns including Altered Mental Status (Patient more lethargic than normal . Hx of prostate CA with met also has hx of stroke with aphasia . Just finished abx for wound on legs . Wife also states he has been choking when she feeds him ). History of Present Illness   Hx obtained via EMR review and wife. Pt hx of expressive aphasia 2/2 stroke 2015. The patient is a 70 y.o. male , presented with worsening cough for x1 week. Wife got him the flu shot 1 week earlier and thought he may have gotten sick from it. Pt has a hx of dysphagia but has been coughing/choking more often with PO intake of food, liquid and medication. Wife was giving him mucinex but it was not helping. Cough is persistent, non-productive. Increasing fatigue and decreasing PO intake prompted her to bring pt into ED. No baseline O2 use. Does not use breathing treatments or inhalers at home. No known sick contacts. No fevers noted at home, no vomiting, abdominal pain, blood in urine, diarrhea/constipation. ED Course: Initially tachycardic to 124 on presentation, now HR 95 after IVF resusitation. Did not pass bedside swallow, pt kept NPO. Afebrile. 1x bolus  & Continuous IVF @100ml/hr, 1x cefepime and doxycycline. Saturating well on room air. -COVID -, WBC wnl, CT chest progressive atelectasis/loss of lumen/consolidation in medial basal segments of LLL w signs of proximal endobronchial obstruction or in case mint, areas of postobstructive pneumonia in remaining aerated segments of lateral basal regions of LLL. Discrete findings in RLL. ED Meds: Patient was given cefepime, doxycycline, morphine, lovenox subcu. ED Fluids: Patient was given 300cc NS bolus, 100cc/hr NS cont. Pt admitted to house for further management.

## 2023-10-15 NOTE — ED NOTES
Attempted to straight cath pt. Pt did not tolerate. Unable to get urine return.       Leanna Bowen RN  10/15/23 9193

## 2023-10-16 ENCOUNTER — APPOINTMENT (OUTPATIENT)
Dept: GENERAL RADIOLOGY | Age: 78
DRG: 177 | End: 2023-10-16
Payer: OTHER GOVERNMENT

## 2023-10-16 PROBLEM — E78.49 OTHER HYPERLIPIDEMIA: Status: ACTIVE | Noted: 2023-10-16

## 2023-10-16 PROBLEM — I10 PRIMARY HYPERTENSION: Status: ACTIVE | Noted: 2023-10-16

## 2023-10-16 PROBLEM — I48.11 LONGSTANDING PERSISTENT ATRIAL FIBRILLATION (HCC): Status: ACTIVE | Noted: 2023-10-16

## 2023-10-16 LAB
B PARAP IS1001 DNA NPH QL NAA+NON-PROBE: NOT DETECTED
B PERT DNA SPEC QL NAA+PROBE: NOT DETECTED
BASOPHILS # BLD: 0 K/UL (ref 0–0.2)
BASOPHILS NFR BLD: 0 % (ref 0–2)
C PNEUM DNA NPH QL NAA+NON-PROBE: NOT DETECTED
EKG ATRIAL RATE: 110 BPM
EKG ATRIAL RATE: 111 BPM
EKG ATRIAL RATE: 81 BPM
EKG Q-T INTERVAL: 334 MS
EKG Q-T INTERVAL: 336 MS
EKG Q-T INTERVAL: 372 MS
EKG QRS DURATION: 100 MS
EKG QRS DURATION: 106 MS
EKG QRS DURATION: 98 MS
EKG QTC CALCULATION (BAZETT): 445 MS
EKG QTC CALCULATION (BAZETT): 456 MS
EKG QTC CALCULATION (BAZETT): 484 MS
EKG R AXIS: -40 DEGREES
EKG R AXIS: -53 DEGREES
EKG R AXIS: -54 DEGREES
EKG T AXIS: -164 DEGREES
EKG T AXIS: 148 DEGREES
EKG T AXIS: 166 DEGREES
EKG VENTRICULAR RATE: 102 BPM
EKG VENTRICULAR RATE: 107 BPM
EKG VENTRICULAR RATE: 111 BPM
EOSINOPHIL # BLD: 0 K/UL (ref 0.05–0.5)
EOSINOPHILS RELATIVE PERCENT: 0 % (ref 0–6)
ERYTHROCYTE [DISTWIDTH] IN BLOOD BY AUTOMATED COUNT: 17.6 % (ref 11.5–15)
ERYTHROCYTE [DISTWIDTH] IN BLOOD BY AUTOMATED COUNT: 17.6 % (ref 11.5–15)
FLUAV RNA NPH QL NAA+NON-PROBE: NOT DETECTED
FLUBV RNA NPH QL NAA+NON-PROBE: NOT DETECTED
GLUCOSE BLD-MCNC: 107 MG/DL (ref 74–99)
GLUCOSE BLD-MCNC: 124 MG/DL (ref 74–99)
HADV DNA NPH QL NAA+NON-PROBE: NOT DETECTED
HCOV 229E RNA NPH QL NAA+NON-PROBE: NOT DETECTED
HCOV HKU1 RNA NPH QL NAA+NON-PROBE: NOT DETECTED
HCOV NL63 RNA NPH QL NAA+NON-PROBE: NOT DETECTED
HCOV OC43 RNA NPH QL NAA+NON-PROBE: NOT DETECTED
HCT VFR BLD AUTO: 21.9 % (ref 37–54)
HCT VFR BLD AUTO: 22.9 % (ref 37–54)
HCT VFR BLD AUTO: 24.1 % (ref 37–54)
HGB BLD-MCNC: 6.6 G/DL (ref 12.5–16.5)
HGB BLD-MCNC: 6.9 G/DL (ref 12.5–16.5)
HGB BLD-MCNC: 7.2 G/DL (ref 12.5–16.5)
HMPV RNA NPH QL NAA+NON-PROBE: NOT DETECTED
HPIV1 RNA NPH QL NAA+NON-PROBE: NOT DETECTED
HPIV2 RNA NPH QL NAA+NON-PROBE: NOT DETECTED
HPIV3 RNA NPH QL NAA+NON-PROBE: NOT DETECTED
HPIV4 RNA NPH QL NAA+NON-PROBE: NOT DETECTED
LACTATE BLDV-SCNC: 2.1 MMOL/L (ref 0.5–2.2)
LYMPHOCYTES NFR BLD: 0.22 K/UL (ref 1.5–4)
LYMPHOCYTES RELATIVE PERCENT: 5 % (ref 20–42)
M PNEUMO DNA NPH QL NAA+NON-PROBE: NOT DETECTED
MCH RBC QN AUTO: 32.1 PG (ref 26–35)
MCH RBC QN AUTO: 32.6 PG (ref 26–35)
MCHC RBC AUTO-ENTMCNC: 29.9 G/DL (ref 32–34.5)
MCHC RBC AUTO-ENTMCNC: 30.1 G/DL (ref 32–34.5)
MCV RBC AUTO: 106.5 FL (ref 80–99.9)
MCV RBC AUTO: 109 FL (ref 80–99.9)
MONOCYTES NFR BLD: 0.3 K/UL (ref 0.1–0.95)
MONOCYTES NFR BLD: 7 % (ref 2–12)
NEUTROPHILS NFR BLD: 88 % (ref 43–80)
NEUTS SEG NFR BLD: 3.78 K/UL (ref 1.8–7.3)
NUCLEATED RED BLOOD CELLS: 2 PER 100 WBC
PARTIAL THROMBOPLASTIN TIME: 42.4 SEC (ref 24.5–35.1)
PARTIAL THROMBOPLASTIN TIME: 56.3 SEC (ref 24.5–35.1)
PARTIAL THROMBOPLASTIN TIME: 78.9 SEC (ref 24.5–35.1)
PLATELET # BLD AUTO: 102 K/UL (ref 130–450)
PLATELET # BLD AUTO: 115 K/UL (ref 130–450)
PMV BLD AUTO: 10 FL (ref 7–12)
PMV BLD AUTO: 9.4 FL (ref 7–12)
PROCALCITONIN SERPL-MCNC: 2.6 NG/ML (ref 0–0.08)
RBC # BLD AUTO: 2.15 M/UL (ref 3.8–5.8)
RBC # BLD AUTO: 2.21 M/UL (ref 3.8–5.8)
RBC # BLD: ABNORMAL 10*6/UL
RBC # BLD: ABNORMAL 10*6/UL
RSV RNA NPH QL NAA+NON-PROBE: NOT DETECTED
RV+EV RNA NPH QL NAA+NON-PROBE: NOT DETECTED
SARS-COV-2 RNA NPH QL NAA+NON-PROBE: NOT DETECTED
SPECIMEN DESCRIPTION: NORMAL
TROPONIN I SERPL HS-MCNC: 56 NG/L (ref 0–11)
TROPONIN I SERPL HS-MCNC: 58 NG/L (ref 0–11)
WBC OTHER # BLD: 3.8 K/UL (ref 4.5–11.5)
WBC OTHER # BLD: 4.3 K/UL (ref 4.5–11.5)

## 2023-10-16 PROCEDURE — 85025 COMPLETE CBC W/AUTO DIFF WBC: CPT

## 2023-10-16 PROCEDURE — 6360000002 HC RX W HCPCS

## 2023-10-16 PROCEDURE — 93005 ELECTROCARDIOGRAM TRACING: CPT

## 2023-10-16 PROCEDURE — 94640 AIRWAY INHALATION TREATMENT: CPT

## 2023-10-16 PROCEDURE — 99231 SBSQ HOSP IP/OBS SF/LOW 25: CPT | Performed by: INTERNAL MEDICINE

## 2023-10-16 PROCEDURE — 86850 RBC ANTIBODY SCREEN: CPT

## 2023-10-16 PROCEDURE — 92610 EVALUATE SWALLOWING FUNCTION: CPT

## 2023-10-16 PROCEDURE — 92526 ORAL FUNCTION THERAPY: CPT

## 2023-10-16 PROCEDURE — 99223 1ST HOSP IP/OBS HIGH 75: CPT | Performed by: INTERNAL MEDICINE

## 2023-10-16 PROCEDURE — 6360000002 HC RX W HCPCS: Performed by: INTERNAL MEDICINE

## 2023-10-16 PROCEDURE — 85027 COMPLETE CBC AUTOMATED: CPT

## 2023-10-16 PROCEDURE — 85018 HEMOGLOBIN: CPT

## 2023-10-16 PROCEDURE — 2580000003 HC RX 258

## 2023-10-16 PROCEDURE — 71045 X-RAY EXAM CHEST 1 VIEW: CPT

## 2023-10-16 PROCEDURE — 99222 1ST HOSP IP/OBS MODERATE 55: CPT | Performed by: EMERGENCY MEDICINE

## 2023-10-16 PROCEDURE — 82962 GLUCOSE BLOOD TEST: CPT

## 2023-10-16 PROCEDURE — 84145 PROCALCITONIN (PCT): CPT

## 2023-10-16 PROCEDURE — 0202U NFCT DS 22 TRGT SARS-COV-2: CPT

## 2023-10-16 PROCEDURE — 6370000000 HC RX 637 (ALT 250 FOR IP): Performed by: INTERNAL MEDICINE

## 2023-10-16 PROCEDURE — 85014 HEMATOCRIT: CPT

## 2023-10-16 PROCEDURE — 84484 ASSAY OF TROPONIN QUANT: CPT

## 2023-10-16 PROCEDURE — 83605 ASSAY OF LACTIC ACID: CPT

## 2023-10-16 PROCEDURE — 6370000000 HC RX 637 (ALT 250 FOR IP)

## 2023-10-16 PROCEDURE — 2060000000 HC ICU INTERMEDIATE R&B

## 2023-10-16 PROCEDURE — 86901 BLOOD TYPING SEROLOGIC RH(D): CPT

## 2023-10-16 PROCEDURE — 86900 BLOOD TYPING SEROLOGIC ABO: CPT

## 2023-10-16 PROCEDURE — 85730 THROMBOPLASTIN TIME PARTIAL: CPT

## 2023-10-16 PROCEDURE — 86923 COMPATIBILITY TEST ELECTRIC: CPT

## 2023-10-16 PROCEDURE — 36415 COLL VENOUS BLD VENIPUNCTURE: CPT

## 2023-10-16 RX ORDER — HEPARIN SODIUM 10000 [USP'U]/100ML
5-30 INJECTION, SOLUTION INTRAVENOUS CONTINUOUS
Status: DISCONTINUED | OUTPATIENT
Start: 2023-10-16 | End: 2023-10-18

## 2023-10-16 RX ORDER — FENTANYL 25 UG/1
1 PATCH TRANSDERMAL
Status: DISCONTINUED | OUTPATIENT
Start: 2023-10-16 | End: 2023-10-16

## 2023-10-16 RX ORDER — IPRATROPIUM BROMIDE AND ALBUTEROL SULFATE 2.5; .5 MG/3ML; MG/3ML
1 SOLUTION RESPIRATORY (INHALATION) EVERY 4 HOURS PRN
Status: DISCONTINUED | OUTPATIENT
Start: 2023-10-16 | End: 2023-10-19 | Stop reason: HOSPADM

## 2023-10-16 RX ORDER — DIGOXIN 0.25 MG/ML
125 INJECTION INTRAMUSCULAR; INTRAVENOUS ONCE
Status: COMPLETED | OUTPATIENT
Start: 2023-10-16 | End: 2023-10-16

## 2023-10-16 RX ORDER — METOPROLOL TARTRATE 5 MG/5ML
5 INJECTION INTRAVENOUS EVERY 6 HOURS
Status: DISCONTINUED | OUTPATIENT
Start: 2023-10-16 | End: 2023-10-19

## 2023-10-16 RX ORDER — HEPARIN SODIUM 1000 [USP'U]/ML
60 INJECTION, SOLUTION INTRAVENOUS; SUBCUTANEOUS ONCE
Status: COMPLETED | OUTPATIENT
Start: 2023-10-16 | End: 2023-10-16

## 2023-10-16 RX ORDER — SODIUM CHLORIDE 9 MG/ML
INJECTION, SOLUTION INTRAVENOUS PRN
Status: DISCONTINUED | OUTPATIENT
Start: 2023-10-16 | End: 2023-10-19 | Stop reason: HOSPADM

## 2023-10-16 RX ORDER — FENTANYL 12.5 UG/1
1 PATCH TRANSDERMAL
Status: DISCONTINUED | OUTPATIENT
Start: 2023-10-16 | End: 2023-10-16

## 2023-10-16 RX ORDER — HEPARIN SODIUM 1000 [USP'U]/ML
60 INJECTION, SOLUTION INTRAVENOUS; SUBCUTANEOUS PRN
Status: DISCONTINUED | OUTPATIENT
Start: 2023-10-16 | End: 2023-10-18

## 2023-10-16 RX ORDER — HEPARIN SODIUM 1000 [USP'U]/ML
30 INJECTION, SOLUTION INTRAVENOUS; SUBCUTANEOUS PRN
Status: DISCONTINUED | OUTPATIENT
Start: 2023-10-16 | End: 2023-10-18

## 2023-10-16 RX ORDER — ALBUTEROL SULFATE 2.5 MG/3ML
2.5 SOLUTION RESPIRATORY (INHALATION)
Status: DISCONTINUED | OUTPATIENT
Start: 2023-10-16 | End: 2023-10-18 | Stop reason: ALTCHOICE

## 2023-10-16 RX ORDER — DIGOXIN 125 MCG
125 TABLET ORAL DAILY
Status: DISCONTINUED | OUTPATIENT
Start: 2023-10-16 | End: 2023-10-16

## 2023-10-16 RX ORDER — ACETYLCYSTEINE 100 MG/ML
4 SOLUTION ORAL; RESPIRATORY (INHALATION)
Status: DISCONTINUED | OUTPATIENT
Start: 2023-10-16 | End: 2023-10-19 | Stop reason: HOSPADM

## 2023-10-16 RX ADMIN — PIPERACILLIN AND TAZOBACTAM 4500 MG: 4; .5 INJECTION, POWDER, LYOPHILIZED, FOR SOLUTION INTRAVENOUS at 09:41

## 2023-10-16 RX ADMIN — PETROLATUM: 420 OINTMENT TOPICAL at 21:37

## 2023-10-16 RX ADMIN — VANCOMYCIN HYDROCHLORIDE 1250 MG: 10 INJECTION, POWDER, LYOPHILIZED, FOR SOLUTION INTRAVENOUS at 00:34

## 2023-10-16 RX ADMIN — Medication 10 ML: at 00:37

## 2023-10-16 RX ADMIN — DEXTROSE MONOHYDRATE: 50 INJECTION, SOLUTION INTRAVENOUS at 00:27

## 2023-10-16 RX ADMIN — PETROLATUM: 420 OINTMENT TOPICAL at 15:48

## 2023-10-16 RX ADMIN — Medication 10 ML: at 21:38

## 2023-10-16 RX ADMIN — PIPERACILLIN AND TAZOBACTAM 4500 MG: 4; .5 INJECTION, POWDER, LYOPHILIZED, FOR SOLUTION INTRAVENOUS at 02:42

## 2023-10-16 RX ADMIN — ACETYLCYSTEINE 400 MG: 100 INHALANT RESPIRATORY (INHALATION) at 08:31

## 2023-10-16 RX ADMIN — IPRATROPIUM BROMIDE AND ALBUTEROL SULFATE 1 DOSE: 2.5; .5 SOLUTION RESPIRATORY (INHALATION) at 13:20

## 2023-10-16 RX ADMIN — IPRATROPIUM BROMIDE AND ALBUTEROL SULFATE 1 DOSE: 2.5; .5 SOLUTION RESPIRATORY (INHALATION) at 08:30

## 2023-10-16 RX ADMIN — HEPARIN SODIUM 12 UNITS/KG/HR: 10000 INJECTION, SOLUTION INTRAVENOUS at 01:42

## 2023-10-16 RX ADMIN — HEPARIN SODIUM 3450 UNITS: 1000 INJECTION INTRAVENOUS; SUBCUTANEOUS at 01:38

## 2023-10-16 RX ADMIN — DIGOXIN 125 MCG: 0.25 INJECTION INTRAMUSCULAR; INTRAVENOUS at 14:17

## 2023-10-16 RX ADMIN — ACETYLCYSTEINE 400 MG: 100 INHALANT RESPIRATORY (INHALATION) at 13:20

## 2023-10-16 RX ADMIN — PIPERACILLIN AND TAZOBACTAM 4500 MG: 4; .5 INJECTION, POWDER, LYOPHILIZED, FOR SOLUTION INTRAVENOUS at 23:47

## 2023-10-16 NOTE — PATIENT CARE CONFERENCE
P Quality Flow/Interdisciplinary Rounds Progress Note        Quality Flow Rounds held on October 16, 2023    Disciplines Attending:  Bedside Nurse, , , and Nursing Unit Noble was admitted on 10/15/2023  1:36 PM    Anticipated Discharge Date:       Disposition:    Montez Score:  Montez Scale Score: 13    Readmission Risk              Risk of Unplanned Readmission:  34           Discussed patient goal for the day, patient clinical progression, and barriers to discharge.   The following Goal(s) of the Day/Commitment(s) have been identified:  Diagnostics - Report Results and Labs - Report Results      Susie Sadler RN  October 16, 2023

## 2023-10-16 NOTE — DISCHARGE INSTRUCTIONS
Discharge instructions deferred to Hospice care. Your information:  Name: Jemal Denny  : 1945    Your instructions:  Home health for dressing change: Right posterior lower leg: clean with normal saline, pack lightly with opticell then cover with 4x4, abd pad and wrap with kerlix from base of toes to knee. Change dressing daily      What to do after you leave the hospital:    Recommended diet: regular diet    Recommended activity: activity as tolerated        The following personal items were collected during your admission and were returned to you:    Belongings  Dental Appliances: None  Vision - Corrective Lenses: None  Hearing Aid: None  Clothing: Socks, Footwear  Jewelry: None  Electronic Devices: None  Weapons (Notify Protective Services/Security): None  Other Valuables: Other (Comment) (Leg brace)  Home Medications: None  Valuables Given To: Family (Comment)  Provide Name(s) of Who Valuable(s) Were Given To:  Jessica Stafford)    Information obtained by:  By signing below, I understand that if any problems occur once I leave the hospital I am to contact ***. I understand and acknowledge receipt of the instructions indicated above.

## 2023-10-16 NOTE — ACP (ADVANCE CARE PLANNING)
Advance Care Planning   The patient has the following advanced directives on file:  Advance Directives       Power of [address] Will ACP-Advance Directive ACP-Power of [place] on 08/30/22 Filed on 06/17/20 Luna Delacruz            The patient has appointed the following active healthcare agents:    Primary Decision Maker: Sharonda Miller - Spouse - [phone]    Secondary Decision Maker: Mellissa Jay Child - [phone]    The Patient has the following current code status:    Code Status: Limited    Visit Documentation:  Verified      Alba Dow, [facility]  10/16/2023

## 2023-10-16 NOTE — FLOWSHEET NOTE
Inpatient Wound Care (Initial consult) 6505A    Admit Date: 10/15/2023  1:36 PM    Reason for consult:  Right lower leg    Significant history:  Per H&P    Chief complaint: had concerns including Altered Mental Status (Patient more lethargic than normal . Hx of prostate CA with met also has hx of stroke with aphasia . Just finished abx for wound on legs . Wife also states he has been choking when she feeds him ). Findings:     10/16/23 1054   Skin Integumentary    Skin Integrity Ecchymosis   Location BUE   Skin Integrity Site 2   Skin Integrity Location 2   (dry flaky, erosion)   Location 2 bilateral buttocks   Wound 10/16/23 Leg Lower;Right;Posterior   Date First Assessed/Time First Assessed: 10/16/23 0000   Present on Original Admission: Yes  Primary Wound Type: Pressure Injury  Location: Leg  Wound Location Orientation: Lower;Right;Posterior   Wound Image    Wound Etiology Pressure Stage 4   Dressing Status New dressing applied   Wound Cleansed Cleansed with saline   Dressing/Treatment ABD; Alginate;Dry dressing;Roll gauze   Wound Length (cm) 6.5 cm   Wound Width (cm) 4 cm   Wound Depth (cm) 1.3 cm   Wound Surface Area (cm^2) 26 cm^2   Wound Volume (cm^3) 33.8 cm^3   Wound Assessment Pink/red; Exposed structure muscle   Drainage Amount Small (< 25%)   Drainage Description Serosanguinous   Odor None   Milena-wound Assessment Dry/flaky   Wound 10/16/23 Sacrum   Date First Assessed/Time First Assessed: 10/16/23 1054   Present on Original Admission: Yes  Location: Sacrum   Wound Image    Wound Etiology Pressure Stage 2   Dressing/Treatment Pharmaceutical agent (see MAR)   Wound Length (cm) 1 cm   Wound Width (cm) 1 cm   Wound Depth (cm) 0.1 cm   Wound Surface Area (cm^2) 1 cm^2   Wound Volume (cm^3) 0.1 cm^3   Wound Assessment Pink/red   Drainage Amount Scant (moist but unmeasurable)   Drainage Description Serosanguinous   Odor None   Milena-wound Assessment Dry/flaky   Wound 10/16/23 Buttocks Left   Date First

## 2023-10-16 NOTE — FLOWSHEET NOTE
Pt arrived to unit with the following belongings:    10/15/23 7201   Belongings   Dental Appliances None   Vision - Corrective Lenses None   Hearing Aid None   Clothing Socks; Footwear   Jewelry None   Electronic Devices None   Weapons (Notify Protective Services/Security) None   Other Valuables Other (Comment)  (Leg brace)   Home Medications None   Valuables Given To Family (Comment)   Provide Name(s) of Who Valuable(s) Were Given To   Farhat Murray

## 2023-10-16 NOTE — PLAN OF CARE
I was perfect served by patient's bedside nurse that family wanted to discuss options regarding a barium swallow study and patient's diet. I personally spoke with patient and his wife and daughter. Initially, family stated they did not wish to proceed to with barium swallow study and requested that patient be placed on soft chewable diet along with protein shakes. They stated patient would not eat a thickened liquid diet. I told patient this was his decision and explained the risks of further aspiration if he chooses to proceed with diet without doing a barium swallow study first.  I also explained that there is already concern for post-obstructive pneumonia based on patient's CT scan while in the ED, which family seemed to not be fully aware of. Family then decided to proceed with barium swallow study as long as they are still able to refuse thickened liquid diet if patient were to fail the swallow study. I verified with the patient that he was in agreement with this plan. I then spoke with the bedside nurse regarding patient and family's wishes to proceed with a modified barium swallow study.     Electronically signed by Gilles Mesa DO on 10/16/2023 at 4:04 PM

## 2023-10-17 ENCOUNTER — APPOINTMENT (OUTPATIENT)
Dept: GENERAL RADIOLOGY | Age: 78
DRG: 177 | End: 2023-10-17
Payer: OTHER GOVERNMENT

## 2023-10-17 ENCOUNTER — HOSPITAL ENCOUNTER (OUTPATIENT)
Dept: WOUND CARE | Age: 78
Discharge: HOME | End: 2023-10-17

## 2023-10-17 PROBLEM — E43 SEVERE PROTEIN-CALORIE MALNUTRITION (HCC): Chronic | Status: ACTIVE | Noted: 2023-10-17

## 2023-10-17 LAB
ANION GAP SERPL CALCULATED.3IONS-SCNC: 15 MMOL/L (ref 7–16)
BASOPHILS # BLD: 0.01 K/UL (ref 0–0.2)
BASOPHILS NFR BLD: 0 % (ref 0–2)
BUN SERPL-MCNC: 16 MG/DL (ref 6–23)
CA-I BLD-SCNC: 0.98 MMOL/L (ref 1.15–1.33)
CALCIUM SERPL-MCNC: 7 MG/DL (ref 8.6–10.2)
CHLORIDE SERPL-SCNC: 106 MMOL/L (ref 98–107)
CO2 SERPL-SCNC: 20 MMOL/L (ref 22–29)
CREAT SERPL-MCNC: 0.7 MG/DL (ref 0.7–1.2)
DATE LAST DOSE: NORMAL
EOSINOPHIL # BLD: 0.03 K/UL (ref 0.05–0.5)
EOSINOPHILS RELATIVE PERCENT: 1 % (ref 0–6)
ERYTHROCYTE [DISTWIDTH] IN BLOOD BY AUTOMATED COUNT: 20 % (ref 11.5–15)
GFR SERPL CREATININE-BSD FRML MDRD: >60 ML/MIN/1.73M2
GLUCOSE SERPL-MCNC: 87 MG/DL (ref 74–99)
HCT VFR BLD AUTO: 24.1 % (ref 37–54)
HGB BLD-MCNC: 7.4 G/DL (ref 12.5–16.5)
IMM GRANULOCYTES # BLD AUTO: 0.03 K/UL (ref 0–0.58)
IMM GRANULOCYTES NFR BLD: 1 % (ref 0–5)
L PNEUMO1 AG UR QL IA.RAPID: NEGATIVE
LYMPHOCYTES NFR BLD: 0.15 K/UL (ref 1.5–4)
LYMPHOCYTES RELATIVE PERCENT: 3 % (ref 20–42)
MCH RBC QN AUTO: 31.2 PG (ref 26–35)
MCHC RBC AUTO-ENTMCNC: 30.7 G/DL (ref 32–34.5)
MCV RBC AUTO: 101.7 FL (ref 80–99.9)
MICROORGANISM SPEC CULT: ABNORMAL
MICROORGANISM/AGENT SPEC: ABNORMAL
MONOCYTES NFR BLD: 0.29 K/UL (ref 0.1–0.95)
MONOCYTES NFR BLD: 6 % (ref 2–12)
NEUTROPHILS NFR BLD: 89 % (ref 43–80)
NEUTS SEG NFR BLD: 4.24 K/UL (ref 1.8–7.3)
PLATELET # BLD AUTO: 91 K/UL (ref 130–450)
PLATELET CONFIRMATION: NORMAL
PMV BLD AUTO: 8.8 FL (ref 7–12)
POTASSIUM SERPL-SCNC: 3.4 MMOL/L (ref 3.5–5)
RBC # BLD AUTO: 2.37 M/UL (ref 3.8–5.8)
S PNEUM AG SPEC QL: NEGATIVE
SODIUM SERPL-SCNC: 141 MMOL/L (ref 132–146)
SPECIMEN DESCRIPTION: ABNORMAL
TME LAST DOSE: NORMAL H
VANCOMYCIN DOSE: NORMAL MG
VANCOMYCIN SERPL-MCNC: 7.2 UG/ML (ref 5–40)
WBC OTHER # BLD: 4.8 K/UL (ref 4.5–11.5)

## 2023-10-17 PROCEDURE — 6360000002 HC RX W HCPCS

## 2023-10-17 PROCEDURE — 99231 SBSQ HOSP IP/OBS SF/LOW 25: CPT | Performed by: EMERGENCY MEDICINE

## 2023-10-17 PROCEDURE — 80048 BASIC METABOLIC PNL TOTAL CA: CPT

## 2023-10-17 PROCEDURE — 92526 ORAL FUNCTION THERAPY: CPT

## 2023-10-17 PROCEDURE — 76000 FLUOROSCOPY <1 HR PHYS/QHP: CPT

## 2023-10-17 PROCEDURE — 6360000002 HC RX W HCPCS: Performed by: INTERNAL MEDICINE

## 2023-10-17 PROCEDURE — 94640 AIRWAY INHALATION TREATMENT: CPT

## 2023-10-17 PROCEDURE — 99232 SBSQ HOSP IP/OBS MODERATE 35: CPT | Performed by: INTERNAL MEDICINE

## 2023-10-17 PROCEDURE — 36430 TRANSFUSION BLD/BLD COMPNT: CPT

## 2023-10-17 PROCEDURE — 2580000003 HC RX 258

## 2023-10-17 PROCEDURE — 2500000003 HC RX 250 WO HCPCS: Performed by: INTERNAL MEDICINE

## 2023-10-17 PROCEDURE — 30233N1 TRANSFUSION OF NONAUTOLOGOUS RED BLOOD CELLS INTO PERIPHERAL VEIN, PERCUTANEOUS APPROACH: ICD-10-PCS

## 2023-10-17 PROCEDURE — 80202 ASSAY OF VANCOMYCIN: CPT

## 2023-10-17 PROCEDURE — 2060000000 HC ICU INTERMEDIATE R&B

## 2023-10-17 PROCEDURE — 85025 COMPLETE CBC W/AUTO DIFF WBC: CPT

## 2023-10-17 PROCEDURE — 82330 ASSAY OF CALCIUM: CPT

## 2023-10-17 PROCEDURE — 6370000000 HC RX 637 (ALT 250 FOR IP): Performed by: EMERGENCY MEDICINE

## 2023-10-17 PROCEDURE — 36415 COLL VENOUS BLD VENIPUNCTURE: CPT

## 2023-10-17 PROCEDURE — P9016 RBC LEUKOCYTES REDUCED: HCPCS

## 2023-10-17 RX ORDER — DIGOXIN 0.25 MG/ML
125 INJECTION INTRAMUSCULAR; INTRAVENOUS DAILY
Status: DISCONTINUED | OUTPATIENT
Start: 2023-10-17 | End: 2023-10-19

## 2023-10-17 RX ORDER — SODIUM CHLORIDE 9 MG/ML
INJECTION, SOLUTION INTRAVENOUS PRN
Status: DISCONTINUED | OUTPATIENT
Start: 2023-10-17 | End: 2023-10-19 | Stop reason: HOSPADM

## 2023-10-17 RX ORDER — SODIUM CHLORIDE 0.9 % (FLUSH) 0.9 %
5-40 SYRINGE (ML) INJECTION PRN
Status: DISCONTINUED | OUTPATIENT
Start: 2023-10-17 | End: 2023-10-19 | Stop reason: HOSPADM

## 2023-10-17 RX ORDER — SODIUM CHLORIDE 0.9 % (FLUSH) 0.9 %
5-40 SYRINGE (ML) INJECTION EVERY 12 HOURS SCHEDULED
Status: DISCONTINUED | OUTPATIENT
Start: 2023-10-17 | End: 2023-10-19 | Stop reason: HOSPADM

## 2023-10-17 RX ORDER — HEPARIN 100 UNIT/ML
1 SYRINGE INTRAVENOUS EVERY 12 HOURS SCHEDULED
Status: DISCONTINUED | OUTPATIENT
Start: 2023-10-17 | End: 2023-10-18

## 2023-10-17 RX ORDER — FENTANYL 25 UG/1
1 PATCH TRANSDERMAL
Status: DISCONTINUED | OUTPATIENT
Start: 2023-10-17 | End: 2023-10-18

## 2023-10-17 RX ORDER — POTASSIUM CHLORIDE 7.45 MG/ML
10 INJECTION INTRAVENOUS
Status: DISCONTINUED | OUTPATIENT
Start: 2023-10-17 | End: 2023-10-17 | Stop reason: SDUPTHER

## 2023-10-17 RX ORDER — HEPARIN 100 UNIT/ML
1 SYRINGE INTRAVENOUS PRN
Status: DISCONTINUED | OUTPATIENT
Start: 2023-10-17 | End: 2023-10-18

## 2023-10-17 RX ORDER — POTASSIUM CHLORIDE 7.45 MG/ML
10 INJECTION INTRAVENOUS
Status: COMPLETED | OUTPATIENT
Start: 2023-10-17 | End: 2023-10-17

## 2023-10-17 RX ADMIN — PIPERACILLIN AND TAZOBACTAM 4500 MG: 4; .5 INJECTION, POWDER, LYOPHILIZED, FOR SOLUTION INTRAVENOUS at 09:37

## 2023-10-17 RX ADMIN — PETROLATUM: 420 OINTMENT TOPICAL at 12:08

## 2023-10-17 RX ADMIN — Medication 10 ML: at 20:54

## 2023-10-17 RX ADMIN — CALCIUM GLUCONATE 2000 MG: 98 INJECTION, SOLUTION INTRAVENOUS at 14:47

## 2023-10-17 RX ADMIN — ALBUTEROL SULFATE 2.5 MG: 2.5 SOLUTION RESPIRATORY (INHALATION) at 20:51

## 2023-10-17 RX ADMIN — PETROLATUM: 420 OINTMENT TOPICAL at 20:54

## 2023-10-17 RX ADMIN — ACETYLCYSTEINE 400 MG: 100 SOLUTION ORAL; RESPIRATORY (INHALATION) at 11:42

## 2023-10-17 RX ADMIN — POTASSIUM CHLORIDE 10 MEQ: 7.46 INJECTION, SOLUTION INTRAVENOUS at 11:36

## 2023-10-17 RX ADMIN — POTASSIUM CHLORIDE 10 MEQ: 7.46 INJECTION, SOLUTION INTRAVENOUS at 17:05

## 2023-10-17 RX ADMIN — POTASSIUM CHLORIDE 10 MEQ: 7.46 INJECTION, SOLUTION INTRAVENOUS at 12:48

## 2023-10-17 RX ADMIN — DIGOXIN 125 MCG: 0.25 INJECTION INTRAMUSCULAR; INTRAVENOUS at 20:34

## 2023-10-17 RX ADMIN — METOPROLOL TARTRATE 5 MG: 5 INJECTION INTRAVENOUS at 11:28

## 2023-10-17 RX ADMIN — ACETYLCYSTEINE 400 MG: 100 SOLUTION ORAL; RESPIRATORY (INHALATION) at 20:51

## 2023-10-17 RX ADMIN — ALBUTEROL SULFATE 2.5 MG: 2.5 SOLUTION RESPIRATORY (INHALATION) at 14:41

## 2023-10-17 RX ADMIN — Medication 10 ML: at 09:38

## 2023-10-17 RX ADMIN — POTASSIUM CHLORIDE 10 MEQ: 7.46 INJECTION, SOLUTION INTRAVENOUS at 15:52

## 2023-10-17 RX ADMIN — METOPROLOL TARTRATE 5 MG: 5 INJECTION INTRAVENOUS at 17:00

## 2023-10-17 RX ADMIN — ALBUTEROL SULFATE 2.5 MG: 2.5 SOLUTION RESPIRATORY (INHALATION) at 11:42

## 2023-10-17 RX ADMIN — ACETYLCYSTEINE 400 MG: 100 SOLUTION ORAL; RESPIRATORY (INHALATION) at 14:41

## 2023-10-17 RX ADMIN — METOPROLOL TARTRATE 5 MG: 5 INJECTION INTRAVENOUS at 20:34

## 2023-10-17 ASSESSMENT — PAIN DESCRIPTION - DESCRIPTORS: DESCRIPTORS: ACHING;SORE

## 2023-10-17 ASSESSMENT — PAIN DESCRIPTION - ORIENTATION: ORIENTATION: LOWER

## 2023-10-17 ASSESSMENT — PAIN DESCRIPTION - LOCATION: LOCATION: BACK

## 2023-10-17 ASSESSMENT — PAIN SCALES - GENERAL: PAINLEVEL_OUTOF10: 2

## 2023-10-18 DIAGNOSIS — C61 PROSTATE CA (HCC): ICD-10-CM

## 2023-10-18 DIAGNOSIS — Z51.5 HOSPICE CARE PATIENT: Primary | ICD-10-CM

## 2023-10-18 DIAGNOSIS — G89.3 PAIN, NEOPLASM-RELATED: ICD-10-CM

## 2023-10-18 DIAGNOSIS — Z51.5 PALLIATIVE CARE BY SPECIALIST: ICD-10-CM

## 2023-10-18 LAB
ABO/RH: NORMAL
ANTIBODY SCREEN: NEGATIVE
ARM BAND NUMBER: NORMAL
BLOOD BANK BLOOD PRODUCT EXPIRATION DATE: NORMAL
BLOOD BANK BLOOD PRODUCT EXPIRATION DATE: NORMAL
BLOOD BANK DISPENSE STATUS: NORMAL
BLOOD BANK DISPENSE STATUS: NORMAL
BLOOD BANK ISBT PRODUCT BLOOD TYPE: 6200
BLOOD BANK ISBT PRODUCT BLOOD TYPE: 6200
BLOOD BANK PRODUCT CODE: NORMAL
BLOOD BANK PRODUCT CODE: NORMAL
BLOOD BANK SAMPLE EXPIRATION: NORMAL
BLOOD BANK UNIT TYPE AND RH: NORMAL
BLOOD BANK UNIT TYPE AND RH: NORMAL
BPU ID: NORMAL
BPU ID: NORMAL
COMPONENT: NORMAL
COMPONENT: NORMAL
CROSSMATCH RESULT: NORMAL
CROSSMATCH RESULT: NORMAL
HCT VFR BLD AUTO: 33.6 % (ref 37–54)
HGB BLD-MCNC: 9.9 G/DL (ref 12.5–16.5)
PSA SERPL-MCNC: 122.9 NG/ML (ref 0–4)
TRANSFUSION STATUS: NORMAL
TRANSFUSION STATUS: NORMAL
UNIT DIVISION: 0
UNIT DIVISION: 0
UNIT ISSUE DATE/TIME: NORMAL
UNIT ISSUE DATE/TIME: NORMAL

## 2023-10-18 PROCEDURE — 6370000000 HC RX 637 (ALT 250 FOR IP): Performed by: CLINICAL NURSE SPECIALIST

## 2023-10-18 PROCEDURE — 84153 ASSAY OF PSA TOTAL: CPT

## 2023-10-18 PROCEDURE — 85014 HEMATOCRIT: CPT

## 2023-10-18 PROCEDURE — 99232 SBSQ HOSP IP/OBS MODERATE 35: CPT | Performed by: INTERNAL MEDICINE

## 2023-10-18 PROCEDURE — 2060000000 HC ICU INTERMEDIATE R&B

## 2023-10-18 PROCEDURE — 6360000002 HC RX W HCPCS: Performed by: INTERNAL MEDICINE

## 2023-10-18 PROCEDURE — 6370000000 HC RX 637 (ALT 250 FOR IP)

## 2023-10-18 PROCEDURE — 85018 HEMOGLOBIN: CPT

## 2023-10-18 PROCEDURE — 6360000002 HC RX W HCPCS: Performed by: CHIROPRACTOR

## 2023-10-18 PROCEDURE — 2700000000 HC OXYGEN THERAPY PER DAY

## 2023-10-18 PROCEDURE — 99232 SBSQ HOSP IP/OBS MODERATE 35: CPT | Performed by: CLINICAL NURSE SPECIALIST

## 2023-10-18 PROCEDURE — 94640 AIRWAY INHALATION TREATMENT: CPT

## 2023-10-18 PROCEDURE — 99231 SBSQ HOSP IP/OBS SF/LOW 25: CPT | Performed by: INTERNAL MEDICINE

## 2023-10-18 PROCEDURE — 36415 COLL VENOUS BLD VENIPUNCTURE: CPT

## 2023-10-18 RX ORDER — MORPHINE SULFATE 10 MG/5ML
5 SOLUTION ORAL EVERY 4 HOURS PRN
Status: DISCONTINUED | OUTPATIENT
Start: 2023-10-18 | End: 2023-10-19 | Stop reason: HOSPADM

## 2023-10-18 RX ORDER — IPRATROPIUM BROMIDE AND ALBUTEROL SULFATE 2.5; .5 MG/3ML; MG/3ML
1 SOLUTION RESPIRATORY (INHALATION) EVERY 4 HOURS PRN
Qty: 360 ML | Refills: 3 | Status: SHIPPED | OUTPATIENT
Start: 2023-10-18 | End: 2023-10-19 | Stop reason: HOSPADM

## 2023-10-18 RX ORDER — FENTANYL 37.5 UG/H
1 PATCH, EXTENDED RELEASE TRANSDERMAL
Qty: 10 PATCH | Refills: 0 | Status: SHIPPED | OUTPATIENT
Start: 2023-10-18 | End: 2023-10-19 | Stop reason: HOSPADM

## 2023-10-18 RX ORDER — FENTANYL 12.5 UG/1
1 PATCH TRANSDERMAL
Status: DISCONTINUED | OUTPATIENT
Start: 2023-10-18 | End: 2023-10-19 | Stop reason: HOSPADM

## 2023-10-18 RX ORDER — GLYCOPYRROLATE 1 MG/1
1 TABLET ORAL 4 TIMES DAILY PRN
Qty: 28 TABLET | Refills: 0 | Status: SHIPPED | OUTPATIENT
Start: 2023-10-18 | End: 2023-10-25

## 2023-10-18 RX ORDER — MORPHINE SULFATE 20 MG/ML
2.5 SOLUTION ORAL
Qty: 15 ML | Refills: 0 | Status: SHIPPED | OUTPATIENT
Start: 2023-10-18 | End: 2023-10-28

## 2023-10-18 RX ORDER — FENTANYL 25 UG/1
1 PATCH TRANSDERMAL
Status: DISCONTINUED | OUTPATIENT
Start: 2023-10-18 | End: 2023-10-19 | Stop reason: HOSPADM

## 2023-10-18 RX ORDER — LORAZEPAM 1 MG/1
1 TABLET ORAL EVERY 6 HOURS PRN
Qty: 28 TABLET | Refills: 0 | Status: SHIPPED | OUTPATIENT
Start: 2023-10-18 | End: 2023-10-25

## 2023-10-18 RX ADMIN — IPRATROPIUM BROMIDE 0.5 MG: 0.5 SOLUTION RESPIRATORY (INHALATION) at 19:38

## 2023-10-18 RX ADMIN — PETROLATUM: 420 OINTMENT TOPICAL at 11:51

## 2023-10-18 RX ADMIN — ALBUTEROL SULFATE 2.5 MG: 2.5 SOLUTION RESPIRATORY (INHALATION) at 11:11

## 2023-10-18 RX ADMIN — PETROLATUM: 420 OINTMENT TOPICAL at 20:34

## 2023-10-18 RX ADMIN — IPRATROPIUM BROMIDE 0.5 MG: 0.5 SOLUTION RESPIRATORY (INHALATION) at 14:47

## 2023-10-18 RX ADMIN — ACETYLCYSTEINE 400 MG: 100 SOLUTION ORAL; RESPIRATORY (INHALATION) at 11:09

## 2023-10-18 RX ADMIN — ACETYLCYSTEINE 400 MG: 100 SOLUTION ORAL; RESPIRATORY (INHALATION) at 19:38

## 2023-10-18 RX ADMIN — ACETYLCYSTEINE 400 MG: 100 SOLUTION ORAL; RESPIRATORY (INHALATION) at 14:47

## 2023-10-18 ASSESSMENT — PAIN SCALES - GENERAL
PAINLEVEL_OUTOF10: 0
PAINLEVEL_OUTOF10: 0

## 2023-10-18 NOTE — TELEPHONE ENCOUNTER
Patient will be admitted to hospice the [facility] tomorrow. Spoke with RN liaison in the hospital and refill was sent to hospice pharmacy for patient's fentanyl patch prescribed by palliative medicine clinic and nebulizer treatments.

## 2023-10-18 NOTE — DISCHARGE SUMMARY
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/2023  Hx of multiple hepatic cysts measuring up to 7.5cm, 3/2023    Hx of chronically elevated LFTs 2/2 widespread bone mets   Hx of anemia 2/2 ACD, Pluvicto infusions  Hx of chronic pressure wounds      Discharge Diagnosis:  Pneumonia likely 2/2 aspiration vs CAP  Atrial fibrillation with RVR  Metastatic prostate cancer  Macrocytic anemia likely 2/2 poor intake  Transaminitis likely 2/2 metastatic lesions  Hx of MCA CVA in 2015 with residual right sided paralysis, expressive aphasia, and dysphagia  Hx of HFpEF  Hx of VRE + urine (8/1/2023)  Hx of L-sided cerbral hemorrhagic contusions 2/2 fall from wheelchair  HS troponin elevation   HLD  Hx of incontinence   Hx of multiple hepatic cysts measuring up to 7.5cm, 3/2023    Hx of chronically elevated LFTs 2/2 widespread bone mets   Hx of anemia 2/2 ACD, Pluvicto infusions  Hx of chronic pressure wounds      Hospital Course:   Edmond Alcazar is a 72-year-old male with a past medical history significant for atrial fibrillation, hyperlipidemia, CVA with residual dysphagia and right-sided weakness, and metastatic prostate cancer. Patient presented to the ED on 10/15 with a chief complaint of worsening cough for 1 week. Patient has a history of dysphagia, however his wife states that she does feed him p.o. at home. Patient's wife states that he can usually tolerate this.   However, over the past week patient

## 2023-10-18 NOTE — PLAN OF CARE
Problem: Chronic Conditions and Co-morbidities  Goal: Patient's chronic conditions and co-morbidity symptoms are monitored and maintained or improved  10/17/2023 1049 by Lavell Ybarra RN  Outcome: Progressing     Problem: Discharge Planning  Goal: Discharge to home or other facility with appropriate resources  10/17/2023 2150 by Kevyn Jennings RN  Outcome: Progressing  10/17/2023 1049 by Lavell Ybarra RN  Outcome: Progressing     Problem: Safety - Adult  Goal: Free from fall injury  10/17/2023 2150 by Kevyn Jennings RN  Outcome: Progressing  10/17/2023 1049 by Lavell Ybarra RN  Outcome: Progressing     Problem: Skin/Tissue Integrity  Goal: Absence of new skin breakdown  Description: 1. Monitor for areas of redness and/or skin breakdown  2. Assess vascular access sites hourly  3. Every 4-6 hours minimum:  Change oxygen saturation probe site  4. Every 4-6 hours:  If on nasal continuous positive airway pressure, respiratory therapy assess nares and determine need for appliance change or resting period. 10/17/2023 1049 by Lavell Ybarra RN  Outcome: Progressing     Problem: Chronic Conditions and Co-morbidities  Goal: Patient's chronic conditions and co-morbidity symptoms are monitored and maintained or improved  10/17/2023 1049 by Lavell Ybarra RN  Outcome: Progressing     Problem: Discharge Planning  Goal: Discharge to home or other facility with appropriate resources  10/17/2023 2150 by Kevyn Jennings RN  Outcome: Progressing  10/17/2023 1049 by Lavell Ybarra RN  Outcome: Progressing     Problem: Safety - Adult  Goal: Free from fall injury  10/17/2023 2150 by Kevyn Jennings RN  Outcome: Progressing  10/17/2023 1049 by Lavell Ybarra RN  Outcome: Progressing     Problem: Skin/Tissue Integrity  Goal: Absence of new skin breakdown  Description: 1. Monitor for areas of redness and/or skin breakdown  2. Assess vascular access sites hourly  3.   Every 4-6 hours minimum:  Change oxygen

## 2023-10-19 VITALS
HEIGHT: 70 IN | RESPIRATION RATE: 22 BRPM | DIASTOLIC BLOOD PRESSURE: 60 MMHG | OXYGEN SATURATION: 93 % | WEIGHT: 126.7 LBS | TEMPERATURE: 98.2 F | BODY MASS INDEX: 18.14 KG/M2 | HEART RATE: 103 BPM | SYSTOLIC BLOOD PRESSURE: 121 MMHG

## 2023-10-19 PROCEDURE — 94640 AIRWAY INHALATION TREATMENT: CPT

## 2023-10-19 PROCEDURE — 2700000000 HC OXYGEN THERAPY PER DAY

## 2023-10-19 PROCEDURE — 6360000002 HC RX W HCPCS: Performed by: CHIROPRACTOR

## 2023-10-19 RX ADMIN — IPRATROPIUM BROMIDE 0.5 MG: 0.5 SOLUTION RESPIRATORY (INHALATION) at 08:18

## 2023-10-19 RX ADMIN — ACETYLCYSTEINE 400 MG: 100 SOLUTION ORAL; RESPIRATORY (INHALATION) at 08:18

## 2023-10-19 RX ADMIN — PETROLATUM: 420 OINTMENT TOPICAL at 09:02

## 2023-10-19 ASSESSMENT — PAIN SCALES - PAIN ASSESSMENT IN ADVANCED DEMENTIA (PAINAD)
CONSOLABILITY: 0
NEGVOCALIZATION: 0
TOTALSCORE: 0
BREATHING: 0
BODYLANGUAGE: 0
FACIALEXPRESSION: 0

## 2023-10-19 ASSESSMENT — PAIN SCALES - GENERAL: PAINLEVEL_OUTOF10: 0

## 2023-10-19 NOTE — CARE COORDINATION
Care Coordination:   70 yr old male admitted with cough, weakness, and choking on food. Found to be septic . Chart reviewed and met with patient and wife and daughter at bedside. Patient lethargic and unable to participate. Currently on IV antibiotics. NPO pending swallow evaluation. He has hx of prostate CA  with mets and receives chemo shots every 6 weeks at [facility]. Just received  #3 of 6 scheduled. Patient has hx of CVA with some aphasia and right sided weakness. He is being cared for at home by his wife and as a service connected [employer] he receives 40 hours of in home care weekly. Wife works. Home is one floor. They have a lift chair, a shower chair, hospital bed. Grab bars  and elevated toilet wheelchair, kane cane. He has hx of HHC in the past with [facility] and [facility] and family would want to use [facility] if needed at discharge. Referral called to Shaunna Howell at [facility] who will follow. Wife will notify the [place] agency [place] the [place] and inform of [place]. SW informed [facility] and faxed clinicals. Was contacted by Lolita Humphreys who is at x [phone]. He will follow and verified service connection. Primary coverage will be from his health insurance. Lolita Humphreys  will call for updates and will need notified of discharge. Patient follows at [facility] with . His PCP is [facility] Plan is home with skilled care and home health aids provided by [address] [place] [address]. Will follow.
Case Management Assessment  Initial Evaluation    Date/Time of Evaluation: 10/16/2023 2:46 PM  Assessment Completed by: PATRICE Hernandez    If patient is discharged prior to next notation, then this note serves as note for discharge by case management. Patient Name: Jake Moran                   YOB: 1945  Diagnosis: Lactic acidosis [E87.20]  Pneumonia of left lower lobe due to infectious organism [J18.9]  Pneumonia due to infectious organism [J18.9]                   Date / Time: 10/15/2023  1:36 PM    Patient Admission Status: Inpatient   Readmission Risk (Low < 19, Mod (19-27), High > 27): Readmission Risk Score: 22.2    Current PCP: Sherie Ni MD  PCP verified by CM? (P) Yes    Chart Reviewed: Yes      History Provided by:    Patient Orientation: (P) Unable to Assess    Patient Cognition: (P) Short Term Memory Deficit    Hospitalization in the last 30 days (Readmission):  No    If yes, Readmission Assessment in  Navigator will be completed.     Advance Directives:      Code Status: Limited   Patient's Primary Decision Maker is:      Primary Decision Maker: Sharonda Miller - Spouse - [phone]    Secondary Decision Maker: Patricia Kelly  Child - [phone]    Discharge Planning:    Patient lives with: Spouse/Significant Other, Children Type of Home: House  Primary Care Giver: (P) Family ([facility] has private duty caregivers through the [place])  Patient Support Systems include: (P) Spouse/Significant Other, Children, Home Care Staff, Other (Comment) ([place])   Current Financial resources:    Current community resources:    Current services prior to admission: Home Care            Current DME:              Type of Home Care services:  Nursing Services    ADLS  Prior functional level: (P) Assistance with the following:, Bathing, Cooking, Housework, Shopping, Mobility  Current functional level: (P) Assistance with the following:, Bathing, Dressing, Toileting, Feeding, [address],
Discharge order noted. Pt to go home with HOV, O2 was delivered last evening. Transport set for 10:30-11am. Spoke with family in room, no other needs. Kentno Christie, MSW, LSW
Hospice order noted, spoke with daughter Ne Raygoza, family would like [address] [place] [address]. Referral to [address] [place] [address]  referral line, called Quin RUIZ, they will come to room and meet with family. Family wants pt home with [facility], await assessment. Edenilson Hernández, MSW, LSW
Reviewed chart, pt from home with spouse and [address] [place] [address] [address] aide 40hr/wk. Pt is a limited code.
Self

## 2023-10-19 NOTE — DISCHARGE INSTR - COC
1306   Change in Wound Size % (l*w) 0 10/18/23 1306   Wound Volume (cm^3) 0.18 cm^3 10/18/23 1306   Wound Healing % 0 10/18/23 1306   Wound Assessment Pink/red 10/18/23 2033   Drainage Amount None (dry) 10/18/23 2033   Drainage Description Serosanguinous 10/17/23 2015   Odor None 10/18/23 2033   Milena-wound Assessment Dry/flaky 10/18/23 2033   Number of days: 2        Elimination:  Continence: Bowel: {YES / QW:89260}CX  Bladder: {YES / LB:05473}OJ  Urinary Catheter: {Urinary Catheter:951996004} none  Colostomy/Ileostomy/Ileal Conduit: {YES / NO:72397}none       Date of Last BM: ***10/18/23    Intake/Output Summary (Last 24 hours) at 10/19/2023 0947  Last data filed at 10/19/2023 0650  Gross per 24 hour   Intake 0 ml   Output 700 ml   Net -700 ml     I/O last 3 completed shifts:   In: 311.7 [Blood:311.7]  Out: 1200 [Urine:1200]    Safety Concerns:     { LIZABETH Safety Concerns:932216532}weakness, flaccid right side, dysphagia, confusion    Impairments/Disabilities:      { LIZABETH Impairments/Disabilities:379658384}    Nutrition Therapy:  Current Nutrition Therapy:   { LIZABETH Diet List:924090250}NPO    Routes of Feeding: {CHP DME Other Feedings:906427582}NPO  Liquids: {Slp liquid thickness:40830}n/a  Daily Fluid Restriction: {CHP DME Yes amt example:148825827}n/a  Last Modified Barium Swallow with Video (Video Swallowing Test): {Done Not Done PLYW:274928661}PFK done    Treatments at the Time of Hospital Discharge:   Respiratory Treatments: ***  Oxygen Therapy:  {Therapy; copd oxygen:87141}2 liters per NC  Ventilator:    { CC Vent LSFB:968213391}NWGU    Rehab Therapies: {THERAPEUTIC INTERVENTION:1558090545}  Weight Bearing Status/Restrictions: { CC Weight Bearin}BR  Other Medical Equipment (for information only, NOT a DME order):  {EQUIPMENT:705878918}  Other Treatments: ***    Patient's personal belongings (please select all that are sent with patient):  {P DME Belongings:633647364}    RN SIGNATURE:

## 2023-10-19 NOTE — PLAN OF CARE
Problem: Discharge Planning  Goal: Discharge to home or other facility with appropriate resources  Outcome: Completed     Problem: Safety - Adult  Goal: Free from fall injury  Outcome: Completed     Problem: Skin/Tissue Integrity  Goal: Absence of new skin breakdown  Description: 1. Monitor for areas of redness and/or skin breakdown  2. Assess vascular access sites hourly  3. Every 4-6 hours minimum:  Change oxygen saturation probe site  4. Every 4-6 hours:  If on nasal continuous positive airway pressure, respiratory therapy assess nares and determine need for appliance change or resting period.   Outcome: Completed     Problem: Nutrition Deficit:  Goal: Optimize nutritional status  Outcome: Completed     Problem: ABCDS Injury Assessment  Goal: Absence of physical injury  Outcome: Completed     Problem: Pain  Goal: Verbalizes/displays adequate comfort level or baseline comfort level  Outcome: Completed

## 2023-10-19 NOTE — DISCHARGE INSTR - DIET

## 2023-10-20 LAB
MICROORGANISM SPEC CULT: NORMAL
MICROORGANISM SPEC CULT: NORMAL
SERVICE CMNT-IMP: NORMAL
SERVICE CMNT-IMP: NORMAL
SPECIMEN DESCRIPTION: NORMAL
SPECIMEN DESCRIPTION: NORMAL

## (undated) DEVICE — GLOVE SURG L12IN SZ 65FNGR THK94MIL TRNSLUC YEL LTX

## (undated) DEVICE — CATHETER URET 5FR L70CM OPN END SGL LUMN INJ HUB FLEXIMA

## (undated) DEVICE — GAUZE,SPONGE,4"X4",16PLY,XRAY,STRL,LF: Brand: MEDLINE

## (undated) DEVICE — BAG DRNGE COMB PK

## (undated) DEVICE — GLOVE ORANGE PI 7 1/2   MSG9075

## (undated) DEVICE — NEEDLE HYPO 25GA L1.5IN BLU POLYPR HUB S STL REG BVL STR

## (undated) DEVICE — GARMENT,MEDLINE,DVT,INT,CALF,MED, GEN2: Brand: MEDLINE

## (undated) DEVICE — DRAINBAG,ANTI-REFLUX TOWER,L/F,2000ML,LL: Brand: MEDLINE

## (undated) DEVICE — SOLUTION IV IRRIG WATER 1000ML POUR BRL 2F7114

## (undated) DEVICE — INTENDED FOR TISSUE SEPARATION, AND OTHER PROCEDURES THAT REQUIRE A SHARP SURGICAL BLADE TO PUNCTURE OR CUT.: Brand: BARD-PARKER ® STAINLESS STEEL BLADES

## (undated) DEVICE — Z INACTIVE USE 2635503 SOLUTION IRRIG 3000ML ST H2O USP UROMATIC PLAS CONT

## (undated) DEVICE — SURGICAL PROCEDURE PACK BASIC

## (undated) DEVICE — BAG DRAINAGE CONTAINER 15 LT FLUID COLLCTN

## (undated) DEVICE — GUIDEWIRE ENDOSCP L150CM DIA0.035IN TIP 3CM PTFE NIT

## (undated) DEVICE — GOWN,SIRUS,NONRNF,SETINSLV,XL,20/CS: Brand: MEDLINE

## (undated) DEVICE — TUBING, SUCTION, 1/4" X 10', STRAIGHT: Brand: MEDLINE

## (undated) DEVICE — CHLORAPREP 26ML ORANGE

## (undated) DEVICE — CAMERA STRYKER 1488 HD GEN

## (undated) DEVICE — SET SURG INSTR MINI VASC

## (undated) DEVICE — SYRINGE MED 10ML POLYPR LUERSLIP TIP FLAT TOP W/O SFTY DISP

## (undated) DEVICE — BASIC SINGLE BASIN 1-LF: Brand: MEDLINE INDUSTRIES, INC.

## (undated) DEVICE — COVER,LIGHT HANDLE,FLX,1/PK: Brand: MEDLINE INDUSTRIES, INC.

## (undated) DEVICE — SHEET, T, LAPAROTOMY, STERILE: Brand: MEDLINE

## (undated) DEVICE — GLOVE SURG SZ 75 L12IN FNGR THK94MIL TRNSLUC YEL LTX

## (undated) DEVICE — CYSTO PACK: Brand: MEDLINE INDUSTRIES, INC.

## (undated) DEVICE — TRAY URO PREPPING W/ SYN VYN GLV 10CC SYR CATH LUB POVIDONE